# Patient Record
Sex: MALE | Race: WHITE | NOT HISPANIC OR LATINO | URBAN - METROPOLITAN AREA
[De-identification: names, ages, dates, MRNs, and addresses within clinical notes are randomized per-mention and may not be internally consistent; named-entity substitution may affect disease eponyms.]

---

## 2018-12-13 ENCOUNTER — EMERGENCY (EMERGENCY)
Facility: HOSPITAL | Age: 63
LOS: 1 days | Discharge: LEFT BEFORE DISCHARGE | End: 2018-12-13
Attending: EMERGENCY MEDICINE

## 2018-12-13 VITALS
TEMPERATURE: 98 F | RESPIRATION RATE: 18 BRPM | WEIGHT: 210.1 LBS | SYSTOLIC BLOOD PRESSURE: 226 MMHG | OXYGEN SATURATION: 98 % | DIASTOLIC BLOOD PRESSURE: 116 MMHG | HEIGHT: 72 IN | HEART RATE: 100 BPM
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
## 2018-12-13 NOTE — ED ADULT NURSE NOTE - CHPI ED NUR SYMPTOMS NEG
no difficulty bearing weight/no laceration/no back pain/no bruising/no dizziness/no loss of consciousness/no disorientation
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
## 2018-12-21 ENCOUNTER — APPOINTMENT (OUTPATIENT)
Dept: OTOLARYNGOLOGY | Facility: CLINIC | Age: 63
End: 2018-12-21

## 2019-01-18 ENCOUNTER — APPOINTMENT (OUTPATIENT)
Dept: OTOLARYNGOLOGY | Facility: CLINIC | Age: 64
End: 2019-01-18
Payer: SELF-PAY

## 2019-01-18 VITALS
WEIGHT: 210 LBS | BODY MASS INDEX: 28.44 KG/M2 | SYSTOLIC BLOOD PRESSURE: 173 MMHG | DIASTOLIC BLOOD PRESSURE: 91 MMHG | HEART RATE: 75 BPM | HEIGHT: 72 IN

## 2019-01-18 DIAGNOSIS — Z82.2 FAMILY HISTORY OF DEAFNESS AND HEARING LOSS: ICD-10-CM

## 2019-01-18 DIAGNOSIS — Z87.828 PERSONAL HISTORY OF OTHER (HEALED) PHYSICAL INJURY AND TRAUMA: ICD-10-CM

## 2019-01-18 DIAGNOSIS — Z78.9 OTHER SPECIFIED HEALTH STATUS: ICD-10-CM

## 2019-01-18 DIAGNOSIS — H90.3 SENSORINEURAL HEARING LOSS, BILATERAL: ICD-10-CM

## 2019-01-18 PROCEDURE — 99203 OFFICE O/P NEW LOW 30 MIN: CPT | Mod: 25

## 2019-01-18 PROCEDURE — 92567 TYMPANOMETRY: CPT

## 2019-01-18 PROCEDURE — 92563 TONE DECAY HEARING TEST: CPT

## 2019-01-18 PROCEDURE — 92557 COMPREHENSIVE HEARING TEST: CPT

## 2019-01-18 NOTE — REASON FOR VISIT
[Initial Consultation] : an initial consultation for [Hearing Loss] : hearing loss [FreeTextEntry2] : ear

## 2019-01-18 NOTE — REVIEW OF SYSTEMS
[Seasonal Allergies] : seasonal allergies [Hearing Loss] : hearing loss [Dizziness] : dizziness [Vertigo] : vertigo [Negative] : Heme/Lymph [FreeTextEntry1] : headache  weight loss
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
## 2019-02-15 ENCOUNTER — APPOINTMENT (OUTPATIENT)
Age: 64
End: 2019-02-15
Payer: SELF-PAY

## 2019-02-15 VITALS
SYSTOLIC BLOOD PRESSURE: 181 MMHG | HEIGHT: 72 IN | HEART RATE: 87 BPM | WEIGHT: 210 LBS | BODY MASS INDEX: 28.44 KG/M2 | DIASTOLIC BLOOD PRESSURE: 92 MMHG

## 2019-02-15 PROCEDURE — 92567 TYMPANOMETRY: CPT

## 2019-02-15 PROCEDURE — 99213 OFFICE O/P EST LOW 20 MIN: CPT | Mod: 25

## 2019-02-15 PROCEDURE — 92557 COMPREHENSIVE HEARING TEST: CPT
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
## 2019-02-15 NOTE — REASON FOR VISIT
[Subsequent Evaluation] : a subsequent evaluation for [Hearing Loss] : hearing loss [FreeTextEntry2] :  as hearing loss mild tinnitus
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
## 2019-03-11 ENCOUNTER — APPOINTMENT (OUTPATIENT)
Dept: OTOLARYNGOLOGY | Facility: CLINIC | Age: 64
End: 2019-03-11
Payer: SELF-PAY

## 2019-03-11 VITALS — BODY MASS INDEX: 28.44 KG/M2 | HEIGHT: 72 IN | WEIGHT: 210 LBS

## 2019-03-11 DIAGNOSIS — H90.12 CONDUCTIVE HEARING LOSS, UNILATERAL, LEFT EAR, WITH UNRESTRICTED HEARING ON THE CONTRALATERAL SIDE: ICD-10-CM

## 2019-03-11 PROCEDURE — 99213 OFFICE O/P EST LOW 20 MIN: CPT

## 2019-03-11 NOTE — PHYSICAL EXAM
[Normal] : tympanic membranes are normal in both ears [de-identified] : maria elena 512 lat to rt ac>bc as rinne

## 2019-03-11 NOTE — ASSESSMENT
[FreeTextEntry1] : cnow w as tinnitus due to hearing loss\par last audio conductive component\par rec fu audio 2 weeks

## 2019-03-11 NOTE — HISTORY OF PRESENT ILLNESS
[de-identified] : co as ringing ie high pitch whine\par  co discomfort and loss hearing as\par co as clogging

## 2019-03-18 ENCOUNTER — APPOINTMENT (OUTPATIENT)
Dept: OTOLARYNGOLOGY | Facility: CLINIC | Age: 64
End: 2019-03-18
Payer: SELF-PAY

## 2019-03-18 DIAGNOSIS — H68.022 CHRONIC EUSTACHIAN SALPINGITIS, LEFT EAR: ICD-10-CM

## 2019-03-18 DIAGNOSIS — H91.22 SUDDEN IDIOPATHIC HEARING LOSS, LEFT EAR: ICD-10-CM

## 2019-03-18 PROCEDURE — 92557 COMPREHENSIVE HEARING TEST: CPT

## 2019-03-18 PROCEDURE — 92567 TYMPANOMETRY: CPT

## 2019-03-18 PROCEDURE — 99213 OFFICE O/P EST LOW 20 MIN: CPT | Mod: 25
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
## 2019-03-18 NOTE — ASSESSMENT
[FreeTextEntry1] : audio as high frequency loss 6000 8000 hz \par source of tinnitus as \par most likely permanent\par fu audio 1 y

## 2020-02-05 ENCOUNTER — EMERGENCY (EMERGENCY)
Facility: HOSPITAL | Age: 65
LOS: 0 days | Discharge: HOME | End: 2020-02-06
Attending: EMERGENCY MEDICINE
Payer: MEDICARE

## 2020-02-05 VITALS
HEIGHT: 70 IN | HEART RATE: 79 BPM | OXYGEN SATURATION: 97 % | RESPIRATION RATE: 18 BRPM | TEMPERATURE: 99 F | SYSTOLIC BLOOD PRESSURE: 210 MMHG | DIASTOLIC BLOOD PRESSURE: 110 MMHG

## 2020-02-05 DIAGNOSIS — R41.82 ALTERED MENTAL STATUS, UNSPECIFIED: ICD-10-CM

## 2020-02-05 DIAGNOSIS — R53.1 WEAKNESS: ICD-10-CM

## 2020-02-05 DIAGNOSIS — R29.711 NIHSS SCORE 11: ICD-10-CM

## 2020-02-05 DIAGNOSIS — R47.9 UNSPECIFIED SPEECH DISTURBANCES: ICD-10-CM

## 2020-02-05 DIAGNOSIS — I63.9 CEREBRAL INFARCTION, UNSPECIFIED: ICD-10-CM

## 2020-02-05 LAB
ALBUMIN SERPL ELPH-MCNC: 3.3 G/DL — SIGNIFICANT CHANGE UP (ref 3.3–5)
ALP SERPL-CCNC: 81 U/L — SIGNIFICANT CHANGE UP (ref 40–120)
ALT FLD-CCNC: 28 U/L — SIGNIFICANT CHANGE UP (ref 12–78)
ANION GAP SERPL CALC-SCNC: 5 MMOL/L — SIGNIFICANT CHANGE UP (ref 5–17)
APTT BLD: 38.4 SEC — HIGH (ref 27.5–36.3)
AST SERPL-CCNC: 23 U/L — SIGNIFICANT CHANGE UP (ref 15–37)
BASOPHILS # BLD AUTO: 0.05 K/UL — SIGNIFICANT CHANGE UP (ref 0–0.2)
BASOPHILS NFR BLD AUTO: 0.5 % — SIGNIFICANT CHANGE UP (ref 0–2)
BILIRUB SERPL-MCNC: 0.2 MG/DL — SIGNIFICANT CHANGE UP (ref 0.2–1.2)
BUN SERPL-MCNC: 14 MG/DL — SIGNIFICANT CHANGE UP (ref 7–23)
CALCIUM SERPL-MCNC: 8.6 MG/DL — SIGNIFICANT CHANGE UP (ref 8.5–10.1)
CHLORIDE SERPL-SCNC: 102 MMOL/L — SIGNIFICANT CHANGE UP (ref 96–108)
CO2 SERPL-SCNC: 30 MMOL/L — SIGNIFICANT CHANGE UP (ref 22–31)
CREAT SERPL-MCNC: 1 MG/DL — SIGNIFICANT CHANGE UP (ref 0.5–1.3)
EOSINOPHIL # BLD AUTO: 0.02 K/UL — SIGNIFICANT CHANGE UP (ref 0–0.5)
EOSINOPHIL NFR BLD AUTO: 0.2 % — SIGNIFICANT CHANGE UP (ref 0–6)
GLUCOSE SERPL-MCNC: 128 MG/DL — HIGH (ref 70–99)
HCT VFR BLD CALC: 39.4 % — SIGNIFICANT CHANGE UP (ref 39–50)
HGB BLD-MCNC: 12.6 G/DL — LOW (ref 13–17)
IMM GRANULOCYTES NFR BLD AUTO: 0.4 % — SIGNIFICANT CHANGE UP (ref 0–1.5)
INR BLD: 0.98 RATIO — SIGNIFICANT CHANGE UP (ref 0.88–1.16)
LYMPHOCYTES # BLD AUTO: 0.54 K/UL — LOW (ref 1–3.3)
LYMPHOCYTES # BLD AUTO: 5.7 % — LOW (ref 13–44)
MCHC RBC-ENTMCNC: 29.3 PG — SIGNIFICANT CHANGE UP (ref 27–34)
MCHC RBC-ENTMCNC: 32 GM/DL — SIGNIFICANT CHANGE UP (ref 32–36)
MCV RBC AUTO: 91.6 FL — SIGNIFICANT CHANGE UP (ref 80–100)
MONOCYTES # BLD AUTO: 0.31 K/UL — SIGNIFICANT CHANGE UP (ref 0–0.9)
MONOCYTES NFR BLD AUTO: 3.3 % — SIGNIFICANT CHANGE UP (ref 2–14)
NEUTROPHILS # BLD AUTO: 8.51 K/UL — HIGH (ref 1.8–7.4)
NEUTROPHILS NFR BLD AUTO: 89.9 % — HIGH (ref 43–77)
PLATELET # BLD AUTO: 230 K/UL — SIGNIFICANT CHANGE UP (ref 150–400)
POTASSIUM SERPL-MCNC: 4.2 MMOL/L — SIGNIFICANT CHANGE UP (ref 3.5–5.3)
POTASSIUM SERPL-SCNC: 4.2 MMOL/L — SIGNIFICANT CHANGE UP (ref 3.5–5.3)
PROT SERPL-MCNC: 6.7 GM/DL — SIGNIFICANT CHANGE UP (ref 6–8.3)
PROTHROM AB SERPL-ACNC: 10.9 SEC — SIGNIFICANT CHANGE UP (ref 10–12.9)
RBC # BLD: 4.3 M/UL — SIGNIFICANT CHANGE UP (ref 4.2–5.8)
RBC # FLD: 12.9 % — SIGNIFICANT CHANGE UP (ref 10.3–14.5)
SODIUM SERPL-SCNC: 137 MMOL/L — SIGNIFICANT CHANGE UP (ref 135–145)
WBC # BLD: 9.47 K/UL — SIGNIFICANT CHANGE UP (ref 3.8–10.5)
WBC # FLD AUTO: 9.47 K/UL — SIGNIFICANT CHANGE UP (ref 3.8–10.5)

## 2020-02-05 PROCEDURE — G0425: CPT

## 2020-02-05 PROCEDURE — 99291 CRITICAL CARE FIRST HOUR: CPT | Mod: 25

## 2020-02-05 PROCEDURE — 70450 CT HEAD/BRAIN W/O DYE: CPT

## 2020-02-05 PROCEDURE — 70496 CT ANGIOGRAPHY HEAD: CPT | Mod: 26

## 2020-02-05 PROCEDURE — 96374 THER/PROPH/DIAG INJ IV PUSH: CPT | Mod: XU

## 2020-02-05 PROCEDURE — 85025 COMPLETE CBC W/AUTO DIFF WBC: CPT

## 2020-02-05 PROCEDURE — 99291 CRITICAL CARE FIRST HOUR: CPT

## 2020-02-05 PROCEDURE — 93005 ELECTROCARDIOGRAM TRACING: CPT

## 2020-02-05 PROCEDURE — 96375 TX/PRO/DX INJ NEW DRUG ADDON: CPT | Mod: XU

## 2020-02-05 PROCEDURE — 85730 THROMBOPLASTIN TIME PARTIAL: CPT

## 2020-02-05 PROCEDURE — 80053 COMPREHEN METABOLIC PANEL: CPT

## 2020-02-05 PROCEDURE — 85610 PROTHROMBIN TIME: CPT

## 2020-02-05 PROCEDURE — 36415 COLL VENOUS BLD VENIPUNCTURE: CPT

## 2020-02-05 PROCEDURE — 70498 CT ANGIOGRAPHY NECK: CPT | Mod: 26

## 2020-02-05 PROCEDURE — 70496 CT ANGIOGRAPHY HEAD: CPT

## 2020-02-05 PROCEDURE — 70498 CT ANGIOGRAPHY NECK: CPT

## 2020-02-05 PROCEDURE — 82962 GLUCOSE BLOOD TEST: CPT

## 2020-02-05 PROCEDURE — 93010 ELECTROCARDIOGRAM REPORT: CPT

## 2020-02-05 RX ORDER — ALTEPLASE 100 MG
8.9 KIT INTRAVENOUS ONCE
Refills: 0 | Status: COMPLETED | OUTPATIENT
Start: 2020-02-05 | End: 2020-02-05

## 2020-02-05 RX ORDER — ALTEPLASE 100 MG
80.2 KIT INTRAVENOUS ONCE
Refills: 0 | Status: COMPLETED | OUTPATIENT
Start: 2020-02-05 | End: 2020-02-05

## 2020-02-05 RX ORDER — LABETALOL HCL 100 MG
10 TABLET ORAL ONCE
Refills: 0 | Status: COMPLETED | OUTPATIENT
Start: 2020-02-05 | End: 2020-02-05

## 2020-02-05 RX ORDER — NICARDIPINE HYDROCHLORIDE 30 MG/1
5 CAPSULE, EXTENDED RELEASE ORAL
Qty: 40 | Refills: 0 | Status: DISCONTINUED | OUTPATIENT
Start: 2020-02-05 | End: 2020-02-06

## 2020-02-05 RX ADMIN — Medication 10 MILLIGRAM(S): at 22:45

## 2020-02-05 RX ADMIN — NICARDIPINE HYDROCHLORIDE 25 MG/HR: 30 CAPSULE, EXTENDED RELEASE ORAL at 22:47

## 2020-02-05 RX ADMIN — ALTEPLASE 80.2 MILLIGRAM(S): KIT at 22:50

## 2020-02-05 RX ADMIN — ALTEPLASE 534 MILLIGRAM(S): KIT at 22:48

## 2020-02-05 NOTE — ED PROVIDER NOTE - PROGRESS NOTE DETAILS
Alissa BOLANOS for ED attending, Dr. Jarrell: TPA administered. Case d/w telestroke attending (see note).  Pt is not improving after TPA and has a significant lesion on CTA.  Transfer to [facility] coordinated by telestroke.  Repeat CT head order to r/o ICH s/p administration of TPA.  Cricket Jarrell, DO

## 2020-02-05 NOTE — ED ADULT NURSE NOTE - AGENT'S NAME
Phentermine Approved        Filling Pharmacy: [facility] [id number]  
Phentermine Pending    Insurance response  Prescription Drug Insurance: [employer]  Notes: Prior authorization submitted - will update provider when decision has been made by insurance.         
Sheeba wife

## 2020-02-05 NOTE — ED PROVIDER NOTE - CRITICAL CARE PROVIDED
additional history taking/consultation with other physicians/documentation/interpretation of diagnostic studies/consult w/ pt's family directly relating to pts condition/direct patient care (not related to procedure)

## 2020-02-05 NOTE — ED ADULT NURSE NOTE - CHIEF COMPLAINT QUOTE
AS per EMS and family, 2 hours PTA patient noted to become confused, right sided weakness, slurred speech. No pertinent medical history as per family.  at triage. MD Jarrell at triage, Code Stroke called [phone].

## 2020-02-05 NOTE — ED ADULT TRIAGE NOTE - CHIEF COMPLAINT QUOTE
AS per EMS and family, 2 hours PTA patient noted to become confused, right sided weakness, slurred speech. No pertinent medical history as per family.  at triage. MD Jarrell at triage, Code Stroke called [phone].

## 2020-02-05 NOTE — STROKE CODE NOTE - ASSESSMENT/PLAN
Ischemic stroke NIHSS 11, ASPECT SCORE 9, cta SHOWED LEFT ICA occlusion s/p tPA at 22:48  CT perfusion at [place]   Neurointerventional surgeon will  assess the patient at [facility] for endovascular, already informed Ischemic stroke NIHSS 11, ASPECT SCORE 9, CTA SHOWED LEFT ICA occlusion s/p tPA at 22:48  CT perfusion at [place]   Neurointerventional surgeon will  assess the patient at [place] for endovascular, already informed

## 2020-02-05 NOTE — ED ADULT NURSE REASSESSMENT NOTE - NS ED NURSE REASSESS COMMENT FT1
As per , maintain TPA infusion below 185 SBP, instruction verified by  with TARUN Baron at bedside, will continue to monitor.

## 2020-02-05 NOTE — ED ADULT NURSE NOTE - NSIMPLEMENTINTERV_GEN_ALL_ED
Implemented All Fall Risk Interventions:  [place] to call system. Call bell, personal items and telephone within reach. Instruct patient to call for assistance. Room bathroom lighting operational. Non-slip footwear when patient is off stretcher. Physically safe environment: no spills, clutter or unnecessary equipment. Stretcher in lowest position, wheels locked, appropriate side rails in place. Provide visual cue, wrist band, yellow gown, etc. Monitor gait and stability. Monitor for mental status changes and reorient to person, place, and time. Review medications for side effects contributing to fall risk. Reinforce activity limits and safety measures with patient and family.

## 2020-02-05 NOTE — STROKE CODE NOTE - SUBJECTIVE
HPI Objective Statement: 63 y/o male with no pertinent PMHx presents to the ED BIBA for weakness. Last known normal was at 7: 30 pm. Per family at bedside, pt started to become confused, was not conversing per his baseline, and was having right sided weakness. Pt was unable to speak/was speaking less. He was given tPA at 22:48 pm by ED physician HPI Objective Statement: 65 y/o male with no pertinent PMHx presents to the ED BIBA for weakness. Last known normal was at 7: 30 pm. Per family at bedside, pt started to become confused, was not conversing per his baseline, and was having right sided weakness. Pt was unable to speak/was speaking less. He was given tPA at 22:48 pm by ED physician before activation of the telestroke.

## 2020-02-05 NOTE — ED PROVIDER NOTE - OBJECTIVE STATEMENT
63 y/o male with no pertinent PMHx presents to the ED BIBA for stroke-like sx 2 hours PTA. Per family at bedside, pt started to become confused, was not conversing per his baseline, and was having right sided weakness. Pt was unable to speak/was speaking less. Per family, pt had recent surgery in December 2019 but cannot recall what it was. Bp 210/110 at ED. No blood thinners.

## 2020-02-05 NOTE — ED ADULT NURSE REASSESSMENT NOTE - NS ED NURSE REASSESS COMMENT FT1
Instructed by  to administer TPA with SBP of 184, states no need for Telestroke at this time, tpa will be pushed, ct read +cva, -bleed.

## 2020-02-05 NOTE — ED PROVIDER NOTE - CLINICAL SUMMARY MEDICAL DECISION MAKING FREE TEXT BOX
Pt presents with acute stroke presentation within TPA window. Stat CT head to r/o ICH. Pt give Labetalol to reduce systolic BP below 185, admit to ICU. Pt presents with acute stroke presentation within TPA window. Stat CT head to r/o ICH. Pt give Labetalol to reduce systolic BP below 185, admit to ICU.  Pt started on Nicardipine drip to control BP.  Tele-stroke attending contacted for transfer as pt has a significant lesion without improvement after TPA.

## 2020-02-05 NOTE — STROKE CODE NOTE - OBJECTIVE
65 yo right handed with ischemic stroke, NIHSS 11, ASPECT SCORE 9, cta SHOWED LEFT ICA occlusion s/p tPA  Neurointerventional called at [place] ( Dr Dale), will transfer patient to [place]

## 2020-02-06 ENCOUNTER — TRANSCRIPTION ENCOUNTER (OUTPATIENT)
Age: 65
End: 2020-02-06

## 2020-02-06 ENCOUNTER — INPATIENT (INPATIENT)
Facility: HOSPITAL | Age: 65
LOS: 6 days | Discharge: HOME | DRG: 64 | End: 2020-02-13
Attending: HOSPITALIST | Admitting: INTERNAL MEDICINE
Payer: COMMERCIAL

## 2020-02-06 VITALS
DIASTOLIC BLOOD PRESSURE: 83 MMHG | HEIGHT: 70 IN | HEART RATE: 71 BPM | SYSTOLIC BLOOD PRESSURE: 144 MMHG | RESPIRATION RATE: 14 BRPM | TEMPERATURE: 99 F | OXYGEN SATURATION: 98 % | WEIGHT: 217.82 LBS

## 2020-02-06 VITALS
DIASTOLIC BLOOD PRESSURE: 77 MMHG | SYSTOLIC BLOOD PRESSURE: 166 MMHG | OXYGEN SATURATION: 96 % | HEART RATE: 74 BPM | RESPIRATION RATE: 12 BRPM

## 2020-02-06 DIAGNOSIS — I63.9 CEREBRAL INFARCTION, UNSPECIFIED: ICD-10-CM

## 2020-02-06 LAB
ANION GAP SERPL CALC-SCNC: 12 MMOL/L — SIGNIFICANT CHANGE UP (ref 5–17)
BUN SERPL-MCNC: 9 MG/DL — SIGNIFICANT CHANGE UP (ref 8–20)
CALCIUM SERPL-MCNC: 9.3 MG/DL — SIGNIFICANT CHANGE UP (ref 8.6–10.2)
CHLORIDE SERPL-SCNC: 98 MMOL/L — SIGNIFICANT CHANGE UP (ref 98–107)
CHOLEST SERPL-MCNC: 187 MG/DL — SIGNIFICANT CHANGE UP (ref 110–199)
CO2 SERPL-SCNC: 26 MMOL/L — SIGNIFICANT CHANGE UP (ref 22–29)
CREAT SERPL-MCNC: 0.56 MG/DL — SIGNIFICANT CHANGE UP (ref 0.5–1.3)
GLUCOSE SERPL-MCNC: 135 MG/DL — HIGH (ref 70–99)
HBA1C BLD-MCNC: 6 % — HIGH (ref 4–5.6)
HCT VFR BLD CALC: 40.1 % — SIGNIFICANT CHANGE UP (ref 39–50)
HDLC SERPL-MCNC: 75 MG/DL — SIGNIFICANT CHANGE UP
HGB BLD-MCNC: 12.8 G/DL — LOW (ref 13–17)
LIPID PNL WITH DIRECT LDL SERPL: 104 MG/DL — SIGNIFICANT CHANGE UP
MAGNESIUM SERPL-MCNC: 2 MG/DL — SIGNIFICANT CHANGE UP (ref 1.6–2.6)
MCHC RBC-ENTMCNC: 28.8 PG — SIGNIFICANT CHANGE UP (ref 27–34)
MCHC RBC-ENTMCNC: 31.9 GM/DL — LOW (ref 32–36)
MCV RBC AUTO: 90.3 FL — SIGNIFICANT CHANGE UP (ref 80–100)
PHOSPHATE SERPL-MCNC: 3.9 MG/DL — SIGNIFICANT CHANGE UP (ref 2.4–4.7)
PLATELET # BLD AUTO: 236 K/UL — SIGNIFICANT CHANGE UP (ref 150–400)
POTASSIUM SERPL-MCNC: 5 MMOL/L — SIGNIFICANT CHANGE UP (ref 3.5–5.3)
POTASSIUM SERPL-SCNC: 5 MMOL/L — SIGNIFICANT CHANGE UP (ref 3.5–5.3)
RBC # BLD: 4.44 M/UL — SIGNIFICANT CHANGE UP (ref 4.2–5.8)
RBC # FLD: 12.8 % — SIGNIFICANT CHANGE UP (ref 10.3–14.5)
SODIUM SERPL-SCNC: 136 MMOL/L — SIGNIFICANT CHANGE UP (ref 135–145)
TOTAL CHOLESTEROL/HDL RATIO MEASUREMENT: 2 RATIO — LOW (ref 3.4–9.6)
TRIGL SERPL-MCNC: 42 MG/DL — SIGNIFICANT CHANGE UP (ref 10–200)
WBC # BLD: 9.51 K/UL — SIGNIFICANT CHANGE UP (ref 3.8–10.5)
WBC # FLD AUTO: 9.51 K/UL — SIGNIFICANT CHANGE UP (ref 3.8–10.5)

## 2020-02-06 PROCEDURE — 93306 TTE W/DOPPLER COMPLETE: CPT | Mod: 26

## 2020-02-06 PROCEDURE — 99223 1ST HOSP IP/OBS HIGH 75: CPT

## 2020-02-06 PROCEDURE — 0042T: CPT

## 2020-02-06 PROCEDURE — 99223 1ST HOSP IP/OBS HIGH 75: CPT | Mod: AI

## 2020-02-06 PROCEDURE — 70552 MRI BRAIN STEM W/DYE: CPT | Mod: 26

## 2020-02-06 PROCEDURE — 70450 CT HEAD/BRAIN W/O DYE: CPT | Mod: 26,77

## 2020-02-06 PROCEDURE — 99285 EMERGENCY DEPT VISIT HI MDM: CPT

## 2020-02-06 PROCEDURE — 70450 CT HEAD/BRAIN W/O DYE: CPT | Mod: 26

## 2020-02-06 PROCEDURE — 93880 EXTRACRANIAL BILAT STUDY: CPT | Mod: 26

## 2020-02-06 RX ORDER — NICARDIPINE HYDROCHLORIDE 30 MG/1
7.5 CAPSULE, EXTENDED RELEASE ORAL
Qty: 40 | Refills: 0 | Status: DISCONTINUED | OUTPATIENT
Start: 2020-02-06 | End: 2020-02-06

## 2020-02-06 RX ORDER — NICARDIPINE HYDROCHLORIDE 30 MG/1
5 CAPSULE, EXTENDED RELEASE ORAL
Qty: 40 | Refills: 0 | Status: DISCONTINUED | OUTPATIENT
Start: 2020-02-06 | End: 2020-02-07

## 2020-02-06 RX ORDER — ATORVASTATIN CALCIUM 80 MG/1
80 TABLET, FILM COATED ORAL AT BEDTIME
Refills: 0 | Status: DISCONTINUED | OUTPATIENT
Start: 2020-02-06 | End: 2020-02-13

## 2020-02-06 RX ADMIN — NICARDIPINE HYDROCHLORIDE 37.5 MG/HR: 30 CAPSULE, EXTENDED RELEASE ORAL at 02:57

## 2020-02-06 NOTE — H&P ADULT - NSHPSOCIALHISTORY_GEN_ALL_CORE
Lives at home w/ significant other, Sheeba. Hx of oxycodone abuse, now on suboxone. Current nicotine vape smoker x2 yrs, smoked cigarettes for 2-3 years prior to vaping. Denies hx of EtOH abuse.

## 2020-02-06 NOTE — CONSULT NOTE ADULT - ASSESSMENT
This is a 63 y/o M w/ no significant PMHx who was transferred from [facility] ED with L fronto-temporal CVA s/p tPA administration at 22:48. CTA head performed at [facility] revealing of total left ICA occlusion w/ right hemiplegia and slurred speech  Now admitted to MICU  Vacular surgery consulted for Left ICA occlusion  -No acute surgical intevention  -Recommend Carotid duplex  -Further management per MICU  -We will follow    Patient seen and examined, d/w Dr. Lowery

## 2020-02-06 NOTE — PROGRESS NOTE ADULT - SUBJECTIVE AND OBJECTIVE BOX
Patient is a 64y old  Male who presents with a chief complaint of Acute CVA (06 Feb 2020 08:56)      BRIEF HOSPITAL COURSE: ***    Events last 24 hours: ***    PAST MEDICAL & SURGICAL HISTORY:  No pertinent past medical history  No significant past surgical history    Allergies    No Known Allergies    Intolerances      FAMILY HISTORY:      Social History:     Review of Systems:  CONSTITUTIONAL: No fever, chills, or fatigue  EYES: No eye pain, visual disturbances, or discharge  ENMT:  No difficulty hearing, tinnitus, vertigo; No sinus or throat pain  NECK: No pain or stiffness  RESPIRATORY: No cough, wheezing, chills or hemoptysis; No shortness of breath  CARDIOVASCULAR: No chest pain, palpitations, dizziness, or leg swelling  GASTROINTESTINAL: No abdominal or epigastric pain. No nausea, vomiting, or hematemesis; No diarrhea or constipation. No melena or hematochezia.  GENITOURINARY: No dysuria, frequency, hematuria, or incontinence  NEUROLOGICAL: No headaches, memory loss, loss of strength, numbness, or tremors  SKIN: No itching, burning, rashes, or lesions   MUSCULOSKELETAL: No joint pain or swelling; No muscle, back, or extremity pain  PSYCHIATRIC: No depression, anxiety, mood swings, or difficulty sleeping      Vitals During Exam:   HR:   BP:   RR:  sPO2:     Physical Examination:    General: No acute distress.      HEENT: Pupils equal, reactive to light.  Symmetric.    PULM: Clear to auscultation bilaterally, no significant sputum production    CVS: Regular rate and rhythm, no murmurs, rubs, or gallops    ABD: Soft, nondistended, nontender, normoactive bowel sounds, no masses    EXT: No edema, nontender    SKIN: Warm and well perfused, no rashes noted.    NEURO: Alert, oriented, interactive, nonfocal      Medications:    niCARdipine Infusion 5 mG/Hr IV Continuous <Continuous>                atorvastatin 80 milliGRAM(s) Oral at bedtime                  ICU Vital Signs Last 24 Hrs  T(C): 36.8 (06 Feb 2020 07:37), Max: 37.3 (05 Feb 2020 22:22)  T(F): 98.2 (06 Feb 2020 07:37), Max: 99.1 (05 Feb 2020 22:22)  HR: 64 (06 Feb 2020 09:00) (61 - 87)  BP: 160/97 (06 Feb 2020 09:00) (122/58 - 210/110)  BP(mean): 116 (06 Feb 2020 09:00) (83 - 129)  ABP: --  ABP(mean): --  RR: 12 (06 Feb 2020 09:00) (10 - 23)  SpO2: 93% (06 Feb 2020 09:00) (93% - 100%)    Vital Signs Last 24 Hrs  T(C): 36.8 (06 Feb 2020 07:37), Max: 37.3 (05 Feb 2020 22:22)  T(F): 98.2 (06 Feb 2020 07:37), Max: 99.1 (05 Feb 2020 22:22)  HR: 64 (06 Feb 2020 09:00) (61 - 87)  BP: 160/97 (06 Feb 2020 09:00) (122/58 - 210/110)  BP(mean): 116 (06 Feb 2020 09:00) (83 - 129)  RR: 12 (06 Feb 2020 09:00) (10 - 23)  SpO2: 93% (06 Feb 2020 09:00) (93% - 100%)        I&O's Detail    05 Feb 2020 07:01  -  06 Feb 2020 07:00  --------------------------------------------------------  IN:    niCARdipine Infusion: 25 mL  Total IN: 25 mL    OUT:    Voided: 160 mL  Total OUT: 160 mL    Total NET: -135 mL      06 Feb 2020 07:01  -  06 Feb 2020 09:35  --------------------------------------------------------  IN:  Total IN: 0 mL    OUT:    Voided: 200 mL  Total OUT: 200 mL    Total NET: -200 mL            LABS:                        12.8   9.51  )-----------( 236      ( 06 Feb 2020 07:40 )             40.1     02-06    136  |  98  |  9.0  ----------------------------<  135<H>  5.0   |  26.0  |  0.56    Ca    9.3      06 Feb 2020 07:40  Phos  3.9     02-06  Mg     2.0     02-06    TPro  6.7  /  Alb  3.3  /  TBili  0.2  /  DBili  x   /  AST  23  /  ALT  28  /  AlkPhos  81  02-05          CAPILLARY BLOOD GLUCOSE      POCT Blood Glucose.: 140 mg/dL (06 Feb 2020 01:06)    PT/INR - ( 05 Feb 2020 22:43 )   PT: 10.9 sec;   INR: 0.98 ratio         PTT - ( 05 Feb 2020 22:43 )  PTT:38.4 sec      RADIOLOGY:   < from: CT Perfusion w/ Maps w/ IV Cont (02.06.20 @ 01:29) >   EXAM:  CT PERFUSION W MAPS IC                         EXAM:  CT BRAIN                          PROCEDURE DATE:  02/06/2020          INTERPRETATION:  HISTORY: Status post TPA. Altered mental status.    COMPARISON: CT head 2/6/2020    TECHNIQUE: Axial noncontrast CT images from the skull base to the vertex were obtained and submitted for interpretation. Coronal and sagittal reformatted images were performed. Bone and soft tissue windows were evaluated. Perfusion maps were generated and submittedfor evaluation.    FINDINGS:    CT HEAD    Subtle loss of gray-white differentiation in the left superior frontal gyrus due to acute infarction.    There is no acute intracranial mass-effect, hemorrhage, midline shift, or abnormal extra-axial fluid collection.     Ventricles, sulci, and cisterns are normal in size for the patient's age without hydrocephalus. Basal cisterns are patent.     Visualized paranasal sinuses and mastoid air cells are clear. Calvarium is intact.     CT PERFUSION    Moderate to large region of asymmetric decrease in time parameters, including mean transit time and Tmax, in the left anterior circulation involving the left frontoparietal region. There is a small region of matched decrease in cerebral blood volume and blood flow in the left superior frontal gyrus due to acute infarction.     No abnormality noted in the right cerebral hemisphere.    IMPRESSION:     CT HEAD: Acute infarction of the left superior frontal gyrus. No acute intracranial bleeding.    CT PERFUSION: Small to moderate-sized left frontal infarction with moderate to large region of low flow and/or ischemic state in the left frontoparietal region. Findings are in keeping with mid left cervical ICA occlusion.    NORMA LEES M.D., ATTENDING RADIOLOGIST  This document has been electronically signed. Feb 6 2020  1:45AM    < end of copied text >    < from: CT Head No Cont (02.06.20 @ 01:28) >   EXAM:  CT PERFUSION W MAPS IC                         EXAM:  CT BRAIN                          PROCEDURE DATE:  02/06/2020      INTERPRETATION:  HISTORY: Status post TPA. Altered mental status.    COMPARISON: CT head 2/6/2020    TECHNIQUE: Axial noncontrast CT images from the skull base to the vertex were obtained and submitted for interpretation. Coronal and sagittal reformatted images were performed. Bone and soft tissue windows were evaluated. Perfusion maps were generated and submittedfor evaluation.    FINDINGS:    CT HEAD    Subtle loss of gray-white differentiation in the left superior frontal gyrus due to acute infarction.    There is no acute intracranial mass-effect, hemorrhage, midline shift, or abnormal extra-axial fluid collection.     Ventricles, sulci, and cisterns are normal in size for the patient's age without hydrocephalus. Basal cisterns are patent.     Visualized paranasal sinuses and mastoid air cells are clear. Calvarium is intact.     CT PERFUSION    Moderate to large region of asymmetric decrease in time parameters, including mean transit time and Tmax, in the left anterior circulation involving the left frontoparietal region. There is a small region of matched decrease in cerebral blood volume and blood flow in the left superior frontal gyrus due to acute infarction.     No abnormality noted in the right cerebral hemisphere.    IMPRESSION:     CT HEAD: Acute infarction of the left superior frontal gyrus. No acute intracranial bleeding.    CT PERFUSION: Small to moderate-sized left frontal infarction with moderate to large region of low flow and/or ischemic state in the left frontoparietal region. Findings are in keeping with mid left cervical ICA occlusion.    NORMA LEES M.D., ATTENDING RADIOLOGIST  This document has been electronically signed. Feb 6 2020  1:45AM    < end of copied text >      SUPPLEMENTAL O2: NC   LINES: Peripheral IV  IVF:   CULVER:   PPx:   CONTACT: Patient is a 64y old  Male who presents with a chief complaint of Acute CVA (06 Feb 2020 08:56)      BRIEF HOSPITAL COURSE:   65 yo m no significant pmhx transferred from  with acute CVA.  Patient presented to  with right sided weakness and ataxia.  Patient was in his normal state of health and then at ~1930 yesterday patient's wife noted him to be swaying to one side and then with right sided weakness with progression to aphasia prompting 911 call.  In  ED patient with /100, slurred speech and dense right sided hemiplegia, Code Stroke, head ct negative, started on cardene gtt and tpa given at 22:48 2/5/2020.  CTA performed that revealed moderate left frontal infarction with moderate to large region of low flow/ischemic state in left frontoparietal region and significant occlusion of left internal carotid prompting transfer to [facility]. Patient seen by vascular, not a candidate for surgical intervention at that time.      Events last 24 hours:   This am called by radiology,  CTA head/neck with new read: right carotid dissection.  Vascular team called, made aware and considering full ac      PAST MEDICAL & SURGICAL HISTORY:  No pertinent past medical history  No significant past surgical history    Allergies  No Known Allergies      FAMILY HISTORY:  unknown     Social History:   From home, lives with significant other (Sheeba).  Remote oxycodone abuse now on suboxone.  Active vape smoker x2 years, prior smoked cigarettes for # years. No etoh use.       Review of Systems:  All ROS negative except as appreciated.       Vitals During Exam:   HR: 64  BP: 160/97 mmHg   RR: 12  sPO2: 94% on RA     Physical Examination:    General: Elderly male, lying in bed, appears comfortable.     HEENT: Pupils equal, reactive to light.  Symmetric.    PULM: Clear to auscultation bilaterally, no significant sputum production    CVS: Regular rate and rhythm, no murmurs, rubs, or gallops    ABD: Soft, nondistended, nontender, normoactive bowel sounds, no masses    EXT: No edema, nontender    SKIN: Warm and well perfused, no rashes noted.    NEURO: Alert, oriented, interactive, nonfocal      Medications:  niCARdipine Infusion 5 mG/Hr IV Continuous <Continuous>  atorvastatin 80 milliGRAM(s) Oral at bedtime      ICU Vital Signs Last 24 Hrs  T(C): 36.8 (06 Feb 2020 07:37), Max: 37.3 (05 Feb 2020 22:22)  T(F): 98.2 (06 Feb 2020 07:37), Max: 99.1 (05 Feb 2020 22:22)  HR: 64 (06 Feb 2020 09:00) (61 - 87)  BP: 160/97 (06 Feb 2020 09:00) (122/58 - 210/110)  BP(mean): 116 (06 Feb 2020 09:00) (83 - 129)  ABP: --  ABP(mean): --  RR: 12 (06 Feb 2020 09:00) (10 - 23)  SpO2: 93% (06 Feb 2020 09:00) (93% - 100%)    Vital Signs Last 24 Hrs  T(C): 36.8 (06 Feb 2020 07:37), Max: 37.3 (05 Feb 2020 22:22)  T(F): 98.2 (06 Feb 2020 07:37), Max: 99.1 (05 Feb 2020 22:22)  HR: 64 (06 Feb 2020 09:00) (61 - 87)  BP: 160/97 (06 Feb 2020 09:00) (122/58 - 210/110)  BP(mean): 116 (06 Feb 2020 09:00) (83 - 129)  RR: 12 (06 Feb 2020 09:00) (10 - 23)  SpO2: 93% (06 Feb 2020 09:00) (93% - 100%)      I&O's Detail    05 Feb 2020 07:01  -  06 Feb 2020 07:00  --------------------------------------------------------  IN:    niCARdipine Infusion: 25 mL  Total IN: 25 mL    OUT:    Voided: 160 mL  Total OUT: 160 mL  Total NET: -135 mL      06 Feb 2020 07:01  -  06 Feb 2020 09:35  --------------------------------------------------------  IN:  Total IN: 0 mL    OUT:    Voided: 200 mL  Total OUT: 200 mL  Total NET: -200 mL      LABS:                        12.8   9.51  )-----------( 236      ( 06 Feb 2020 07:40 )             40.1     02-06    136  |  98  |  9.0  ----------------------------<  135<H>  5.0   |  26.0  |  0.56    Ca    9.3      06 Feb 2020 07:40  Phos  3.9     02-06  Mg     2.0     02-06    TPro  6.7  /  Alb  3.3  /  TBili  0.2  /  DBili  x   /  AST  23  /  ALT  28  /  AlkPhos  81  02-05      CAPILLARY BLOOD GLUCOSE  POCT Blood Glucose.: 140 mg/dL (06 Feb 2020 01:06)      PT/INR - ( 05 Feb 2020 22:43 )   PT: 10.9 sec;   INR: 0.98 ratio    PTT - ( 05 Feb 2020 22:43 )  PTT:38.4 sec      RADIOLOGY:   < from: CT Perfusion w/ Maps w/ IV Cont (02.06.20 @ 01:29) >   EXAM:  CT PERFUSION W MAPS IC                         EXAM:  CT BRAIN                          PROCEDURE DATE:  02/06/2020      INTERPRETATION:  HISTORY: Status post TPA. Altered mental status.    COMPARISON: CT head 2/6/2020    TECHNIQUE: Axial noncontrast CT images from the skull base to the vertex were obtained and submitted for interpretation. Coronal and sagittal reformatted images were performed. Bone and soft tissue windows were evaluated. Perfusion maps were generated and submittedfor evaluation.    FINDINGS:    CT HEAD    Subtle loss of gray-white differentiation in the left superior frontal gyrus due to acute infarction.    There is no acute intracranial mass-effect, hemorrhage, midline shift, or abnormal extra-axial fluid collection.     Ventricles, sulci, and cisterns are normal in size for the patient's age without hydrocephalus. Basal cisterns are patent.     Visualized paranasal sinuses and mastoid air cells are clear. Calvarium is intact.     CT PERFUSION    Moderate to large region of asymmetric decrease in time parameters, including mean transit time and Tmax, in the left anterior circulation involving the left frontoparietal region. There is a small region of matched decrease in cerebral blood volume and blood flow in the left superior frontal gyrus due to acute infarction.     No abnormality noted in the right cerebral hemisphere.    IMPRESSION:     CT HEAD: Acute infarction of the left superior frontal gyrus. No acute intracranial bleeding.    CT PERFUSION: Small to moderate-sized left frontal infarction with moderate to large region of low flow and/or ischemic state in the left frontoparietal region. Findings are in keeping with mid left cervical ICA occlusion.    NORMA LEES M.D., ATTENDING RADIOLOGIST  This document has been electronically signed. Feb 6 2020  1:45AM    < end of copied text >    < from: CT Head No Cont (02.06.20 @ 01:28) >   EXAM:  CT PERFUSION W MAPS IC                         EXAM:  CT BRAIN                          PROCEDURE DATE:  02/06/2020      INTERPRETATION:  HISTORY: Status post TPA. Altered mental status.    COMPARISON: CT head 2/6/2020    TECHNIQUE: Axial noncontrast CT images from the skull base to the vertex were obtained and submitted for interpretation. Coronal and sagittal reformatted images were performed. Bone and soft tissue windows were evaluated. Perfusion maps were generated and submittedfor evaluation.    FINDINGS:    CT HEAD    Subtle loss of gray-white differentiation in the left superior frontal gyrus due to acute infarction.    There is no acute intracranial mass-effect, hemorrhage, midline shift, or abnormal extra-axial fluid collection.     Ventricles, sulci, and cisterns are normal in size for the patient's age without hydrocephalus. Basal cisterns are patent.     Visualized paranasal sinuses and mastoid air cells are clear. Calvarium is intact.     CT PERFUSION    Moderate to large region of asymmetric decrease in time parameters, including mean transit time and Tmax, in the left anterior circulation involving the left frontoparietal region. There is a small region of matched decrease in cerebral blood volume and blood flow in the left superior frontal gyrus due to acute infarction.     No abnormality noted in the right cerebral hemisphere.    IMPRESSION:     CT HEAD: Acute infarction of the left superior frontal gyrus. No acute intracranial bleeding.    CT PERFUSION: Small to moderate-sized left frontal infarction with moderate to large region of low flow and/or ischemic state in the left frontoparietal region. Findings are in keeping with mid left cervical ICA occlusion.    NORMA LEES M.D., ATTENDING RADIOLOGIST  This document has been electronically signed. Feb 6 2020  1:45AM    < end of copied text >      SUPPLEMENTAL O2: NC   LINES: Peripheral IV  IVF: N  CULVER: N  PPx: SCD  CONTACT: N Patient is a 64y old  Male who presents with a chief complaint of Acute CVA (06 Feb 2020 08:56)      BRIEF HOSPITAL COURSE:   65 yo m no significant pmhx transferred from  with acute CVA.  Patient presented to  with right sided weakness and ataxia.  Patient was in his normal state of health and then at ~1930 yesterday patient's wife noted him to be swaying to one side and then with right sided weakness with progression to aphasia prompting 911 call.  In  ED patient with /100, slurred speech and dense right sided hemiplegia, Code Stroke, head ct negative, started on cardene gtt and tpa given at 22:48 2/5/2020.  CTA performed that revealed moderate left frontal infarction with moderate to large region of low flow/ischemic state in left frontoparietal region and significant occlusion of left internal carotid prompting transfer to [facility]. Patient seen by vascular, not a candidate for surgical intervention at that time.      Events last 24 hours:   This am called by radiology,  CTA head/neck with new read: right carotid dissection.  Vascular team called, made aware and considering full ac.  MRi/a ordered.       PAST MEDICAL & SURGICAL HISTORY:  No pertinent past medical history  No significant past surgical history    Allergies  No Known Allergies      FAMILY HISTORY:  unknown     Social History:   From home, lives with significant other (Sheeba).  Remote oxycodone abuse now on suboxone.  Active vape smoker x2 years, prior smoked cigarettes for # years. No etoh use.       Review of Systems:  All ROS negative except as appreciated.       Vitals During Exam:   HR: 64  BP: 160/97 mmHg   RR: 12  sPO2: 94% on RA     Physical Examination:    General: Elderly male, lying in bed, appears comfortable.     HEENT: Pupils equal, reactive to light.  Symmetric.    PULM: Clear to auscultation bilaterally, no significant sputum production    CVS: Regular rate and rhythm, no murmurs, rubs, or gallops    ABD: Soft, nondistended, nontender, normoactive bowel sounds, no masses    EXT: No edema, nontender    SKIN: Warm and well perfused, no rashes noted.    NEURO: Alert, oriented, interactive, nonfocal      Medications:  niCARdipine Infusion 5 mG/Hr IV Continuous <Continuous>  atorvastatin 80 milliGRAM(s) Oral at bedtime      ICU Vital Signs Last 24 Hrs  T(C): 36.8 (06 Feb 2020 07:37), Max: 37.3 (05 Feb 2020 22:22)  T(F): 98.2 (06 Feb 2020 07:37), Max: 99.1 (05 Feb 2020 22:22)  HR: 64 (06 Feb 2020 09:00) (61 - 87)  BP: 160/97 (06 Feb 2020 09:00) (122/58 - 210/110)  BP(mean): 116 (06 Feb 2020 09:00) (83 - 129)  ABP: --  ABP(mean): --  RR: 12 (06 Feb 2020 09:00) (10 - 23)  SpO2: 93% (06 Feb 2020 09:00) (93% - 100%)    Vital Signs Last 24 Hrs  T(C): 36.8 (06 Feb 2020 07:37), Max: 37.3 (05 Feb 2020 22:22)  T(F): 98.2 (06 Feb 2020 07:37), Max: 99.1 (05 Feb 2020 22:22)  HR: 64 (06 Feb 2020 09:00) (61 - 87)  BP: 160/97 (06 Feb 2020 09:00) (122/58 - 210/110)  BP(mean): 116 (06 Feb 2020 09:00) (83 - 129)  RR: 12 (06 Feb 2020 09:00) (10 - 23)  SpO2: 93% (06 Feb 2020 09:00) (93% - 100%)      I&O's Detail    05 Feb 2020 07:01  -  06 Feb 2020 07:00  --------------------------------------------------------  IN:    niCARdipine Infusion: 25 mL  Total IN: 25 mL    OUT:    Voided: 160 mL  Total OUT: 160 mL  Total NET: -135 mL      06 Feb 2020 07:01  -  06 Feb 2020 09:35  --------------------------------------------------------  IN:  Total IN: 0 mL    OUT:    Voided: 200 mL  Total OUT: 200 mL  Total NET: -200 mL      LABS:                        12.8   9.51  )-----------( 236      ( 06 Feb 2020 07:40 )             40.1     02-06    136  |  98  |  9.0  ----------------------------<  135<H>  5.0   |  26.0  |  0.56    Ca    9.3      06 Feb 2020 07:40  Phos  3.9     02-06  Mg     2.0     02-06    TPro  6.7  /  Alb  3.3  /  TBili  0.2  /  DBili  x   /  AST  23  /  ALT  28  /  AlkPhos  81  02-05      CAPILLARY BLOOD GLUCOSE  POCT Blood Glucose.: 140 mg/dL (06 Feb 2020 01:06)      PT/INR - ( 05 Feb 2020 22:43 )   PT: 10.9 sec;   INR: 0.98 ratio    PTT - ( 05 Feb 2020 22:43 )  PTT:38.4 sec      RADIOLOGY:   < from: CT Perfusion w/ Maps w/ IV Cont (02.06.20 @ 01:29) >   EXAM:  CT PERFUSION W MAPS IC                         EXAM:  CT BRAIN                          PROCEDURE DATE:  02/06/2020      INTERPRETATION:  HISTORY: Status post TPA. Altered mental status.    COMPARISON: CT head 2/6/2020    TECHNIQUE: Axial noncontrast CT images from the skull base to the vertex were obtained and submitted for interpretation. Coronal and sagittal reformatted images were performed. Bone and soft tissue windows were evaluated. Perfusion maps were generated and submittedfor evaluation.    FINDINGS:    CT HEAD    Subtle loss of gray-white differentiation in the left superior frontal gyrus due to acute infarction.    There is no acute intracranial mass-effect, hemorrhage, midline shift, or abnormal extra-axial fluid collection.     Ventricles, sulci, and cisterns are normal in size for the patient's age without hydrocephalus. Basal cisterns are patent.     Visualized paranasal sinuses and mastoid air cells are clear. Calvarium is intact.     CT PERFUSION    Moderate to large region of asymmetric decrease in time parameters, including mean transit time and Tmax, in the left anterior circulation involving the left frontoparietal region. There is a small region of matched decrease in cerebral blood volume and blood flow in the left superior frontal gyrus due to acute infarction.     No abnormality noted in the right cerebral hemisphere.    IMPRESSION:     CT HEAD: Acute infarction of the left superior frontal gyrus. No acute intracranial bleeding.    CT PERFUSION: Small to moderate-sized left frontal infarction with moderate to large region of low flow and/or ischemic state in the left frontoparietal region. Findings are in keeping with mid left cervical ICA occlusion.    NORMA LEES M.D., ATTENDING RADIOLOGIST  This document has been electronically signed. Feb 6 2020  1:45AM    < end of copied text >    < from: CT Head No Cont (02.06.20 @ 01:28) >   EXAM:  CT PERFUSION W MAPS IC                         EXAM:  CT BRAIN                          PROCEDURE DATE:  02/06/2020      INTERPRETATION:  HISTORY: Status post TPA. Altered mental status.    COMPARISON: CT head 2/6/2020    TECHNIQUE: Axial noncontrast CT images from the skull base to the vertex were obtained and submitted for interpretation. Coronal and sagittal reformatted images were performed. Bone and soft tissue windows were evaluated. Perfusion maps were generated and submittedfor evaluation.    FINDINGS:    CT HEAD    Subtle loss of gray-white differentiation in the left superior frontal gyrus due to acute infarction.    There is no acute intracranial mass-effect, hemorrhage, midline shift, or abnormal extra-axial fluid collection.     Ventricles, sulci, and cisterns are normal in size for the patient's age without hydrocephalus. Basal cisterns are patent.     Visualized paranasal sinuses and mastoid air cells are clear. Calvarium is intact.     CT PERFUSION    Moderate to large region of asymmetric decrease in time parameters, including mean transit time and Tmax, in the left anterior circulation involving the left frontoparietal region. There is a small region of matched decrease in cerebral blood volume and blood flow in the left superior frontal gyrus due to acute infarction.     No abnormality noted in the right cerebral hemisphere.    IMPRESSION:     CT HEAD: Acute infarction of the left superior frontal gyrus. No acute intracranial bleeding.    CT PERFUSION: Small to moderate-sized left frontal infarction with moderate to large region of low flow and/or ischemic state in the left frontoparietal region. Findings are in keeping with mid left cervical ICA occlusion.    NORMA LEES M.D., ATTENDING RADIOLOGIST  This document has been electronically signed. Feb 6 2020  1:45AM    < end of copied text >      SUPPLEMENTAL O2: NC   LINES: Peripheral IV  IVF: N  CULVER: N  PPx: SCD  CONTACT: N

## 2020-02-06 NOTE — H&P ADULT - HISTORY OF PRESENT ILLNESS
65 y/o M w/ no significant PMHx who was transferred from [facility] ED s/p tPA administration at 22:48. CTA head performed at [facility] revealing of total left ICA occlusion. Per pt's significant other, Sheeba, pt returned home from work around 6:30 PM in his usual state of health. Pt ate dinner, and at approx 7:30 PM, pt's significant other noticed him to be "swaying to one side" and slumped over on the cough. Also state that pt was not moving his right upper or lower extremities, and was not speaking. At approx 9:30 PM, pt's significant other states that she called 911 because pt's symptoms were not improving. Upon arrival to [facility], pt w/ decreased/slurred speech and dense right-sided hemiplegia. S/p tPA administration, pt w/o improvement in right-sided weakness. Pt was transferred to [facility] for further neurologic evaluation. Upon arrival to ED, /100 so cardene gtt was re-started. CT head perfusion revealing of small to moderate sized left frontal infarction w/ moderate to large region of low flow and/or ischemic state in left frontoparietal region. CTA neck significant for occlusion of left internal carotid. Neurovascular interventionalist not offering any intervention at this time per ED attending. Admit to MICU for post tPA monitoring.

## 2020-02-06 NOTE — PROGRESS NOTE ADULT - SUBJECTIVE AND OBJECTIVE BOX
Case and radiology imaging reviewed with Dr Cherry Kerns  Pt with LICA occlusion on CTA  R ICA with dissection flap noted  Recommend: Full anticoagulation for at least 6 weeks if ok with neuro; if concern for hemorrhagic convergence outweighs benefit than antiplatelet therapy should be considered  No vascular surgical intervention indicated  Reconsult prn

## 2020-02-06 NOTE — PROGRESS NOTE ADULT - SUBJECTIVE AND OBJECTIVE BOX
Neurointerventional Surgery Note       HPI:  This is a 65 y/o male patient with no significant past medical history whom I was consulted during the night after tPA administration at 22:48 at [facility] due to cervical carotid occlussion. CT angiography of the head was reviewed by me and shows a cervical  left ICA occlusion with reconstitution of supraclinoid ICA via probably posterior and anterior communicating arteries and retrograde through opthalmic. CT of the head showed an area of loss of gray and white matter differentiation at the left frontal lobe JESSE/MCA territory. I discuss case with Dr. Zimmer and given cervical occlusion with no intracranial occlusion other than a distal JESSE branch risks of mechanical thrombectomy outweighs benefits.       Interval history as documented in record:  Per pt's significant other, Sheeba, pt returned home from work around 6:30 PM in his usual state of health. Pt ate dinner, and at approx 7:30 PM, pt's significant other noticed him to be "swaying to one side" and slumped over on the cough. Also state that pt was not moving his right upper or lower extremities, and was not speaking. At approx 9:30 PM, pt's significant other states that she called 911 because pt's symptoms were not improving. Upon arrival to [facility], pt w/ decreased/slurred speech and dense right-sided hemiplegia. S/p tPA administration, pt w/o improvement in right-sided weakness. Pt was transferred to [facility] for further neurologic evaluation. Upon arrival to ED, /100 so cardene gtt was re-started. CT head perfusion revealing of small to moderate sized left frontal infarction w/ moderate to large region of low flow and/or ischemic state in left frontoparietal region. CTA neck significant for occlusion of left internal carotid. Neurovascular interventionalist not offering any intervention at this time per ED attending. Admit to MICU for post tPA monitoring. (06 Feb 2020 02:41)    PAST MEDICAL & SURGICAL HISTORY:  No pertinent past medical history  No significant past surgical history    Allergies    No Known Allergies    Intolerances    MEDICATIONS:   CV:   niCARdipine Infusion 5 mG/Hr IV Continuous <Continuous>    Other:   atorvastatin 80 milliGRAM(s) Oral at bedtime      PHYSICAL EXAM:   Vital Signs Last 24 Hrs  T(C): 36.8 (06 Feb 2020 07:37), Max: 37.3 (05 Feb 2020 22:22)  T(F): 98.2 (06 Feb 2020 07:37), Max: 99.1 (05 Feb 2020 22:22)  HR: 64 (06 Feb 2020 09:00) (61 - 87)  BP: 160/97 (06 Feb 2020 09:00) (122/58 - 210/110)  BP(mean): 116 (06 Feb 2020 09:00) (83 - 129)  RR: 12 (06 Feb 2020 09:00) (10 - 23)  SpO2: 93% (06 Feb 2020 09:00) (93% - 100%)  General: No acute distress  HEENT: EOM intact, visual fields difficult to asses but seems responding to direct threat adequately       NEUROLOGICAL EXAM:  Mental status: Awake, alert, follows one step command intermittently like show 2 fingers, nods head  yes or no appropriately to my questions, globally aphasic   Cranial Nerves: Mild right facial droop, V1-V3 difficult to asses, responds to my voice, no evident dysphagia, shoulder shrug intact on the left  Motor exam: Decrease tone both right upper and lower extremities.          [] Upper extremity                Delt       Bicep    Tricep                                                  R         0/5        0/5        0/5       0/5                                               L          5/5        5/5        5/5       5/5         [] Lower extremity               HF          KE          KF        DF         PF                                               R        0/5        0/5        0/5       0/5       0/5                                               L         5/5        5/5       5/5       5/5        5/5    Sensation: Intact to pinprick in all 4 extremities    Coordination/ Gait: No nystagmus, no apparent dysmetria on the left     LABS:                        12.8   9.51  )-----------( 236      ( 06 Feb 2020 07:40 )             40.1    02-06    136  |  98  |  9.0  ----------------------------<  135<H>  5.0   |  26.0  |  0.56    Ca    9.3      06 Feb 2020 07:40  Phos  3.9     02-06  Mg     2.0     02-06    TPro  6.7  /  Alb  3.3  /  TBili  0.2  /  DBili  x   /  AST  23  /  ALT  28  /  AlkPhos  81  02-05  PT/INR - ( 05 Feb 2020 22:43 )   PT: 10.9 sec;   INR: 0.98 ratio         PTT - ( 05 Feb 2020 22:43 )  PTT:38.4 sec  Hemoglobin A1C, Whole Blood: 6.0 % (02-06 @ 07:40)      IMAGING: Reviewed by me.     CT Perfusion w/ Maps w/ IV Cont (02.06.20 @ 01:29)   IMPRESSION:     CT HEAD: Acute infarction of the left superior frontal gyrus. No acute intracranial bleeding.    CT PERFUSION: Small to moderate-sized left frontal infarction with moderate to large region of low flow and/or ischemic state in the left frontoparietal region. Findings are in keeping with mid left cervical ICA occlusion.      NORMA LEES M.D., ATTENDING RADIOLOGIST  This document has been electronically signed. Feb 6 2020  1:45AM

## 2020-02-06 NOTE — ED ADULT TRIAGE NOTE - CHIEF COMPLAINT QUOTE
pt BIBA, transferred from [facility] for stroke. LKW 1930. pt presents with slurred speech and R sided weakness. tpa administered at [facility]. pt presents with cardene paused at this time for goal of systolic -180 as per EMS. as per EMS, pt stopped speaking after tpa was administered and repeat CT was done showing no change. pt BIBA, transferred from [facility] for stroke. LKW 1930. pt presents with slurred speech and R sided weakness. tpa administered at [place]. pt presents with cardene paused at this time for goal of systolic -180 as per EMS. as per EMS, pt stopped speaking after tpa was administered and repeat CT was done showing no change. Code stroke called, pt brought to CT and MD ibarra called to bedside.

## 2020-02-06 NOTE — ED ADULT NURSE REASSESSMENT NOTE - NS ED NURSE REASSESS COMMENT FT1
[facility] transport team at bedside for transfer to [facility], report given to Consuelo DIAZ for transfer of pt care at this time, report given to Joshua lemus.

## 2020-02-06 NOTE — PROGRESS NOTE ADULT - ASSESSMENT
Impression: Cerebral infarction area of left MCA/JESSE borderzone probably related to known left carotid occlusion. Etiology either large vessel disease with artery to artery embolism vs embolic form cardiac source or paradoxical embolism. Given cervical occlusion with very distal JESSE intracranial occlusion but rest of circulation patent mechanical thrombectomy was not recommended in this setting.     Recommendations:  1-Post tPA protocol in terms of BP parameters try to avoid hypotension systolic >120  2-Repeat CT 24  hrs after tPA or MRI of the brain w/o contrast   3-After 24 hrs post tPA if no   4-Aspirin and pharmacological DVT prophylaxis to be considered at 24 hours after the IV tPA and after repeating brain imaging, SCDs for DVT prophylaxis in the interim  5- Atorvastatin 80 mg at bedtime after establishing enteral access or passing swallow evaluation and titrate according to lipid panel  6- TTE with bubble study telemetry to screen for possible cardiac source of embolism  7- Prolonged cardiac monitoring with 30 days events monitor / ICM to screen for occult cardiac arrythmia being the cardiac source of embolism, to be considered based on results of above mentioned tests   8- HbA1C and LDL, continue with aggressive vascular risk factors modifications   9- PT/OT/Speech and swallow evaluation   Rest of recommendations as per neuro and primary team    Above mentioned plan was discussed with patient and available family member in detail. All the questions were answered and concerns were addressed.

## 2020-02-06 NOTE — PROGRESS NOTE ADULT - ASSESSMENT
65 yo m no significant pmhx transferred from  with acute CVA.     NEURO: Acute CVA s/p tPA,   CV: HTN on cardene gtt, titrating to maintain goal sbp 160-180.  Weaning as tolerated.   RESP: No active issues.   RENAL: Monitor lytes, replace as needed  GI: NPO.   ENDO: No active issues  ID: No active infectious process   HEME: Holding ac in setting of tpa last night.  With new results with right ICA dissection, on going discussion with vascular about timing of restarting ac.   DISPO: Full code. 63 yo m no significant pmhx transferred from  with acute CVA.     NEURO: Acute CVA s/p tPA, q1hr neurochecks, cta neck with right ica dissection, mri/a head and neck ordered for today, vascular following, neurointerventionalist following, neurology consulted.  Head CT at 2300.    CV: HTN on cardene gtt, titrating to maintain goal sbp 160-180.  Weaning as tolerated.   RESP: No active issues.   RENAL: Monitor lytes, replace as needed  GI: NPO.   ENDO: No active issues  ID: No active infectious process   HEME: Holding ac in setting of tpa last night.  With new results with right ICA dissection, on going discussion with vascular about timing of restarting ac.   DISPO: Full code.

## 2020-02-06 NOTE — ED PROVIDER NOTE - OBJECTIVE STATEMENT
65y/o M with no significant PMHx presents to the ED, transferred from [facility] ED s/p stroke for further workup. According to wife at bedside, pt came home from work, they ate pizza for dinner and around 730 tonight, she noticed pt to be slumped on one side on couch with decreased speech. Pt presented to the ED 2 hrs after sx onset, around 9pm c/o right sided weakness and decreased speech, after wife noticed that pt was not improving. Pt had CT angio head and neck at [facility] which was positive for left ICA total occlusion. Pt had TPA given at [facility] PTA to transfer and had decreased speech afterwards without improvement in weakness. According to wife, pt has fluctuating BP which increases and decreases. BP currently 202/100 in ED. Additionally, pt is on Suboxone (px by [facility]) for prior Oxycodone abuse. NIH stroke scale at [facility] was 14.   PSHx: Spinal disc (December)

## 2020-02-06 NOTE — PROVIDER CONTACT NOTE (EICU) - BACKGROUND
63 y/o male transferred from [place] for neurological evaluation following dx of acute CVA s/p tPA 10:48 pm. He had presented with slurred speech with right sided hemiplegia at 9:30 pm.

## 2020-02-06 NOTE — H&P ADULT - NSHPPHYSICALEXAM_GEN_ALL_CORE
General: Elderly male lying in bed, appears comfortable.  HEENT: PERRL, sclera white.  Cardiac: Regular rate and rhythm, +s1/s2, no murmurs appreciated.  Pulm: Breath sounds clear B/L. No wheezing, rhonchi, or rales.  GI: Soft, nontender, nondistended, bowel sounds normoactive.  Skin: No rashes, warm and dry.  Ext: No edema, DP pulses +2 B/L.  Neuro: Unable to assess orientation due to difficulty speaking, strength 5/5 in left upper and lower extremities, strength 0/5 in right upper and lower extremities, sensory loss in right upper and lower extremities.

## 2020-02-06 NOTE — H&P ADULT - NSHPLABSRESULTS_GEN_ALL_CORE
12.6   9.47  )-----------( 230      ( 05 Feb 2020 22:43 )             39.4   02-05    137  |  102  |  14  ----------------------------<  128<H>  4.2   |  30  |  1.00    Ca    8.6      05 Feb 2020 22:43    TPro  6.7  /  Alb  3.3  /  TBili  0.2  /  DBili  x   /  AST  23  /  ALT  28  /  AlkPhos  81  02-05    < from: CT Perfusion w/ Maps w/ IV Cont (02.06.20 @ 01:29) >    EXAM:  CT PERFUSION W MAPS IC                        EXAM:  CT BRAIN                          PROCEDURE DATE:  02/06/2020      INTERPRETATION:  HISTORY: Status post TPA. Altered mental status.    COMPARISON: CT head 2/6/2020    TECHNIQUE: Axial noncontrast CT images from the skull base to the vertex were obtained and submitted for interpretation. Coronal and sagittal reformatted images were performed. Bone and soft tissue windows were evaluated. Perfusion maps were generated and submittedfor evaluation.    FINDINGS:    CT HEAD    Subtle loss of gray-white differentiation in the left superior frontal gyrus due to acute infarction.    There is no acute intracranial mass-effect, hemorrhage, midline shift, or abnormal extra-axial fluid collection.     Ventricles, sulci, and cisterns are normal in size for the patient's age without hydrocephalus. Basal cisterns are patent.     Visualized paranasal sinuses and mastoid air cells are clear. Calvarium is intact.     CT PERFUSION    Moderate to large region of asymmetric decrease in time parameters, including mean transit time and Tmax, in the left anterior circulation involving the left frontoparietal region. There is a small region of matched decrease in cerebral blood volume and blood flow in the left superior frontal gyrus due to acute infarction.     No abnormality noted in the right cerebral hemisphere.    IMPRESSION:     CT HEAD: Acute infarction of the left superior frontal gyrus. No acute intracranial bleeding.    CT PERFUSION: Small to moderate-sized left frontal infarction with moderate to large region of low flow and/or ischemic state in the left frontoparietal region. Findings are in keeping with mid left cervical ICA occlusion.    NORMA LEES M.D., ATTENDING RADIOLOGIST  This document has been electronically signed. Feb 6 2020  1:45AM    < end of copied text >

## 2020-02-06 NOTE — H&P ADULT - ATTENDING COMMENTS
I saw this patient independently and agree with above.  Will need to address ICA -- vascular surgery consultation.  re-image  continue stroke workup  updated life partner and brother (MD) at bedside

## 2020-02-06 NOTE — PROVIDER CONTACT NOTE (EICU) - ACTION/TREATMENT ORDERED:
-have entered AM labs for this patient including CBC, BMP, magnesium and phosphorous levels  -will CTM for results and replete as per [facility] standard

## 2020-02-06 NOTE — CONSULT NOTE ADULT - SUBJECTIVE AND OBJECTIVE BOX
HPI:  65 y/o M w/ no significant PMHx who was transferred from [facility] ED s/p tPA administration at 22:48. CTA head performed at [facility] revealing of total left ICA occlusion. Per pt's significant other, Sheeba, pt returned home from work around 6:30 PM in his usual state of health. Pt ate dinner, and at approx 7:30 PM, pt's significant other noticed him to be "swaying to one side" and slumped over on the cough. Also state that pt was not moving his right upper or lower extremities, and was not speaking. At approx 9:30 PM, pt's significant other states that she called 911 because pt's symptoms were not improving. Upon arrival to [facility], pt w/ decreased/slurred speech and dense right-sided hemiplegia. S/p tPA administration, pt w/o improvement in right-sided weakness. Pt was transferred to [facility] for further neurologic evaluation. Upon arrival to ED, /100 so cardene gtt was re-started. CT head perfusion revealing of small to moderate sized left frontal infarction w/ moderate to large region of low flow and/or ischemic state in left frontoparietal region. CTA neck significant for occlusion of left internal carotid. Neurovascular interventionalist not offering any intervention at this time per ED attending. Admit to MICU for post tPA monitoring. (06 Feb 2020 02:41)    Vascular surgery consulted for recommendations regarding L internal carotid occlusion. Upon examination, patient is now off the cardene drip. Still with right-sided hemiplegia and slurred speech. Able to follow commands.     PAST MEDICAL & SURGICAL HISTORY:  No pertinent past medical history  Cervical spine surgery (2019)    Review of systems: all systems reviewed, negative except as stated above    MEDICATIONS  (STANDING):  atorvastatin 80 milliGRAM(s) Oral at bedtime  niCARdipine Infusion 7.5 mG/Hr (37.5 mL/Hr) IV Continuous <Continuous>    Allergies: No Known Allergies    SOCIAL HISTORY: Lives at home w/ significant other, Sheeba. Hx of oxycodone abuse, now on suboxone. Current nicotine vape smoker x2 yrs, smoked cigarettes for 2-3 years prior to vaping. Denies hx of EtOH abuse.    FAMILY HISTORY: unknown to significant other     Vital Signs Last 24 Hrs  T(C): 36.8 (06 Feb 2020 03:20), Max: 37.3 (05 Feb 2020 22:22)  T(F): 98.2 (06 Feb 2020 03:20), Max: 99.1 (05 Feb 2020 22:22)  HR: 68 (06 Feb 2020 04:30) (68 - 87)  BP: 140/64 (06 Feb 2020 04:30) (122/58 - 210/110)  BP(mean): 94 (06 Feb 2020 04:30) (83 - 129)  RR: 13 (06 Feb 2020 04:30) (12 - 20)  SpO2: 98% (06 Feb 2020 04:30) (94% - 99%)    General: Elderly male lying in bed, appears comfortable  HEENT: PERRL, sclera white  Cardiac: Regular rate and rhythm, +s1/s2, no murmurs appreciated  Pulm: Breath sounds clear B/L. No wheezing, rhonchi, or rales  GI: Soft, nontender, nondistended, bowel sounds normoactive  Skin: No rashes, warm and dry  Ext: No edema, DP pulses +2 B/L  Neuro: Unable to assess orientation due to difficulty speaking, strength 5/5 in left upper and lower extremities, strength 0/5 in right upper and lower extremities, sensory loss in right upper and lower extremities    LABS:                        12.6   9.47  )-----------( 230      ( 05 Feb 2020 22:43 )             39.4     02-05    137  |  102  |  14  ----------------------------<  128<H>  4.2   |  30  |  1.00    Ca    8.6      05 Feb 2020 22:43    TPro  6.7  /  Alb  3.3  /  TBili  0.2  /  DBili  x   /  AST  23  /  ALT  28  /  AlkPhos  81  02-05    PT/INR - ( 05 Feb 2020 22:43 )   PT: 10.9 sec;   INR: 0.98 ratio         PTT - ( 05 Feb 2020 22:43 )  PTT:38.4 sec      RADIOLOGY & ADDITIONAL STUDIES:

## 2020-02-06 NOTE — H&P ADULT - ASSESSMENT
65 y/o M w/ no significant PMHx who was transferred from [facility] ED w/ small to moderate sized left frontal infarction s/p tPA administration at 22:48.    PLAN:  Neuro: CT perfusion scan revealing of small to moderate sized left frontal infarction w/ moderate to large region of low flow and/or ischemic state in left frontoparietal region. S/p tPA administration at [facility] at 22:48. Neurovascular interventionalist not offering any intervention at this time per ED attending. Neurology consult. Repeat CT head 24 hrs s/p tPA administration or for any acute change in neurologic status. q1 hr neurochecks. CTA neck revealing of left carotid artery occlusion. Vascular surgery consulted. PT/OT/speech consults in AM. F/u lipid panel. Hx of oxycodone abuse, currently on suboxone. Obtain home dose, restart tmrw.  Cardiac: S/p tPA administration, SBP goal 140-180 per neurovascular interventionalist. Currently on cardene gtt. TTE ordered. High dose statin.  Pulm: SpO2 stable on room air. Maintain SpO2 > 92%.  GI: NPO, pending speech evaluation in AM.  Renal: Monitor electrolytes, replace as needed. No active issues.  ID: Afebrile, WBC within normal limits. No signs of infection.  Endo: F/u HgbA1C in AM.  Heme/Onc: S/p tPA administration. Hold off on chemical DVT prophylaxis until repeat head CT. SCDs for mechanical DVT prophylaxis.  Dispo: Full code. Sheeba, significant other, will be primary contact (cell: [phone], home: [phone]).    Case discussed w/ eICU attending Dr. Garcia.

## 2020-02-06 NOTE — PROVIDER CONTACT NOTE (EICU) - RECOMMENDATIONS
Admit to ICU for post tPA monitoring with neuro checks q1 hr  Monitoring BP on nicardipine gtt with goal <180  No anticoagulation/anti platelet for 24 hrs post tPA, SCDs only  ECHO    Discussed with ANDER Headley.

## 2020-02-06 NOTE — DISCHARGE NOTE NURSING/CASE MANAGEMENT/SOCIAL WORK - PATIENT PORTAL LINK FT
You can access the FollowMyHealth Patient Portal offered by [facility] by registering at the following website: [email or web address]. By joining [employer]’s FollowMyHealth portal, you will also be able to view your health information using other applications (apps) compatible with our system.

## 2020-02-07 LAB
ANION GAP SERPL CALC-SCNC: 11 MMOL/L — SIGNIFICANT CHANGE UP (ref 5–17)
BUN SERPL-MCNC: 10 MG/DL — SIGNIFICANT CHANGE UP (ref 8–20)
CALCIUM SERPL-MCNC: 9.2 MG/DL — SIGNIFICANT CHANGE UP (ref 8.6–10.2)
CHLORIDE SERPL-SCNC: 98 MMOL/L — SIGNIFICANT CHANGE UP (ref 98–107)
CO2 SERPL-SCNC: 28 MMOL/L — SIGNIFICANT CHANGE UP (ref 22–29)
CREAT SERPL-MCNC: 0.72 MG/DL — SIGNIFICANT CHANGE UP (ref 0.5–1.3)
GLUCOSE SERPL-MCNC: 116 MG/DL — HIGH (ref 70–99)
HCT VFR BLD CALC: 40.4 % — SIGNIFICANT CHANGE UP (ref 39–50)
HCV AB S/CO SERPL IA: 0.21 S/CO — SIGNIFICANT CHANGE UP (ref 0–0.99)
HCV AB SERPL-IMP: SIGNIFICANT CHANGE UP
HGB BLD-MCNC: 13.3 G/DL — SIGNIFICANT CHANGE UP (ref 13–17)
MAGNESIUM SERPL-MCNC: 2 MG/DL — SIGNIFICANT CHANGE UP (ref 1.6–2.6)
MCHC RBC-ENTMCNC: 29.5 PG — SIGNIFICANT CHANGE UP (ref 27–34)
MCHC RBC-ENTMCNC: 32.9 GM/DL — SIGNIFICANT CHANGE UP (ref 32–36)
MCV RBC AUTO: 89.6 FL — SIGNIFICANT CHANGE UP (ref 80–100)
PHOSPHATE SERPL-MCNC: 3.1 MG/DL — SIGNIFICANT CHANGE UP (ref 2.4–4.7)
PLATELET # BLD AUTO: 257 K/UL — SIGNIFICANT CHANGE UP (ref 150–400)
POTASSIUM SERPL-MCNC: 3.9 MMOL/L — SIGNIFICANT CHANGE UP (ref 3.5–5.3)
POTASSIUM SERPL-SCNC: 3.9 MMOL/L — SIGNIFICANT CHANGE UP (ref 3.5–5.3)
RBC # BLD: 4.51 M/UL — SIGNIFICANT CHANGE UP (ref 4.2–5.8)
RBC # FLD: 12.9 % — SIGNIFICANT CHANGE UP (ref 10.3–14.5)
SODIUM SERPL-SCNC: 137 MMOL/L — SIGNIFICANT CHANGE UP (ref 135–145)
WBC # BLD: 9.52 K/UL — SIGNIFICANT CHANGE UP (ref 3.8–10.5)
WBC # FLD AUTO: 9.52 K/UL — SIGNIFICANT CHANGE UP (ref 3.8–10.5)

## 2020-02-07 PROCEDURE — 99233 SBSQ HOSP IP/OBS HIGH 50: CPT

## 2020-02-07 PROCEDURE — 99223 1ST HOSP IP/OBS HIGH 75: CPT

## 2020-02-07 RX ORDER — ASPIRIN/CALCIUM CARB/MAGNESIUM 324 MG
325 TABLET ORAL DAILY
Refills: 0 | Status: DISCONTINUED | OUTPATIENT
Start: 2020-02-07 | End: 2020-02-10

## 2020-02-07 RX ORDER — ASPIRIN/CALCIUM CARB/MAGNESIUM 324 MG
300 TABLET ORAL DAILY
Refills: 0 | Status: DISCONTINUED | OUTPATIENT
Start: 2020-02-07 | End: 2020-02-07

## 2020-02-07 RX ORDER — ENOXAPARIN SODIUM 100 MG/ML
40 INJECTION SUBCUTANEOUS DAILY
Refills: 0 | Status: DISCONTINUED | OUTPATIENT
Start: 2020-02-07 | End: 2020-02-12

## 2020-02-07 RX ADMIN — Medication 325 MILLIGRAM(S): at 12:26

## 2020-02-07 RX ADMIN — ENOXAPARIN SODIUM 40 MILLIGRAM(S): 100 INJECTION SUBCUTANEOUS at 11:11

## 2020-02-07 RX ADMIN — ATORVASTATIN CALCIUM 80 MILLIGRAM(S): 80 TABLET, FILM COATED ORAL at 23:00

## 2020-02-07 NOTE — CONSULT NOTE ADULT - SUBJECTIVE AND OBJECTIVE BOX
[facility]                                        Neurology at [place]                                  Zohreh Alexandre, & Skyler                                      [address]                                           [place], [place], [address]                                                [phone]        CC: stroke.     HISTORY:  The patient is a 64y Male who presented 2/5/2020 to [facility] after slumping over at home. He was given t-PA and transferred to [facility] for further evaluation and management.  He was found to have distal occlusion of the left ICA and was admitted to MICU for blood pressure control as he initially required drips. CT-A also suggested dissection of the right ICA.   In ICU he has been globally aphasic with neglect of right and right hemiparesis.     PAST MEDICAL & SURGICAL HISTORY:  No pertinent past medical history  No significant past surgical history    Meds prior to admission:  Suboxone.     MEDICATIONS  (STANDING):  aspirin Suppository 300 milliGRAM(s) Rectal daily  atorvastatin 80 milliGRAM(s) Oral at bedtime  enoxaparin Injectable 40 milliGRAM(s) SubCutaneous daily    Allergies  No Known Allergies    SOCIAL HISTORY:  Vape smoker.   Prior oxycodone abuse.     FAMILY HISTORY:  Patient unable to provide family history.    ROS:  Constitutional: Unobtainable due to patient's condition.   Neuro: Unobtainable due to patient's condition.   Eyes: Unobtainable due to patient's condition.   Ears/nose/throat: Unobtainable due to patient's condition.   Cardiac: Unobtainable due to patient's condition.   Respiratory: Unobtainable due to patient's condition.   GI: Unobtainable due to patient's condition.   : Unobtainable due to patient's condition..  Integumentary: Unobtainable due to patient's condition.  Psych: Unobtainable due to patient's condition.  Heme: Unobtainable due to patient's condition.     Exam:  Vital Signs Last 24 Hrs  T(C): 36.3 (07 Feb 2020 07:33), Max: 36.7 (06 Feb 2020 11:44)  T(F): 97.4 (07 Feb 2020 07:33), Max: 98.1 (06 Feb 2020 11:44)  HR: 55 (07 Feb 2020 10:00) (54 - 78)  BP: 144/80 (07 Feb 2020 10:00) (130/62 - 184/83)  BP(mean): 106 (07 Feb 2020 10:00) (88 - 129)  RR: 21 (07 Feb 2020 10:00) (10 - 21)  SpO2: 93% (07 Feb 2020 10:00) (90% - 97%)  General: NAD.   Carotid bruits absent.     Mental status: The patient sleepy but awakens to voice. He is mute and does not follow instructions.     Cranial nerves: There is no papilledema. Pupils react symmetrically to light. There is no blink to threat from the right. He tracks with gaze although does not go much past midline on the right. There is no ptosis. Facial sensation is intact. Facial musculature is asymmetric with a depression of the left nasolabial fold. Palate and tongue are difficult to assess.     Motor/Sensory: There is normal bulk and tone.  Moves left well to stimuli.   No movement on right to stimuli.     Reflexes: 1+ throughout and plantar responses are flexor.    Cerebellar: Cannot be tested.     LABS:                         13.3   9.52  )-----------( 257      ( 07 Feb 2020 05:13 )             40.4       02-07    137  |  98  |  10.0  ----------------------------<  116<H>  3.9   |  28.0  |  0.72    Ca    9.2      07 Feb 2020 05:13  Phos  3.1     02-07  Mg     2.0     02-07    TPro  6.7  /  Alb  3.3  /  TBili  0.2  /  DBili  x   /  AST  23  /  ALT  28  /  AlkPhos  81  02-05      PT/INR - ( 05 Feb 2020 22:43 )   PT: 10.9 sec;   INR: 0.98 ratio    PTT - ( 05 Feb 2020 22:43 )  PTT:38.4 sec    RADIOLOGY   MRI brain and CT reviewed. There is acute infarct in the left anterior frontal region.     CT-A shows distal left ICA occlusion and also right petrous ICA dissection.

## 2020-02-07 NOTE — PROGRESS NOTE ADULT - SUBJECTIVE AND OBJECTIVE BOX
ACCEPTANCE NOTE:  Pt downgraded from ICU to SDU    CHIEF COMPLAINT/INTERVAL HISTORY:    Patient is a 64y old  Male who presents with a chief complaint of Acute CVA (07 Feb 2020 11:19)      HPI:  63 y/o M w/ no significant PMHx who was transferred from [facility] ED s/p tPA administration at 22:48. CTA head performed at [facility] revealing of total left ICA occlusion. Per pt's significant other, Sheeba, pt returned home from work around 6:30 PM in his usual state of health. Pt ate dinner, and at approx 7:30 PM, pt's significant other noticed him to be "swaying to one side" and slumped over on the cough. Also state that pt was not moving his right upper or lower extremities, and was not speaking. At approx 9:30 PM, pt's significant other states that she called 911 because pt's symptoms were not improving. Upon arrival to [facility], pt w/ decreased/slurred speech and dense right-sided hemiplegia. S/p tPA administration, pt w/o improvement in right-sided weakness. Pt was transferred to [facility] for further neurologic evaluation. Upon arrival to ED, /100 so cardene gtt was re-started. CT head perfusion revealing of small to moderate sized left frontal infarction w/ moderate to large region of low flow and/or ischemic state in left frontoparietal region. CTA neck significant for occlusion of left internal carotid. Neurovascular interventionalist not offering any intervention at this time per ED attending. Admit to MICU for post tPA monitoring. (06 Feb 2020 02:41)      SUBJECTIVE & OBJECTIVE/ ROS: Pt seen and examined at bedside.     ICU Vital Signs Last 24 Hrs  T(C): 36.3 (07 Feb 2020 07:33), Max: 36.7 (06 Feb 2020 15:36)  T(F): 97.4 (07 Feb 2020 07:33), Max: 98.1 (06 Feb 2020 15:36)  HR: 60 (07 Feb 2020 12:00) (54 - 78)  BP: 135/73 (07 Feb 2020 12:00) (130/62 - 184/83)  BP(mean): 97 (07 Feb 2020 12:00) (88 - 127)  ABP: --  ABP(mean): --  RR: 13 (07 Feb 2020 12:00) (10 - 21)  SpO2: 95% (07 Feb 2020 12:00) (90% - 97%)        MEDICATIONS  (STANDING):  aspirin 325 milliGRAM(s) Oral daily  atorvastatin 80 milliGRAM(s) Oral at bedtime  enoxaparin Injectable 40 milliGRAM(s) SubCutaneous daily    MEDICATIONS  (PRN):      LABS:                        13.3   9.52  )-----------( 257      ( 07 Feb 2020 05:13 )             40.4     02-07    137  |  98  |  10.0  ----------------------------<  116<H>  3.9   |  28.0  |  0.72    Ca    9.2      07 Feb 2020 05:13  Phos  3.1     02-07  Mg     2.0     02-07    TPro  6.7  /  Alb  3.3  /  TBili  0.2  /  DBili  x   /  AST  23  /  ALT  28  /  AlkPhos  81  02-05    PT/INR - ( 05 Feb 2020 22:43 )   PT: 10.9 sec;   INR: 0.98 ratio         PTT - ( 05 Feb 2020 22:43 )  PTT:38.4 sec      CAPILLARY BLOOD GLUCOSE          RECENT CULTURES:      RADIOLOGY & ADDITIONAL TESTS:      PHYSICAL EXAM:    GENERAL: NAD  HEAD:  Atraumatic, Normocephalic  EYES: EOMI, PERRLA, conjunctiva and sclera clear  ENMT: Moist mucous membranes  NECK: Supple, No JVD  NERVOUS SYSTEM: Awake, right sided hemiplegia, hemineglect, decreased sensation on right  CHEST/LUNG: Clear to auscultation bilaterally; No rales, rhonchi, wheezing, or rubs  HEART: Regular rate and rhythm; No murmurs, rubs, or gallops  ABDOMEN: Soft, Nontender, Nondistended; Bowel sounds present  EXTREMITIES:  2+ Peripheral Pulses, No clubbing, cyanosis, or edema

## 2020-02-07 NOTE — OCCUPATIONAL THERAPY INITIAL EVALUATION ADULT - ADDITIONAL COMMENTS
Pt inconsistently answer questions via yes/no head nods regarding prior level of function, social history and prior living environment. Pt is questionable historian so information should be verified by family. Pt potentially lives with wife in an apartment with stairs.

## 2020-02-07 NOTE — CONSULT NOTE ADULT - ASSESSMENT
The patient is a 64y Male with stroke.     Stroke  Now status post t-PA.   No hemorrhage on follow up imaging.   Clinically large territory.   Would repeat CT in another 24-48 hrs.   LDL: 104  Continue antiplatelet and statin.   Mobilize with physical therapy.     Dissection.   Seen by Vascular surgery.   Would hold off on anticoagulation given clinically large infarct.   Would wait at least 7 days from stroke onset (ie. 2/12/2020) prior to starting any anticoagulation.    Case discussed with MICU team (Dr Gonzalez attending).

## 2020-02-07 NOTE — OCCUPATIONAL THERAPY INITIAL EVALUATION ADULT - LEVEL OF INDEPENDENCE: BED TO CHAIR, REHAB EVAL
Tamie Delatorre
recommend use of ezio at this time for safety due to pt's decreased functional and cognitive status

## 2020-02-07 NOTE — PROGRESS NOTE ADULT - ASSESSMENT
HPI/INTERVAL EVENTS: no major events    EXAM:   GENERAL: NAD,   HEAD: Atraumatic, normocephalic,   EYES: EOMI, PERRL, sclera clear,   ENMT:  No oropharyngeal erythema or exudate,   NECK: supple, trachea midline, no JVD noted,   NERVOUS SYSTEM: awake, doesn't follow commands well (asked to show 2 fingers and he just holds his hand up), EOMI, dense right sided hemiplegia and hemineglect, decreased sensation on right  CHEST/LUNG: bilat air entry, no chest deformity,  HEART: regular rhythm, regular rate,   ABDOMEN: nontender, soft, nondistended, no rebound or guarding,   EXTREMITIES: no clubbing, no cyanosis,   LYMPH: no lymphadenopathy,    SKIN: good capillary refill,       IMPRESSION/ASSESSMENT & PLAN:  65 yo male transferred from  to [facility] after receiving TPA for right sided hemiplegia and aphasia, found to have large left frontal stroke on MRI, left ICA occlusion with dissection.   MRI, echo, carotid US performed.  Seen by PT, awaiting speech/swallow eval.  - ASA, lipitor, SC lovenox  - Hold off full dose anticoagulation due to large infarct and high likelihood of hemorrhagic conversion  - Can continue neuro checks Q2H in step down unit.  Discussed with neurology.

## 2020-02-07 NOTE — OCCUPATIONAL THERAPY INITIAL EVALUATION ADULT - PERTINENT HX OF CURRENT PROBLEM, REHAB EVAL
Pt presents to the ED as a transfer from [facility] with slurred speech and right sided weakness. Pt received tPA at [facility] prior to transfer to [facility]. Head MRI with left-sided acute infarcts (as described in results); no acute intracranial hemorrhage; occluded distal left internal carotid artery.

## 2020-02-07 NOTE — PHYSICAL THERAPY INITIAL EVALUATION ADULT - GENERAL OBSERVATIONS, REHAB EVAL
Pt. greeted resting in bed, not able to express himself verbally, but nodding yes and no inconsistently, + monitor, pulse ox, agreeable to PT

## 2020-02-07 NOTE — OCCUPATIONAL THERAPY INITIAL EVALUATION ADULT - ORIENTATION, REHAB EVAL
pt nods head "yes" to confirmation of name and to correct option when given choices for place/location, pt unable to identify time/year despite choices/cues

## 2020-02-07 NOTE — OCCUPATIONAL THERAPY INITIAL EVALUATION ADULT - RANGE OF MOTION EXAMINATION, UPPER EXTREMITY
Right UE Passive ROM was WFL  (within functional limits)/Left UE Active ROM was WFL (within functional limits)/right UE with no AROM/trace movement in any planes

## 2020-02-07 NOTE — PROGRESS NOTE ADULT - SUBJECTIVE AND OBJECTIVE BOX
On Admission  02-06-20 (1d)  HPI:  63 y/o M w/ no significant PMHx who was transferred from [facility] ED s/p tPA administration at 22:48. CTA head performed at [facility] revealing of total left ICA occlusion. Per pt's significant other, Sheeba, pt returned home from work around 6:30 PM in his usual state of health. Pt ate dinner, and at approx 7:30 PM, pt's significant other noticed him to be "swaying to one side" and slumped over on the cough. Also state that pt was not moving his right upper or lower extremities, and was not speaking. At approx 9:30 PM, pt's significant other states that she called 911 because pt's symptoms were not improving. Upon arrival to [facility], pt w/ decreased/slurred speech and dense right-sided hemiplegia. S/p tPA administration, pt w/o improvement in right-sided weakness. Pt was transferred to [facility] for further neurologic evaluation. Upon arrival to ED, /100 so cardene gtt was re-started. CT head perfusion revealing of small to moderate sized left frontal infarction w/ moderate to large region of low flow and/or ischemic state in left frontoparietal region. CTA neck significant for occlusion of left internal carotid. Neurovascular interventionalist not offering any intervention at this time per ED attending. Admit to MICU for post tPA monitoring. (06 Feb 2020 02:41)    PAST MEDICAL & SURGICAL HISTORY:  No pertinent past medical history  No significant past surgical history      Antimicrobial:    Cardiovascular:    Pulmonary:    Hematalogic:  enoxaparin Injectable 40 milliGRAM(s) SubCutaneous daily    Other:  aspirin Suppository 300 milliGRAM(s) Rectal daily  atorvastatin 80 milliGRAM(s) Oral at bedtime      Drug Dosing Weight  Height (cm): 188 (06 Feb 2020 03:20)  Weight (kg): 96.7 (06 Feb 2020 03:20)  BMI (kg/m2): 27.4 (06 Feb 2020 03:20)  BSA (m2): 2.23 (06 Feb 2020 03:20)    T(C): 36.3 (02-07-20 @ 07:33), Max: 36.7 (02-06-20 @ 11:44)  HR: 55 (02-07-20 @ 10:00)  BP: 144/80 (02-07-20 @ 10:00)  BP(mean): 106 (02-07-20 @ 10:00)  ABP: --  ABP(mean): --  RR: 21 (02-07-20 @ 10:00)  SpO2: 93% (02-07-20 @ 10:00)          02-06 @ 07:01  -  02-07 @ 07:00  --------------------------------------------------------  IN: 20 mL / OUT: 1145 mL / NET: -1125 mL              LABS:  CBC Full  -  ( 07 Feb 2020 05:13 )  WBC Count : 9.52 K/uL  RBC Count : 4.51 M/uL  Hemoglobin : 13.3 g/dL  Hematocrit : 40.4 %  Platelet Count - Automated : 257 K/uL  Mean Cell Volume : 89.6 fl  Mean Cell Hemoglobin : 29.5 pg  Mean Cell Hemoglobin Concentration : 32.9 gm/dL  Auto Neutrophil # : x  Auto Lymphocyte # : x  Auto Monocyte # : x  Auto Eosinophil # : x  Auto Basophil # : x  Auto Neutrophil % : x  Auto Lymphocyte % : x  Auto Monocyte % : x  Auto Eosinophil % : x  Auto Basophil % : x    02-07    137  |  98  |  10.0  ----------------------------<  116<H>  3.9   |  28.0  |  0.72    Ca    9.2      07 Feb 2020 05:13  Phos  3.1     02-07  Mg     2.0     02-07    TPro  6.7  /  Alb  3.3  /  TBili  0.2  /  DBili  x   /  AST  23  /  ALT  28  /  AlkPhos  81  02-05    PT/INR - ( 05 Feb 2020 22:43 )   PT: 10.9 sec;   INR: 0.98 ratio         PTT - ( 05 Feb 2020 22:43 )  PTT:38.4 sec      ____________________________________________________________________________________________________

## 2020-02-07 NOTE — OCCUPATIONAL THERAPY INITIAL EVALUATION ADULT - MANUAL MUSCLE TESTING RESULTS, REHAB EVAL
left shoulder grossly assessed with AROM against gravity 3/5, left elbow grossly assessed with AROM against gravity 3/5, left gross grasp 4/5; right UE 0/5 in all planes

## 2020-02-07 NOTE — PHYSICAL THERAPY INITIAL EVALUATION ADULT - LEVEL OF INDEPENDENCE: BED TO CHAIR, REHAB EVAL
unable to perform/safety concerns; pt. pushing/leaning to right, decreased balance, poor command following

## 2020-02-07 NOTE — OCCUPATIONAL THERAPY INITIAL EVALUATION ADULT - PLANNED THERAPY INTERVENTIONS, OT EVAL
cognitive, visual perceptual/fine motor coordination training/transfer training/ROM/strengthening/ADL retraining/bed mobility training/motor coordination training/toilet/balance training/neuromuscular re-education/parent/caregiver training...

## 2020-02-07 NOTE — PHYSICAL THERAPY INITIAL EVALUATION ADULT - ADDITIONAL COMMENTS
Pt. unable to express. Pt. inconsistently nodding head yes and no. May be poor historian. Pt. reporting he lives in an apartment with his wife with stairs, but is unable to clarify any further details re: social/functional hx.

## 2020-02-07 NOTE — OCCUPATIONAL THERAPY INITIAL EVALUATION ADULT - GENERAL OBSERVATIONS, REHAB EVAL
Received pt in semi-craig position in bed, +IV lock, +telemetry, +condom cath quezada. Pt agrees to OT.

## 2020-02-07 NOTE — OCCUPATIONAL THERAPY INITIAL EVALUATION ADULT - VISUAL ACUITY
to be assessed further, visual deficits suspected however difficult to assess with cognitive and speech impairments

## 2020-02-07 NOTE — OCCUPATIONAL THERAPY INITIAL EVALUATION ADULT - MODIFIED CLINICAL TEST OF SENSORY INTEGRATION IN BALANCE TEST
static sitting edge of bed with maximum assistance with left UE support; pt with significant lean/push to right lateral and posterior

## 2020-02-07 NOTE — PROGRESS NOTE ADULT - ASSESSMENT
65 yo male transferred from  to [facility] after receiving TPA for right sided hemiplegia and aphasia, found to have large left frontal stroke on MRI, left ICA occlusion with dissection.         >Acute  large left frontal stroke/ left ICA occlusion with dissection.   Transfer to SDU  MRI, echo, carotid US performed.  Seen by PT- recs Acute rehab, PM & R called  awaiting speech/swallow eval.  - ASA, lipitor, SC lovenox  - Hold off full dose anticoagulation due to large infarct and high likelihood of hemorrhagic conversion  - Can continue neuro checks Q2H in step down unit.  Discussed with neurology.   Would repeat CT in another 24-48 hrs.       ICA dissection  ICA with dissection flap  Vascular sx Recommend: Full anticoagulation for at least 6 weeks if ok with neuro; but as per neuro would hold off on anticoagulation given clinically large infarct.   Would wait at least 7 days from stroke onset (ie. 2/12/2020) prior to starting any anticoagulation.  c/w  as per neuro      DVT ppx 63 y/o M w/ no significant PMHx who was transferred from [facility] ED s/p tPA administration at 22:48. CTA head performed at [facility] revealing of total left ICA occlusion. Per pt's significant other, Sheeba, pt returned home from work around 6:30 PM in his usual state of health. Pt ate dinner, and at approx 7:30 PM, pt's significant other noticed him to be "swaying to one side" and slumped over on the cough. Also state that pt was not moving his right upper or lower extremities, and was not speaking. At approx 9:30 PM, pt's significant other states that she called 911 because pt's symptoms were not improving. Upon arrival to [facility], pt w/ decreased/slurred speech and dense right-sided hemiplegia. S/p tPA administration, pt w/o improvement in right-sided weakness. Pt was transferred to [facility] for further neurologic evaluation. Upon arrival to ED, /100 so cardene gtt was re-started. CT head perfusion revealing of small to moderate sized left frontal infarction w/ moderate to large region of low flow and/or ischemic state in left frontoparietal region. CTA neck significant for occlusion of left internal carotid. Neurovascular interventionalist not offering any intervention at this time per ED attending. Admit to MICU for post tPA monitoring. Also found to have left ICA occlusion with dissection. Stable to be transferred to SDU              >Acute  large left frontal stroke/ left ICA occlusion with dissection.   Transfer to SDU  MRI, echo, carotid US performed.  Seen by PT- recs Acute rehab, PM & R called  awaiting speech/swallow eval.  - ASA, lipitor, SC lovenox  - Hold off full dose anticoagulation due to large infarct and high likelihood of hemorrhagic conversion  - Can continue neuro checks Q2H in step down unit.  Discussed with neurology.   Would repeat CT in another 24-48 hrs.       ICA dissection  ICA with dissection flap  Vascular sx Recommend: Full anticoagulation for at least 6 weeks if ok with neuro; but as per neuro would hold off on anticoagulation given clinically large infarct.   Would wait at least 7 days from stroke onset (ie. 2/12/2020) prior to starting any anticoagulation.  c/w  as per neuro      DVT ppx

## 2020-02-07 NOTE — OCCUPATIONAL THERAPY INITIAL EVALUATION ADULT - NS ASR FOLLOW COMMAND OT EVAL
50% of the time/pt requires increased time and repetition to process commands of tasks; pt with decreased attention requiring cues to redirect and attend to tasks; pt requires cues to sequence and problem solve tasks/mobility/able to follow single-step instructions

## 2020-02-07 NOTE — PHYSICAL THERAPY INITIAL EVALUATION ADULT - PERTINENT HX OF CURRENT PROBLEM, REHAB EVAL
65 yo m no significant pmhx transferred from  with acute CVA.  Patient presented to  with right sided weakness and ataxia.  Patient was in his normal state of health and then at ~1930 2/5/20 patient's wife noted him to be swaying to one side and then with right sided weakness with progression to aphasia prompting 911 call.

## 2020-02-07 NOTE — PHYSICAL THERAPY INITIAL EVALUATION ADULT - ACTIVE RANGE OF MOTION EXAMINATION, REHAB EVAL
Left UE Active ROM was WNL (within normal limits)/right side limited by melvin plegia/LLE Active ROM was WNL (within normal limits)

## 2020-02-08 LAB
ANION GAP SERPL CALC-SCNC: 13 MMOL/L — SIGNIFICANT CHANGE UP (ref 5–17)
BASOPHILS # BLD AUTO: 0.05 K/UL — SIGNIFICANT CHANGE UP (ref 0–0.2)
BASOPHILS NFR BLD AUTO: 0.5 % — SIGNIFICANT CHANGE UP (ref 0–2)
BUN SERPL-MCNC: 16 MG/DL — SIGNIFICANT CHANGE UP (ref 8–20)
CALCIUM SERPL-MCNC: 9.4 MG/DL — SIGNIFICANT CHANGE UP (ref 8.6–10.2)
CHLORIDE SERPL-SCNC: 99 MMOL/L — SIGNIFICANT CHANGE UP (ref 98–107)
CO2 SERPL-SCNC: 25 MMOL/L — SIGNIFICANT CHANGE UP (ref 22–29)
CREAT SERPL-MCNC: 0.69 MG/DL — SIGNIFICANT CHANGE UP (ref 0.5–1.3)
EOSINOPHIL # BLD AUTO: 0.03 K/UL — SIGNIFICANT CHANGE UP (ref 0–0.5)
EOSINOPHIL NFR BLD AUTO: 0.3 % — SIGNIFICANT CHANGE UP (ref 0–6)
GLUCOSE SERPL-MCNC: 115 MG/DL — HIGH (ref 70–99)
HCT VFR BLD CALC: 41.3 % — SIGNIFICANT CHANGE UP (ref 39–50)
HGB BLD-MCNC: 13.3 G/DL — SIGNIFICANT CHANGE UP (ref 13–17)
IMM GRANULOCYTES NFR BLD AUTO: 0.2 % — SIGNIFICANT CHANGE UP (ref 0–1.5)
LYMPHOCYTES # BLD AUTO: 0.94 K/UL — LOW (ref 1–3.3)
LYMPHOCYTES # BLD AUTO: 9.7 % — LOW (ref 13–44)
MAGNESIUM SERPL-MCNC: 2.1 MG/DL — SIGNIFICANT CHANGE UP (ref 1.6–2.6)
MCHC RBC-ENTMCNC: 29.2 PG — SIGNIFICANT CHANGE UP (ref 27–34)
MCHC RBC-ENTMCNC: 32.2 GM/DL — SIGNIFICANT CHANGE UP (ref 32–36)
MCV RBC AUTO: 90.6 FL — SIGNIFICANT CHANGE UP (ref 80–100)
MONOCYTES # BLD AUTO: 0.8 K/UL — SIGNIFICANT CHANGE UP (ref 0–0.9)
MONOCYTES NFR BLD AUTO: 8.3 % — SIGNIFICANT CHANGE UP (ref 2–14)
NEUTROPHILS # BLD AUTO: 7.81 K/UL — HIGH (ref 1.8–7.4)
NEUTROPHILS NFR BLD AUTO: 81 % — HIGH (ref 43–77)
PHOSPHATE SERPL-MCNC: 3.6 MG/DL — SIGNIFICANT CHANGE UP (ref 2.4–4.7)
PLATELET # BLD AUTO: 241 K/UL — SIGNIFICANT CHANGE UP (ref 150–400)
POTASSIUM SERPL-MCNC: 4 MMOL/L — SIGNIFICANT CHANGE UP (ref 3.5–5.3)
POTASSIUM SERPL-SCNC: 4 MMOL/L — SIGNIFICANT CHANGE UP (ref 3.5–5.3)
RBC # BLD: 4.56 M/UL — SIGNIFICANT CHANGE UP (ref 4.2–5.8)
RBC # FLD: 12.9 % — SIGNIFICANT CHANGE UP (ref 10.3–14.5)
SODIUM SERPL-SCNC: 137 MMOL/L — SIGNIFICANT CHANGE UP (ref 135–145)
WBC # BLD: 9.65 K/UL — SIGNIFICANT CHANGE UP (ref 3.8–10.5)
WBC # FLD AUTO: 9.65 K/UL — SIGNIFICANT CHANGE UP (ref 3.8–10.5)

## 2020-02-08 PROCEDURE — 99232 SBSQ HOSP IP/OBS MODERATE 35: CPT

## 2020-02-08 PROCEDURE — 93010 ELECTROCARDIOGRAM REPORT: CPT

## 2020-02-08 PROCEDURE — 99233 SBSQ HOSP IP/OBS HIGH 50: CPT

## 2020-02-08 RX ORDER — METOPROLOL TARTRATE 50 MG
25 TABLET ORAL
Refills: 0 | Status: DISCONTINUED | OUTPATIENT
Start: 2020-02-08 | End: 2020-02-08

## 2020-02-08 RX ORDER — AMLODIPINE BESYLATE 2.5 MG/1
5 TABLET ORAL DAILY
Refills: 0 | Status: DISCONTINUED | OUTPATIENT
Start: 2020-02-08 | End: 2020-02-09

## 2020-02-08 RX ADMIN — Medication 325 MILLIGRAM(S): at 11:38

## 2020-02-08 RX ADMIN — AMLODIPINE BESYLATE 5 MILLIGRAM(S): 2.5 TABLET ORAL at 11:38

## 2020-02-08 RX ADMIN — ATORVASTATIN CALCIUM 80 MILLIGRAM(S): 80 TABLET, FILM COATED ORAL at 20:52

## 2020-02-08 RX ADMIN — ENOXAPARIN SODIUM 40 MILLIGRAM(S): 100 INJECTION SUBCUTANEOUS at 11:38

## 2020-02-08 NOTE — PROGRESS NOTE ADULT - SUBJECTIVE AND OBJECTIVE BOX
Internal Medicine Hospitalist Progress Note    follow up for stroke , HTN , aphasia   pt is seen in am , chart reviewed   he is awake , non verbal able to fallow simple commands    overnight events noted         ROS: as above, all remaining ROS are negative.       BACKGROUND:  MEDICATIONS  (STANDING):  aspirin 325 milliGRAM(s) Oral daily  atorvastatin 80 milliGRAM(s) Oral at bedtime  enoxaparin Injectable 40 milliGRAM(s) SubCutaneous daily    MEDICATIONS  (PRN):    Allergies    No Known Allergies    Intolerances            VITALS:  Vital Signs Last 24 Hrs  T(C): 36.9 (2020 04:00), Max: 37 (2020 16:32)  T(F): 98.5 (2020 04:00), Max: 98.6 (2020 16:32)  HR: 67 (2020 07:00) (57 - 71)  BP: 175/96 (2020 07:00) (132/78 - 176/85)  BP(mean): 112 (2020 04:00) (97 - 112)  RR: 12 (2020 07:00) (10 - 18)  SpO2: 96% (2020 07:00) (93% - 99%) Daily     Daily Weight in k.7 (2020 06:00)  CAPILLARY BLOOD GLUCOSE        I&O's Summary    2020 07:01  -  2020 07:00  --------------------------------------------------------  IN: 0 mL / OUT: 245 mL / NET: -245 mL        PHYSICAL EXAM:      Constitutional: awake alert , no verbal not able assess his cognitive status     Neck: supple , no JVD     Respiratory: CTA bilateral , no wheezing     Cardiovascular: regular s1 /s2     Gastrointestinal: soft no tenderness , BS positive     Extremities: no pretibial edema     Neurological awake alert aphasic , right sided weakness, able to follow commands             LABS:                        13.3   9.65  )-----------( 241      ( 2020 05:34 )             41.3     02-08    137  |  99  |  16.0  ----------------------------<  115<H>  4.0   |  25.0  |  0.69    Ca    9.4      2020 05:34  Phos  3.6     02-  Mg     2.1               Radiology :      < from: CT Head No Cont (20 @ 22:49) >  MPRESSION:   Loss of gray-white differentiation and decreased attenuation at the left anterior frontal lobe consistent with acute infarct as seen on previous MRI. No intracranial hemorrhage.     < end of copied text >

## 2020-02-08 NOTE — PROGRESS NOTE ADULT - ASSESSMENT
64y Male with stroke.     Stroke  Now status post t-PA.   No hemorrhage on follow up imaging.   Clinically large territory.   Would repeat CT tomorrow.  LDL: 104  Continue antiplatelet and statin.   Mobilize with physical therapy.     Dissection.   Seen by Vascular surgery.   Would hold off on anticoagulation given clinically large infarct.   Would wait at least 7 days from stroke onset (ie. 2/12/2020) prior to starting any anticoagulation.

## 2020-02-08 NOTE — PROGRESS NOTE ADULT - SUBJECTIVE AND OBJECTIVE BOX
[facility]                                        Neurology at [place]                                 Zohreh Alexandre & Skyler                                  [address]                                        [place], [place], [address]                                             [phone]        CC: Stroke.     HPI:   The patient is a 64y Male who presented 2/5/2020 to [facility] after slumping over at home. He was given t-PA and transferred to [facility] for further evaluation and management.  He was found to have distal occlusion of the left ICA and was admitted to MICU for blood pressure control as he initially required drips. CT-A also suggested dissection of the right ICA.   In ICU he has been globally aphasic with neglect of right and right hemiparesis.     Interim history:  Now on 4 Boyd.   No neurologic change.     ROS:   Unobtainable due to patient's condition.     MEDICATIONS  (STANDING):  aspirin 325 milliGRAM(s) Oral daily  atorvastatin 80 milliGRAM(s) Oral at bedtime  enoxaparin Injectable 40 milliGRAM(s) SubCutaneous daily    Vital Signs Last 24 Hrs  T(C): 36.9 (08 Feb 2020 04:00), Max: 37 (07 Feb 2020 16:32)  T(F): 98.5 (08 Feb 2020 04:00), Max: 98.6 (07 Feb 2020 16:32)  HR: 67 (08 Feb 2020 07:00) (58 - 71)  BP: 175/96 (08 Feb 2020 07:00) (135/73 - 176/85)  BP(mean): 112 (08 Feb 2020 04:00) (97 - 112)  RR: 12 (08 Feb 2020 07:00) (10 - 18)  SpO2: 96% (08 Feb 2020 07:00) (93% - 99%)    Detailed Neurologic Exam:    Mental status: The patient is alert. He is mute and does not follow instructions.     Cranial nerves: There is no papilledema. Pupils react symmetrically to light. There is no blink to threat from the right. He tracks with gaze although does not go much past midline on the right. There is no ptosis. Facial sensation is intact. Facial musculature is asymmetric with a depression of the left nasolabial fold. Palate and tongue are difficult to assess.     Motor/Sensory: There is normal bulk and tone.  Moves left well to stimuli.   No movement on right to stimuli.     Reflexes: 1+ throughout and plantar responses are flexor.    Cerebellar: Cannot be tested.     Labs:     02-08    137  |  99  |  16.0  ----------------------------<  115<H>  4.0   |  25.0  |  0.69    Ca    9.4      08 Feb 2020 05:34  Phos  3.6     02-08  Mg     2.1     02-08                              13.3   9.65  )-----------( 241      ( 08 Feb 2020 05:34 )             41.3

## 2020-02-08 NOTE — PROGRESS NOTE ADULT - ASSESSMENT
65 y/o M w/ no significant PMHx who was transferred from [facility] ED s/p tPA administration at 22:48. CTA head performed at [facility] revealing of total left ICA occlusion. Per pt's significant other, Sheeba, pt returned home from work around 6:30 PM in his usual state of health. Pt ate dinner, and at approx 7:30 PM, pt's significant other noticed him to be "swaying to one side" and slumped over on the cough. Also state that pt was not moving his right upper or lower extremities, and was not speaking. At approx 9:30 PM, pt's significant other states that she called 911 because pt's symptoms were not improving. Upon arrival to [facility], pt w/ decreased/slurred speech and dense right-sided hemiplegia. S/p tPA administration, pt w/o improvement in right-sided weakness. Pt was transferred to [facility] for further neurologic evaluation. Upon arrival to ED, /100 so cardene gtt was re-started. CT head perfusion revealing of small to moderate sized left frontal infarction w/ moderate to large region of low flow and/or ischemic state in left frontoparietal region. CTA neck significant for occlusion of left internal carotid. Neurovascular interventionalist not offering any intervention at this time per ED attending. Admit to MICU for post tPA monitoring.    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

## 2020-02-08 NOTE — CHART NOTE - NSCHARTNOTEFT_GEN_A_CORE
E- Alert called for abnormal rhythm on monitor.   As per RN, unchanged from prior during day however concerning for underlying ST elevation.  Pt unable to communicate s/p CVA, received TPA for ischemic CVA on 2/6/20.  Due to large infarct and high likelihood of hemorrhagic conversion cannot received full AC due to risk vs benefit- currenlty on ASA, sq lovenox    Vital Signs Last 24 Hrs  T(C): 36.6 (08 Feb 2020 00:00), Max: 37 (07 Feb 2020 16:32)  T(F): 97.8 (08 Feb 2020 00:00), Max: 98.6 (07 Feb 2020 16:32)  HR: 66 (08 Feb 2020 00:00) (54 - 76)  BP: 162/96 (08 Feb 2020 00:00) (132/78 - 176/85)  BP(mean): 110 (07 Feb 2020 20:00) (97 - 117)  RR: 10 (08 Feb 2020 00:00) (10 - 21)  SpO2: 95% (08 Feb 2020 00:00) (90% - 99%)    Plan:  EKG performed and compared to previous via EMR.  EKG on arrival: HR 84, NSR, RBBB, no STT elevation noted (2/5/20)  EKG now: HR 67 NSR, RBBB, no STT elevated noted, grossly unchanged from prior with artifact noted in V3.  No further intervention indicated at this time.   Pt is asymptomatic however non verbal  Continue to monitor closely on SDU  Will sign out to day team.

## 2020-02-09 PROCEDURE — 99233 SBSQ HOSP IP/OBS HIGH 50: CPT

## 2020-02-09 PROCEDURE — 70450 CT HEAD/BRAIN W/O DYE: CPT | Mod: 26

## 2020-02-09 RX ORDER — METOPROLOL TARTRATE 50 MG
25 TABLET ORAL
Refills: 0 | Status: DISCONTINUED | OUTPATIENT
Start: 2020-02-09 | End: 2020-02-10

## 2020-02-09 RX ORDER — AMLODIPINE BESYLATE 2.5 MG/1
10 TABLET ORAL EVERY 24 HOURS
Refills: 0 | Status: DISCONTINUED | OUTPATIENT
Start: 2020-02-10 | End: 2020-02-13

## 2020-02-09 RX ORDER — METOPROLOL TARTRATE 50 MG
2.5 TABLET ORAL EVERY 6 HOURS
Refills: 0 | Status: DISCONTINUED | OUTPATIENT
Start: 2020-02-09 | End: 2020-02-09

## 2020-02-09 RX ORDER — HYDRALAZINE HCL 50 MG
10 TABLET ORAL ONCE
Refills: 0 | Status: COMPLETED | OUTPATIENT
Start: 2020-02-09 | End: 2020-02-09

## 2020-02-09 RX ORDER — ONDANSETRON 8 MG/1
4 TABLET, FILM COATED ORAL EVERY 4 HOURS
Refills: 0 | Status: DISCONTINUED | OUTPATIENT
Start: 2020-02-09 | End: 2020-02-13

## 2020-02-09 RX ORDER — AMLODIPINE BESYLATE 2.5 MG/1
5 TABLET ORAL ONCE
Refills: 0 | Status: COMPLETED | OUTPATIENT
Start: 2020-02-09 | End: 2020-02-09

## 2020-02-09 RX ADMIN — Medication 10 MILLIGRAM(S): at 04:28

## 2020-02-09 RX ADMIN — Medication 25 MILLIGRAM(S): at 14:04

## 2020-02-09 RX ADMIN — Medication 2.5 MILLIGRAM(S): at 06:02

## 2020-02-09 RX ADMIN — ONDANSETRON 4 MILLIGRAM(S): 8 TABLET, FILM COATED ORAL at 17:16

## 2020-02-09 RX ADMIN — ATORVASTATIN CALCIUM 80 MILLIGRAM(S): 80 TABLET, FILM COATED ORAL at 21:47

## 2020-02-09 RX ADMIN — AMLODIPINE BESYLATE 5 MILLIGRAM(S): 2.5 TABLET ORAL at 14:04

## 2020-02-09 RX ADMIN — ENOXAPARIN SODIUM 40 MILLIGRAM(S): 100 INJECTION SUBCUTANEOUS at 12:12

## 2020-02-09 RX ADMIN — ONDANSETRON 4 MILLIGRAM(S): 8 TABLET, FILM COATED ORAL at 21:51

## 2020-02-09 RX ADMIN — Medication 325 MILLIGRAM(S): at 12:12

## 2020-02-09 NOTE — PROGRESS NOTE ADULT - ASSESSMENT
64y Male with stroke.     Stroke  Now status post t-PA.   No hemorrhage on follow up imaging.   Clinically large territory.   Would repeat CT in another 24-48 hrs.   LDL: 104  Continue antiplatelet and statin.   Mobilize with physical therapy.     Dissection.   Seen by Vascular surgery.   Would hold off on anticoagulation given clinically large infarct.   Would wait at least 7 days from stroke onset (ie. 2/12/2020) prior to starting any anticoagulation. Would need repeat CT on 2/12/2020 if still in hospital prior to decision.     Case discussed with Dr Hay.

## 2020-02-09 NOTE — PROGRESS NOTE ADULT - ASSESSMENT
65 y/o M w/ no significant PMHx who was transferred from [facility] ED s/p tPA administration at 22:48. CTA head performed at [facility] revealing of total left ICA occlusion. Per pt's significant other, Sheeba, pt returned home from work around 6:30 PM in his usual state of health. Pt ate dinner, and at approx 7:30 PM, pt's significant other noticed him to be "swaying to one side" and slumped over on the cough. Also state that pt was not moving his right upper or lower extremities, and was not speaking. At approx 9:30 PM, pt's significant other states that she called 911 because pt's symptoms were not improving. Upon arrival to [facility], pt w/ decreased/slurred speech and dense right-sided hemiplegia. S/p tPA administration, pt w/o improvement in right-sided weakness. Pt was transferred to [facility] for further neurologic evaluation. Upon arrival to ED, /100 so cardene gtt was re-started. CT head perfusion revealing of small to moderate sized left frontal infarction w/ moderate to large region of low flow and/or ischemic state in left frontoparietal region. CTA neck significant for occlusion of left internal carotid. Neurovascular interventionalist not offering any intervention at this time per ED attending. Admit to MICU for post tPA monitoring.He was treated with cardene drip for HTN , stopped on 2/7, transfered to medical service , pt was seen by vascular surgery in ICU for Left CA occlusion rec :  Full anticoagulation for at least 6 weeks if ok with neuro however neuro  advise to hold starting anticoagulation at least 6 days after stroke       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 with aphasia and right sided weakness   s/p TPA   cont aspirin , statin   repeat CT scan reviewed by neurology worse than before   cont to monitor   he will need another CT scan in 2 days per neuro   discharge to [facility] [place] vs acute rehab   overall prognosis poor     2- Left ICA occlusion   no intervention for c artery stenosis per neurovascular team and vascular team   rec full anticoagulation once stable and cleared by neurology     3- HTN new dx uncontrolled   adjust dose of amlodipine  , add metoprolol   iv hydralazine as needed     discharge planning in am

## 2020-02-09 NOTE — PROGRESS NOTE ADULT - SUBJECTIVE AND OBJECTIVE BOX
Internal Medicine Hospitalist Progress Note    follow up for stroke , HTN , aphasia   pt is seen in am , elevated BP overnight iv meds given  pt is awake , non verbal , right sided neglect   he is shaking his head to every question i ask   d/w neurology , CT scan result reviewed             Vital Signs Last 24 Hrs  T(C): 37.1 (09 Feb 2020 08:37), Max: 37.3 (08 Feb 2020 15:58)  T(F): 98.8 (09 Feb 2020 08:37), Max: 99.1 (08 Feb 2020 15:58)  HR: 75 (09 Feb 2020 12:00) (68 - 84)  BP: 174/93 (09 Feb 2020 12:00) (153/75 - 190/104)  BP(mean): --  RR: 17 (09 Feb 2020 12:00) (12 - 21)  SpO2: 96% (09 Feb 2020 12:00) (94% - 97%)      PHYSICAL EXAM:      Constitutional: awake alert , no verbal     Neck: supple , no JVD     Respiratory: CTA bilateral , no wheezing     Cardiovascular: regular s1 /s2     Gastrointestinal: soft no tenderness , BS positive     Extremities: no pretibial edema     Neurological awake alert aphasic , right sided weakness, able to follow commands , moves left side             LABS:                        13.3   9.65  )-----------( 241      ( 08 Feb 2020 05:34 )             41.3     02-08    137  |  99  |  16.0  ----------------------------<  115<H>  4.0   |  25.0  |  0.69    Ca    9.4      08 Feb 2020 05:34  Phos  3.6     02-08  Mg     2.1     02-08          Radiology :      IMPRESSION:   Loss of gray-white differentiation and decreased attenuation at the left anterior frontal lobe consistent with acute infarct as seen on previous MRI. No intracranial hemorrhage.     < end of copied text >    < from: CT Head No Cont (02.09.20 @ 08:57) >  IMPRESSION:   acute LEFT frontal cortical infarctions within the LEFT JESSE and MCA territories. Minimal high density seen in the region of the LEFT anterior cerebral artery consistent with thrombus. Is sulcal effacement without significant mass effect. Minimal hyperdensity in the LEFT posterior frontal infarction may represent petechial hemorrhage.     < end of copied text >

## 2020-02-09 NOTE — PROGRESS NOTE ADULT - SUBJECTIVE AND OBJECTIVE BOX
[facility]                                        Neurology at [place]                                 Zohreh Alexandre & Skyler                                  [address]                                        [place], [place], [address]                                             [phone]        CC: Stroke.     HPI:   The patient is a 64y Male who presented 2/5/2020 to [facility] after slumping over at home. He was given t-PA and transferred to [facility] for further evaluation and management.  He was found to have distal occlusion of the left ICA and was admitted to MICU for blood pressure control as he initially required drips. CT-A also suggested dissection of the right ICA.   In ICU he has been globally aphasic with neglect of right and right hemiparesis.     Interim history:  Now on 4 Boyd.   No neurologic change.     ROS:   Unobtainable due to patient's condition.     MEDICATIONS  (STANDING):  amLODIPine   Tablet 5 milliGRAM(s) Oral daily  aspirin 325 milliGRAM(s) Oral daily  atorvastatin 80 milliGRAM(s) Oral at bedtime  enoxaparin Injectable 40 milliGRAM(s) SubCutaneous daily      Vital Signs Last 24 Hrs  T(C): 37.1 (09 Feb 2020 08:37), Max: 37.5 (08 Feb 2020 12:19)  T(F): 98.8 (09 Feb 2020 08:37), Max: 99.5 (08 Feb 2020 12:19)  HR: 68 (09 Feb 2020 08:00) (68 - 84)  BP: 153/75 (09 Feb 2020 08:00) (153/75 - 190/104)  RR: 21 (09 Feb 2020 08:00) (12 - 21)  SpO2: 94% (09 Feb 2020 08:00) (93% - 97%)    Detailed Neurologic Exam:    Mental status: The patient is alert. He is mute and does not follow instructions. Able to mimic gestures with left hand after multiple attempts.	  Cranial nerves: There is no papilledema. Pupils react symmetrically to light. There is no blink to threat from the right. He tracks with gaze although does not go much past midline on the right. There is no ptosis. Facial sensation is intact. Facial musculature is asymmetric with a depression of the left nasolabial fold. Palate and tongue are difficult to assess.     Motor/Sensory: There is normal bulk and tone.  Moves left well to stimuli.   No movement on right to stimuli.     Reflexes: 1+ throughout and plantar responses are flexor.    Cerebellar: Cannot be tested.     Labs:     02-08    137  |  99  |  16.0  ----------------------------<  115<H>  4.0   |  25.0  |  0.69    Ca    9.4      08 Feb 2020 05:34  Phos  3.6     02-08  Mg     2.1     02-08                              13.3   9.65  )-----------( 241      ( 08 Feb 2020 05:34 )             41.3       Rad:   Repeat CT reviewed. There is left frontal infarct and new left parietal infarct. There is minimal petechial hemorrhage.

## 2020-02-10 PROCEDURE — 99233 SBSQ HOSP IP/OBS HIGH 50: CPT

## 2020-02-10 PROCEDURE — 99223 1ST HOSP IP/OBS HIGH 75: CPT | Mod: GC

## 2020-02-10 RX ORDER — ASPIRIN/CALCIUM CARB/MAGNESIUM 324 MG
325 TABLET ORAL DAILY
Refills: 0 | Status: DISCONTINUED | OUTPATIENT
Start: 2020-02-10 | End: 2020-02-12

## 2020-02-10 RX ORDER — METOPROLOL TARTRATE 50 MG
50 TABLET ORAL
Refills: 0 | Status: DISCONTINUED | OUTPATIENT
Start: 2020-02-10 | End: 2020-02-10

## 2020-02-10 RX ORDER — LOSARTAN POTASSIUM 100 MG/1
25 TABLET, FILM COATED ORAL DAILY
Refills: 0 | Status: DISCONTINUED | OUTPATIENT
Start: 2020-02-11 | End: 2020-02-13

## 2020-02-10 RX ORDER — METOPROLOL TARTRATE 50 MG
50 TABLET ORAL DAILY
Refills: 0 | Status: DISCONTINUED | OUTPATIENT
Start: 2020-02-10 | End: 2020-02-12

## 2020-02-10 RX ADMIN — Medication 50 MILLIGRAM(S): at 20:07

## 2020-02-10 RX ADMIN — ONDANSETRON 4 MILLIGRAM(S): 8 TABLET, FILM COATED ORAL at 05:09

## 2020-02-10 RX ADMIN — ENOXAPARIN SODIUM 40 MILLIGRAM(S): 100 INJECTION SUBCUTANEOUS at 11:26

## 2020-02-10 RX ADMIN — AMLODIPINE BESYLATE 10 MILLIGRAM(S): 2.5 TABLET ORAL at 04:08

## 2020-02-10 RX ADMIN — Medication 325 MILLIGRAM(S): at 11:26

## 2020-02-10 RX ADMIN — Medication 25 MILLIGRAM(S): at 05:02

## 2020-02-10 RX ADMIN — ONDANSETRON 4 MILLIGRAM(S): 8 TABLET, FILM COATED ORAL at 20:08

## 2020-02-10 RX ADMIN — ATORVASTATIN CALCIUM 80 MILLIGRAM(S): 80 TABLET, FILM COATED ORAL at 21:12

## 2020-02-10 NOTE — PROGRESS NOTE ADULT - SUBJECTIVE AND OBJECTIVE BOX
[facility]                                        Neurology at [place]                                 Zohreh Alexandre, & Skyler                                  [address]                                        [place], [place], [address]                                             [phone]        CC: Stroke.     HPI:   The patient is a 64y Male who presented 2/5/2020 to [facility] after slumping over at home. He was given t-PA and transferred to [facility] for further evaluation and management.  He was found to have distal occlusion of the left ICA and was admitted to MICU for blood pressure control as he initially required drips. CT-A also suggested dissection of the right ICA.   In ICU he has been globally aphasic with neglect of right and right hemiparesis.     Interim history:  Now on 4 Boyd.   No neurologic change.     ROS:   Unobtainable due to patient's condition.     MEDICATIONS  (STANDING):  amLODIPine   Tablet 5 milliGRAM(s) Oral daily  aspirin 325 milliGRAM(s) Oral daily  atorvastatin 80 milliGRAM(s) Oral at bedtime  enoxaparin Injectable 40 milliGRAM(s) SubCutaneous daily      Vital Signs Last 24 Hrs  T(C): 37.1 (09 Feb 2020 08:37), Max: 37.5 (08 Feb 2020 12:19)  T(F): 98.8 (09 Feb 2020 08:37), Max: 99.5 (08 Feb 2020 12:19)  HR: 68 (09 Feb 2020 08:00) (68 - 84)  BP: 153/75 (09 Feb 2020 08:00) (153/75 - 190/104)  RR: 21 (09 Feb 2020 08:00) (12 - 21)  SpO2: 94% (09 Feb 2020 08:00) (93% - 97%)    Detailed Neurologic Exam:    Mental status: The patient is alert. He is mute . Follows  some simple instructions  	  Cranial nerves:  Pupils react symmetrically to light. There is no blink to threat from the right. He tracks with gaze although does not go much past midline on the right. There is no ptosis. Facial sensation is intact. Facial musculature is asymmetric with a depression of the right nasolabial fold. ss.     Motor/Sensory: There is normal bulk and tone.  Moves left well to stimuli.   No movement on right to stimuli.     Reflexes: 1+ throughout and plantar responses are flexor.    Cerebellar: Cannot be tested.     Labs:     02-08    137  |  99  |  16.0  ----------------------------<  115<H>  4.0   |  25.0  |  0.69    Ca    9.4      08 Feb 2020 05:34  Phos  3.6     02-08  Mg     2.1     02-08                              13.3   9.65  )-----------( 241      ( 08 Feb 2020 05:34 )             41.3       Rad:   Repeat CT reviewed. There is left frontal infarct and new left parietal infarct. There is minimal petechial hemorrhage.           Assessment and Plan:   · Assessment		  64y Male with stroke.     Stroke  Now status post t-PA.   No hemorrhage on follow up imaging.   Clinically large territory.   Would repeat CT in another 24-48 hrs.   LDL: 104  Continue antiplatelet and statin.   Mobilize with physical therapy.     Dissection.   Seen by Vascular surgery.   Would hold off on anticoagulation given clinically large infarct.   Would wait at least 7 days from stroke onset (ie. 2/12/2020) prior to starting any anticoagulation. Would need repeat CT on 2/12/2020 if still in hospital prior to decision.

## 2020-02-10 NOTE — SPEECH LANGUAGE PATHOLOGY EVALUATION - SLP DIAGNOSIS
Severe receptive & expressive language deficits, ongoing assessment for apraxia. Assessment limited by ? frustration vs behavioral overlay

## 2020-02-10 NOTE — CONSULT NOTE ADULT - ASSESSMENT
ASSESSMENT/PLAN  64y Male with functional deficits after acute LEFT frontal cortical infarctions within the LEFT JESSE and MCA territories.       CVA- Continue ASA, statin   Left ICA occlusion - No intervention as per vascular team, follow up neuro in regards to full AC   HTN-Amlodipine, metoprolol,   Pain - well managed without meds at the moment   DVT PPX - Lovenox, SCDs   Diet- Dysphagia 3     Rehab -    Recommend ACUTE inpatient rehabilitation for the functional deficits consisting of 3 hours of therapy/day & 24 hour RN/daily PMR physician for comorbid medical management. Will continue to follow for ongoing rehab needs and recommendations. ASSESSMENT/PLAN  64y Male with functional deficits after acute LEFT frontal cortical infarctions within the LEFT JESSE and MCA territories.       CVA- Continue ASA, statin   Left ICA occlusion - No intervention as per vascular team, follow up neuro in regards to full AC   HTN-Amlodipine, metoprolol,   Pain - well managed without meds at the moment   DVT PPX - Lovenox, SCDs   Diet- Dysphagia 3       Rehab -   Will continue to follow. Functional progress will determine ongoing rehab dispo recommendations. ASSESSMENT/PLAN  64y Male with functional deficits after acute LEFT frontal cortical infarctions within the LEFT JESSE and MCA territories.   s/p tPA  Right neglect, right hemiplegia, aphasia    CVA- Continue ASA, statin   Left ICA occlusion - No intervention as per vascular team, follow up neuro in regards to full AC   HTN-Amlodipine, metoprolol,   Pain - well managed without meds at the moment   DVT PPX - Lovenox, SCDs   Diet- Dysphagia 3

## 2020-02-10 NOTE — CONSULT NOTE ADULT - ATTENDING COMMENTS
Patient seen and examined and cased discussed with resident. Agree with recommendations.   Rehab -   Patient was independent in the community prior to admission, would benefit from intensive PT/OT/SLP to restore function while being closely monitored and managed for ongoing medical issues which can destabilize     Recommend ACUTE inpatient rehabilitation for the functional deficits consisting of 3 hours of therapy/day & 24 hour RN/daily PMR physician for comorbid medical management. Will continue to follow for ongoing rehab needs and recommendations. Patient will be able to tolerate 3 hours a day.    Continue bedside therapy as well as OOB throughout the day with mobilization throughout the day with staff to maintain cardiopulmonary function and prevention of secondary complications related to debility.   SLP, PT, OT for speech, bed mobility, transfers, ambulation, ADLs

## 2020-02-10 NOTE — SPEECH LANGUAGE PATHOLOGY EVALUATION - COMMENTS
As per MD note:   "64y Male with functional deficits after acute LEFT frontal cortical infarctions within the LEFT JESSE and MCA territories." Pt with no attempt to initiate verbal output t/o assessment: ? impacted by frustration versus ? behavioral overlay to be assessed Unable to assess, given severity of communication deficits To be assessed To be assessed: pt non-verbal at this time

## 2020-02-10 NOTE — SPEECH LANGUAGE PATHOLOGY EVALUATION - SLP GESTURES
head nod/Cue sto initiate use of head nod with yes/no questions, in which pt did not maintain t/o eval, in spite of provided cues

## 2020-02-10 NOTE — CONSULT NOTE ADULT - SUBJECTIVE AND OBJECTIVE BOX
64yM was admitted on 02-06    In ED, GCS=    Patient is a 64y old  Male who presents with a chief complaint of Acute CVA (09 Feb 2020 12:49)    HPI:  63 y/o M w/ no significant PMHx who was transferred from  ED s/p tPA administration at 22:48. CTA head performed at  revealing of total left ICA occlusion. Per pt's significant other, Sheeba, pt returned home from work around 6:30 PM in his usual state of health. Pt ate dinner, and at approx 7:30 PM, pt's significant other noticed him to be "swaying to one side" and slumped over on the cough. Also state that pt was not moving his right upper or lower extremities, and was not speaking. At approx 9:30 PM, pt's significant other states that she called 911 because pt's symptoms were not improving. Upon arrival to , pt w/ decreased/slurred speech and dense right-sided hemiplegia. S/p tPA administration, pt w/o improvement in right-sided weakness. Pt was transferred to [facility] for further neurologic evaluation. Upon arrival to ED, /100 so cardene gtt was re-started. CT head perfusion revealing of small to moderate sized left frontal infarction w/ moderate to large region of low flow and/or ischemic state in left frontoparietal region. CTA neck significant for occlusion of left internal carotid. Neurovascular interventionalist not offering any intervention at this time per ED attending. Admitted to MICU for post tPA monitoring. CT-A also noted with right ICA dissection.     While admitted, patient was treated with cardene drip for HTN which was stopped on 2/7 and was then transfered to medical services. Patient was seen by vascular surgery in ICU for Left CA occlusion and recommended for patient to be started on full anticoagulation for at least 6 weeks. Neuro was consulted and recommended for repeat CTH which showed no hemorrhage on follow up. As per neuro, patient to be held off of anticoagulation from atleast 7 days from stroke onset (2/6/20) and would need repeat CTH on 2/12 prior to making AC decision       REVIEW OF SYSTEMS  Difficult to assess due to aphasia       VITALS  T(C): 36.9 (02-10-20 @ 08:12), Max: 37.7 (02-09-20 @ 23:41)  HR: 61 (02-10-20 @ 08:00) (61 - 76)  BP: 151/85 (02-10-20 @ 08:00) (151/85 - 174/93)  RR: 11 (02-10-20 @ 08:00) (11 - 20)  SpO2: 96% (02-10-20 @ 08:00) (95% - 96%)  Wt(kg): --    PAST MEDICAL & SURGICAL HISTORY  No pertinent past medical history  No significant past surgical history      SOCIAL HISTORY  Smoking - Denied  EtOH - Denied   Drugs - Denied    FUNCTIONAL HISTORY  Patient is a poor historian and will nod yes or no to questions but not much else. As per Therapy notes: Pt. reporting he lives in an apartment with his wife with stairs, but is unable to clarify any further details.  Prior level of function unknown due to his aphasia       CURRENT FUNCTIONAL STATUS    As per PT notes on 2/10/20:     Bed Mobility  Bed Mobility Training Sit-to-Supine: maximum assist (25% patient effort)  Bed Mobility Training Supine-to-Sit: maximum assist (25% patient effort)    Bed-Chair Transfer Training  Transfer Training Bed-to-Chair Transfer: maximum assist (25% patient effort);  2 person assist;  weight-bearing as tolerated  Transfer Training Chair-to-Bed Transfer: maximum assist (25% patient effort);  2 person assist;  weight-bearing as tolerated  Bed-to-Chair Transfer Training Transfer Safety Analysis: impaired coordination;  impaired balance;  decreased strength;  impaired motor control;  impaired postural control;  cognitive, decreased safety awareness    Sit-Stand Transfer Training  Transfer Training Sit-to-Stand Transfer: maximum assist (25% patient effort);  2 person assist;  weight-bearing as tolerated   rolling walker  Transfer Training Stand-to-Sit Transfer: maximum assist (25% patient effort);  2 person assist;  weight-bearing as tolerated   rolling walker  Sit-to-Stand Transfer Training Transfer Safety Analysis: decreased balance;  decreased strength;  impaired balance;  impaired coordination;  impaired motor control;  cognitive, decreased safety awareness    Gait: Unable to be tested       As per OT notes on 2/7/20    Fine Motor Coordination:   · Left Hand, Finger to Nose	pt unable to follow commands of assessment despite visual/verbal/tactile cues  · Right Hand, Finger to Nose	not tested  pt with no AROM in right UE  · Left Hand Thumb/Finger Opposition Skills	pt unable to follow commands of assessment despite visual/verbal/tactile cues  · Right Hand Thumb/Finger Opposition Skills	not tested  pt with no AROM in right UE  · Left Hand, Manipulation of Objects	pt unable to follow commands of assessment despite visual/verbal/tactile cues  · Right Hand, Manipulation of Objects	not tested  pt with no AROM in right UE    Bathing Training:     · Level of [place]	dependent (less than 25% patients effort); sponge, as per nursing  · Physical Assist/Nonphysical Assist	2 person assist; verbal cues; nonverbal cues (demo/gestures); set-up required    Upper Body Dressing Training:     · Level of [place]	dependent (less than 25% patients effort); gowns  · Physical Assist/Nonphysical Assist	1 person assist; nonverbal cues (demo/gestures); verbal cues    Lower Body Dressing Training:     · Level of [place]	dependent (less than 25% patients effort); socks  · Physical Assist/Nonphysical Assist	1 person assist; nonverbal cues (demo/gestures); verbal cues    Toilet Hygiene Training:     · Level of [place]	dependent (less than 25% patients effort); +condom cath quezada  · Physical Assist/Nonphysical Assist	1 person assist    Grooming Training:     · Level of [place]	dependent (less than 25% patients effort)  · Physical Assist/Nonphysical Assist	1 person assist; nonverbal cues (demo/gestures); verbal cues    Eating/Self-Feeding Training:     · Level of [place]	not observed      FAMILY HISTORY     RECENT LABS/IMAGING    < from: CT Angio Head w/ IV Cont (02.05.20 @ 22:37) >    IMPRESSION:          1.   Right carotid system:No hemodynamically significant stenosis.        2.   Left carotid system:  occlusion of the LEFT internal carotid artery 3.2 cm beyond the bifurcation.        3.   Intracranial circulation:  occlusion of the intracranial LEFT internal carotid artery with reconstitution of the LEFT supraclinoid internal carotid artery via a patent anterior communicating artery. There is occlusion of the A3 segment of the LEFT anterior cerebral artery.        4.   Brain:  loss of gray-white differentiation in the anterior superior LEFT frontal lobe which may reflect an acute infarction.      < end of copied text >      < from: CT Head No Cont (02.06.20 @ 22:49) >  IMPRESSION:   Loss of gray-white differentiation and decreased attenuation at the left anterior frontal lobe consistent with acute infarct as seen on previous MRI. No intracranial hemorrhage.     < end of copied text >      < from: CT Head No Cont (02.09.20 @ 08:57) >    IMPRESSION:   acute LEFT frontal cortical infarctions within the LEFT JESSE and MCA territories. Minimal high density seen in the region of the LEFT anterior cerebral artery consistent with thrombus. Is sulcal effacement without significant mass effect. Minimal hyperdensity in the LEFT posterior frontal infarction may represent petechial hemorrhage.     < end of copied text >        ALLERGIES  No Known Allergies      MEDICATIONS   amLODIPine   Tablet 10 milliGRAM(s) Oral every 24 hours  aspirin 325 milliGRAM(s) Oral daily  atorvastatin 80 milliGRAM(s) Oral at bedtime  enoxaparin Injectable 40 milliGRAM(s) SubCutaneous daily  metoprolol tartrate 25 milliGRAM(s) Oral two times a day  ondansetron Injectable 4 milliGRAM(s) IV Push every 4 hours PRN      ----------------------------------------------------------------------------------------  PHYSICAL EXAM  Constitutional - NAD, Comfortable  HEENT - NCAT, EOMI  Neck - Supple, No limited ROM  Chest - Breathing comfortably, No wheezing  Cardiovascular - S1S2   Abdomen - Soft   Extremities - No C/C/E, No calf tenderness   Neurologic Exam -                    Cognitive -Awake and lethargic, unable to fully assess due to aphasia      Communication - Non verbal, will nod his head to some questions but unable to speak. Expressive >receptive aphasia. Not able to fully follow commands      Cranial Nerves - Right sided droop      Motor - No focal deficits                    LEFT    UE - ShAB 5/5, EF 5/5, EE 5/5, WE 5/5,  5/5                    RIGHT UE - ShAB 0/5, EF 0/5, EE 0/5, WE 0/5,  0/5                    LEFT    LE - HF 5/5, KE 5/5, DF 5/5, PF 5/5                    RIGHT LE - HF 0/5, KE 0/5, DF 0/5, PF 0/5        Sensory - Intact to LT     Reflexes - 1+ reflexes      Coordination - Unable to be tested on the affected side R      Psychiatric - Flat affect 64yM was admitted on 02-06    Patient is a 64y old  Male who presents with a chief complaint of Acute CVA (09 Feb 2020 12:49)    HPI:  63 y/o M w/ no significant PMHx who was transferred from [facility] ED s/p tPA administration at 22:48. CTA head performed at [facility] revealing of total left ICA occlusion. Per pt's significant other, Sheeba, pt returned home from work around 6:30 PM in his usual state of health. Pt ate dinner, and at approx 7:30 PM, pt's significant other noticed him to be "swaying to one side" and slumped over on the cough. Also state that pt was not moving his right upper or lower extremities, and was not speaking. At approx 9:30 PM, pt's significant other states that she called 911 because pt's symptoms were not improving. Upon arrival to [facility], pt w/ decreased/slurred speech and dense right-sided hemiplegia. S/p tPA administration, pt w/o improvement in right-sided weakness. Pt was transferred to [facility] for further neurologic evaluation. Upon arrival to ED, /100 so cardene gtt was re-started. CT head perfusion revealing of small to moderate sized left frontal infarction w/ moderate to large region of low flow and/or ischemic state in left frontoparietal region. CTA neck significant for occlusion of left internal carotid. Neurovascular interventionalist not offering any intervention at this time per ED attending. Admitted to MICU for post tPA monitoring. CT-A also noted with right ICA dissection.     While admitted, patient was treated with cardene drip for HTN which was stopped on 2/7 and was then transfered to medical services. Patient was seen by vascular surgery in ICU for Left CA occlusion and recommended for patient to be started on full anticoagulation for at least 6 weeks. Neuro was consulted and recommended for repeat CTH which showed no hemorrhage on follow up. As per neuro, patient to be held off of anticoagulation from atleast 7 days from stroke onset (2/6/20) and would need repeat CTH on 2/12 prior to making AC decision       REVIEW OF SYSTEMS  Difficult to assess due to aphasia       VITALS  T(C): 36.9 (02-10-20 @ 08:12), Max: 37.7 (02-09-20 @ 23:41)  HR: 61 (02-10-20 @ 08:00) (61 - 76)  BP: 151/85 (02-10-20 @ 08:00) (151/85 - 174/93)  RR: 11 (02-10-20 @ 08:00) (11 - 20)  SpO2: 96% (02-10-20 @ 08:00) (95% - 96%)  Wt(kg): --    PAST MEDICAL & SURGICAL HISTORY  No pertinent past medical history  No significant past surgical history      SOCIAL HISTORY  Smoking - Denied  EtOH - Denied   Drugs - Denied    FUNCTIONAL HISTORY  Patient is a poor historian and will nod yes or no to questions but not much else. As per Therapy notes: Pt. reporting he lives in an apartment with his wife with stairs, but is unable to clarify any further details.  Prior level of function unknown due to his aphasia       CURRENT FUNCTIONAL STATUS    As per PT notes on 2/10/20:     Bed Mobility  Bed Mobility Training Sit-to-Supine: maximum assist (25% patient effort)  Bed Mobility Training Supine-to-Sit: maximum assist (25% patient effort)    Bed-Chair Transfer Training  Transfer Training Bed-to-Chair Transfer: maximum assist (25% patient effort);  2 person assist;  weight-bearing as tolerated  Transfer Training Chair-to-Bed Transfer: maximum assist (25% patient effort);  2 person assist;  weight-bearing as tolerated  Bed-to-Chair Transfer Training Transfer Safety Analysis: impaired coordination;  impaired balance;  decreased strength;  impaired motor control;  impaired postural control;  cognitive, decreased safety awareness    Sit-Stand Transfer Training  Transfer Training Sit-to-Stand Transfer: maximum assist (25% patient effort);  2 person assist;  weight-bearing as tolerated   rolling walker  Transfer Training Stand-to-Sit Transfer: maximum assist (25% patient effort);  2 person assist;  weight-bearing as tolerated   rolling walker  Sit-to-Stand Transfer Training Transfer Safety Analysis: decreased balance;  decreased strength;  impaired balance;  impaired coordination;  impaired motor control;  cognitive, decreased safety awareness    Gait: Unable to be tested       As per OT notes on 2/7/20    Fine Motor Coordination:   · Left Hand, Finger to Nose	pt unable to follow commands of assessment despite visual/verbal/tactile cues  · Right Hand, Finger to Nose	not tested  pt with no AROM in right UE  · Left Hand Thumb/Finger Opposition Skills	pt unable to follow commands of assessment despite visual/verbal/tactile cues  · Right Hand Thumb/Finger Opposition Skills	not tested  pt with no AROM in right UE  · Left Hand, Manipulation of Objects	pt unable to follow commands of assessment despite visual/verbal/tactile cues  · Right Hand, Manipulation of Objects	not tested  pt with no AROM in right UE    Bathing Training:     · Level of [place]	dependent (less than 25% patients effort); sponge, as per nursing  · Physical Assist/Nonphysical Assist	2 person assist; verbal cues; nonverbal cues (demo/gestures); set-up required    Upper Body Dressing Training:     · Level of [place]	dependent (less than 25% patients effort); gowns  · Physical Assist/Nonphysical Assist	1 person assist; nonverbal cues (demo/gestures); verbal cues    Lower Body Dressing Training:     · Level of [place]	dependent (less than 25% patients effort); socks  · Physical Assist/Nonphysical Assist	1 person assist; nonverbal cues (demo/gestures); verbal cues    Toilet Hygiene Training:     · Level of [place]	dependent (less than 25% patients effort); +condom cath quezada  · Physical Assist/Nonphysical Assist	1 person assist    Grooming Training:     · Level of [place]	dependent (less than 25% patients effort)  · Physical Assist/Nonphysical Assist	1 person assist; nonverbal cues (demo/gestures); verbal cues    Eating/Self-Feeding Training:     · Level of [place]	not observed      FAMILY HISTORY     RECENT LABS/IMAGING    < from: CT Angio Head w/ IV Cont (02.05.20 @ 22:37) >    IMPRESSION:          1.   Right carotid system:No hemodynamically significant stenosis.        2.   Left carotid system:  occlusion of the LEFT internal carotid artery 3.2 cm beyond the bifurcation.        3.   Intracranial circulation:  occlusion of the intracranial LEFT internal carotid artery with reconstitution of the LEFT supraclinoid internal carotid artery via a patent anterior communicating artery. There is occlusion of the A3 segment of the LEFT anterior cerebral artery.        4.   Brain:  loss of gray-white differentiation in the anterior superior LEFT frontal lobe which may reflect an acute infarction.      < end of copied text >      < from: CT Head No Cont (02.06.20 @ 22:49) >  IMPRESSION:   Loss of gray-white differentiation and decreased attenuation at the left anterior frontal lobe consistent with acute infarct as seen on previous MRI. No intracranial hemorrhage.     < end of copied text >      < from: CT Head No Cont (02.09.20 @ 08:57) >    IMPRESSION:   acute LEFT frontal cortical infarctions within the LEFT JESSE and MCA territories. Minimal high density seen in the region of the LEFT anterior cerebral artery consistent with thrombus. Is sulcal effacement without significant mass effect. Minimal hyperdensity in the LEFT posterior frontal infarction may represent petechial hemorrhage.     < end of copied text >        ALLERGIES  No Known Allergies      MEDICATIONS   amLODIPine   Tablet 10 milliGRAM(s) Oral every 24 hours  aspirin 325 milliGRAM(s) Oral daily  atorvastatin 80 milliGRAM(s) Oral at bedtime  enoxaparin Injectable 40 milliGRAM(s) SubCutaneous daily  metoprolol tartrate 25 milliGRAM(s) Oral two times a day  ondansetron Injectable 4 milliGRAM(s) IV Push every 4 hours PRN      ----------------------------------------------------------------------------------------  PHYSICAL EXAM  Constitutional - NAD, Comfortable  HEENT - NCAT, EOMI  Neck - Supple, No limited ROM  Chest - Breathing comfortably, No wheezing  Cardiovascular - S1S2   Abdomen - Soft   Extremities - No C/C/E, No calf tenderness   Neurologic Exam -                    Cognitive -Awake and lethargic, unable to fully assess due to aphasia      Communication - Non verbal, will nod his head to some questions but unable to speak. Expressive >receptive aphasia. Not able to fully follow commands      Cranial Nerves - Right sided droop      Motor - No focal deficits                    LEFT    UE - ShAB 5/5, EF 5/5, EE 5/5, WE 5/5,  5/5                    RIGHT UE - ShAB 0/5, EF 0/5, EE 0/5, WE 0/5,  0/5                    LEFT    LE - HF 5/5, KE 5/5, DF 5/5, PF 5/5                    RIGHT LE - HF 0/5, KE 0/5, DF 0/5, PF 0/5        Sensory - Intact to LT     Reflexes - 1+ reflexes      Coordination - Unable to be tested on the affected side R      Psychiatric - Flat affect 64yM was admitted on 02-06    Patient is a 64y old  Male who presents with a chief complaint of Acute CVA (09 Feb 2020 12:49)    HPI:  65 y/o M w/ no significant PMHx who was transferred from [facility] ED s/p tPA administration at 22:48. CTA head performed at [facility] revealing of total left ICA occlusion. Per pt's significant other, Sheeba, pt returned home from work around 6:30 PM in his usual state of health. Pt ate dinner, and at approx 7:30 PM, pt's significant other noticed him to be "swaying to one side" and slumped over on the cough. Also state that pt was not moving his right upper or lower extremities, and was not speaking. At approx 9:30 PM, pt's significant other states that she called 911 because pt's symptoms were not improving. Upon arrival to [facility], pt w/ decreased/slurred speech and dense right-sided hemiplegia. S/p tPA administration, pt w/o improvement in right-sided weakness. Pt was transferred to [facility] for further neurologic evaluation. Upon arrival to ED, /100 so cardene gtt was re-started. CT head perfusion revealing of small to moderate sized left frontal infarction w/ moderate to large region of low flow and/or ischemic state in left frontoparietal region. CTA neck significant for occlusion of left internal carotid. Neurovascular interventionalist not offering any intervention at this time per ED attending. Admitted to MICU for post tPA monitoring. CT-A also noted with right ICA dissection.     While admitted, patient was treated with cardene drip for HTN which was stopped on 2/7 and was then transfered to medical services. Patient was seen by vascular surgery in ICU for Left CA occlusion and recommended for patient to be started on full anticoagulation for at least 6 weeks. Neuro was consulted and recommended for repeat CTH which showed no hemorrhage on follow up. As per neuro, patient to be held off of anticoagulation from atleast 7 days from stroke onset (2/6/20) and would need repeat CTH on 2/12 prior to making AC decision       REVIEW OF SYSTEMS  Difficult to assess due to aphasia       VITALS  T(C): 36.9 (02-10-20 @ 08:12), Max: 37.7 (02-09-20 @ 23:41)  HR: 61 (02-10-20 @ 08:00) (61 - 76)  BP: 151/85 (02-10-20 @ 08:00) (151/85 - 174/93)  RR: 11 (02-10-20 @ 08:00) (11 - 20)  SpO2: 96% (02-10-20 @ 08:00) (95% - 96%)  Wt(kg): --    PAST MEDICAL & SURGICAL HISTORY  No pertinent past medical history  No significant past surgical history      SOCIAL HISTORY  Smoking - Denied  EtOH - Denied   Drugs - Denied    FUNCTIONAL HISTORY  Patient is a poor historian and will nod yes or no to questions but not much else. As per Therapy notes: Pt. reporting he lives in an apartment with his wife with stairs, but is unable to clarify any further details.  Prior level of function unknown due to his aphasia       CURRENT FUNCTIONAL STATUS    2/10 SLP  Severe receptive & expressive language deficits, ongoing assessment for apraxia. Assessment limited by ? frustration vs behavioral overlay    As per PT notes on 2/10/20:     Bed Mobility  Bed Mobility Training Sit-to-Supine: maximum assist (25% patient effort)  Bed Mobility Training Supine-to-Sit: maximum assist (25% patient effort)    Bed-Chair Transfer Training  Transfer Training Bed-to-Chair Transfer: maximum assist (25% patient effort);  2 person assist;  weight-bearing as tolerated  Transfer Training Chair-to-Bed Transfer: maximum assist (25% patient effort);  2 person assist;  weight-bearing as tolerated  Bed-to-Chair Transfer Training Transfer Safety Analysis: impaired coordination;  impaired balance;  decreased strength;  impaired motor control;  impaired postural control;  cognitive, decreased safety awareness    Sit-Stand Transfer Training  Transfer Training Sit-to-Stand Transfer: maximum assist (25% patient effort);  2 person assist;  weight-bearing as tolerated   rolling walker  Transfer Training Stand-to-Sit Transfer: maximum assist (25% patient effort);  2 person assist;  weight-bearing as tolerated   rolling walker  Sit-to-Stand Transfer Training Transfer Safety Analysis: decreased balance;  decreased strength;  impaired balance;  impaired coordination;  impaired motor control;  cognitive, decreased safety awareness    Gait: Unable to be tested       As per OT notes on 2/7/20    Fine Motor Coordination:   · Left Hand, Finger to Nose	pt unable to follow commands of assessment despite visual/verbal/tactile cues  · Right Hand, Finger to Nose	not tested  pt with no AROM in right UE  · Left Hand Thumb/Finger Opposition Skills	pt unable to follow commands of assessment despite visual/verbal/tactile cues  · Right Hand Thumb/Finger Opposition Skills	not tested  pt with no AROM in right UE  · Left Hand, Manipulation of Objects	pt unable to follow commands of assessment despite visual/verbal/tactile cues  · Right Hand, Manipulation of Objects	not tested  pt with no AROM in right UE    Bathing Training:     · Level of [place]	dependent (less than 25% patients effort); sponge, as per nursing  · Physical Assist/Nonphysical Assist	2 person assist; verbal cues; nonverbal cues (demo/gestures); set-up required    Upper Body Dressing Training:     · Level of [place]	dependent (less than 25% patients effort); gowns  · Physical Assist/Nonphysical Assist	1 person assist; nonverbal cues (demo/gestures); verbal cues    Lower Body Dressing Training:     · Level of [place]	dependent (less than 25% patients effort); socks  · Physical Assist/Nonphysical Assist	1 person assist; nonverbal cues (demo/gestures); verbal cues    Toilet Hygiene Training:     · Level of [place]	dependent (less than 25% patients effort); +condom cath quezada  · Physical Assist/Nonphysical Assist	1 person assist    Grooming Training:     · Level of [place]	dependent (less than 25% patients effort)  · Physical Assist/Nonphysical Assist	1 person assist; nonverbal cues (demo/gestures); verbal cues    Eating/Self-Feeding Training:     · Level of [place]	not observed      FAMILY HISTORY   no pertinent history in primary relatives    RECENT LABS/IMAGING    < from: CT Angio Head w/ IV Cont (02.05.20 @ 22:37) >    IMPRESSION:          1.   Right carotid system:No hemodynamically significant stenosis.        2.   Left carotid system:  occlusion of the LEFT internal carotid artery 3.2 cm beyond the bifurcation.        3.   Intracranial circulation:  occlusion of the intracranial LEFT internal carotid artery with reconstitution of the LEFT supraclinoid internal carotid artery via a patent anterior communicating artery. There is occlusion of the A3 segment of the LEFT anterior cerebral artery.        4.   Brain:  loss of gray-white differentiation in the anterior superior LEFT frontal lobe which may reflect an acute infarction.      < end of copied text >      < from: CT Head No Cont (02.06.20 @ 22:49) >  IMPRESSION:   Loss of gray-white differentiation and decreased attenuation at the left anterior frontal lobe consistent with acute infarct as seen on previous MRI. No intracranial hemorrhage.     < end of copied text >      < from: CT Head No Cont (02.09.20 @ 08:57) >    IMPRESSION:   acute LEFT frontal cortical infarctions within the LEFT JESSE and MCA territories. Minimal high density seen in the region of the LEFT anterior cerebral artery consistent with thrombus. Is sulcal effacement without significant mass effect. Minimal hyperdensity in the LEFT posterior frontal infarction may represent petechial hemorrhage.     < end of copied text >        ALLERGIES  No Known Allergies      MEDICATIONS   amLODIPine   Tablet 10 milliGRAM(s) Oral every 24 hours  aspirin 325 milliGRAM(s) Oral daily  atorvastatin 80 milliGRAM(s) Oral at bedtime  enoxaparin Injectable 40 milliGRAM(s) SubCutaneous daily  metoprolol tartrate 25 milliGRAM(s) Oral two times a day  ondansetron Injectable 4 milliGRAM(s) IV Push every 4 hours PRN      ----------------------------------------------------------------------------------------  PHYSICAL EXAM  Constitutional - NAD, Comfortable, in chair  HEENT - NCAT, EOMI  Neck - Supple, No limited ROM  Chest - Breathing comfortably, No wheezing  Cardiovascular - S1S2   Abdomen - Soft   Extremities - No C/C/E, No calf tenderness   Neurologic Exam -   right side neglect                 Cognitive -Awake and lethargic, unable to fully assess due to aphasia      Communication - Non verbal, will nod his head to some questions but unable to speak. Expressive >receptive aphasia. Not able to fully follow commands      Cranial Nerves - Right sided droop      Motor -                     LEFT    UE - ShAB 5/5, EF 5/5, EE 5/5, WE 5/5,  5/5                    RIGHT UE - ShAB 0/5, EF 0/5, EE 0/5, WE 0/5,  0/5                    LEFT    LE - HF 5/5, KE 5/5, DF 5/5, PF 5/5                    RIGHT LE - HF 0/5, KE 0/5, DF 0/5, PF 0/5        Sensory - Intact to LT     Reflexes - 1+ reflexes      Coordination - Unable to be tested on the affected side R      Psychiatric - Flat affect

## 2020-02-10 NOTE — PROGRESS NOTE ADULT - SUBJECTIVE AND OBJECTIVE BOX
CLAYTON COX Male 64y [id number]    Patient is a 64y old  Male who presents with a chief complaint of Acute CVA (10 Feb 2020 10:27)      Subjective/objective:  Pt seen and examined, sitting comfortably in chair, no over night event reported by night staff. Patient is aphasic, communication through nodding/shaking head, denies any new complaints.    PHYSICAL EXAM:    Vital Signs Last 24 Hrs  T(C): 36.9 (10 Feb 2020 08:12), Max: 37.7 (09 Feb 2020 23:41)  T(F): 98.4 (10 Feb 2020 08:12), Max: 99.8 (09 Feb 2020 23:41)  HR: 61 (10 Feb 2020 08:00) (61 - 76)  BP: 151/85 (10 Feb 2020 08:00) (151/85 - 174/93)  BP(mean): 113 (10 Feb 2020 05:02) (106 - 113)  RR: 11 (10 Feb 2020 08:00) (11 - 20)  SpO2: 96% (10 Feb 2020 08:00) (95% - 96%)    GENERAL: Pt sitting comfortably in chair, NAD.  ENMT: PERRL, +EOMI.  NECK: soft, Supple, No JVD,   CHEST/LUNG: Clear to auscultatation bilaterally; No wheezing.  HEART: S1S2+, Regular rate and rhythm; No murmurs.  ABDOMEN: Soft, Nontender, Nondistended;  MUSCULOSKELETAL: No AROM right upper/lower extremity  SKIN: No rashes or lesions.  NEURO: Patient is alert, aphasic, significant right sided facial droop, right sided hemiplegia, left side normal strength  PSYCH: appears sad, possible depression         MEDICATIONS  (STANDING):  amLODIPine   Tablet 10 milliGRAM(s) Oral every 24 hours  aspirin 325 milliGRAM(s) Oral daily  atorvastatin 80 milliGRAM(s) Oral at bedtime  enoxaparin Injectable 40 milliGRAM(s) SubCutaneous daily  metoprolol tartrate 25 milliGRAM(s) Oral two times a day    MEDICATIONS  (PRN):  ondansetron Injectable 4 milliGRAM(s) IV Push every 4 hours PRN Nausea and/or Vomiting      < from: MR Head w/ IV Cont (02.06.20 @ 10:09) >    FINDINGS:    There is mild diffuse parenchymal volume loss. There are T2 prolongation signal abnormalities in the periventricular white matter likely related to mild chronic microvascular ischemic changes.    Acute infarct left frontal lobe noted measuring approximately 7.4 x 4.1 cm. Punctate acute infarct also noted left centrum semiovale. Small acute infarct also noted measuring approximately 1.4 x 1.2 cm in the left posterior temporal lobe. No abnormal intraparenchymal or leptomeningeal enhancement.    There is no acute parenchymal hemorrhage, parenchymal mass, or midline shift. There is no extra-axial fluid collection.  There is no hydrocephalus. Partial empty sella    Loss of normal high T2 signal distal left internal carotid artery compatible with occlusion    IMPRESSION:  Left-sided acute infarcts as described above.    No acute intracranial hemorrhage.    Occluded distal left internal carotid artery    < end of copied text >      < from: CT Head No Cont (02.09.20 @ 08:57) >    IMPRESSION:   acute LEFT frontal cortical infarctions within the LEFT JESSE and MCA territories. Minimal high density seen in the region of the LEFT anterior cerebral artery consistent with thrombus. Is sulcal effacement without significant mass effect. Minimal hyperdensity in the LEFT posterior frontal infarction may represent petechial hemorrhage.       < end of copied text >

## 2020-02-10 NOTE — PROGRESS NOTE ADULT - ASSESSMENT
65 y/o M no PMH transfer from [facility] ED s/p tPA After presenting from home with right sided weakness and aphasia. CTA at  with occlusion of LICA. Pt was transferred to [facility] for further neurologic evaluation. Upon arrival to ED, /100 so cardene gtt was re-started. Imaging revealed multiple left sided infarcts, large JESSE/MCA infarct, punctate left centrum semiovale infarct, and small left posterior temporal infarct with Minimal high density seen in the region of the LEFT anterior cerebral artery consistent with thrombus. Neurovascular interventionalist consulted, not offering any intervention at this time. Admitted to MICU for post tPA monitoring. He was treated with cardene drip for HTN , stopped on 2/7, transferred to medical service. Pt was seen by vascular surgery in ICU for Left CA occlusion rec :  Full anticoagulation for at least 6 weeks if ok with neuro however neuro advises to hold starting anticoagulation at least 7 days after stroke  (02/12/20). On exam no improvement of motor function in right upper and lower extremities. Patient is aphasic, cooperative to nodding/shaking head upon questioning. Previous NIH scale score of 20. Patient is a candidate for acute rehab facility. Denies any new complaints at this time by shaking head. Repeat CT 2/9 with possible petechial hemorrhage. Plan to repeat CT on 02/12/20.       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 with aphasia and right sided weakness   - s/p TPA   - c/w aspirin, statin   -   - Hgb A1C 6.0- blood glucose here 115-130's, continue to monitor     - repeat CT scan possible petechial hemorrhage 02/09, plan to repeat on 02/12   - cont to monitor q 2 hr neuro checks   - PMNR consult appreciated, eventual discharge to acute rehab   - overall prognosis poor   - tolerating soft with thin liquid diet, S/S following  - PT/OT and Speech therapy following      2- Left ICA occlusion   - no intervention per neuro interventional or vascular team  - rec full anticoagulation once stable and cleared by neurology   - as per neurology wait at least 7 days to consider AC (02/12)    3- HTN new dx uncontrolled   - BP trend reviewed  - Increased metoprolol to 50 mg BID with parameters  - continue amlodipine 10 mg qd  - IV hydralazine as needed   - continue to monitor     - RBBB noted on EKG  - Telemetry reviewed: no events  -Continue to monitor tele

## 2020-02-10 NOTE — SPEECH LANGUAGE PATHOLOGY EVALUATION - SLP GENERAL OBSERVATIONS
Pt received & seen seated upright OOB in chair, +awake, +lethargic at times, +non-verbal t/o assessment, 0/10 pain non-verbal pain scale

## 2020-02-10 NOTE — PROGRESS NOTE ADULT - ATTENDING COMMENTS
pt is seen in am , pt is non verbal and looks sad , refuses to eat per nurse   exam no change as above   BP is still not well controlled   neurology fallow up appreciated     cont to observe closely neurocheck BP monitoring   soon discharge to acute rehab

## 2020-02-11 LAB
ANION GAP SERPL CALC-SCNC: 13 MMOL/L — SIGNIFICANT CHANGE UP (ref 5–17)
BUN SERPL-MCNC: 32 MG/DL — HIGH (ref 8–20)
CALCIUM SERPL-MCNC: 9.6 MG/DL — SIGNIFICANT CHANGE UP (ref 8.6–10.2)
CHLORIDE SERPL-SCNC: 99 MMOL/L — SIGNIFICANT CHANGE UP (ref 98–107)
CO2 SERPL-SCNC: 26 MMOL/L — SIGNIFICANT CHANGE UP (ref 22–29)
CREAT SERPL-MCNC: 0.71 MG/DL — SIGNIFICANT CHANGE UP (ref 0.5–1.3)
GLUCOSE SERPL-MCNC: 136 MG/DL — HIGH (ref 70–99)
HCT VFR BLD CALC: 44.6 % — SIGNIFICANT CHANGE UP (ref 39–50)
HGB BLD-MCNC: 14.4 G/DL — SIGNIFICANT CHANGE UP (ref 13–17)
MCHC RBC-ENTMCNC: 29 PG — SIGNIFICANT CHANGE UP (ref 27–34)
MCHC RBC-ENTMCNC: 32.3 GM/DL — SIGNIFICANT CHANGE UP (ref 32–36)
MCV RBC AUTO: 89.7 FL — SIGNIFICANT CHANGE UP (ref 80–100)
PLATELET # BLD AUTO: 303 K/UL — SIGNIFICANT CHANGE UP (ref 150–400)
POTASSIUM SERPL-MCNC: 4.1 MMOL/L — SIGNIFICANT CHANGE UP (ref 3.5–5.3)
POTASSIUM SERPL-SCNC: 4.1 MMOL/L — SIGNIFICANT CHANGE UP (ref 3.5–5.3)
RBC # BLD: 4.97 M/UL — SIGNIFICANT CHANGE UP (ref 4.2–5.8)
RBC # FLD: 12.6 % — SIGNIFICANT CHANGE UP (ref 10.3–14.5)
SODIUM SERPL-SCNC: 138 MMOL/L — SIGNIFICANT CHANGE UP (ref 135–145)
WBC # BLD: 10.61 K/UL — HIGH (ref 3.8–10.5)
WBC # FLD AUTO: 10.61 K/UL — HIGH (ref 3.8–10.5)

## 2020-02-11 PROCEDURE — 99232 SBSQ HOSP IP/OBS MODERATE 35: CPT

## 2020-02-11 PROCEDURE — 99233 SBSQ HOSP IP/OBS HIGH 50: CPT

## 2020-02-11 RX ADMIN — LOSARTAN POTASSIUM 25 MILLIGRAM(S): 100 TABLET, FILM COATED ORAL at 06:05

## 2020-02-11 RX ADMIN — ENOXAPARIN SODIUM 40 MILLIGRAM(S): 100 INJECTION SUBCUTANEOUS at 11:44

## 2020-02-11 RX ADMIN — AMLODIPINE BESYLATE 10 MILLIGRAM(S): 2.5 TABLET ORAL at 04:37

## 2020-02-11 RX ADMIN — ATORVASTATIN CALCIUM 80 MILLIGRAM(S): 80 TABLET, FILM COATED ORAL at 22:33

## 2020-02-11 RX ADMIN — Medication 325 MILLIGRAM(S): at 11:44

## 2020-02-11 NOTE — PROGRESS NOTE ADULT - ASSESSMENT
65 y/o M w/ no significant PMHx who was transferred from [facility] ED s/p tPA administration at 22:48. CTA head performed at [facility] revealing of total left ICA occlusion. Per pt's significant other, Sheeba, pt returned home from work around 6:30 PM in his usual state of health. Pt ate dinner, and at approx 7:30 PM, pt's significant other noticed him to be "swaying to one side" and slumped over on the cough. Also state that pt was not moving his right upper or lower extremities, and was not speaking. At approx 9:30 PM, pt's significant other states that she called 911 because pt's symptoms were not improving. Upon arrival to [facility], pt w/ decreased/slurred speech and dense right-sided hemiplegia. S/p tPA administration, pt w/o improvement in right-sided weakness. Pt was transferred to [facility] for further neurologic evaluation. Upon arrival to ED, /100 so cardene gtt was re-started. CT head perfusion revealing of small to moderate sized left frontal infarction w/ moderate to large region of low flow and/or ischemic state in left frontoparietal region. CTA neck significant for occlusion of left internal carotid. Neurovascular interventionalist not offering any intervention at this time per ED attending. Admit to MICU for post tPA monitoring.He was treated with cardene drip for HTN , stopped on 2/7, transfered to medical service , pt was seen by vascular surgery in ICU for Left CA occlusion and  R ICA with dissection flap noted so rec to start Full anticoagulation for at least 6 weeks if ok with neuro however neuro  advise to hold starting anticoagulation at least 6 days after stroke , cont aspirin  , repeat Ct scan large stroke with micro hemorrhages ? , pt is with high BP antihyopertensive medications initiated post tx from ICU , meds adjusted for better control         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 and R ICa dissection on Ct scan residual   aphasia and right sided weakness   s/p TPA te ed to [facility]   cont aspirin , statin   repeat Ct scan tomorrow per neuro   discharge acute rehab in 24 -48 hours     2- Left ICA occlusion / R ICA dissection flap   no intervention for c artery stenosis per neurovascular team and vascular team   rec full anticoagulation once stable and cleared by neurology to start on 2/12     3- HTN new dx uncontrolled - better   cont metoprolol , losartan and amlodipine   will avoid hypotension meds     discharge planning to acute rehab once cleared by neurology Speaking Coherently

## 2020-02-11 NOTE — PROGRESS NOTE ADULT - SUBJECTIVE AND OBJECTIVE BOX
patient emotional, frustrated by deficits. Follows simple commands    REVIEW OF SYSTEMS  Constitutional - No fever,  No fatigue  HEENT - No vertigo, No neck pain  Neurological - No headaches, No memory loss, + loss of strength, No numbness, No tremors  Skin - No rashes, No lesions   Musculoskeletal - No joint pain, No joint swelling, No muscle pain  Psychiatric - +depression, + anxiety    FUNCTIONAL PROGRESS    2/11 SLP  Therapeutic Interventions    SLP Treatment: Auditory Comprehension  SLP Treatment: Auditory Comprehension Treatment Detail: 1. To assess reading comprehension: Letters: 0% independently, 0% despite max cues2. To improve body part ID to >30% accuracy without cues, to facilitate improve auditory  comprehension: 20% independently, did not increase despite cues, due to +perseveration on previous stimulus3. To follow simple 1 step comnmands to >30% accuracy without cues, to facilitate improve auditory  comprehension: 60% independently increased to 80% with max cues4. To improve yes/no reliability for simple ? to > 70%, to facilitate improve auditory  comprehension: 40% independently, did not increase despite max cues5. To improve object ID in array x2 to >75%, to facilitate improve auditory  comprehension: 80% independently     SLP Treatment: Verbal Expression  SLP Treatment: Verbal Expression Treatment Detail: 1. To imitate vowels to >30% accuracy, to facilitate verbal expression: 0% independently, pt able to mouth /a/ after max tactile cues, however, no vocalization noted.     2/10 PT  Bed Mobility  Bed Mobility Training Sit-to-Supine: maximum assist (25% patient effort)  Bed Mobility Training Supine-to-Sit: maximum assist (25% patient effort)    Bed-Chair Transfer Training  Transfer Training Bed-to-Chair Transfer: maximum assist (25% patient effort);  2 person assist;  weight-bearing as tolerated  Transfer Training Chair-to-Bed Transfer: maximum assist (25% patient effort);  2 person assist;  weight-bearing as tolerated  Bed-to-Chair Transfer Training Transfer Safety Analysis: impaired coordination;  impaired balance;  decreased strength;  impaired motor control;  impaired postural control;  cognitive, decreased safety awareness    Sit-Stand Transfer Training  Transfer Training Sit-to-Stand Transfer: maximum assist (25% patient effort);  2 person assist;  weight-bearing as tolerated   rolling walker  Transfer Training Stand-to-Sit Transfer: maximum assist (25% patient effort);  2 person assist;  weight-bearing as tolerated   rolling walker  Sit-to-Stand Transfer Training Transfer Safety Analysis: decreased balance;  decreased strength;  impaired balance;  impaired coordination;  impaired motor control;  cognitive, decreased safety awareness      2/10 OT  Therapeutic Exercise  Therapeutic Exercise Detail: Passive ROM right UE throughout by therapist, encouraging pt to assist as able.  No muscle contraction palpated 0/5.  Pt instructed on use of left UE to self assist with Right UE ROM.  Pt continues to need max A but grasps right UE in attempt to assist.  Pt mirrored PROM Right UE with active ROM left UE, with min verbal cues to promote bilateral coordination, neuromuscular re-education, and crossing midline.     Functional Endurance  Functional Endurance Detail: Pt demonstrates increased alertness, increased attention during OT today.  Pt still inconsistent with following but follows simple commands  >50% of time.  Pt demonstrates increased ability to attend to right side with min verbal cues when asked to search for and maintain focus on different objects in room.     VITALS  T(C): 36.7 (02-11-20 @ 09:04), Max: 37.1 (02-10-20 @ 16:00)  HR: 62 (02-11-20 @ 08:00) (56 - 69)  BP: 141/72 (02-11-20 @ 08:00) (119/84 - 163/101)  RR: 10 (02-11-20 @ 08:00) (10 - 19)  SpO2: 94% (02-11-20 @ 08:00) (94% - 97%)  Wt(kg): --    MEDICATIONS   amLODIPine   Tablet 10 milliGRAM(s) every 24 hours  aspirin enteric coated 325 milliGRAM(s) daily  atorvastatin 80 milliGRAM(s) at bedtime  enoxaparin Injectable 40 milliGRAM(s) daily  losartan 25 milliGRAM(s) daily  metoprolol succinate ER 50 milliGRAM(s) daily  ondansetron Injectable 4 milliGRAM(s) every 4 hours PRN      RECENT LABS - Reviewed                        14.4   10.61 )-----------( 303      ( 11 Feb 2020 04:50 )             44.6     02-11    138  |  99  |  32.0<H>  ----------------------------<  136<H>  4.1   |  26.0  |  0.71    Ca    9.6      11 Feb 2020 04:50        -------------------------------------------------------------------------------  PHYSICAL EXAM  Constitutional - NAD, Comfortable, in bed  HEENT - NCAT, EOMI  Neck - Supple, No limited ROM  Chest - Breathing comfortably, No wheezing  Cardiovascular - S1S2   Abdomen - Soft   Extremities - No C/C/E, No calf tenderness   Neurologic Exam -   decreased neglect on right              Cognitive -Awake and lethargic, unable to fully assess due to aphasia      Communication - Non verbal, will nod his head to some questions but unable to speak. Expressive >receptive aphasia. Not able to fully follow commands      Cranial Nerves - Right sided droop      Motor -                     LEFT    UE - ShAB 5/5, EF 5/5, EE 5/5, WE 5/5,  5/5                    RIGHT UE - ShAB 0/5, EF 0/5, EE 0/5, WE 0/5,  0/5                    LEFT    LE - HF 5/5, KE 5/5, DF 5/5, PF 5/5                    RIGHT LE - HF 0/5, KE 0/5, DF 0/5, PF 0/5        Sensory - Intact to LT     Reflexes - 1+ reflexes      Coordination - Unable to be tested on the affected side R      Psychiatric - tearful, frustrated         Assessment and Recommendation:   · Assessment	  ASSESSMENT/PLAN  64y Male with functional deficits after acute LEFT frontal cortical infarctions within the LEFT JESSE and MCA territories.   s/p tPA  Right neglect, right hemiplegia, aphasia    CVA- Continue ASA, statin, follow up CT tomorrow  Left ICA occlusion - No intervention as per vascular team, follow up neuro in regards to full AC   HTN-Amlodipine, metoprolol,   Pain - well managed without meds at the moment   DVT PPX - Lovenox, SCDs   Diet- Dysphagia 3, swallow eval, patient seen coughing, spitting up food  Consider adding Prozac 10 mg daily for mood and motor recovery    Rehab -   Patient was independent in the community prior to admission, would benefit from intensive PT/OT/SLP to restore function while being closely monitored and managed for ongoing medical issues which can destabilize     Recommend ACUTE inpatient rehabilitation for the functional deficits consisting of 3 hours of therapy/day & 24 hour RN/daily PMR physician for comorbid medical management. Will continue to follow for ongoing rehab needs and recommendations. Patient will be able to tolerate 3 hours a day.    Continue bedside therapy as well as OOB throughout the day with mobilization throughout the day with staff to maintain cardiopulmonary function and prevention of secondary complications related to debility.   SLP, PT, OT for speech, bed mobility, transfers, ambulation, ADLs .

## 2020-02-11 NOTE — SWALLOW BEDSIDE ASSESSMENT ADULT - ASR SWALLOW ASPIRATION MONITOR
throat clearing/gurgly voice/oral hygiene/cough/fever/upper respiratory infection/change of breathing pattern

## 2020-02-11 NOTE — SWALLOW BEDSIDE ASSESSMENT ADULT - SLP PERTINENT HISTORY OF CURRENT PROBLEM
Pt family reports difficulty swallowing s/p recent neck surgery with "effort" needed to get solid food down

## 2020-02-11 NOTE — SWALLOW BEDSIDE ASSESSMENT ADULT - SLP GENERAL OBSERVATIONS
Pt received & seen seated upright in bed, +awake/alert, +aphasic, +non-verbal, +family present (ex-wife, son & brother), 0/10 pain non-verbal pain scale

## 2020-02-11 NOTE — PROGRESS NOTE ADULT - SUBJECTIVE AND OBJECTIVE BOX
[facility]                                        Neurology at [place]                                 [facility]                                  [address]                                        [place], [place], [address]                                             [phone]        CC: Stroke.     HPI:   The patient is a 64y Male who presented 2/5/2020 to [facility] after slumping over at home. He was given t-PA and transferred to [facility] for further evaluation and management.  He was found to have distal occlusion of the left ICA and was admitted to MICU for blood pressure control as he initially required drips. CT-A also suggested dissection of the right ICA.   In ICU he has been globally aphasic with neglect of right and right hemiparesis.     Interim history:  Now on 3 [place] Stepdown.  No neurologic change.     ROS:   Unobtainable due to patient's condition.     MEDICATIONS  (STANDING):  amLODIPine   Tablet 10 milliGRAM(s) Oral every 24 hours  aspirin enteric coated 325 milliGRAM(s) Oral daily  atorvastatin 80 milliGRAM(s) Oral at bedtime  enoxaparin Injectable 40 milliGRAM(s) SubCutaneous daily  losartan 25 milliGRAM(s) Oral daily  metoprolol succinate ER 50 milliGRAM(s) Oral daily      Vital Signs Last 24 Hrs  T(C): 36.7 (11 Feb 2020 09:04), Max: 37.1 (10 Feb 2020 16:00)  T(F): 98 (11 Feb 2020 09:04), Max: 98.8 (10 Feb 2020 16:00)  HR: 62 (11 Feb 2020 08:00) (56 - 69)  BP: 141/72 (11 Feb 2020 08:00) (119/84 - 163/101)  BP(mean): 92 (11 Feb 2020 08:00) (91 - 103)  RR: 10 (11 Feb 2020 08:00) (10 - 19)  SpO2: 94% (11 Feb 2020 08:00) (94% - 97%)    Detailed Neurologic Exam:    Mental status: The patient is alert. He is mute and does not follow instructions. Able to mimic gestures with left hand after multiple attempts.    Cranial nerves: There is no papilledema. Pupils react symmetrically to light. There is no blink to threat from the right. He tracks with gaze although does not go much past midline on the right. There is no ptosis. Facial sensation is intact. Facial musculature is asymmetric with a depression of the left nasolabial fold. Palate and tongue are difficult to assess.     Motor/Sensory: There is normal bulk and tone.  Moves left well to stimuli.   No movement on right to stimuli.     Reflexes: 1+ throughout and plantar responses are flexor.    Cerebellar: Cannot be tested.     Labs:     02-11    138  |  99  |  32.0<H>  ----------------------------<  136<H>  4.1   |  26.0  |  0.71    Ca    9.6      11 Feb 2020 04:50                              14.4   10.61 )-----------( 303      ( 11 Feb 2020 04:50 )             44.6

## 2020-02-11 NOTE — SWALLOW BEDSIDE ASSESSMENT ADULT - ORAL PHASE
Within functional limits Decreased anterior-posterior movement of the bolus/prolonged mastication time observed/Delayed oral transit time

## 2020-02-11 NOTE — PROGRESS NOTE ADULT - SUBJECTIVE AND OBJECTIVE BOX
Internal Medicine Hospitalist Progress Note    follow up for acute stroke with aphasia right sided weakness   pt 's seen examined , sitting in the bed , no distress  nonverbal , able to fallow commands , moves left side upper and lower extremity       Vital Signs Last 24 Hrs  T(C): 36.7 (11 Feb 2020 09:04), Max: 37.1 (10 Feb 2020 16:00)  T(F): 98 (11 Feb 2020 09:04), Max: 98.8 (10 Feb 2020 16:00)  HR: 62 (11 Feb 2020 08:00) (56 - 69)  BP: 141/72 (11 Feb 2020 08:00) (119/84 - 163/101)  BP(mean): 92 (11 Feb 2020 08:00) (91 - 103)  RR: 10 (11 Feb 2020 08:00) (10 - 19)  SpO2: 94% (11 Feb 2020 08:00) (94% - 97%)        Constitutional: awake alert , non verbal     Respiratory: CTA bilateral , no wheezing     Cardiovascular: regular s1 /s2     Gastrointestinal: soft no tenderness , BS positive     Extremities: no pretibial edema , no calf tenderness     Neurological awake alert aphasic , right sided weakness, able to follow commands , moves left side                                 14.4   10.61 )-----------( 303      ( 11 Feb 2020 04:50 )             44.6   02-11    138  |  99  |  32.0<H>  ----------------------------<  136<H>  4.1   |  26.0  |  0.71    Ca    9.6      11 Feb 2020 04:50        Radiology :      IMPRESSION:   Loss of gray-white differentiation and decreased attenuation at the left anterior frontal lobe consistent with acute infarct as seen on previous MRI. No intracranial hemorrhage.     < end of copied text >    < from: CT Head No Cont (02.09.20 @ 08:57) >  IMPRESSION:   acute LEFT frontal cortical infarctions within the LEFT JESSE and MCA territories. Minimal high density seen in the region of the LEFT anterior cerebral artery consistent with thrombus. Is sulcal effacement without significant mass effect. Minimal hyperdensity in the LEFT posterior frontal infarction may represent petechial hemorrhage.     < end of copied text >

## 2020-02-11 NOTE — PROGRESS NOTE ADULT - ASSESSMENT
64y Male with stroke.     Stroke  Now status post t-PA.   No hemorrhage on follow up imaging.   Clinically large territory.   Would repeat CT in another 24-48 hrs.   LDL: 104  Continue antiplatelet and statin.   Mobilize with physical therapy.     Dissection.   Seen by Vascular surgery.   Would hold off on anticoagulation given clinically large infarct.   Would wait at least 7 days from stroke onset (ie. 2/12/2020) prior to starting any anticoagulation.   Will repeat CT in am.     Case discussed with PA covering stepdown unit.

## 2020-02-11 NOTE — SWALLOW BEDSIDE ASSESSMENT ADULT - SWALLOW EVAL: DIAGNOSIS
Mild to moderate oral dysphagia observed for soft solids, otherwise grossly WFL for puree, mech soft & thin fluids.  Pharyngeal stage of swallow clinically unremarkable with no overt s/s aspiration

## 2020-02-11 NOTE — SWALLOW BEDSIDE ASSESSMENT ADULT - COMMENTS
As per MD note:  "As per MD note:   "64y Male with functional deficits after acute LEFT frontal cortical infarctions within the LEFT JESSE and MCA territories."

## 2020-02-12 ENCOUNTER — TRANSCRIPTION ENCOUNTER (OUTPATIENT)
Age: 65
End: 2020-02-12

## 2020-02-12 PROCEDURE — 93010 ELECTROCARDIOGRAM REPORT: CPT

## 2020-02-12 PROCEDURE — 99232 SBSQ HOSP IP/OBS MODERATE 35: CPT

## 2020-02-12 PROCEDURE — 99221 1ST HOSP IP/OBS SF/LOW 40: CPT

## 2020-02-12 PROCEDURE — 70450 CT HEAD/BRAIN W/O DYE: CPT | Mod: 26

## 2020-02-12 RX ORDER — FLUOXETINE HCL 10 MG
10 CAPSULE ORAL DAILY
Refills: 0 | Status: DISCONTINUED | OUTPATIENT
Start: 2020-02-12 | End: 2020-02-13

## 2020-02-12 RX ORDER — FLUOXETINE HCL 10 MG
10 CAPSULE ORAL DAILY
Refills: 0 | Status: DISCONTINUED | OUTPATIENT
Start: 2020-02-13 | End: 2020-02-15

## 2020-02-12 RX ORDER — ASPIRIN/CALCIUM CARB/MAGNESIUM 324 MG
81 TABLET ORAL DAILY
Refills: 0 | Status: DISCONTINUED | OUTPATIENT
Start: 2020-02-12 | End: 2020-02-13

## 2020-02-12 RX ORDER — BUPRENORPHINE AND NALOXONE 2; .5 MG/1; MG/1
1 TABLET SUBLINGUAL DAILY
Refills: 0 | Status: DISCONTINUED | OUTPATIENT
Start: 2020-02-12 | End: 2020-02-13

## 2020-02-12 RX ORDER — ACETAMINOPHEN 500 MG
650 TABLET ORAL EVERY 6 HOURS
Refills: 0 | Status: DISCONTINUED | OUTPATIENT
Start: 2020-02-13 | End: 2020-03-06

## 2020-02-12 RX ORDER — SODIUM CHLORIDE 9 MG/ML
1000 INJECTION INTRAMUSCULAR; INTRAVENOUS; SUBCUTANEOUS
Refills: 0 | Status: COMPLETED | OUTPATIENT
Start: 2020-02-12 | End: 2020-02-12

## 2020-02-12 RX ORDER — LOSARTAN POTASSIUM 100 MG/1
1 TABLET, FILM COATED ORAL
Qty: 0 | Refills: 0 | DISCHARGE
Start: 2020-02-12

## 2020-02-12 RX ORDER — ASPIRIN/CALCIUM CARB/MAGNESIUM 324 MG
81 TABLET ORAL DAILY
Refills: 0 | Status: DISCONTINUED | OUTPATIENT
Start: 2020-02-13 | End: 2020-02-15

## 2020-02-12 RX ORDER — LOSARTAN POTASSIUM 100 MG/1
25 TABLET, FILM COATED ORAL DAILY
Refills: 0 | Status: DISCONTINUED | OUTPATIENT
Start: 2020-02-13 | End: 2020-02-22

## 2020-02-12 RX ORDER — ATORVASTATIN CALCIUM 80 MG/1
1 TABLET, FILM COATED ORAL
Qty: 0 | Refills: 0 | DISCHARGE
Start: 2020-02-12

## 2020-02-12 RX ORDER — AMLODIPINE BESYLATE 2.5 MG/1
1 TABLET ORAL
Qty: 0 | Refills: 0 | DISCHARGE
Start: 2020-02-12

## 2020-02-12 RX ORDER — ATORVASTATIN CALCIUM 80 MG/1
80 TABLET, FILM COATED ORAL AT BEDTIME
Refills: 0 | Status: DISCONTINUED | OUTPATIENT
Start: 2020-02-13 | End: 2020-03-06

## 2020-02-12 RX ORDER — ASPIRIN/CALCIUM CARB/MAGNESIUM 324 MG
1 TABLET ORAL
Qty: 0 | Refills: 0 | DISCHARGE
Start: 2020-02-12

## 2020-02-12 RX ORDER — BUPRENORPHINE AND NALOXONE 2; .5 MG/1; MG/1
1.5 TABLET SUBLINGUAL DAILY
Refills: 0 | Status: DISCONTINUED | OUTPATIENT
Start: 2020-02-12 | End: 2020-02-12

## 2020-02-12 RX ORDER — BUPRENORPHINE 10 UG/H
1.5 PATCH, EXTENDED RELEASE TRANSDERMAL
Qty: 0 | Refills: 0 | DISCHARGE

## 2020-02-12 RX ORDER — FLUOXETINE HCL 10 MG
1 CAPSULE ORAL
Qty: 0 | Refills: 0 | DISCHARGE
Start: 2020-02-12

## 2020-02-12 RX ORDER — AMLODIPINE BESYLATE 2.5 MG/1
10 TABLET ORAL EVERY 24 HOURS
Refills: 0 | Status: DISCONTINUED | OUTPATIENT
Start: 2020-02-13 | End: 2020-03-01

## 2020-02-12 RX ORDER — BUPRENORPHINE 10 UG/H
1.5 PATCH, EXTENDED RELEASE TRANSDERMAL
Qty: 0 | Refills: 0 | DISCHARGE
Start: 2020-02-12

## 2020-02-12 RX ADMIN — LOSARTAN POTASSIUM 25 MILLIGRAM(S): 100 TABLET, FILM COATED ORAL at 05:49

## 2020-02-12 RX ADMIN — BUPRENORPHINE AND NALOXONE 1 TABLET(S): 2; .5 TABLET SUBLINGUAL at 19:54

## 2020-02-12 RX ADMIN — AMLODIPINE BESYLATE 10 MILLIGRAM(S): 2.5 TABLET ORAL at 04:47

## 2020-02-12 RX ADMIN — Medication 10 MILLIGRAM(S): at 17:54

## 2020-02-12 RX ADMIN — SODIUM CHLORIDE 75 MILLILITER(S): 9 INJECTION INTRAMUSCULAR; INTRAVENOUS; SUBCUTANEOUS at 19:55

## 2020-02-12 RX ADMIN — Medication 81 MILLIGRAM(S): at 17:42

## 2020-02-12 RX ADMIN — Medication 50 MILLIGRAM(S): at 05:49

## 2020-02-12 RX ADMIN — ATORVASTATIN CALCIUM 80 MILLIGRAM(S): 80 TABLET, FILM COATED ORAL at 22:01

## 2020-02-12 NOTE — DISCHARGE NOTE PROVIDER - HOSPITAL COURSE
63 y/o M w/ no significant PMHx who was transferred from [facility] ED s/p tPA administration at 22:48. CTA head performed at [facility] revealing of total left ICA occlusion. Per pt's significant other, Sheeba, pt returned home from work around 6:30 PM in his usual state of health. Pt ate dinner, and at approx 7:30 PM, pt's significant other noticed him to be "swaying to one side" and slumped over on the cough. Also state that pt was not moving his right upper or lower extremities, and was not speaking. At approx 9:30 PM, pt's significant other states that she called 911 because pt's symptoms were not improving. Upon arrival to [facility], pt w/ decreased/slurred speech and dense right-sided hemiplegia. S/p tPA administration, pt w/o improvement in right-sided weakness. Pt was transferred to [facility] for further neurologic evaluation. Upon arrival to ED, /100 so cardene gtt was re-started. CT head perfusion revealing of small to moderate sized left frontal infarction w/ moderate to large region of low flow and/or ischemic state in left frontoparietal region. CTA neck significant for occlusion of left internal carotid. Neurovascular interventionalist not offering any intervention at this time per ED attending. Admit to MICU for post tPA monitoring. He was treated with cardene drip for HTN , stopped on 2/7,and  transferred to medical service He was seen by vascular surgery in  ICU for Left CA occlusion and R ICA dissection flap seen on CTA rec :  Full anticoagulation for at least 6 weeks if ok with neuro however neuro  advise to hold starting anticoagulation at least 6 days initialy repeat CT scan 2/9 showed large infarct with left frontal petechial hemorrhage , repaet CT scan today showed more prominent hemmorhage , neurology called to comment ; due to petecchial hemorrhage and risk of bleed advised to hold full anticoagulation for few weeks .continue aspirin 81 mg daily     I had spoek to [patient's daughter who is orthopedics physician re plan and findings     he is cleared to go to acute rehab today         total time spend coordinating care 40 min 65 y/o M w/ no significant PMHx who was transferred from [facility] ED s/p tPA administration at 22:48. CTA head performed at [facility] revealing of total left ICA occlusion. Per pt's significant other, Sheeba, pt returned home from work around 6:30 PM in his usual state of health. Pt ate dinner, and at approx 7:30 PM, pt's significant other noticed him to be "swaying to one side" and slumped over on the cough. Also state that pt was not moving his right upper or lower extremities, and was not speaking. At approx 9:30 PM, pt's significant other states that she called 911 because pt's symptoms were not improving. Upon arrival to [facility], pt w/ decreased/slurred speech and dense right-sided hemiplegia. S/p tPA administration, pt w/o improvement in right-sided weakness. Pt was transferred to [facility] for further neurologic evaluation. Upon arrival to ED, /100 so cardene gtt was re-started. CT head perfusion revealing of small to moderate sized left frontal infarction w/ moderate to large region of low flow and/or ischemic state in left frontoparietal region. CTA neck significant for occlusion of left internal carotid. Neurovascular interventionalist not offering any intervention at this time per ED attending. Admit to MICU for post tPA monitoring. He was treated with cardene drip for HTN , stopped on 2/7,and  transferred to medical service He was seen by vascular surgery in  ICU for Left CA occlusion and R ICA dissection flap seen on CTA rec :  Full anticoagulation for at least 6 weeks if ok with neuro however neuro  advise to hold starting anticoagulation at least 6 days initialy repeat CT scan 2/9 showed large infarct with left frontal petechial hemorrhage , repaet CT scan today showed more prominent hemmorhage , neurology called to comment ; due to petecchial hemorrhage and risk of bleed advised to hold full anticoagulation for few weeks .continue aspirin 81 mg daily     cardio also saw patient who recommended to hold beta blockers        I had spoek to [patient's daughter who is orthopedics physician re plan and findings     he is cleared to go to acute rehab today         total time spend coordinating care 40 min 65 y/o M w/ no significant PMHx who was transferred from [facility] ED s/p tPA administration at 22:48. CTA head performed at [facility] revealing of total left ICA occlusion. Per pt's significant other, Sheeba, pt returned home from work around 6:30 PM in his usual state of health. Pt ate dinner, and at approx 7:30 PM, pt's significant other noticed him to be "swaying to one side" and slumped over on the cough. Also state that pt was not moving his right upper or lower extremities, and was not speaking. At approx 9:30 PM, pt's significant other states that she called 911 because pt's symptoms were not improving. Upon arrival to [facility], pt w/ decreased/slurred speech and dense right-sided hemiplegia. S/p tPA administration, pt w/o improvement in right-sided weakness. Pt was transferred to [facility] for further neurologic evaluation. Upon arrival to ED, /100 so cardene gtt was re-started. CT head perfusion revealing of small to moderate sized left frontal infarction w/ moderate to large region of low flow and/or ischemic state in left frontoparietal region. CTA neck significant for occlusion of left internal carotid. Neurovascular interventionalist not offering any intervention at this time per ED attending. Admit to MICU for post tPA monitoring. He was treated with cardene drip for HTN , stopped on 2/7,and  transferred to medical service He was seen by vascular surgery in  ICU for Left CA occlusion and R ICA dissection flap seen on CTA rec :  Full anticoagulation for at least 6 weeks if ok with neuro however neuro  advise to hold starting anticoagulation at least 6 days initialy. Repeat CT scan 2/9 showed large infarct with left frontal petechial hemorrhage, another repeat CT head showed more prominent hemorrhage.  Neurology called to comment, due to petecchial hemorrhage and risk of bleed advised to hold full anticoagulation for 6-8 weeks. Ok to continue with aspirin 81 mg daily. Cardio also saw patient who recommended to hold beta blockers.         Vital Signs Last 24 Hrs    T(C): 36.8 (13 Feb 2020 07:39), Max: 37.1 (12 Feb 2020 12:04)    T(F): 98.2 (13 Feb 2020 07:39), Max: 98.7 (12 Feb 2020 12:04)    HR: 70 (13 Feb 2020 04:00) (61 - 70)    BP: 148/88 (13 Feb 2020 04:00) (122/77 - 148/90)    BP(mean): 102 (13 Feb 2020 04:00) (85 - 103)    RR: 14 (13 Feb 2020 04:00) (12 - 15)    SpO2: 93% (13 Feb 2020 04:00) (93% - 97%)        PHYSICAL EXAM:     Constitutional: awake alert, non verbal     Respiratory: CTA bilateral , no wheezing     Cardiovascular: regular s1 /s2     Gastrointestinal: soft no tenderness , BS positive     Extremities: no pretibial edema , no calf tenderness     Neurological awake alert aphasic but able to answer yes or no questions with head shakes, complete right sided weakness, able to follow commands, moves left side

## 2020-02-12 NOTE — H&P ADULT - HISTORY OF PRESENT ILLNESS
65 y/o M w/ no significant PMHx who was transferred from [facility] ED on 2/5/20 after presenting with aphasia, dysarthria and right hemiplegia, s/p tPA administration at 22:48 2/5/20. CTA head performed at [facility] revealing of total left ICA occlusion. LNK 6:30PM 2/5/20. S/p tPA administration, pt w/o improvement in right-sided weakness. Pt was transferred to [facility] for further neurologic evaluation. Upon arrival to ED, /100 so cardene gtt was re-started. CT head perfusion revealing of small to moderate sized left frontal infarction w/ moderate to large region of low flow and/or ischemic state in left frontoparietal region. CTA neck significant for occlusion of left internal carotid. Neurovascular interventionalist not offering any intervention at this time per ED attending. Admitted to MICU for post tPA monitoring. CT-A also noted with right ICA dissection. While admitted, patient was treated with cardene drip for HTN which was stopped on 2/7 and was then transferred to medical services. Patient was seen by vascular surgery in ICU for Left CA occlusion and recommended for patient to be started on full anticoagulation for at least 6 weeks. Neuro was consulted, repeat CT showing petechial hemorrhages.

## 2020-02-12 NOTE — DISCHARGE NOTE PROVIDER - CARE PROVIDERS DIRECT ADDRESSES
[email or web address]

## 2020-02-12 NOTE — CONSULT NOTE ADULT - SUBJECTIVE AND OBJECTIVE BOX
[place] [facility]                                                               Office:  [address] [address]                                                              Telephone: [phone]. Fax:[phone]                                                                        CARDIOLOGY CONSULTATION NOTE                                                                                             Consult requested by:  Dr. Burgess  Reason for Consultation: Pause   History obtained by: Patient and medical record   obtained: No    Chief complaint:    Patient is a 64y old  Male who presents with a chief complaint of Acute CVA (12 Feb 2020 15:13)        HPI: Pt is a 65 y/o male with history of active smoking/vaping, and oxycodone abuse-on suboxone, who presents to [facility] as a transfer from [facility] s/p CVA. Pt unable to verbally relay HPI d/t aphasia. As per chart pt "transferred from [facility] ED s/p tPA administration at 22:48. CTA head performed at [facility] revealing of total left ICA occlusion. Per pt's significant other, Sheeba, pt returned home from work around 6:30 PM in his usual state of health. Pt ate dinner, and at approx 7:30 PM, pt's significant other noticed him to be "swaying to one side" and slumped over on the cough. Also state that pt was not moving his right upper or lower extremities, and was not speaking. At approx 9:30 PM, pt's significant other states that she called 911 because pt's symptoms were not improving. Upon arrival to [facility], pt w/ decreased/slurred speech and dense right-sided hemiplegia. S/p tPA administration, pt w/o improvement in right-sided weakness. Pt was transferred to [facility] for further neurologic evaluation. Upon arrival to ED, /100 so cardene gtt was re-started. CT head perfusion revealing of small to moderate sized left frontal infarction w/ moderate to large region of low flow and/or ischemic state in left frontoparietal region. CTA neck significant for occlusion of left internal carotid. Neurovascular interventionalist not offering any intervention at this time." As per neurology team, plan is for patient to be discharged to subacute facility. Pt presented three episodes of  pauses 3-3.5 seconds in length. Only one witnessed pause episode as per nurse, with no noted symptoms to report. Pt unable to verbalize associated symptoms d/t aphasia.    REVIEW OF SYMPTOMS:     CONSTITUTIONAL: No fever, weight loss, or fatigue  ENMT:  No difficulty hearing, tinnitus, vertigo; No sinus or throat pain  NECK: No pain or stiffness  CARDIOVASCULAR: See HPI  RESPIRATORY: No Dyspnea on exertion, Shortness of breath, cough, wheezing  : No dysuria, no hematuria   GI: No dark color stool, no melena, no diarrhea, no constipation, no abdominal pain   NEURO: See HPI  MUSCULOSKELETAL: No joint pain or swelling; No muscle, back, or extremity pain  PSYCH: No agitation, no anxiety.    ALL OTHER REVIEW OF SYSTEMS ARE NEGATIVE.      PREVIOUS DIAGNOSTIC TESTING  ECHO FINDINGS:< from: TTE Echo Complete w/Doppler (02.06.20 @ 10:20) >  Summary:   1. The left atrium is normal in size.   2. Normal wall motion. Left ventricular ejection fraction, by visual estimation, is 65 to 70%. Grade I diastolic dysfunction.   3. The right atrium is normal in size.   4. Normal right ventricular size and function.   5. No significant valvular abnormality.   6. There is no evidence of pericardial effusion.   7. No cardiac mass, vegetations, thrombus or shunts visualized. However, DANNY is a more sensitive test to evaluate for these findings.    < end of copied text >      ALLERGIES: Allergies    No Known Allergies    Intolerances      PAST MEDICAL HISTORY  No pertinent past medical history      PAST SURGICAL HISTORY  No significant past surgical history      FAMILY HISTORY:      SOCIAL HISTORY:    CIGARETTES:   smoked cigg 2-3 years prior to vaping-vaping for past 2 years  ALCOHOL: Denies  DRUGS: History of oxycodone abuse, on suboxone      CURRENT MEDICATIONS:  amLODIPine   Tablet 10 milliGRAM(s) Oral every 24 hours  losartan 25 milliGRAM(s) Oral daily  buprenorphine 8 mG/naloxone 2 mG SL  Tablet  FLUoxetine  aspirin enteric coated  atorvastatin        HOME MEDICATIONS:    amLODIPine 10 mg oral tablet: 1 tab(s) orally every 24 hours (12 Feb 2020 15:15)  aspirin 81 mg oral delayed release tablet: 1 tab(s) orally once a day (12 Feb 2020 15:15)  atorvastatin 80 mg oral tablet: 1 tab(s) orally once a day (at bedtime) (12 Feb 2020 15:15)  buprenorphine 8 mg sublingual tablet: 1.5 tab(s) sublingual once a day (12 Feb 2020 15:32)  FLUoxetine 10 mg oral capsule: 1 cap(s) orally once a day (12 Feb 2020 15:15)  losartan 25 mg oral tablet: 1 tab(s) orally once a day (12 Feb 2020 15:15)      Vital Signs Last 24 Hrs  T(C): 37.1 (12 Feb 2020 17:32), Max: 37.2 (12 Feb 2020 00:16)  T(F): 98.7 (12 Feb 2020 17:32), Max: 98.9 (12 Feb 2020 00:16)  HR: 61 (12 Feb 2020 20:00) (60 - 66)  BP: 148/90 (12 Feb 2020 20:00) (119/82 - 148/90)  BP(mean): 103 (12 Feb 2020 20:00) (93 - 103)  RR: 15 (12 Feb 2020 20:00) (14 - 19)  SpO2: 97% (12 Feb 2020 20:00) (84% - 98%)      PHYSICAL EXAM:  Constitutional: Comfortable . No acute distress.   HEENT: Atraumatic and normocephalic , neck is supple . no JVD. No carotid bruit. PEERL   CNS: unable to assess d/t aphasia, hemipalgeia in right side   Lymph Nodes: Cervical : Not palpable.  Respiratory: CTAB  Cardiovascular: S1S2 RRR. No murmur/rubs or gallop.  Gastrointestinal: Soft non-tender and non distended . +Bowel sounds. negative Hoffman's sign.  Extremities: No edema.   Psychiatric: Calm . no agitation.  Skin: No skin rash/ulcers visualized to face, hands or feet.    Intake and output:   02-11 @ 07:01  -  02-12 @ 07:00  --------------------------------------------------------  IN: 480 mL / OUT: 650 mL / NET: -170 mL        LABS:                        14.4   10.61 )-----------( 303      ( 11 Feb 2020 04:50 )             44.6     02-11    138  |  99  |  32.0<H>  ----------------------------<  136<H>  4.1   |  26.0  |  0.71    Ca    9.6      11 Feb 2020 04:50        ;p-BNP=        INTERPRETATION OF TELEMETRY: NSR with bi-fasicular block HR @ 61  ECG:  NSR HR @ 79 with bi-fasicular block    RADIOLOGY & ADDITIONAL STUDIES:      CT scan: < from: CT Head No Cont (02.12.20 @ 09:23) >  IMPRESSION:    Evolving acute infarcts in the left JESSE and left MCA vascular territory distribution.     Associated petechial hemorrhages, which appears more prominent than on prior imaging.    < end of copied text >    MRI: < from: MR Head w/ IV Cont (02.06.20 @ 10:09) >    IMPRESSION:  Left-sided acute infarcts as described above.    No acute intracranial hemorrhage.    Occluded distal left internal carotid artery        < end of copied text > [place] [facility]                                                               Office:  [address] [address]                                                              Telephone: [phone]. Fax:[phone]                                                                        CARDIOLOGY CONSULTATION NOTE                                                                                             Consult requested by:  Dr. Burgess  Reason for Consultation: Pause   History obtained by: Patient and medical record   obtained: No    Chief complaint:    Patient is a 64y old  Male who presents with a chief complaint of Acute CVA (12 Feb 2020 15:13)    HPI: Pt is a 63 y/o male with history of active smoking/vaping, and oxycodone abuse-on suboxone, who presents to [facility] as a transfer from [facility] s/p CVA. Pt unable to verbally relay HPI d/t aphasia. As per chart pt "transferred from [facility] ED s/p tPA administration at 22:48. CTA head performed at [facility] revealing of total left ICA occlusion. Per pt's significant other, Sheeba, pt returned home from work around 6:30 PM in his usual state of health. Pt ate dinner, and at approx 7:30 PM, pt's significant other noticed him to be "swaying to one side" and slumped over on the cough. Also state that pt was not moving his right upper or lower extremities, and was not speaking. At approx 9:30 PM, pt's significant other states that she called 911 because pt's symptoms were not improving. Upon arrival to [facility], pt w/ decreased/slurred speech and dense right-sided hemiplegia. S/p tPA administration, pt w/o improvement in right-sided weakness. Pt was transferred to [facility] for further neurologic evaluation. Upon arrival to ED, /100 so cardene gtt was re-started. CT head perfusion revealing of small to moderate sized left frontal infarction w/ moderate to large region of low flow and/or ischemic state in left frontoparietal region. CTA neck significant for occlusion of left internal carotid. Neurovascular interventionalist not offering any intervention at this time." As per neurology team, plan is for patient to be discharged to subacute facility. Pt presented three episodes of  pauses 3-3.5 seconds in length. Only one witnessed pause episode as per nurse, with no noted symptoms to report. Pt unable to verbalize associated symptoms d/t aphasia.    REVIEW OF SYMPTOMS:     CONSTITUTIONAL: No fever, weight loss, or fatigue  ENMT:  No difficulty hearing, tinnitus, vertigo; No sinus or throat pain  NECK: No pain or stiffness  CARDIOVASCULAR: See HPI  RESPIRATORY: No Dyspnea on exertion, Shortness of breath, cough, wheezing  : No dysuria, no hematuria   GI: No dark color stool, no melena, no diarrhea, no constipation, no abdominal pain   NEURO: See HPI  MUSCULOSKELETAL: No joint pain or swelling; No muscle, back, or extremity pain  PSYCH: No agitation, no anxiety.    ALL OTHER REVIEW OF SYSTEMS ARE NEGATIVE.      PREVIOUS DIAGNOSTIC TESTING  ECHO FINDINGS:< from: TTE Echo Complete w/Doppler (02.06.20 @ 10:20) >  Summary:   1. The left atrium is normal in size.   2. Normal wall motion. Left ventricular ejection fraction, by visual estimation, is 65 to 70%. Grade I diastolic dysfunction.   3. The right atrium is normal in size.   4. Normal right ventricular size and function.   5. No significant valvular abnormality.   6. There is no evidence of pericardial effusion.   7. No cardiac mass, vegetations, thrombus or shunts visualized. However, DANNY is a more sensitive test to evaluate for these findings.    < end of copied text >      ALLERGIES: Allergies    No Known Allergies    Intolerances      PAST MEDICAL HISTORY  No pertinent past medical history      PAST SURGICAL HISTORY  No significant past surgical history      FAMILY HISTORY:      SOCIAL HISTORY:    CIGARETTES:   smoked cigg 2-3 years prior to vaping-vaping for past 2 years  ALCOHOL: Denies  DRUGS: History of oxycodone abuse, on suboxone      CURRENT MEDICATIONS:  amLODIPine   Tablet 10 milliGRAM(s) Oral every 24 hours  losartan 25 milliGRAM(s) Oral daily  buprenorphine 8 mG/naloxone 2 mG SL  Tablet  FLUoxetine  aspirin enteric coated  atorvastatin        HOME MEDICATIONS:    amLODIPine 10 mg oral tablet: 1 tab(s) orally every 24 hours (12 Feb 2020 15:15)  aspirin 81 mg oral delayed release tablet: 1 tab(s) orally once a day (12 Feb 2020 15:15)  atorvastatin 80 mg oral tablet: 1 tab(s) orally once a day (at bedtime) (12 Feb 2020 15:15)  buprenorphine 8 mg sublingual tablet: 1.5 tab(s) sublingual once a day (12 Feb 2020 15:32)  FLUoxetine 10 mg oral capsule: 1 cap(s) orally once a day (12 Feb 2020 15:15)  losartan 25 mg oral tablet: 1 tab(s) orally once a day (12 Feb 2020 15:15)      Vital Signs Last 24 Hrs  T(C): 37.1 (12 Feb 2020 17:32), Max: 37.2 (12 Feb 2020 00:16)  T(F): 98.7 (12 Feb 2020 17:32), Max: 98.9 (12 Feb 2020 00:16)  HR: 61 (12 Feb 2020 20:00) (60 - 66)  BP: 148/90 (12 Feb 2020 20:00) (119/82 - 148/90)  BP(mean): 103 (12 Feb 2020 20:00) (93 - 103)  RR: 15 (12 Feb 2020 20:00) (14 - 19)  SpO2: 97% (12 Feb 2020 20:00) (84% - 98%)      PHYSICAL EXAM:  Constitutional: Comfortable . No acute distress.   HEENT: Atraumatic and normocephalic , neck is supple . no JVD. No carotid bruit. PEERL   CNS: unable to assess d/t aphasia, hemipalgeia in right side   Lymph Nodes: Cervical : Not palpable.  Respiratory: CTAB  Cardiovascular: S1S2 RRR. No murmur/rubs or gallop.  Gastrointestinal: Soft non-tender and non distended . +Bowel sounds. negative Hoffman's sign.  Extremities: No edema.   Psychiatric: Calm . no agitation.  Skin: No skin rash/ulcers visualized to face, hands or feet.    Intake and output:   02-11 @ 07:01  -  02-12 @ 07:00  --------------------------------------------------------  IN: 480 mL / OUT: 650 mL / NET: -170 mL        LABS:                        14.4   10.61 )-----------( 303      ( 11 Feb 2020 04:50 )             44.6     02-11    138  |  99  |  32.0<H>  ----------------------------<  136<H>  4.1   |  26.0  |  0.71    Ca    9.6      11 Feb 2020 04:50        ;p-BNP=        INTERPRETATION OF TELEMETRY: NSR with bi-fasicular block HR @ 61  ECG:  NSR HR @ 79 with bi-fasicular block    RADIOLOGY & ADDITIONAL STUDIES:      CT scan: < from: CT Head No Cont (02.12.20 @ 09:23) >  IMPRESSION:    Evolving acute infarcts in the left JESSE and left MCA vascular territory distribution.     Associated petechial hemorrhages, which appears more prominent than on prior imaging.    < end of copied text >    MRI: < from: MR Head w/ IV Cont (02.06.20 @ 10:09) >    IMPRESSION:  Left-sided acute infarcts as described above.    No acute intracranial hemorrhage.    Occluded distal left internal carotid artery        < end of copied text >

## 2020-02-12 NOTE — CONSULT NOTE ADULT - ASSESSMENT
Pt is a 65 y/o male with history of active smoking/vaping, and oxycodone abuse-on suboxone, who presents to [facility] as a transfer from [facility] s/p CVA. Pt unable to verbally relay HPI d/t aphasia. As per chart pt "transferred from [facility] ED s/p tPA administration at 22:48. CTA head performed at [facility] revealing of total left ICA occlusion. Per pt's significant other, Sheeba, pt returned home from work around 6:30 PM in his usual state of health. Pt ate dinner, and at approx 7:30 PM, pt's significant other noticed him to be "swaying to one side" and slumped over on the cough. Also state that pt was not moving his right upper or lower extremities, and was not speaking. At approx 9:30 PM, pt's significant other states that she called 911 because pt's symptoms were not improving. Upon arrival to [facility], pt w/ decreased/slurred speech and dense right-sided hemiplegia. S/p tPA administration, pt w/o improvement in right-sided weakness. Pt was transferred to [facility] for further neurologic evaluation. Upon arrival to ED, /100 so cardene gtt was re-started. CT head perfusion revealing of small to moderate sized left frontal infarction w/ moderate to large region of low flow and/or ischemic state in left frontoparietal region. CTA neck significant for occlusion of left internal carotid. Neurovascular interventionalist not offering any intervention at this time." As per neurology team, plan is for patient to be discharged to subacute facility. Pt presented three episodes of  pauses 3-3.5 seconds in length. Only one witnessed pause episode as per nurse, with no noted symptoms to report. Pt unable to verbalize associated symptoms d/t aphasia.    Sinus rhythm with episodes of 3-3.5 second Pauses  -ECG-NSR HR @ 79 with bi-fasicular block  -Tele- NSR with bi-fasicular block HR @ 61  -Echo-EF 65-70%, grade I diastolic dysfx, normal wall motion, normal RV size and fx, no pericardial effusion,   -Pauses likely d/t vagal response, may also be contributed by resp. apnea  -Metoprolol currently DC'd   -No further orders at this time Pt is a 63 y/o male with history of active smoking/vaping, and oxycodone abuse-on suboxone, who presents to [facility] as a transfer from [facility] s/p CVA. Pt unable to verbally relay HPI d/t aphasia. As per chart pt "transferred from [facility] ED s/p tPA administration at 22:48. CTA head performed at [facility] revealing of total left ICA occlusion. Per pt's significant other, Sheeba, pt returned home from work around 6:30 PM in his usual state of health. Pt ate dinner, and at approx 7:30 PM, pt's significant other noticed him to be "swaying to one side" and slumped over on the cough. Also state that pt was not moving his right upper or lower extremities, and was not speaking. At approx 9:30 PM, pt's significant other states that she called 911 because pt's symptoms were not improving. Upon arrival to [facility], pt w/ decreased/slurred speech and dense right-sided hemiplegia. S/p tPA administration, pt w/o improvement in right-sided weakness. Pt was transferred to [facility] for further neurologic evaluation. Upon arrival to ED, /100 so cardene gtt was re-started. CT head perfusion revealing of small to moderate sized left frontal infarction w/ moderate to large region of low flow and/or ischemic state in left frontoparietal region. CTA neck significant for occlusion of left internal carotid. Neurovascular interventionalist not offering any intervention at this time." As per neurology team, plan is for patient to be discharged to subacute facility. Pt presented three episodes of  pauses 3-3.5 seconds in length. Only one witnessed pause episode as per nurse, with no noted symptoms to report. Pt unable to verbalize associated symptoms d/t aphasia.    Sinus rhythm with episodes of 3-3.5 second Pauses  -ECG-NSR HR @ 79 with bi-fasicular block  -Tele- NSR with bi-fasicular block HR @ 61  -Echo-EF 65-70%, grade I diastolic dysfx, normal wall motion, normal RV size and fx, no pericardial effusion,   -Pauses likely d/t vagal response, may also be contributed by resp. apnea  -Metoprolol currently DC'd   -No further orders at this time  -Cont. plan as per neuro team for dispo Pt is a 65 y/o male with history of active smoking/vaping, and oxycodone abuse-on suboxone, who presents to [facility] as a transfer from [facility] s/p CVA. Pt unable to verbally relay HPI d/t aphasia. As per chart pt "transferred from [facility] ED s/p tPA administration at 22:48. CTA head performed at [facility] revealing of total left ICA occlusion. Per pt's significant other, Sheeba, pt returned home from work around 6:30 PM in his usual state of health. Pt ate dinner, and at approx 7:30 PM, pt's significant other noticed him to be "swaying to one side" and slumped over on the cough. Also state that pt was not moving his right upper or lower extremities, and was not speaking. At approx 9:30 PM, pt's significant other states that she called 911 because pt's symptoms were not improving. Upon arrival to [facility], pt w/ decreased/slurred speech and dense right-sided hemiplegia. S/p tPA administration, pt w/o improvement in right-sided weakness. Pt was transferred to [facility] for further neurologic evaluation. Upon arrival to ED, /100 so cardene gtt was re-started. CT head perfusion revealing of small to moderate sized left frontal infarction w/ moderate to large region of low flow and/or ischemic state in left frontoparietal region. CTA neck significant for occlusion of left internal carotid. Neurovascular interventionalist not offering any intervention at this time." As per neurology team, plan is for patient to be discharged to subacute facility. Pt presented three episodes of  pauses 3-3.5 seconds in length. Only one witnessed pause episode as per nurse, with no noted symptoms to report. Pt unable to verbalize associated symptoms d/t aphasia.    Sinus rhythm with episodes of 3-3.5 second Pauses  -ECG-NSR HR @ 79 with bi-fasicular block  -Tele- NSR with bi-fasicular block HR @ 61  -Echo-EF 65-70%, grade I diastolic dysfx, normal wall motion, normal RV size and fx, no pericardial effusion,   -Telemetry review shows that pauses always preceded by gradual sinus slowing, and then resolves with gradual sinus rate acceleration. This is associated with [place] prolongation consistent with elevated vagal tone. Patient was reportedly awake during these times. It is possible elevated vagal tone from cerebral disease is causing these pauses. Pacemaker implantation would not help this and fortunately he is asymptomatic. He should be evaluated for sleep apnea at some point.  -Hold Metoprolol and other AVN blockers  -No further orders at this time  -Cont. plan as per neuro team for dispo     Thank you for this consultation. Cardiology will sign off. Please contact with any questions.    Cristian Ricketts MD  Clinical Cardiac Electrophysiology

## 2020-02-12 NOTE — H&P ADULT - ASSESSMENT
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    Rehab: Gait Instability, ADL impairments and Functional impairments: start Comprehensive Rehab Program of PT/OT/ST    Left JESSE/MCA CVA with petechial hemorrhage:  - Continue ASA 81mg daily  - Continue Lipitor 80mg QHS  - Continue Prozac 10mg daily for mood motor recovery     Dissection in Right ICA:  - Per neuro at [facility] given size of stroke with petechial hemorrhage avoid anticoagulation for 6-8 weeks, continue ASA but at 81 mg daily.  "This should provide protection for stroke from Right ICA dissection while minimizing chance for worsening petechial ICH."    Pain: Tylenol PRN  - Prior opioid use: well managed without meds at the moment, patient has h/o opioid dependence, was on Suboxone treatment as outpatient.      Hypertension:  - Continue Losartan 25mg daily    Resp: desat last night at [facility] care hospital, placed on O2 PRN and nocturnal    GI/Bowel: Colace, Senna, Miralax PRN    Diet: Mechanical soft with thin liquids    Renal/Bladder:     Precautions / PROPHYLAXIS:   - Falls  - Ortho: Weight bearing status: WBAT   - Lungs: Aspiration, Incentive Spirometer   - Pressure injury/Skin: Turn Q2hrs while in bed, OOB to Chair, PT/OT    - DVT: Lovenox, SCDs, TEDs     MEDICAL PROGNOSIS: GOOD            REHAB POTENTIAL: FAIR             ESTIMATED DISPOSITION: HOME WITH HOME CARE            ELOS: 10-14 Days   EXPECTED THERAPY:     P.T. 1hr/day       O.T. 1hr/day      S.L.P. 1hr/day     P&O Unnecessary     EXP FREQUENCY: 5 days per 7 day period     PRESCREEN COMPARISON:   I have reviewed the prescreen information and I have found no relevant changes between the preadmission screening and my post admission evaluation     RATIONALE FOR INPATIENT ADMISSION - Patient demonstrates the following: (check all that apply)  [X] Medically appropriate for rehabilitation admission  [X] Has attainable rehab goals with an appropriate initial discharge plan  [X] Has rehabilitation potential (expected to make a significant improvement within a reasonable period of time)   [X] Requires close medical management by a rehab physician, rehab nursing care, Hospitalist and comprehensive interdisciplinary team (including PT, OT, & or SLP, Prosthetics and Orthotics) ASSESSMENT/PLAN  CLAYTON COX is a 64M with no previous PMH who 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    Rehab: Gait Instability, ADL impairments and Functional impairments: start Comprehensive Rehab Program of PT/OT/ST    Left JESSE/MCA CVA with petechial hemorrhage:  - Continue ASA 81mg daily  - Continue Lipitor 80mg QHS  - Continue Prozac 10mg daily for mood motor recovery     Dissection in Right ICA:  - Per neuro at [facility] given size of stroke with petechial hemorrhage avoid anticoagulation for 6-8 weeks, continue ASA but at 81 mg daily.  "This should provide protection for stroke from Right ICA dissection while minimizing chance for worsening petechial ICH."    Pain: Tylenol PRN  - Prior opioid use: well managed without meds at the moment, patient has h/o opioid dependence, was on Suboxone treatment as outpatient.      Hypertension:  - Continue Losartan 25mg daily    Resp: desat last night at acute care hospital, placed on O2 PRN and nocturnal  - may need r/o sleep apnea with overnight pulse ox.     GI/Bowel: Colace, Senna, Miralax PRN    Diet: Mechanical soft with thin liquids    Renal/Bladder:     Precautions / PROPHYLAXIS:   - Falls  - Ortho: Weight bearing status: WBAT   - Lungs: Aspiration, Incentive Spirometer   - Pressure injury/Skin: Turn Q2hrs while in bed, OOB to Chair, PT/OT    - DVT: Lovenox, SCDs, TEDs     MEDICAL PROGNOSIS: GOOD            REHAB POTENTIAL: FAIR             ESTIMATED DISPOSITION: HOME WITH HOME CARE            ELOS: 10-14 Days   EXPECTED THERAPY:     P.T. 1hr/day       O.T. 1hr/day      S.L.P. 1hr/day     P&O Unnecessary     EXP FREQUENCY: 5 days per 7 day period     PRESCREEN COMPARISON:   I have reviewed the prescreen information and I have found no relevant changes between the preadmission screening and my post admission evaluation     RATIONALE FOR INPATIENT ADMISSION - Patient demonstrates the following: (check all that apply)  [X] Medically appropriate for rehabilitation admission  [X] Has attainable rehab goals with an appropriate initial discharge plan  [X] Has rehabilitation potential (expected to make a significant improvement within a reasonable period of time)   [X] Requires close medical management by a rehab physician, rehab nursing care, Hospitalist and comprehensive interdisciplinary team (including PT, OT, & or SLP, Prosthetics and Orthotics)

## 2020-02-12 NOTE — H&P ADULT - NSHPREVIEWOFSYSTEMS_GEN_ALL_CORE
REVIEW OF SYSTEMS  Constitutional: No fever, No Chills, No fatigue  HEENT: No eye pain, No visual disturbances, No difficulty hearing, No Dysphagia   Pulm: No cough,  No shortness of breath  Cardio: No chest pain, No palpitations  GI:  No abdominal pain, No nausea, No vomiting, No diarrhea, No constipation, No incontinence, Last Bowel Movement on   : No dysuria, No frequency, No hematuria, No incontinence  Neuro: No headaches, No memory loss, No loss of strength, No numbness, No tremors  Skin: No itching, No rashes, No lesions   Endo: No temperature intolerance  MSK: No joint pain, No joint swelling, No muscle pain, No Neck or back pain  Psych:  No depression, No anxiety    Any Major Surgery within past 100 days? No / Yes     Two or more Falls within past one year?  No / Yes                   One Fall with injury past one year?           No / Yes

## 2020-02-12 NOTE — DISCHARGE NOTE PROVIDER - CARE PROVIDER_API CALL
London Storey)  Neurology  [address], [address]  [place] [address]  Phone: [phone]  Fax: [phone]  Follow Up Time: Routine    Christiano Barrera)  Surgery; Vascular Surgery  [address], [address] [place] [address]  Phone: [phone]  Fax: [phone]  Follow Up Time: 1 month

## 2020-02-12 NOTE — DISCHARGE NOTE PROVIDER - PROVIDER TOKENS
PROVIDER:[TOKEN:[[id number]],FOLLOWUP:[Routine]],PROVIDER:[TOKEN:[[id number]],FOLLOWUP:[1 month]]

## 2020-02-12 NOTE — PROGRESS NOTE ADULT - ASSESSMENT
Imp: 64y Male with stroke.     Stroke   status post t-PA.   Petechial hemorrhage on follow up head CT.   Clinically large territory.   LDL: 104  Continue antiplatelet and statin.   Mobilize with physical therapy.     Dissection in Right ICA   Seen by Vascular surgery.   Follow up CT head shows petechial hemorrhage and evolving stroke  given size of stroke with petechial hemorrhage avoid anticoagulation for 6-8 weeks  Would continue ASA but at 81 mg daily.  This should provide protection for stroke from Right ICA dissection while minimizing chance for worsening petechial ICH    Neurologically stable for discharge today to acute rehab at [facility].    Case discussed with Dr Hay and ANDER Ramos

## 2020-02-12 NOTE — H&P ADULT - NSHPSOCIALHISTORY_GEN_ALL_CORE
SOCIAL HISTORY  Smoking - Denied  EtOH - Denied   Drugs - Denied    FUNCTIONAL HISTORY  Lives in a ____ with ___ and has __ Stairs to Enter + Hand Rails and ___ Stairs inside + Hand Rails   Prior Level of Function: Independent prior with Ambulation and ADLs.   Occupation:    CURRENT FUNCTIONAL STATUS  - Bed Mobility: Max A  - Transfers: Mod-Max A  - Gait: Only standing attempted Max A  - ADLs: Dependent SOCIAL HISTORY  Smoking - Denied  EtOH - Denied   Drugs - Denied    FUNCTIONAL HISTORY  Patient is a poor historian and will nod yes or no to questions but not much else. As per Therapy notes: Pt. reporting he lives in an apartment with his wife with stairs, but is unable to clarify any further details.  Prior level of function unknown due to his aphasia     CURRENT FUNCTIONAL STATUS  - Bed Mobility: Max A  - Transfers: Mod-Max A  - Gait: Only standing attempted Max A  - ADLs: Dependent

## 2020-02-12 NOTE — DISCHARGE NOTE PROVIDER - NSDCCPCAREPLAN_GEN_ALL_CORE_FT
PRINCIPAL DISCHARGE DIAGNOSIS  Diagnosis: Acute CVA (cerebrovascular accident)  Assessment and Plan of Treatment: continue baby aspirin , statin rehab   pt will need to follwo up with neurology and vascualr surgery after discharge      SECONDARY DISCHARGE DIAGNOSES  Diagnosis: Internal carotid artery occlusion  Assessment and Plan of Treatment: Right internal carotid artery dissection   can not be anticoagulated due to petechial hemorrhage per neurologist    Diagnosis: HTN (hypertension)  Assessment and Plan of Treatment: adjut medications as needed for better control PRINCIPAL DISCHARGE DIAGNOSIS  Diagnosis: Acute CVA (cerebrovascular accident)  Assessment and Plan of Treatment: continue baby aspirin, statin, rehab   pt will need to follow up with neurology and vascular surgery after discharge for further management / treatment. Follow up 1-2 weeks post discharge. Contact info provided.      SECONDARY DISCHARGE DIAGNOSES  Diagnosis: Urinary retention  Assessment and Plan of Treatment: Retained > 500cc of urine today, now s/p straight cath. To continue bladder scans Q6 and straight cath as needed at acute rehab. Further work up as per MD at acute rehab. Flomax to be continued.    Diagnosis: HTN (hypertension)  Assessment and Plan of Treatment: Continue with BP regimen as prescribed. Furtehr mgt as per PMD at acute rehab    Diagnosis: Internal carotid artery occlusion  Assessment and Plan of Treatment: Right internal carotid artery dissection   Cannot be anticoagulated due to petechial hemorrhage per neurologist. To be reevaulated in 6-8 weeks.

## 2020-02-12 NOTE — PROGRESS NOTE ADULT - SUBJECTIVE AND OBJECTIVE BOX
patient with aphasia    REVIEW OF SYSTEMS  unable to obtain    FUNCTIONAL PROGRESS  2/11 SLP  Speech Language Pathology Recommendations: 1. Modify diet to: mechanical soft, thin fluids 2. Oral care 3. Aspiration precautions 4. Small bites/sips 5. 1:1 supervision with PO 6. Upright with all PO 7. Will follow, to chcek PO tolerance       SLP Treatment: Auditory Comprehension  SLP Treatment: Auditory Comprehension Treatment Detail: 1. To assess reading comprehension: Letters: 0% independently, 0% despite max cues2. To improve body part ID to >30% accuracy without cues, to facilitate improve auditory  comprehension: 20% independently, did not increase despite cues, due to +perseveration on previous stimulus3. To follow simple 1 step comnmands to >30% accuracy without cues, to facilitate improve auditory  comprehension: 60% independently increased to 80% with max cues4. To improve yes/no reliability for simple ? to > 70%, to facilitate improve auditory  comprehension: 40% independently, did not increase despite max cues5. To improve object ID in array x2 to >75%, to facilitate improve auditory  comprehension: 80% independently     SLP Treatment: Verbal Expression  SLP Treatment: Verbal Expression Treatment Detail: 1. To imitate vowels to >30% accuracy, to facilitate verbal expression: 0% independently, pt able to mouth /a/ after max tactile cues, however, no vocalization noted.       2/12 PT  Bed Mobility  Bed Mobility Training Sit-to-Supine: maximum assist (25% patient effort);  2 person assist  Bed Mobility Training Supine-to-Sit: maximum assist (25% patient effort);  2 person assist  Bed Mobility Training Limitations: decreased ability to use legs for bridging/pushing    Bed-Chair Transfer Training  Transfer Training Bed-to-Chair Transfer: maximum assist (25% patient effort);  2 person assist;  full weight-bearing  Transfer Training Chair-to-Bed Transfer: maximum assist (25% patient effort);  2 person assist;  full weight-bearing  Bed-to-Chair Transfer Training Transfer Safety Analysis: decreased balance;  decreased strength;  decreased ROM;  impaired coordination;  impaired motor control;  impaired sensory feedback;  impaired postural control;  cognitive, decreased safety awareness    Sit-Stand Transfer Training  Transfer Training Sit-to-Stand Transfer: maximum assist (25% patient effort);  2 person assist;  full weight-bearing  Transfer Training Stand-to-Sit Transfer: maximum assist (25% patient effort);  2 person assist;  verbal cues;  full weight-bearing  Sit-to-Stand Transfer Training Transfer Safety Analysis: decreased strength;  impaired balance;  impaired coordination;  impaired motor control;  impaired postural control    Gait Training  Gait Training: unable to perform    2/11 OT  Bed Mobility  Bed Mobility Training Sit-to-Supine: maximum assist (25% patient effort);  2 person assist  Bed Mobility Training Supine-to-Sit: maximum assist (25% patient effort);  2 person assist  Bed Mobility Training Limitations: impaired motor control;  impaired coordination;  impaired balance;  impaired postural control;  cognitive, decreased safety awareness    Sit-Stand Transfer Training  Transfer Training Sit-to-Stand Transfer: maximum assist (25% patient effort);  2 person assist  Transfer Training Stand-to-Sit Transfer: maximum assist (25% patient effort);  2 person assist  Sit-to-Stand Transfer Training Transfer Safety Analysis: cognitive, decreased safety awareness;  impaired motor control;  impaired coordination;  impaired balance    Therapeutic Exercise  Therapeutic Exercise Detail: Pt provided with passive stretch t/o right UE shoulder, elbow, wrist, and digits.     Functional Endurance  Functional Endurance Detail: Pt stood for 2 trials with maximal assist x2. Pt tends to lean to the right at times and demonstrates decreased difficulty with weight shifting to right side to advance left LE. Pt also sat at the edge of the bed with minimal assist while self supporting and moderate assist unsupported.                 VITALS  T(C): 36.9 (02-12-20 @ 08:28), Max: 37.2 (02-12-20 @ 00:16)  HR: 60 (02-12-20 @ 08:00) (60 - 73)  BP: 119/82 (02-12-20 @ 08:00) (119/82 - 150/83)  RR: 16 (02-12-20 @ 08:00) (15 - 20)  SpO2: 95% (02-12-20 @ 08:00) (84% - 98%)  Wt(kg): --    MEDICATIONS   amLODIPine   Tablet 10 milliGRAM(s) every 24 hours  atorvastatin 80 milliGRAM(s) at bedtime  FLUoxetine 10 milliGRAM(s) daily  losartan 25 milliGRAM(s) daily  ondansetron Injectable 4 milliGRAM(s) every 4 hours PRN      RECENT LABS - Reviewed                        14.4   10.61 )-----------( 303      ( 11 Feb 2020 04:50 )             44.6     02-11    138  |  99  |  32.0<H>  ----------------------------<  136<H>  4.1   |  26.0  |  0.71    Ca    9.6      11 Feb 2020 04:50      < from: CT Head No Cont (02.12.20 @ 09:23) >    IMPRESSION:    Evolving acute infarcts in the left JESSE and left MCA vascular territory distribution.     Associated petechial hemorrhages, which appears more prominent than on prior imaging.              < end of copied text >    ----------------------------------------------------------------------  PHYSICAL EXAM  Constitutional - NAD, Comfortable, in bed  HEENT - NCAT, EOMI  Neck - Supple, No limited ROM  Chest - Breathing comfortably, No wheezing  Cardiovascular - S1S2   Abdomen - Soft   Extremities - No C/C/E, No calf tenderness   Neurologic Exam -   decreased neglect on right              Cognitive -Awake and lethargic, unable to fully assess due to aphasia      Communication - Non verbal, will nod his head to some questions but unable to speak. Expressive >receptive aphasia. Not able to fully follow commands      Cranial Nerves - Right sided droop      Motor -                     LEFT    UE - ShAB 5/5, EF 5/5, EE 5/5, WE 5/5,  5/5                    RIGHT UE - ShAB 0/5, EF 0/5, EE 0/5, WE 0/5,  0/5                    LEFT    LE - HF 5/5, KE 5/5, DF 5/5, PF 5/5                    RIGHT LE - HF 0/5, KE 0/5, DF 0/5, PF 0/5        Sensory - Intact to LT     Reflexes - 1+ reflexes      Coordination - Unable to be tested on the affected side R      Psychiatric - tearful, frustrated         Assessment and Recommendation:   · Assessment	  ASSESSMENT/PLAN  64y Male with functional deficits after acute LEFT frontal cortical infarctions within the LEFT JESSE and MCA territories.   s/p tPA  Right neglect, right hemiplegia, aphasia    CVA- CT showing evolving acute infarct, neuro following   Left ICA occlusion - No intervention as per vascular team, follow up neuro in regards to full AC   HTN-Amlodipine, metoprolol,   Pain - well managed without meds at the moment, patient has h/o opioid dependence, was on Suboxone treatment as outpatient.    DVT PPX - Lovenox, SCDs   Diet- Dysphagia 3, swallow eval, patient seen coughing, spitting up food  Started on Prozac 10 mg daily for mood and motor recovery    Rehab -   Patient was independent in the community prior to admission, would benefit from intensive PT/OT/SLP to restore function while being closely monitored and managed for ongoing medical issues which can destabilize     Recommend ACUTE inpatient rehabilitation for the functional deficits consisting of 3 hours of therapy/day & 24 hour RN/daily PMR physician for comorbid medical management. Will continue to follow for ongoing rehab needs and recommendations. Patient will be able to tolerate 3 hours a day.    Continue bedside therapy as well as OOB throughout the day with mobilization throughout the day with staff to maintain cardiopulmonary function and prevention of secondary complications related to debility.   SLP, PT, OT for speech, bed mobility, transfers, ambulation, ADLs .

## 2020-02-12 NOTE — DISCHARGE NOTE PROVIDER - NSDCMRMEDTOKEN_GEN_ALL_CORE_FT
amLODIPine 10 mg oral tablet: 1 tab(s) orally every 24 hours  aspirin 81 mg oral delayed release tablet: 1 tab(s) orally once a day  atorvastatin 80 mg oral tablet: 1 tab(s) orally once a day (at bedtime)  FLUoxetine 10 mg oral capsule: 1 cap(s) orally once a day  losartan 25 mg oral tablet: 1 tab(s) orally once a day amLODIPine 10 mg oral tablet: 1 tab(s) orally every 24 hours  aspirin 81 mg oral delayed release tablet: 1 tab(s) orally once a day  atorvastatin 80 mg oral tablet: 1 tab(s) orally once a day (at bedtime)  buprenorphine 8 mg sublingual tablet: 1.5 tab(s) sublingual once a day  FLUoxetine 10 mg oral capsule: 1 cap(s) orally once a day  losartan 25 mg oral tablet: 1 tab(s) orally once a day amLODIPine 10 mg oral tablet: 1 tab(s) orally every 24 hours  aspirin 81 mg oral delayed release tablet: 1 tab(s) orally once a day  atorvastatin 80 mg oral tablet: 1 tab(s) orally once a day (at bedtime)  buprenorphine 8 mg sublingual tablet: 1.5 tab(s) sublingual once a day  FLUoxetine 10 mg oral capsule: 1 cap(s) orally once a day  losartan 25 mg oral tablet: 1 tab(s) orally once a day  tamsulosin 0.4 mg oral capsule: 1 cap(s) orally once

## 2020-02-12 NOTE — PROGRESS NOTE ADULT - SUBJECTIVE AND OBJECTIVE BOX
[facility]                                        [facility]                                 [facility]                                  [address]                                        [place], [place], [address]                                             [phone]        CC: Stroke.     HPI:   The patient is a 64y Male who presented 2/5/2020 to [facility] after slumping over at home. He was given t-PA and transferred to [facility] for further evaluation and management.  He was found to have distal occlusion of the left ICA and was admitted to MICU for blood pressure control as he initially required drips. CT-A also suggested dissection of the right ICA.   In ICU he has been globally aphasic with neglect of right and right hemiparesis. (JW)    Interval history: remains aphasic, right weakness    ROS neurology: Global (expressive>receptive) aphasia (+) right weakness.     MEDICATIONS  (STANDING):  amLODIPine   Tablet 10 milliGRAM(s) Oral every 24 hours  aspirin enteric coated 81 milliGRAM(s) Oral daily  atorvastatin 80 milliGRAM(s) Oral at bedtime  FLUoxetine 10 milliGRAM(s) Oral daily  losartan 25 milliGRAM(s) Oral daily    MEDICATIONS  (PRN):  ondansetron Injectable 4 milliGRAM(s) IV Push every 4 hours PRN Nausea and/or Vomiting      Vital Signs Last 24 Hrs  T(C): 36.9 (12 Feb 2020 08:28), Max: 37.2 (12 Feb 2020 00:16)  T(F): 98.4 (12 Feb 2020 08:28), Max: 98.9 (12 Feb 2020 00:16)  HR: 60 (12 Feb 2020 08:00) (60 - 73)  BP: 119/82 (12 Feb 2020 08:00) (119/82 - 150/83)  BP(mean): 100 (12 Feb 2020 05:41) (89 - 103)  RR: 16 (12 Feb 2020 08:00) (15 - 20)  SpO2: 95% (12 Feb 2020 08:00) (84% - 98%)    Detailed Neurologic Exam:    Mental status: The patient is alert. He is mute and does not follow instructions.     Cranial nerves: Pupils react symmetrically to light. There is no blink to threat from the right. He tracks with gaze although does not go much past midline on the right. There is no ptosis. Facial sensation is intact. Facial musculature is asymmetric with a central right sided weakness Palate and tongue are difficult to assess.     Motor/Sensory: There is normal bulk and tone.  Moves left well to stimuli.   No movement on right to stimuli.     Reflexes: 1+ throughout and plantar responses are flexor.    Cerebellar: Cannot be tested.     Labs:                           14.4   10.61 )-----------( 303      ( 11 Feb 2020 04:50 )             44.6     02-11    138  |  99  |  32.0<H>  ----------------------------<  136<H>  4.1   |  26.0  |  0.71    Ca    9.6      11 Feb 2020 04:50        Rads:  repeat head CT large CVA in left MCA territory with petechial hemorrhage, no mass

## 2020-02-12 NOTE — CHART NOTE - NSCHARTNOTEFT_GEN_A_CORE
Pt had 3.5 sec pause on cardiac monitor   Dc canceled , cardiology consult called to evaluate     cont cardiac monitor , avoid B blocker   discharge once cleared by cardiology and HR is stable   a Pt had 3.5 sec pause on cardiac monitor   Dc canceled , cardiology consult called to evaluate     cont cardiac monitor , avoid B blocker   discharge once cleared by cardiology and HR is stable   his doctor who is prescribing suboxone contacted this afternoon Dr Amico in [place], apparently pt has been on it for 1 years and he has been on tapering dose , he advised us to restart to avoid withdrawal and can be tapered as tolerate   will resume and monitor closely

## 2020-02-12 NOTE — PROGRESS NOTE ADULT - ASSESSMENT
65 y/o M w/ no significant PMHx who was transferred from [facility] ED s/p tPA administration at 22:48. CTA head performed at [facility] revealing of total left ICA occlusion. Per pt's significant other, Sheeba, pt returned home from work around 6:30 PM in his usual state of health. Pt ate dinner, and at approx 7:30 PM, pt's significant other noticed him to be "swaying to one side" and slumped over on the cough. Also state that pt was not moving his right upper or lower extremities, and was not speaking. At approx 9:30 PM, pt's significant other states that she called 911 because pt's symptoms were not improving. Upon arrival to [facility], pt w/ decreased/slurred speech and dense right-sided hemiplegia. S/p tPA administration, pt w/o improvement in right-sided weakness. Pt was transferred to [facility] for further neurologic evaluation. Upon arrival to ED, /100 so cardene gtt was re-started. CT head perfusion revealing of small to moderate sized left frontal infarction w/ moderate to large region of low flow and/or ischemic state in left frontoparietal region. CTA neck significant for occlusion of left internal carotid. Neurovascular interventionalist not offering any intervention at this time per ED attending. Admit to MICU for post tPA monitoring.He was treated with cardene drip for HTN , stopped on 2/7, transfered to medical service , pt was seen by vascular surgery in ICU for Left CA occlusion and  R ICA with dissection flap noted so rec to start Full anticoagulation for at least 6 weeks if ok with neuro however neuro  advise to hold starting anticoagulation at least 6 days after stroke , cont aspirin  , repeat Ct scan large stroke with micro hemorrhages ? , pt is with high BP antihyopertensive medications initiated post tx from ICU , meds adjusted BP is stable , repat CT of head today 2/12         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 and R ICa dissection on Ct scan residual   aphasia and right sided weakness   s/p TPA te ed to [facility]   cont aspirin , statin   repeat Ct scan today   if stable likely discharge to acute rehab today or tomorrow   may need insurance authorization     2- Left ICA occlusion / R ICA dissection flap   no intervention for c artery occlusion per neurovascular team and vascular team   rec full anticoagulation once stable and cleared by neurology to start on 2/12   need it for at least  6 weeks per vascular then antiplatelet therapy recommended     3- HTN new dx uncontrolled - better now   cont losartan and amlodipine   metoprolol dose decreased due to low HR     discharge planning to acute rehab once cleared by neurology

## 2020-02-12 NOTE — H&P ADULT - NSHPPHYSICALEXAM_GEN_ALL_CORE
Vital Signs  T(C): 37.1 (02-12-20 @ 12:04), Max: 37.2 (02-12-20 @ 00:16)  HR: 60 (02-12-20 @ 08:00) (60 - 73)  BP: 119/82 (02-12-20 @ 08:00) (119/82 - 150/83)  RR: 16 (02-12-20 @ 08:00) (15 - 20)  SpO2: 95% (02-12-20 @ 08:00) (84% - 98%)    Gen - NAD, Comfortable  HEENT - NCAT, EOMI, MMM  Neck - Supple, No limited ROM  Pulm - CTAB, No wheeze, No rhonchi, No crackles  Cardiovascular - RRR, S1S2, No m/r/g  Abdomen - Soft, NT/ND, +BS  Extremities - No C/C/E, No calf tenderness  Neuro-     Cognitive - AAOx3     Communication - Fluent, No dysarthria     Attention: Intact      Judgement: Good evidence of judgement     Memory: Recall 3 objects immediate and 3 min later         Cranial Nerves - CN 2-12 intact     Motor - No focal deficits                    LEFT    UE - ShAB 5/5, EF 5/5, EE 5/5, WE 5/5,  5/5                    RIGHT UE - ShAB 5/5, EF 5/5, EE 5/5, WE 5/5,  5/5                    LEFT    LE - HF 5/5, KE 5/5, DF 5/5, PF 5/5                    RIGHT LE - HF 5/5, KE 5/5, DF 5/5, PF 5/5        Sensory - Intact to LT     Reflexes - DTR Intact, No primitive reflexive     Coordination - FTN intact     Tone - normal  Psychiatric - Mood stable, Affect WNL  Skin:  all skin intact    Wounds: None Present

## 2020-02-12 NOTE — PROGRESS NOTE ADULT - SUBJECTIVE AND OBJECTIVE BOX
Internal Medicine Hospitalist Progress Note    follow up for acute stroke with aphasia right sided weakness   overnight events reported , he was desaturating at sleep   now awake alert , not sure if he understand verbal cues or question , non verbal difficult to asses due to aphasia   able to move left side when asked     Vital Signs Last 24 Hrs  T(C): 36.9 (12 Feb 2020 05:23), Max: 37.2 (12 Feb 2020 00:16)  T(F): 98.5 (12 Feb 2020 05:23), Max: 98.9 (12 Feb 2020 00:16)  HR: 66 (12 Feb 2020 05:41) (64 - 73)  BP: 143/89 (12 Feb 2020 05:41) (130/77 - 150/83)  BP(mean): 100 (12 Feb 2020 05:41) (89 - 103)  RR: 15 (12 Feb 2020 04:00) (15 - 20)  SpO2: 98% (12 Feb 2020 04:00) (84% - 98%)      Constitutional: awake alert , non verbal     Respiratory: CTA bilateral , no wheezing     Cardiovascular: regular s1 /s2     Gastrointestinal: soft no tenderness , BS positive     Extremities: no pretibial edema , no calf tenderness     Neurological awake alert aphasic , right sided weakness, able to follow commands , moves left side                                  14.4   10.61 )-----------( 303      ( 11 Feb 2020 04:50 )             44.6   02-11    138  |  99  |  32.0<H>  ----------------------------<  136<H>  4.1   |  26.0  |  0.71    Ca    9.6      11 Feb 2020 04:50        Radiology :      IMPRESSION:   Loss of gray-white differentiation and decreased attenuation at the left anterior frontal lobe consistent with acute infarct as seen on previous MRI. No intracranial hemorrhage.     < end of copied text >    < from: CT Head No Cont (02.09.20 @ 08:57) >  IMPRESSION:   acute LEFT frontal cortical infarctions within the LEFT JESSE and MCA territories. Minimal high density seen in the region of the LEFT anterior cerebral artery consistent with thrombus. Is sulcal effacement without significant mass effect. Minimal hyperdensity in the LEFT posterior frontal infarction may represent petechial hemorrhage.     < end of copied text >

## 2020-02-13 ENCOUNTER — INPATIENT (INPATIENT)
Facility: HOSPITAL | Age: 65
LOS: 21 days | Discharge: NURSING, REHAB OR LONG-TERM CARE | DRG: 949 | End: 2020-03-06
Attending: STUDENT IN AN ORGANIZED HEALTH CARE EDUCATION/TRAINING PROGRAM | Admitting: SPECIALIST
Payer: MEDICARE

## 2020-02-13 VITALS
DIASTOLIC BLOOD PRESSURE: 83 MMHG | HEIGHT: 68 IN | TEMPERATURE: 98 F | HEART RATE: 77 BPM | RESPIRATION RATE: 15 BRPM | SYSTOLIC BLOOD PRESSURE: 125 MMHG | WEIGHT: 196.65 LBS | OXYGEN SATURATION: 96 %

## 2020-02-13 VITALS — TEMPERATURE: 98 F

## 2020-02-13 DIAGNOSIS — I63.9 CEREBRAL INFARCTION, UNSPECIFIED: ICD-10-CM

## 2020-02-13 LAB
ANION GAP SERPL CALC-SCNC: 12 MMOL/L — SIGNIFICANT CHANGE UP (ref 5–17)
BUN SERPL-MCNC: 27 MG/DL — HIGH (ref 8–20)
CALCIUM SERPL-MCNC: 8.9 MG/DL — SIGNIFICANT CHANGE UP (ref 8.6–10.2)
CHLORIDE SERPL-SCNC: 98 MMOL/L — SIGNIFICANT CHANGE UP (ref 98–107)
CO2 SERPL-SCNC: 25 MMOL/L — SIGNIFICANT CHANGE UP (ref 22–29)
CREAT SERPL-MCNC: 0.64 MG/DL — SIGNIFICANT CHANGE UP (ref 0.5–1.3)
GLUCOSE SERPL-MCNC: 122 MG/DL — HIGH (ref 70–99)
HCT VFR BLD CALC: 42.6 % — SIGNIFICANT CHANGE UP (ref 39–50)
HGB BLD-MCNC: 14.5 G/DL — SIGNIFICANT CHANGE UP (ref 13–17)
MAGNESIUM SERPL-MCNC: 2.1 MG/DL — SIGNIFICANT CHANGE UP (ref 1.8–2.6)
MCHC RBC-ENTMCNC: 30.1 PG — SIGNIFICANT CHANGE UP (ref 27–34)
MCHC RBC-ENTMCNC: 34 GM/DL — SIGNIFICANT CHANGE UP (ref 32–36)
MCV RBC AUTO: 88.6 FL — SIGNIFICANT CHANGE UP (ref 80–100)
PHOSPHATE SERPL-MCNC: 3.7 MG/DL — SIGNIFICANT CHANGE UP (ref 2.4–4.7)
PLATELET # BLD AUTO: 329 K/UL — SIGNIFICANT CHANGE UP (ref 150–400)
POTASSIUM SERPL-MCNC: 4 MMOL/L — SIGNIFICANT CHANGE UP (ref 3.5–5.3)
POTASSIUM SERPL-SCNC: 4 MMOL/L — SIGNIFICANT CHANGE UP (ref 3.5–5.3)
RBC # BLD: 4.81 M/UL — SIGNIFICANT CHANGE UP (ref 4.2–5.8)
RBC # FLD: 12.2 % — SIGNIFICANT CHANGE UP (ref 10.3–14.5)
SODIUM SERPL-SCNC: 135 MMOL/L — SIGNIFICANT CHANGE UP (ref 135–145)
WBC # BLD: 13.35 K/UL — HIGH (ref 3.8–10.5)
WBC # FLD AUTO: 13.35 K/UL — HIGH (ref 3.8–10.5)

## 2020-02-13 PROCEDURE — 70450 CT HEAD/BRAIN W/O DYE: CPT

## 2020-02-13 PROCEDURE — 97530 THERAPEUTIC ACTIVITIES: CPT

## 2020-02-13 PROCEDURE — 80048 BASIC METABOLIC PNL TOTAL CA: CPT

## 2020-02-13 PROCEDURE — 82962 GLUCOSE BLOOD TEST: CPT

## 2020-02-13 PROCEDURE — 0042T: CPT

## 2020-02-13 PROCEDURE — 70552 MRI BRAIN STEM W/DYE: CPT

## 2020-02-13 PROCEDURE — 99239 HOSP IP/OBS DSCHRG MGMT >30: CPT

## 2020-02-13 PROCEDURE — 92610 EVALUATE SWALLOWING FUNCTION: CPT

## 2020-02-13 PROCEDURE — 83735 ASSAY OF MAGNESIUM: CPT

## 2020-02-13 PROCEDURE — 86803 HEPATITIS C AB TEST: CPT

## 2020-02-13 PROCEDURE — 36415 COLL VENOUS BLD VENIPUNCTURE: CPT

## 2020-02-13 PROCEDURE — 93005 ELECTROCARDIOGRAM TRACING: CPT

## 2020-02-13 PROCEDURE — 85027 COMPLETE CBC AUTOMATED: CPT

## 2020-02-13 PROCEDURE — 93880 EXTRACRANIAL BILAT STUDY: CPT

## 2020-02-13 PROCEDURE — 99232 SBSQ HOSP IP/OBS MODERATE 35: CPT | Mod: GC

## 2020-02-13 PROCEDURE — 92523 SPEECH SOUND LANG COMPREHEN: CPT

## 2020-02-13 PROCEDURE — 99223 1ST HOSP IP/OBS HIGH 75: CPT | Mod: GC,AI

## 2020-02-13 PROCEDURE — 93306 TTE W/DOPPLER COMPLETE: CPT

## 2020-02-13 PROCEDURE — 97535 SELF CARE MNGMENT TRAINING: CPT

## 2020-02-13 PROCEDURE — 80061 LIPID PANEL: CPT

## 2020-02-13 PROCEDURE — 84100 ASSAY OF PHOSPHORUS: CPT

## 2020-02-13 PROCEDURE — 83036 HEMOGLOBIN GLYCOSYLATED A1C: CPT

## 2020-02-13 PROCEDURE — 99285 EMERGENCY DEPT VISIT HI MDM: CPT | Mod: 25

## 2020-02-13 PROCEDURE — 97110 THERAPEUTIC EXERCISES: CPT

## 2020-02-13 PROCEDURE — 97163 PT EVAL HIGH COMPLEX 45 MIN: CPT

## 2020-02-13 PROCEDURE — 97112 NEUROMUSCULAR REEDUCATION: CPT

## 2020-02-13 PROCEDURE — 97167 OT EVAL HIGH COMPLEX 60 MIN: CPT

## 2020-02-13 RX ORDER — ACETAMINOPHEN 500 MG
650 TABLET ORAL EVERY 6 HOURS
Refills: 0 | Status: DISCONTINUED | OUTPATIENT
Start: 2020-02-13 | End: 2020-02-13

## 2020-02-13 RX ORDER — TAMSULOSIN HYDROCHLORIDE 0.4 MG/1
0.4 CAPSULE ORAL ONCE
Refills: 0 | Status: COMPLETED | OUTPATIENT
Start: 2020-02-13 | End: 2020-02-13

## 2020-02-13 RX ORDER — TAMSULOSIN HYDROCHLORIDE 0.4 MG/1
1 CAPSULE ORAL
Qty: 0 | Refills: 0 | DISCHARGE
Start: 2020-02-13

## 2020-02-13 RX ADMIN — BUPRENORPHINE AND NALOXONE 1 TABLET(S): 2; .5 TABLET SUBLINGUAL at 11:04

## 2020-02-13 RX ADMIN — LOSARTAN POTASSIUM 25 MILLIGRAM(S): 100 TABLET, FILM COATED ORAL at 05:49

## 2020-02-13 RX ADMIN — ATORVASTATIN CALCIUM 80 MILLIGRAM(S): 80 TABLET, FILM COATED ORAL at 21:31

## 2020-02-13 RX ADMIN — AMLODIPINE BESYLATE 10 MILLIGRAM(S): 2.5 TABLET ORAL at 05:49

## 2020-02-13 NOTE — H&P ADULT - ATTENDING COMMENTS
Chart reviewed.  History from chart (as patient with global aphasia) and from wife over phone  64 year old male, previously independent, works as a , admitted for rehab after recent admission for stroke s/p TPA. Found to have left ICA occlusion with involvement of left frontal and temporoparietal area. Repeat imaging with petechial hemorrhages. Also found to have right ICA dissection.   On ASA 81 mag daily and high dose statins. Recommendations for full dose AC in 6 weeks.  On exam patient with global aphasia, ( grimaces and shrugs ), moves left side spontaneously, right dense hemiplegia with normal tone right knee and non sustained clonus.   Begin full rehab program

## 2020-02-13 NOTE — CHART NOTE - NSCHARTNOTEFT_GEN_A_CORE
Pt resting, appears comfortable  VSS B/P 122/77 T 98.7 R 12 PO 96%  Heart RR  Lungs clear  Abd soft and nontender  Pt stable at this time  Continue to monitor

## 2020-02-13 NOTE — H&P ADULT - NSHPREVIEWOFSYSTEMS_GEN_ALL_CORE
REVIEW OF SYSTEMS - patient nods or shrugs.   Constitutional: No fever, No fatigue  Pulm: No shortness of breath  Cardio: No chest pain, No palpitations  GI:  No abdominal pain, No nausea, No vomiting, No diarrhea, No constipation  Neuro: +loss of strength  MSK:  No Neck or back pain  Psych:  No depression, No anxiety REVIEW OF SYSTEMS - patient nods or shrugs.   Constitutional: No fever, No fatigue  Limited due to global aphasia

## 2020-02-13 NOTE — DIETITIAN INITIAL EVALUATION ADULT. - SIGNS/SYMPTOMS
as evidenced by <50% PO intake and moderate muscle loss as evidenced by <50% PO intake and mild muscle loss

## 2020-02-13 NOTE — H&P ADULT - NSICDXPASTMEDICALHX_GEN_ALL_CORE_FT
PAST MEDICAL HISTORY:  No pertinent past medical history
PAST MEDICAL HISTORY:  No pertinent past medical history

## 2020-02-13 NOTE — DIETITIAN INITIAL EVALUATION ADULT. - OTHER INFO
63 y/o M w/ no significant PMHx who was transferred from [facility] ED w/ small to moderate sized left frontal infarction s/p tPA.    Pt is aphasic. Pt observed during breakfast. As per nurse, pt has poor appetite. Pt observed to drink juice and have fruit. As per speech, pt on dysphagia 2 mechanical soft, thin liquid diet. Unable to obtain usual body weight. 65 y/o M w/ no significant PMHx who was transferred from [facility] ED w/ small to moderate sized left frontal infarction s/p tPA.    Pt is aphasic. Pt observed during breakfast. As per nurse, pt has poor appetite. Pt observed drinking juice and liked fruit. As per speech, pt on dysphagia 2 mechanical soft, thin liquid diet. Unable to obtain usual body weight. 65 y/o M w/ no significant PMHx who was transferred from [facility] ED w/ small to moderate sized left frontal infarction s/p tPA.    Pt is aphasic. Unable to obtain history. Pt observed during breakfast. As per nurse assistant, pt has poor appetite. Pt observed drinking juice. As per speech, pt on dysphagia 2 mechanical soft, thin liquid diet.

## 2020-02-13 NOTE — CHART NOTE - NSCHARTNOTEFT_GEN_A_CORE
Upon Nutritional Assessment by the Registered Dietitian your patient was determined to meet criteria / has evidence of the following diagnosis/diagnoses:          [ ]  Mild Protein Calorie Malnutrition        [x ]  Moderate Protein Calorie Malnutrition        [ ] Severe Protein Calorie Malnutrition        [ ] Unspecified Protein Calorie Malnutrition        [ ] Underweight / BMI <19        [ ] Morbid Obesity / BMI > 40      Findings as based on:  •  Comprehensive nutrition assessment and consultation  •  Calorie counts (nutrient intake analysis)  •  Food acceptance and intake status from observations by staff  •  Follow up  •  Patient education  •  Intervention secondary to interdisciplinary rounds  •   concerns      Treatment:    The following diet has been recommended:  Dysphagia 2 Mechanical Soft-Thin Liquids   Recommend Ensure TID    PROVIDER Section:     By signing this assessment you are acknowledging and agree with the diagnosis/diagnoses assigned by the Registered Dietitian    Comments:

## 2020-02-13 NOTE — H&P ADULT - ASSESSMENT
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  "This should provide protection for stroke from Right ICA dissection while minimizing chance for worsening petechial ICH."    #Pain: Tylenol PRN  - Prior opioid use: well managed without meds at the moment, patient has h/o opioid dependence, was on Suboxone treatment as outpatient.      #Hypertension:  - Continue Losartan 25mg daily    #[place] prolongation  - Per cardio at [facility] "This is associated with [place] prolongation consistent with elevated vagal tone. Patient was reportedly awake during these times. It is possible elevated vagal tone from cerebral disease is causing these pauses. Pacemaker implantation would not help this and fortunately he is asymptomatic. He should be evaluated for sleep apnea at some point."  - Avoid metoprolol and/or other AVN blockers  - Outpatient cardiology followup     #Resp: desat last night at [facility] hospital, placed on O2 PRN and nocturnal  - may need r/o sleep apnea with overnight pulse ox.     #GI/Bowel: Colace, Senna, Miralax PRN    #Diet: Mechanical soft with thin liquids    #Renal/Bladder:     #Precautions / PROPHYLAXIS:   - Falls  - Ortho: Weight bearing status: WBAT   - Lungs: Aspiration, Incentive Spirometer   - Pressure injury/Skin: Turn Q2hrs while in bed, OOB to Chair, PT/OT    - DVT: Lovenox, SCDs, TEDs     MEDICAL PROGNOSIS: GOOD            REHAB POTENTIAL: FAIR             ESTIMATED DISPOSITION: HOME WITH HOME CARE            ELOS: 10-14 Days   EXPECTED THERAPY:     P.T. 1hr/day       O.T. 1hr/day      S.L.P. 1hr/day     P&O Unnecessary     EXP FREQUENCY: 5 days per 7 day period     PRESCREEN COMPARISON:   I have reviewed the prescreen information and I have found no relevant changes between the preadmission screening and my post admission evaluation     RATIONALE FOR INPATIENT ADMISSION - Patient demonstrates the following: (check all that apply)  [X] Medically appropriate for rehabilitation admission  [X] Has attainable rehab goals with an appropriate initial discharge plan  [X] Has rehabilitation potential (expected to make a significant improvement within a reasonable period of time)   [X] Requires close medical management by a rehab physician, rehab nursing care, Hospitalist and comprehensive interdisciplinary team (including PT, OT, & or SLP, Prosthetics and Orthotics) ASSESSMENT/PLAN  CLAYTON COX is a 64M with no previous PMH who 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  "This should provide protection for stroke from Right ICA dissection while minimizing chance for worsening petechial ICH."    #Pain: Tylenol PRN  - Prior opioid use: well managed without meds at the moment, patient has h/o opioid dependence, was on Suboxone treatment as outpatient.      #Hypertension:  - Continue Losartan 25mg daily  - Continue amlodipine 10mg daily     #[place] prolongation  - Per cardio at [facility] "This is associated with [place] prolongation consistent with elevated vagal tone. Patient was reportedly awake during these times. It is possible elevated vagal tone from cerebral disease is causing these pauses. Pacemaker implantation would not help this and fortunately he is asymptomatic. He should be evaluated for sleep apnea at some point."  - Avoid metoprolol and/or other AVN blockers  - Outpatient cardiology followup     #Resp: desat last night at [facility] hospital, placed on O2 PRN and nocturnal  - may need r/o sleep apnea with overnight pulse ox.     #GI/Bowel: Colace, Senna, Miralax PRN    #Diet: Mechanical soft with thin liquids    #Renal/Bladder:     #Precautions / PROPHYLAXIS:   - Falls  - Ortho: Weight bearing status: WBAT   - Lungs: Aspiration, Incentive Spirometer   - Pressure injury/Skin: Turn Q2hrs while in bed, OOB to Chair, PT/OT    - DVT: Lovenox, SCDs, TEDs     MEDICAL PROGNOSIS: GOOD            REHAB POTENTIAL: FAIR             ESTIMATED DISPOSITION: HOME WITH HOME CARE            ELOS: 10-14 Days   EXPECTED THERAPY:     P.T. 1hr/day       O.T. 1hr/day      S.L.P. 1hr/day     P&O Unnecessary     EXP FREQUENCY: 5 days per 7 day period     PRESCREEN COMPARISON:   I have reviewed the prescreen information and I have found no relevant changes between the preadmission screening and my post admission evaluation     RATIONALE FOR INPATIENT ADMISSION - Patient demonstrates the following: (check all that apply)  [X] Medically appropriate for rehabilitation admission  [X] Has attainable rehab goals with an appropriate initial discharge plan  [X] Has rehabilitation potential (expected to make a significant improvement within a reasonable period of time)   [X] Requires close medical management by a rehab physician, rehab nursing care, Hospitalist and comprehensive interdisciplinary team (including PT, OT, & or SLP, Prosthetics and Orthotics) ASSESSMENT/PLAN  CLAYTON OCX is a 64M with no previous PMH who 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    Rehab: Gait Instability, ADL impairments and Functional impairments: start Comprehensive Rehab Program of PT/OT/ST    #Left JESSE/MCA CVA with petechial hemorrhage  - Continue ASA 81mg daily  - Continue Lipitor 80mg QHS  - Continue Prozac 10mg daily for mood motor recovery     #Dissection in Right ICA  - Per neuro at [facility] given size of stroke with petechial hemorrhage avoid anticoagulation for 6-8 weeks, continue ASA but at 81 mg daily.  "This should provide protection for stroke from Right ICA dissection while minimizing chance for worsening petechial ICH."    #Pain: Tylenol PRN  - Prior opioid use: well managed without meds at the moment, patient has h/o opioid dependence, was on Suboxone treatment as outpatient.      #Hypertension  - Continue Losartan 25mg daily  - Continue amlodipine 10mg daily     #[place] prolongation  - Per cardio at [facility] "This is associated with [place] prolongation consistent with elevated vagal tone. Patient was reportedly awake during these times. It is possible elevated vagal tone from cerebral disease is causing these pauses. Pacemaker implantation would not help this and fortunately he is asymptomatic. He should be evaluated for sleep apnea at some point."  - Avoid metoprolol and/or other AVN blockers  - Outpatient cardiology followup     #Resp  - desat last night at [facility], placed on O2 PRN and nocturnal  - may need r/o sleep apnea with overnight pulse ox.     #GI/Bowel: Colace, Senna, Miralax PRN    #Diet: Mechanical soft with thin liquids    #Renal/Bladder: Bladder scans q8    #Precautions / PROPHYLAXIS:   - Falls  - Ortho: Weight bearing status: WBAT   - Lungs: Aspiration, Incentive Spirometer   - Pressure injury/Skin: Turn Q2hrs while in bed, OOB to Chair, PT/OT    - DVT: Lovenox, SCDs, TEDs     MEDICAL PROGNOSIS: GOOD            REHAB POTENTIAL: FAIR             ESTIMATED DISPOSITION: HOME WITH HOME CARE            ELOS: 10-14 Days   EXPECTED THERAPY:     P.T. 1hr/day       O.T. 1hr/day      S.L.P. 1hr/day     P&O Unnecessary     EXP FREQUENCY: 5 days per 7 day period     PRESCREEN COMPARISON:   I have reviewed the prescreen information and I have found no relevant changes between the preadmission screening and my post admission evaluation     RATIONALE FOR INPATIENT ADMISSION - Patient demonstrates the following: (check all that apply)  [X] Medically appropriate for rehabilitation admission  [X] Has attainable rehab goals with an appropriate initial discharge plan  [X] Has rehabilitation potential (expected to make a significant improvement within a reasonable period of time)   [X] Requires close medical management by a rehab physician, rehab nursing care, Hospitalist and comprehensive interdisciplinary team (including PT, OT, & or SLP, Prosthetics and Orthotics)

## 2020-02-13 NOTE — H&P ADULT - HISTORY OF PRESENT ILLNESS
65 y/o M w/ no significant PMHx who was transferred from [facility] ED on 2/5/20 after presenting with aphasia, dysarthria and right hemiplegia, s/p tPA administration at 22:48 2/5/20. CTA head performed at [facility] revealing of total left ICA occlusion. LNK 6:30PM 2/5/20. S/p tPA administration, pt w/o improvement in right-sided weakness. Pt was transferred to [facility] for further neurologic evaluation. Upon arrival to ED, /100 so cardene gtt was re-started. CT head perfusion revealing of small to moderate sized left frontal infarction w/ moderate to large region of low flow and/or ischemic state in left frontoparietal region. CTA neck significant for occlusion of left internal carotid. Neurovascular interventionalist not offering any intervention at this time per ED attending. Admitted to MICU for post tPA monitoring. CT-A also noted with right ICA dissection. While admitted, patient was treated with cardene drip for HTN which was stopped on 2/7 and was then transferred to medical services. Patient was seen by vascular surgery in ICU for Left CA occlusion and recommended for patient to be started on full anticoagulation for at least 6 weeks. Neuro was consulted, repeat CT showing petechial hemorrhages.    On 2/12 Was planned for dc to acute rehab but admission was held due to 3-3.5 second pauses on monitor. Cardiology was consulted and stated that it is associated with [place] prolongation consistent with increased vagal tone possibly due to his cerebral disease. Pacemaker would not be beneficial. Patient also noted to be asymptomatic during the pauses. Metoprolol and AVN blockers held as per cardio. Patient planned for dc to [facility] today. 63 y/o M w/ no significant PMHx who was transferred from [facility] ED on 2/5/20 after presenting with aphasia, dysarthria and right hemiplegia, s/p tPA administration at 22:48 2/5/20. CTA head performed at [facility] revealing of total left ICA occlusion. LNK 6:30PM 2/5/20. S/p tPA administration, pt w/o improvement in right-sided weakness. Pt was transferred to [facility] for further neurologic evaluation. Upon arrival to ED, /100 so cardene gtt was re-started. CT head perfusion revealing of small to moderate sized left frontal infarction w/ moderate to large region of low flow and/or ischemic state in left frontoparietal region. CTA neck significant for occlusion of left internal carotid. Neurovascular interventionalist not offering any intervention at this time per ED attending. Admitted to MICU for post tPA monitoring. CT-A also noted with right ICA dissection. While admitted, patient was treated with cardene drip for HTN which was stopped on 2/7 and was then transferred to medical services. Patient was seen by vascular surgery in ICU for Left CA occlusion and recommended for patient to be started on full anticoagulation for at least 6 weeks. Neuro was consulted, repeat CT showing petechial hemorrhages.    On 2/12 Was planned for dc to acute rehab but admission was held due to 3-3.5 second pauses on monitor. Cardiology was consulted and stated that it is associated with [place] prolongation consistent with increased vagal tone possibly due to his cerebral disease. Pacemaker would not be beneficial. Patient also noted to be asymptomatic during the pauses. Metoprolol and AVN blockers held as per cardio. Patient planned for dc to [facility] today.     Wife: Ca Camposn H) [phone], (C) [phone] 65 y/o M w/ no significant PMHx who was transferred from [facility] ED on 2/5/20 after presenting with aphasia, dysarthria and right hemiplegia, s/p tPA administration at 22:48 2/5/20. CTA head performed at [facility] revealing of total left ICA occlusion. LNK 6:30PM 2/5/20. S/p tPA administration, pt w/o improvement in right-sided weakness. Pt was transferred to [facility] for further neurologic evaluation. Upon arrival to ED, /100 so cardene gtt was re-started. CT head perfusion revealing of small to moderate sized left frontal infarction w/ moderate to large region of low flow and/or ischemic state in left frontoparietal region. CTA neck significant for occlusion of left internal carotid. No Neurovascular intervention at this time. Admitted to MICU for post tPA monitoring. CT-A also noted with right ICA dissection. While admitted, patient was treated with cardene drip for HTN which was stopped on 2/7 and was then transferred to medical services. Patient was seen by vascular surgery in ICU for Left Carotid occlusion and recommended for patient to be started on full anticoagulation in at least 6 weeks. Neuro was consulted, repeat CT showing petechial hemorrhages.    On 2/12 Was planned for dc to acute rehab but admission was held due to 3-3.5 second pauses on monitor. Cardiology was consulted and stated that it is associated with CT prolongation consistent with increased vagal tone possibly due to his cerebral disease. Pacemaker would not be beneficial. Patient also noted to be asymptomatic during the pauses. Metoprolol and AVN blockers held as per cardio. Patient planned for dc to [facility] today.     Wife: Ca Camposn (H) [phone], (C) [phone]

## 2020-02-13 NOTE — PROGRESS NOTE ADULT - SUBJECTIVE AND OBJECTIVE BOX
Patient seen and evaluated this morning. Aphasic but able to follow simple commands. Was planned for dc to acute rehab yesterday but admission was held due to 3-3.5 second pauses on monitor. Cardiology was consulted and stated that it is associated with [place] prolongation consistent with increased vagal tone possibly due to his cerebral disease. Pacemaker would not be beneficial. Patient also noted to be asymptomatic during the pauses. Metoprolol and AVN blockers held as per cardio. Patient planned for dc to [facility] today.     REVIEW OF SYSTEMS  unable to obtain    FUNCTIONAL PROGRESS  2/11 SLP  Speech Language Pathology Recommendations: 1. Modify diet to: mechanical soft, thin fluids 2. Oral care 3. Aspiration precautions 4. Small bites/sips 5. 1:1 supervision with PO 6. Upright with all PO 7. Will follow, to chcek PO tolerance       SLP Treatment: Auditory Comprehension  SLP Treatment: Auditory Comprehension Treatment Detail: 1. To assess reading comprehension: Letters: 0% independently, 0% despite max cues2. To improve body part ID to >30% accuracy without cues, to facilitate improve auditory  comprehension: 20% independently, did not increase despite cues, due to +perseveration on previous stimulus3. To follow simple 1 step comnmands to >30% accuracy without cues, to facilitate improve auditory  comprehension: 60% independently increased to 80% with max cues4. To improve yes/no reliability for simple ? to > 70%, to facilitate improve auditory  comprehension: 40% independently, did not increase despite max cues5. To improve object ID in array x2 to >75%, to facilitate improve auditory  comprehension: 80% independently     SLP Treatment: Verbal Expression  SLP Treatment: Verbal Expression Treatment Detail: 1. To imitate vowels to >30% accuracy, to facilitate verbal expression: 0% independently, pt able to mouth /a/ after max tactile cues, however, no vocalization noted.       2/12 PT  Bed Mobility  Bed Mobility Training Sit-to-Supine: maximum assist (25% patient effort);  2 person assist  Bed Mobility Training Supine-to-Sit: maximum assist (25% patient effort);  2 person assist  Bed Mobility Training Limitations: decreased ability to use legs for bridging/pushing    Bed-Chair Transfer Training  Transfer Training Bed-to-Chair Transfer: maximum assist (25% patient effort);  2 person assist;  full weight-bearing  Transfer Training Chair-to-Bed Transfer: maximum assist (25% patient effort);  2 person assist;  full weight-bearing  Bed-to-Chair Transfer Training Transfer Safety Analysis: decreased balance;  decreased strength;  decreased ROM;  impaired coordination;  impaired motor control;  impaired sensory feedback;  impaired postural control;  cognitive, decreased safety awareness    Sit-Stand Transfer Training  Transfer Training Sit-to-Stand Transfer: maximum assist (25% patient effort);  2 person assist;  full weight-bearing  Transfer Training Stand-to-Sit Transfer: maximum assist (25% patient effort);  2 person assist;  verbal cues;  full weight-bearing  Sit-to-Stand Transfer Training Transfer Safety Analysis: decreased strength;  impaired balance;  impaired coordination;  impaired motor control;  impaired postural control    Gait Training  Gait Training: unable to perform    2/11 OT  Bed Mobility  Bed Mobility Training Sit-to-Supine: maximum assist (25% patient effort);  2 person assist  Bed Mobility Training Supine-to-Sit: maximum assist (25% patient effort);  2 person assist  Bed Mobility Training Limitations: impaired motor control;  impaired coordination;  impaired balance;  impaired postural control;  cognitive, decreased safety awareness    Sit-Stand Transfer Training  Transfer Training Sit-to-Stand Transfer: maximum assist (25% patient effort);  2 person assist  Transfer Training Stand-to-Sit Transfer: maximum assist (25% patient effort);  2 person assist  Sit-to-Stand Transfer Training Transfer Safety Analysis: cognitive, decreased safety awareness;  impaired motor control;  impaired coordination;  impaired balance    Therapeutic Exercise  Therapeutic Exercise Detail: Pt provided with passive stretch t/o right UE shoulder, elbow, wrist, and digits.     Functional Endurance  Functional Endurance Detail: Pt stood for 2 trials with maximal assist x2. Pt tends to lean to the right at times and demonstrates decreased difficulty with weight shifting to right side to advance left LE. Pt also sat at the edge of the bed with minimal assist while self supporting and moderate assist unsupported.       Vital Signs Last 24 Hrs  T(C): 36.8 (13 Feb 2020 07:39), Max: 37.1 (12 Feb 2020 12:04)  T(F): 98.2 (13 Feb 2020 07:39), Max: 98.7 (12 Feb 2020 12:04)  HR: 70 (13 Feb 2020 04:00) (61 - 70)  BP: 148/88 (13 Feb 2020 04:00) (122/77 - 148/90)  BP(mean): 102 (13 Feb 2020 04:00) (85 - 103)  RR: 14 (13 Feb 2020 04:00) (12 - 15)  SpO2: 93% (13 Feb 2020 04:00) (93% - 97%)    MEDICATIONS  (STANDING):  amLODIPine   Tablet 10 milliGRAM(s) Oral every 24 hours  aspirin enteric coated 81 milliGRAM(s) Oral daily  atorvastatin 80 milliGRAM(s) Oral at bedtime  buprenorphine 8 mG/naloxone 2 mG SL  Tablet 1 Tablet(s) SubLingual daily  FLUoxetine 10 milliGRAM(s) Oral daily  losartan 25 milliGRAM(s) Oral daily  tamsulosin 0.4 milliGRAM(s) Oral once    MEDICATIONS  (PRN):  acetaminophen   Tablet .. 650 milliGRAM(s) Oral every 6 hours PRN Temp greater or equal to 38C (100.4F), Mild Pain (1 - 3)  ondansetron Injectable 4 milliGRAM(s) IV Push every 4 hours PRN Nausea and/or Vomiting    RECENT LABS/IMAGING                        14.5   13.35 )-----------( 329      ( 13 Feb 2020 07:48 )             42.6     02-13    135  |  98  |  27.0<H>  ----------------------------<  122<H>  4.0   |  25.0  |  0.64    Ca    8.9      13 Feb 2020 07:48  Phos  3.7     02-13  Mg     2.1     02-13      < from: CT Head No Cont (02.12.20 @ 09:23) >    IMPRESSION:    Evolving acute infarcts in the left JESSE and left MCA vascular territory distribution.     Associated petechial hemorrhages, which appears more prominent than on prior imaging.      < end of copied text >    ----------------------------------------------------------------------  PHYSICAL EXAM  Constitutional - NAD, Comfortable, in bed  HEENT - NCAT, EOMI  Neck - Supple, No limited ROM  Chest - Breathing comfortably, No wheezing  Cardiovascular - S1S2   Abdomen - Soft   Extremities - No C/C/E, No calf tenderness   Neurologic Exam -   decreased neglect on right              Cognitive -Awake and lethargic, unable to fully assess due to aphasia      Communication - Non verbal, will nod his head to some questions but unable to speak. Expressive >receptive aphasia. Able to follow simple commands such as raising hie left arm.      Cranial Nerves - Right sided droop      Motor -                     LEFT    UE - ShAB 5/5, EF 5/5, EE 5/5, WE 5/5,  5/5                    RIGHT UE - ShAB 0/5, EF 0/5, EE 0/5, WE 0/5,  0/5                    LEFT    LE - HF 5/5, KE 5/5, DF 5/5, PF 5/5                    RIGHT LE - HF 0/5, KE 0/5, DF 0/5, PF 0/5        Sensory - Intact to LT     Reflexes - 1+ reflexes      Coordination - Unable to be tested on the affected side R      Psychiatric - Seems frustrated but mood slightly improved compared to prior days.       Assessment and Recommendation:   · Assessment	  ASSESSMENT/PLAN  64y Male with functional deficits after acute LEFT frontal cortical infarctions within the LEFT JESSE and MCA territories.   s/p tPA  Right neglect, right hemiplegia, aphasia    CVA- ASA, Statin, Prozac, CT showing evolving acute infarct, neuro following   Left ICA occlusion - No intervention as per vascular team, Was recommended full AC but will avoid anticoagulation for 6-8 weeks due to more prominent petechial hemorrhage on CTH.   HTN-Amlodipine, losartan. As per cario metoprolol and AVN blockers to be held due to pasues noted on telemetry.   Pain - well managed without meds at the moment, patient has h/o opioid dependence, was on Suboxone treatment as outpatient.    DVT PPX - SCDs, Lovenox dc'd due to more prominent petechial hemorrhage on recent CT   Diet- Dysphagia 2  Mood- Continue on Prozac 10 mg daily for mood and motor recovery    Rehab -   Patient was independent in the community prior to admission, would benefit from intensive PT/OT/SLP to restore function while being closely monitored and managed for ongoing medical issues which can destabilize     Recommend ACUTE inpatient rehabilitation for the functional deficits consisting of 3 hours of therapy/day & 24 hour RN/daily PMR physician for comorbid medical management. Will continue to follow for ongoing rehab needs and recommendations. Patient will be able to tolerate 3 hours a day.    Continue bedside therapy as well as OOB throughout the day with mobilization throughout the day with staff to maintain cardiopulmonary function and prevention of secondary complications related to debility.   SLP, PT, OT for speech, bed mobility, transfers, ambulation, ADLs . Patient seen and evaluated this morning. Aphasic but able to follow simple commands. Was planned for dc to acute rehab yesterday but admission was held due to 3-3.5 second pauses on monitor. Cardiology was consulted and stated that it is associated with [place] prolongation consistent with increased vagal tone possibly due to his cerebral disease. Pacemaker would not be beneficial. Patient also noted to be asymptomatic during the pauses. Metoprolol and AVN blockers held as per cardio. Patient planned for dc to [facility] today.     REVIEW OF SYSTEMS  unable to obtain    FUNCTIONAL PROGRESS  2/11 SLP  Speech Language Pathology Recommendations: 1. Modify diet to: mechanical soft, thin fluids 2. Oral care 3. Aspiration precautions 4. Small bites/sips 5. 1:1 supervision with PO 6. Upright with all PO 7. Will follow, to chcek PO tolerance       SLP Treatment: Auditory Comprehension  SLP Treatment: Auditory Comprehension Treatment Detail: 1. To assess reading comprehension: Letters: 0% independently, 0% despite max cues2. To improve body part ID to >30% accuracy without cues, to facilitate improve auditory  comprehension: 20% independently, did not increase despite cues, due to +perseveration on previous stimulus3. To follow simple 1 step comnmands to >30% accuracy without cues, to facilitate improve auditory  comprehension: 60% independently increased to 80% with max cues4. To improve yes/no reliability for simple ? to > 70%, to facilitate improve auditory  comprehension: 40% independently, did not increase despite max cues5. To improve object ID in array x2 to >75%, to facilitate improve auditory  comprehension: 80% independently     SLP Treatment: Verbal Expression  SLP Treatment: Verbal Expression Treatment Detail: 1. To imitate vowels to >30% accuracy, to facilitate verbal expression: 0% independently, pt able to mouth /a/ after max tactile cues, however, no vocalization noted.       2/12 PT  Bed Mobility  Bed Mobility Training Sit-to-Supine: maximum assist (25% patient effort);  2 person assist  Bed Mobility Training Supine-to-Sit: maximum assist (25% patient effort);  2 person assist  Bed Mobility Training Limitations: decreased ability to use legs for bridging/pushing    Bed-Chair Transfer Training  Transfer Training Bed-to-Chair Transfer: maximum assist (25% patient effort);  2 person assist;  full weight-bearing  Transfer Training Chair-to-Bed Transfer: maximum assist (25% patient effort);  2 person assist;  full weight-bearing  Bed-to-Chair Transfer Training Transfer Safety Analysis: decreased balance;  decreased strength;  decreased ROM;  impaired coordination;  impaired motor control;  impaired sensory feedback;  impaired postural control;  cognitive, decreased safety awareness    Sit-Stand Transfer Training  Transfer Training Sit-to-Stand Transfer: maximum assist (25% patient effort);  2 person assist;  full weight-bearing  Transfer Training Stand-to-Sit Transfer: maximum assist (25% patient effort);  2 person assist;  verbal cues;  full weight-bearing  Sit-to-Stand Transfer Training Transfer Safety Analysis: decreased strength;  impaired balance;  impaired coordination;  impaired motor control;  impaired postural control    Gait Training  Gait Training: unable to perform    2/11 OT  Bed Mobility  Bed Mobility Training Sit-to-Supine: maximum assist (25% patient effort);  2 person assist  Bed Mobility Training Supine-to-Sit: maximum assist (25% patient effort);  2 person assist  Bed Mobility Training Limitations: impaired motor control;  impaired coordination;  impaired balance;  impaired postural control;  cognitive, decreased safety awareness    Sit-Stand Transfer Training  Transfer Training Sit-to-Stand Transfer: maximum assist (25% patient effort);  2 person assist  Transfer Training Stand-to-Sit Transfer: maximum assist (25% patient effort);  2 person assist  Sit-to-Stand Transfer Training Transfer Safety Analysis: cognitive, decreased safety awareness;  impaired motor control;  impaired coordination;  impaired balance    Therapeutic Exercise  Therapeutic Exercise Detail: Pt provided with passive stretch t/o right UE shoulder, elbow, wrist, and digits.     Functional Endurance  Functional Endurance Detail: Pt stood for 2 trials with maximal assist x2. Pt tends to lean to the right at times and demonstrates decreased difficulty with weight shifting to right side to advance left LE. Pt also sat at the edge of the bed with minimal assist while self supporting and moderate assist unsupported.       Vital Signs Last 24 Hrs  T(C): 36.8 (13 Feb 2020 07:39), Max: 37.1 (12 Feb 2020 12:04)  T(F): 98.2 (13 Feb 2020 07:39), Max: 98.7 (12 Feb 2020 12:04)  HR: 70 (13 Feb 2020 04:00) (61 - 70)  BP: 148/88 (13 Feb 2020 04:00) (122/77 - 148/90)  BP(mean): 102 (13 Feb 2020 04:00) (85 - 103)  RR: 14 (13 Feb 2020 04:00) (12 - 15)  SpO2: 93% (13 Feb 2020 04:00) (93% - 97%)    MEDICATIONS  (STANDING):  amLODIPine   Tablet 10 milliGRAM(s) Oral every 24 hours  aspirin enteric coated 81 milliGRAM(s) Oral daily  atorvastatin 80 milliGRAM(s) Oral at bedtime  buprenorphine 8 mG/naloxone 2 mG SL  Tablet 1 Tablet(s) SubLingual daily  FLUoxetine 10 milliGRAM(s) Oral daily  losartan 25 milliGRAM(s) Oral daily  tamsulosin 0.4 milliGRAM(s) Oral once    MEDICATIONS  (PRN):  acetaminophen   Tablet .. 650 milliGRAM(s) Oral every 6 hours PRN Temp greater or equal to 38C (100.4F), Mild Pain (1 - 3)  ondansetron Injectable 4 milliGRAM(s) IV Push every 4 hours PRN Nausea and/or Vomiting    RECENT LABS/IMAGING                        14.5   13.35 )-----------( 329      ( 13 Feb 2020 07:48 )             42.6     02-13    135  |  98  |  27.0<H>  ----------------------------<  122<H>  4.0   |  25.0  |  0.64    Ca    8.9      13 Feb 2020 07:48  Phos  3.7     02-13  Mg     2.1     02-13      < from: CT Head No Cont (02.12.20 @ 09:23) >    IMPRESSION:    Evolving acute infarcts in the left JESSE and left MCA vascular territory distribution.     Associated petechial hemorrhages, which appears more prominent than on prior imaging.      < end of copied text >    ----------------------------------------------------------------------  PHYSICAL EXAM  Constitutional - NAD, Comfortable, in bed  HEENT - NCAT, EOMI  Neck - Supple, No limited ROM  Chest - Breathing comfortably, No wheezing  Cardiovascular - S1S2   Abdomen - Soft   Extremities - No C/C/E, No calf tenderness   Neurologic Exam -   decreased neglect on right              Cognitive -Awake and lethargic, unable to fully assess due to aphasia      Communication - Non verbal, will nod his head to some questions but unable to speak. Expressive >receptive aphasia. Able to follow simple commands such as raising hie left arm.      Cranial Nerves - Right sided droop      Motor -                     LEFT    UE - ShAB 5/5, EF 5/5, EE 5/5, WE 5/5,  5/5                    RIGHT UE - ShAB 0/5, EF 0/5, EE 0/5, WE 0/5,  0/5                    LEFT    LE - HF 5/5, KE 5/5, DF 5/5, PF 5/5                    RIGHT LE - HF 0/5, KE 0/5, DF 0/5, PF 0/5        Sensory - Intact to LT     Reflexes - 1+ reflexes      Coordination - Unable to be tested on the affected side R      Psychiatric - Seems frustrated but mood slightly improved compared to prior days.       Assessment and Recommendation:   · Assessment	  ASSESSMENT/PLAN  64y Male with functional deficits after acute LEFT frontal cortical infarctions within the LEFT JESSE and MCA territories.   s/p tPA  Right neglect, right hemiplegia, aphasia    CVA- ASA, Statin, Prozac, CT showing evolving acute infarct, neuro following   Left ICA occlusion - No intervention as per vascular team, Was recommended full AC but will avoid anticoagulation for 6-8 weeks due to more prominent petechial hemorrhage on CTH.   HTN-Amlodipine, losartan. As per cario metoprolol and AVN blockers to be held due to pasues noted on telemetry.   Pain - Tylenol PRN, patient has h/o opioid dependence, was on Suboxone treatment as outpatient.    DVT PPX - SCDs, Lovenox dc'd due to more prominent petechial hemorrhage on recent CT   Diet- Dysphagia 2  Mood- Continue on Prozac 10 mg daily for mood and motor recovery    Rehab -   Patient was independent in the community prior to admission, would benefit from intensive PT/OT/SLP to restore function while being closely monitored and managed for ongoing medical issues which can destabilize     Recommend ACUTE inpatient rehabilitation for the functional deficits consisting of 3 hours of therapy/day & 24 hour RN/daily PMR physician for comorbid medical management. Will continue to follow for ongoing rehab needs and recommendations. Patient will be able to tolerate 3 hours a day.    Continue bedside therapy as well as OOB throughout the day with mobilization throughout the day with staff to maintain cardiopulmonary function and prevention of secondary complications related to debility.   SLP, PT, OT for speech, bed mobility, transfers, ambulation, ADLs .

## 2020-02-13 NOTE — H&P ADULT - NSHPSOCIALHISTORY_GEN_ALL_CORE
SOCIAL HISTORY  CIGARETTES:   smoked cigg 2-3 years prior to vaping-vaping for past 2 years  ALCOHOL: Denies  DRUGS: History of oxycodone abuse, on suboxone taper    FUNCTIONAL HISTORY  Patient is a poor historian and will nod yes or no to questions but not much else. As per Therapy notes: Pt. reporting he lives in an apartment with his wife with stairs, but is unable to clarify any further details. Worked as a .   Prior level of function independent.       CURRENT FUNCTIONAL STATUS  - Bed Mobility: Max A  - Transfers: Mod-Max A  - Gait: Only standing attempted Max A  - ADLs: Dependent    SLP Treatment: Verbal Expression  SLP Treatment: Verbal Expression Treatment Detail: 1. To imitate vowels to >30% accuracy, to facilitate verbal expression: 0% independently, pt able to mouth /a/ after max tactile cues, however, no vocalization noted.       2/12 PT  Bed Mobility  Bed Mobility Training Sit-to-Supine: maximum assist (25% patient effort);  2 person assist  Bed Mobility Training Supine-to-Sit: maximum assist (25% patient effort);  2 person assist  Bed Mobility Training Limitations: decreased ability to use legs for bridging/pushing    Bed-Chair Transfer Training  Transfer Training Bed-to-Chair Transfer: maximum assist (25% patient effort);  2 person assist;  full weight-bearing  Transfer Training Chair-to-Bed Transfer: maximum assist (25% patient effort);  2 person assist;  full weight-bearing  Bed-to-Chair Transfer Training Transfer Safety Analysis: decreased balance;  decreased strength;  decreased ROM;  impaired coordination;  impaired motor control;  impaired sensory feedback;  impaired postural control;  cognitive, decreased safety awareness    Sit-Stand Transfer Training  Transfer Training Sit-to-Stand Transfer: maximum assist (25% patient effort);  2 person assist;  full weight-bearing  Transfer Training Stand-to-Sit Transfer: maximum assist (25% patient effort);  2 person assist;  verbal cues;  full weight-bearing  Sit-to-Stand Transfer Training Transfer Safety Analysis: decreased strength;  impaired balance;  impaired coordination;  impaired motor control;  impaired postural control    Gait Training  Gait Training: unable to perform SOCIAL HISTORY  CIGARETTES: smoked cigg 2-3 years prior to vaping- vaping for past 2 years  ALCOHOL: Denies  DRUGS: History of oxycodone abuse, on suboxone taper (Per wife, family is not to know)    FUNCTIONAL HISTORY  Patient is a poor historian and will nod yes or no to questions but not much else.   History obtained from wife: patient lives in [address] [facility] with 2STE, no HR. Bedroom and bathroom on the 1st floor.   Patient works as a , +drives. Independent in ADLs and ambulation. Very active, plays golf, tennis. Wife available at home.       CURRENT FUNCTIONAL STATUS  - Bed Mobility: Max A  - Transfers: Mod-Max A  - Gait: Only standing attempted Max A  - ADLs: Dependent    SLP Treatment: Verbal Expression  SLP Treatment: Verbal Expression Treatment Detail: 1. To imitate vowels to >30% accuracy, to facilitate verbal expression: 0% independently, pt able to mouth /a/ after max tactile cues, however, no vocalization noted.       2/12 PT  Bed Mobility  Bed Mobility Training Sit-to-Supine: maximum assist (25% patient effort);  2 person assist  Bed Mobility Training Supine-to-Sit: maximum assist (25% patient effort);  2 person assist  Bed Mobility Training Limitations: decreased ability to use legs for bridging/pushing    Bed-Chair Transfer Training  Transfer Training Bed-to-Chair Transfer: maximum assist (25% patient effort);  2 person assist;  full weight-bearing  Transfer Training Chair-to-Bed Transfer: maximum assist (25% patient effort);  2 person assist;  full weight-bearing  Bed-to-Chair Transfer Training Transfer Safety Analysis: decreased balance;  decreased strength;  decreased ROM;  impaired coordination;  impaired motor control;  impaired sensory feedback;  impaired postural control;  cognitive, decreased safety awareness    Sit-Stand Transfer Training  Transfer Training Sit-to-Stand Transfer: maximum assist (25% patient effort);  2 person assist;  full weight-bearing  Transfer Training Stand-to-Sit Transfer: maximum assist (25% patient effort);  2 person assist;  verbal cues;  full weight-bearing  Sit-to-Stand Transfer Training Transfer Safety Analysis: decreased strength;  impaired balance;  impaired coordination;  impaired motor control;  impaired postural control    Gait Training  Gait Training: unable to perform

## 2020-02-13 NOTE — DIETITIAN INITIAL EVALUATION ADULT. - PERTINENT MEDS FT
MEDICATIONS  (STANDING):  amLODIPine   Tablet 10 milliGRAM(s) Oral every 24 hours  aspirin enteric coated 81 milliGRAM(s) Oral daily  atorvastatin 80 milliGRAM(s) Oral at bedtime  buprenorphine 8 mG/naloxone 2 mG SL  Tablet 1 Tablet(s) SubLingual daily  FLUoxetine 10 milliGRAM(s) Oral daily  losartan 25 milliGRAM(s) Oral daily    MEDICATIONS  (PRN):  acetaminophen   Tablet .. 650 milliGRAM(s) Oral every 6 hours PRN Temp greater or equal to 38C (100.4F), Mild Pain (1 - 3)  ondansetron Injectable 4 milliGRAM(s) IV Push every 4 hours PRN Nausea and/or Vomiting

## 2020-02-13 NOTE — PROGRESS NOTE ADULT - REASON FOR ADMISSION
Acute CVA

## 2020-02-13 NOTE — H&P ADULT - NSHPPHYSICALEXAM_GEN_ALL_CORE
PHYSICAL EXAM  Constitutional - NAD, Comfortable, in bed  HEENT - NCAT, EOMI  Neck - Supple, No limited ROM  Chest - Breathing comfortably, No wheezing  Cardiovascular - S1S2   Abdomen - Soft   Extremities - No C/C/E, No calf tenderness   Neurologic Exam -   decreased neglect on right              Cognitive -Awake and lethargic, unable to fully assess due to aphasia      Communication - Non verbal, will nod his head to some questions but unable to speak. Expressive >receptive aphasia. Able to follow simple commands such as raising hie left arm.      Cranial Nerves - Right sided droop      Motor -                     LEFT    UE - ShAB 5/5, EF 5/5, EE 5/5, WE 5/5,  5/5                    RIGHT UE - ShAB 0/5, EF 0/5, EE 0/5, WE 0/5,  0/5                    LEFT    LE - HF 5/5, KE 5/5, DF 5/5, PF 5/5                    RIGHT LE - HF 0/5, KE 0/5, DF 0/5, PF 0/5        Sensory - Intact to LT     Reflexes - 1+ reflexes      Coordination - Unable to be tested on the affected side R      Psychiatric - Seems frustrated PHYSICAL EXAM  Constitutional - NAD, Comfortable, in bed  HEENT - NCAT, EOMI  Neck - Supple, No limited ROM  Chest - Breathing comfortably, No wheezing  Cardiovascular - S1S2   Abdomen - Soft, NTND, +BS  Extremities - No C/C/E, No calf tenderness   Neurologic Exam -   +right sided neglect.           Cognitive - AAO to person, nods when his name is given. Unable to verbalize responses. Intermittently follows commands correctly 25% of the time.      Communication - Non verbal, will nod his head to some questions but unable to speak. Expressive >receptive aphasia.      Cranial Nerves - Right sided facial droop      Motor -                     LEFT    UE - ShAB 5/5, EF 5/5, EE 5/5, WE 5/5,  5/5                    RIGHT UE - ShAB 0/5, EF 0/5, EE 0/5, WE 0/5,  0/5                    LEFT    LE - HF 5/5, KE 5/5, DF 5/5, PF 5/5                    RIGHT LE - HF 0/5, KE 0/5, DF 0/5, PF 0/5        Sensory - winces to deep pressure on RUE.      Reflexes - 1+ reflexes, +R ankle clonus 3-4 beats     Coordination - Unable to be tested on the affected side R       Tone - RUE and RLE flaccid    Psychiatric - Seems frustrated

## 2020-02-13 NOTE — H&P ADULT - NSHPLABSRESULTS_GEN_ALL_CORE
14.4   10.61 )-----------( 303      ( 11 Feb 2020 04:50 )             44.6   02-11    138  |  99  |  32.0<H>  ----------------------------<  136<H>  4.1   |  26.0  |  0.71    Ca    9.6      11 Feb 2020 04:50    CT Head No Cont (02.12.20 @ 09:23)  IMPRESSION:  Evolving acute infarcts in the left JESSE and left MCA vascular territory distribution.   Associated petechial hemorrhages, which appears more prominent than on prior imaging.    US Duplex Carotid Arteries Complete, Bilateral (02.06.20 @ 12:48)   IMPRESSION:  No duplex evidence of hemodynamically significant right internal carotid artery stenosis.  Diminished and reversed flow within the left internal carotid artery, suspicious for distal occlusion.    CT Angio:  IMPRESSION:        1.   Right carotid system: No hemodynamically significant stenosis.      2.   Left carotid system:  occlusion of the LEFT internal carotid artery 3.2 cm beyond the bifurcation.      3.   Intracranial circulation:  occlusion of the intracranial LEFT internal carotid artery with reconstitution of the LEFT supraclinoid internal carotid artery via a patent anterior communicating artery. There is occlusion of the A3 segment of the LEFT anterior cerebral artery.      4.   Brain:  loss of gray-white differentiation in the anterior superior LEFT frontal lobe which may reflect an acute infarction.    Critical value:  I discussed the finding of this report with Dr. Jarrell at 10:35 PM on 02/05/2020.  Critical value policy of the hospital was followed.  Read back and confirmation of receipt of this communication was performed.  This verbal communication supplements the text report of this document.  Upon review, there is a small dissection flap within the distal cervical and proximal petrous RIGHT internal carotid artery.< from: CT Angio Neck w/ IV Cont (02.05.20 @ 22:37)
Labs, Radiology, Cardiology, and Other Results:                           14.5   13.35 )-----------( 329      ( 13 Feb 2020 07:48 )             42.6     02-13    135  |  98  |  27.0<H>  ----------------------------<  122<H>  4.0   |  25.0  |  0.64    Ca    8.9      13 Feb 2020 07:48  Phos  3.7     02-13  Mg     2.1     02-13          CT Head No Cont (02.12.20 @ 09:23)  IMPRESSION:  Evolving acute infarcts in the left JESSE and left MCA vascular territory distribution.   Associated petechial hemorrhages, which appears more prominent than on prior imaging.    US Duplex Carotid Arteries Complete, Bilateral (02.06.20 @ 12:48)   IMPRESSION:  No duplex evidence of hemodynamically significant right internal carotid artery stenosis.  Diminished and reversed flow within the left internal carotid artery, suspicious for distal occlusion.    CT Angio:  IMPRESSION:        1.   Right carotid system: No hemodynamically significant stenosis.      2.   Left carotid system:  occlusion of the LEFT internal carotid artery 3.2 cm beyond the bifurcation.      3.   Intracranial circulation:  occlusion of the intracranial LEFT internal carotid artery with reconstitution of the LEFT supraclinoid internal carotid artery via a patent anterior communicating artery. There is occlusion of the A3 segment of the LEFT anterior cerebral artery.      4.   Brain:  loss of gray-white differentiation in the anterior superior LEFT frontal lobe which may reflect an acute infarction.    Critical value:  I discussed the finding of this report with Dr. Jarrell at 10:35 PM on 02/05/2020.  Critical value policy of the hospital was followed.  Read back and confirmation of receipt of this communication was performed.  This verbal communication supplements the text report of this document.  Upon review, there is a small dissection flap within the distal cervical and proximal petrous RIGHT internal carotid artery.< from: CT Angio Neck w/ IV Cont (02.05.20 @ 22:37)    ECHO FINDINGS:< from: TTE Echo Complete w/Doppler (02.06.20 @ 10:20) >  Summary:   1. The left atrium is normal in size.   2. Normal wall motion. Left ventricular ejection fraction, by visual estimation, is 65 to 70%. Grade I diastolic dysfunction.   3. The right atrium is normal in size.   4. Normal right ventricular size and function.   5. No significant valvular abnormality.   6. There is no evidence of pericardial effusion.   7. No cardiac mass, vegetations, thrombus or shunts visualized. However, DANNY is a more sensitive test to evaluate for these findings.    < end of copied text >

## 2020-02-13 NOTE — DIETITIAN INITIAL EVALUATION ADULT. - PERTINENT LABORATORY DATA
02-13 Na135 mmol/L Glu 122 mg/dL<H> K+ 4.0 mmol/L Cr  0.64 mg/dL BUN 27.0 mg/dL<H> Phos 3.7 mg/dL Alb n/a   PAB n/a

## 2020-02-13 NOTE — H&P ADULT - NSICDXPASTSURGICALHX_GEN_ALL_CORE_FT
PAST SURGICAL HISTORY:  No significant past surgical history
PAST SURGICAL HISTORY:  No significant past surgical history

## 2020-02-14 PROCEDURE — 99232 SBSQ HOSP IP/OBS MODERATE 35: CPT | Mod: GC

## 2020-02-14 PROCEDURE — 99223 1ST HOSP IP/OBS HIGH 75: CPT

## 2020-02-14 RX ADMIN — AMLODIPINE BESYLATE 10 MILLIGRAM(S): 2.5 TABLET ORAL at 05:51

## 2020-02-14 RX ADMIN — LOSARTAN POTASSIUM 25 MILLIGRAM(S): 100 TABLET, FILM COATED ORAL at 05:51

## 2020-02-14 RX ADMIN — ATORVASTATIN CALCIUM 80 MILLIGRAM(S): 80 TABLET, FILM COATED ORAL at 21:54

## 2020-02-14 NOTE — PROGRESS NOTE ADULT - ATTENDING COMMENTS
Chart reviewed. Patient seen at bedside  Patient with global aphasia and with right hemiplegia  Shrugs and grimaces face  Appears comfortable  Begin full rehab program Chart reviewed. Patient seen at bedside  Patient with global aphasia and with right hemiplegia  Shrugs and grimaces face  Appears comfortable  Begin full rehab program  Patient not on lovenox for DVT prophylaxis due to petechial bleed and increases on repeat head CT. TEDS and VCB for prophylaxis

## 2020-02-14 NOTE — CONSULT NOTE ADULT - ASSESSMENT
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        #Left JESSE/MCA CVA with petechial hemorrhage  - Continue ASA 81mg daily  - Continue Lipitor 80mg QHS  - Continue Prozac 10mg daily for mood motor recovery     # Gait abnormality   -comprehensive Rehab Program of PT/OT/ST    #Dissection in Right ICA  - Per neuro at [facility] given size of stroke with petechial hemorrhage avoid anticoagulation for 6-8 weeks, continue ASA but at 81 mg daily.  "This should provide protection for stroke from Right ICA dissection while minimizing chance for worsening petechial ICH."    #Pain: Tylenol PRN  - Prior opioid use: well managed without meds at the moment, patient has h/o opioid dependence, was on Suboxone treatment as outpatient.      #Hypertension  - Continue Losartan 25mg daily  - Continue amlodipine 10mg daily     #AR prolongation  - Per cardio at [facility] "This is associated with AR prolongation consistent with elevated vagal tone. Patient was reportedly awake during these times. It is possible elevated vagal tone from cerebral disease is causing these pauses. Pacemaker implantation would not help this and fortunately he is asymptomatic. He should be evaluated for sleep apnea at some point."  - Avoid metoprolol and/or other AVN blockers  - Outpatient cardiology followup     #Resp  - desat last night at [facility] hospital, placed on O2 PRN and nocturnal  - may need r/o sleep apnea with overnight pulse ox.     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  "This should provide protection for stroke from Right ICA dissection while minimizing chance for worsening petechial ICH."    #Pain-  - Tylenol PRN  - Prior opioid use: well managed without meds at the moment, patient has h/o opioid dependence, was on Suboxone treatment as outpatient.      #Hypertension - BP control  - Continue Losartan 25 mg daily, amlodipine 10mg daily     #MT prolongation  - Per cardiologist at [facility] "This is associated with MT prolongation consistent with elevated vagal tone. Patient was reportedly awake during these times. It is possible elevated vagal tone from cerebral disease is causing these pauses. Pacemaker implantation would not help this and fortunately he is asymptomatic. 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 given size of stroke with petechial hemorrhage avoid anticoagulation for 6-8 weeks, continue ASA but at 81 mg daily.  "This should provide protection for stroke from Right ICA dissection while minimizing chance for worsening petechial ICH."    #Pain-  - Tylenol PRN  - Prior opioid use: well managed without meds at the moment, patient has h/o opioid dependence, was on Suboxone treatment as outpatient.      #Hypertension - BP control  - Continue Losartan 25 mg daily, amlodipine 10mg daily     #AZ prolongation  - Per cardiologist at [facility] "This is associated with AZ prolongation consistent with elevated vagal tone. Patient was reportedly awake during these times. It is possible elevated vagal tone from cerebral disease is causing these pauses. Pacemaker implantation would not help this and fortunately he is asymptomatic. He should be evaluated for sleep apnea at some point."  - Avoid metoprolol and/or other AVN blockers  - Outpatient cardiology followup     #Hypoxia  - desat night before at acute care hospital, placed on O2 PRN and nocturnal  - may need r/o sleep apnea  - f/u pulmonary    #Constipation  - continue bowel regimen, Colace, Senna, Miralax PRN    #Dysphagia  - continue Mechanical soft with thin liquids    #DVT ppx  - SCDs, TEDs

## 2020-02-14 NOTE — PROGRESS NOTE ADULT - ASSESSMENT
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  "This should provide protection for stroke from Right ICA dissection while minimizing chance for worsening petechial ICH."    #Pain: Tylenol PRN  - Prior opioid use: well managed without meds at the moment, patient has h/o opioid dependence, was on Suboxone treatment as outpatient.      #Hypertension  - Continue Losartan 25mg daily  - Continue amlodipine 10mg daily     #IN prolongation  - Per cardio at [facility] "This is associated with IN prolongation consistent with elevated vagal tone. Patient was reportedly awake during these times. It is possible elevated vagal tone from cerebral disease is causing these pauses. Pacemaker implantation would not help this and fortunately he is asymptomatic. He should be evaluated for sleep apnea at some point."  - Avoid metoprolol and/or other AVN blockers  - Outpatient cardiology followup     #Resp  - desat during last night at [facility], placed on O2 PRN and nocturnal. No issues overnight.   - may need r/o sleep apnea with overnight pulse ox.     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    Rehab: Gait Instability, ADL impairments and Functional impairments: Continue Comprehensive Rehab Program of PT/OT/ST    #Left JESSE/MCA CVA with petechial hemorrhage  - Continue ASA 81mg daily  - Continue Lipitor 80mg QHS  - Continue Prozac 10mg daily for mood motor recovery     #Dissection in Right ICA  - Per neuro at [facility] given size of stroke with petechial hemorrhage avoid anticoagulation for 6-8 weeks, continue ASA but at 81 mg daily.  "This should provide protection for stroke from Right ICA dissection while minimizing chance for worsening petechial ICH."    #Leukocytosis  - 2/14 WBC 13.3, patient afebrile, vital signs stable  - 2/15 CBC    #Pain: Tylenol PRN  - Prior opioid use: well managed without meds at the moment, patient has h/o opioid dependence, was on Suboxone treatment as outpatient.      #Hypertension  - Continue Losartan 25mg daily  - Continue amlodipine 10mg daily     #GA prolongation  - Per cardio at [facility] "This is associated with GA prolongation consistent with elevated vagal tone. Patient was reportedly awake during these times. It is possible elevated vagal tone from cerebral disease is causing these pauses. Pacemaker implantation would not help this and fortunately he is asymptomatic. He should be evaluated for sleep apnea at some point."  - Avoid metoprolol and/or other AVN blockers  - Outpatient cardiology followup     #Resp  - desat during last night at [facility] hospital, placed on O2 PRN and nocturnal. No issues overnight.   - may need r/o sleep apnea with overnight pulse ox.     #GI/Bowel: Colace, Senna, Miralax PRN    #Diet: Mechanical soft with thin liquids    #Renal/Bladder: Bladder scans q8    #Precautions / PROPHYLAXIS:   - Falls  - Ortho: Weight bearing status: WBAT   - Lungs: Aspiration, Incentive Spirometer   - Pressure injury/Skin: Turn Q2hrs while in bed, OOB to Chair, PT/OT    - DVT: Lovenox, SCDs, TEDs

## 2020-02-14 NOTE — CHART NOTE - NSCHARTNOTEFT_GEN_A_CORE
Brief nutrition note: spoke with pt's daughter regarding food preferences as she reports pt has had poor appetite on mechanical soft diet. Will provide Ensure Enlive TID + in-house smoothie (1 banana, 1 tbsp peanut butter, 9oz whole milk, 1 scoop protein powder). Discussed appropriate texture of foods should family want to bring in any foods from home. Will follow up for full assessment. Shaneka Webb RDN Pager #[phone].

## 2020-02-14 NOTE — CONSULT NOTE ADULT - SUBJECTIVE AND OBJECTIVE BOX
Patient is a 64 year old  Male who presents with a chief complaint of Left MCA and JESSE CVA (13 Feb 2020 11:29)    HPI: 64 year old male w/ no significant PMHx who was transferred from [facility] ED on 2/5/20 after presenting with aphasia, dysarthria and right hemiplegia, s/p tPA administration at 22:48 2/5/20. CTA head performed at [facility] revealing of total left ICA occlusion. LNK 6:30PM 2/5/20. S/p tPA administration, pt w/o improvement in right-sided weakness. Pt was transferred to [facility] for further neurologic evaluation. Upon arrival to ED, /100 so cardene gtt was re-started. CT head perfusion revealing of small to moderate sized left frontal infarction w/ moderate to large region of low flow and/or ischemic state in left frontoparietal region. CTA neck significant for occlusion of left internal carotid. No Neurovascular intervention at this time. Admitted to MICU for post tPA monitoring. CT-A also noted with right ICA dissection. While admitted, patient was treated with cardene drip for HTN which was stopped on 2/7 and was then transferred to medical services. Patient was seen by vascular surgery in ICU for Left Carotid occlusion and recommended for patient to be started on full anticoagulation in at least 6 weeks. Neuro was consulted, repeat CT showing petechial hemorrhages.    On 2/12 Was planned for dc to acute rehab but admission was held due to 3-3.5 second pauses on monitor. Cardiology was consulted and stated that it is associated with AR prolongation consistent with increased vagal tone possibly due to his cerebral disease. Pacemaker would not be beneficial. Patient also noted to be asymptomatic during the pauses. Metoprolol and AVN blockers held as per cardio. Patient planned for dc to [facility] today.       MEDICATIONS  (STANDING):  amLODIPine   Tablet 10 milliGRAM(s) Oral every 24 hours  aspirin enteric coated 81 milliGRAM(s) Oral daily  atorvastatin 80 milliGRAM(s) Oral at bedtime  FLUoxetine 10 milliGRAM(s) Oral daily  losartan 25 milliGRAM(s) Oral daily    MEDICATIONS  (PRN):  acetaminophen   Tablet .. 650 milliGRAM(s) Oral every 6 hours PRN Temp greater or equal to 38C (100.4F), Mild Pain (1 - 3)      REVIEW OF SYSTEMS:  CONSTITUTIONAL: No fever, weight loss, or fatigue  EYES: No eye pain, visual disturbances, or discharge  ENMT:  No difficulty hearing, tinnitus, vertigo; No sinus or throat pain  NECK: No pain or stiffness  RESPIRATORY: No cough, wheezing, chills or hemoptysis; No shortness of breath  CARDIOVASCULAR: No chest pain, palpitations, dizziness, or leg swelling  GASTROINTESTINAL: No abdominal or epigastric pain. No nausea, vomiting, or hematemesis; No diarrhea or constipation. No melena or hematochezia.  GENITOURINARY: No dysuria, frequency, hematuria, or incontinence  NEUROLOGICAL: No headaches, memory loss, loss of strength, numbness, or tremors  SKIN: No itching, burning, rashes, or lesions   ENDOCRINE: No heat or cold intolerance; No hair loss  MUSCULOSKELETAL: No joint pain or swelling; No muscle, back, or extremity pain  PSYCHIATRIC: No depression, anxiety, mood swings, or difficulty sleeping  HEME/LYMPH: No easy bruising, or bleeding gums  ALLERY AND IMMUNOLOGIC: No hives or eczema    PHYSICAL EXAM:    T(C): 36.8 (02-13-20 @ 21:26), Max: 36.8 (02-13-20 @ 07:39)  HR: 85 (02-14-20 @ 05:48) (75 - 85)  BP: 143/93 (02-14-20 @ 05:48) (108/71 - 143/93)  RR: 16 (02-13-20 @ 21:26) (14 - 16)  SpO2: 95% (02-13-20 @ 21:26) (93% - 96%)        GENERAL: NAD, well-groomed, well-developed  HEAD:  Atraumatic, Normocephalic  EYES: EOMI, PERRL, conjunctiva and sclera clear  ENMT: No tonsillar erythema, exudates, or enlargement; Moist mucous membranes, Good dentition, No lesions  NECK: Supple, No JVD, Normal thyroid  NERVOUS SYSTEM:  Alert & Oriented X3, Good concentration; Motor Strength 5/5 B/L upper and lower extremities; DTRs 2+ intact and symmetric  CHEST/LUNG: Clear to ascultation bilaterally; No rales, rhonchi, wheezing, or rubs  HEART: Regular rate and rhythm; No murmurs, rubs, or gallops  ABDOMEN: Soft, Nontender, Nondistended; Bowel sounds present  EXTREMITIES:  2+ Peripheral Pulses, No clubbing, cyanosis, or edema  SKIN: No rash    LABS:                        14.5   13.35 )-----------( 329      ( 13 Feb 2020 07:48 )             42.6     02-13    135  |  98  |  27.0<H>  ----------------------------<  122<H>  4.0   |  25.0  |  0.64    Ca    8.9      13 Feb 2020 07:48  Phos  3.7     02-13  Mg     2.1     02-13      RADIOLOGY & ADDITIONAL TESTS:    Imaging Reviewed:  [x] YES  [ ] NO    Consultant(s) Notes Reviewed:  [x] YES  [ ] NO    Care Discussed with Consultants/Other Providers [x] YES  [ ] NO Patient is a 64 year old male who presents with a chief complaint of Left MCA and JESSE CVA (13 Feb 2020 11:29)    HPI: 64 year old male w/ no significant PMHx who was transferred from [facility] ED on 2/5/2020 after presenting with aphasia, dysarthria, and right hemiplegia, s/p tPA administration at 22:48 2/5/2020. CTA head performed at [facility] revealing of total left ICA occlusion. LNK 6:30PM 2/5/2020. S/p tPA administration, pt w/o improvement in right-sided weakness. Pt was transferred to [facility] for further neurologic evaluation. Upon arrival to ED, /100 so cardene gtt was re-started. CT head perfusion revealing of small to moderate sized left frontal infarction w/ moderate to large region of low flow and/or ischemic state in left frontoparietal region. CTA neck significant for occlusion of left internal carotid. No Neurovascular intervention at this time. Admitted to MICU for post tPA monitoring. CT-A also noted with right ICA dissection. While admitted, patient was treated with cardene drip for HTN which was stopped on 2/7/2020 and was then transferred to medical services. Patient was seen by vascular surgery in ICU for Left Carotid occlusion and recommended for patient to be started on full anticoagulation in at least 6 weeks. Neuro was consulted, repeat CT showing petechial hemorrhages.  On 2/12/2020 was planned for discharge to acute rehab but admission was held due to 3-3.5 second pauses on monitor. Cardiology was consulted and stated that it is associated with [place] prolongation consistent with increased vagal tone possibly due to his cerebral disease. Pacemaker would not be beneficial. Patient also noted to be asymptomatic during the pauses. Metoprolol and AVN blockers held as per cardio. Patient admitted to acute rehab at [facility] on 2/13/2020. History obtained from chart review.       MEDICATIONS  (STANDING):  amLODIPine   Tablet 10 milliGRAM(s) Oral every 24 hours  aspirin enteric coated 81 milliGRAM(s) Oral daily  atorvastatin 80 milliGRAM(s) Oral at bedtime  FLUoxetine 10 milliGRAM(s) Oral daily  losartan 25 milliGRAM(s) Oral daily    MEDICATIONS  (PRN):  acetaminophen   Tablet .. 650 milliGRAM(s) Oral every 6 hours PRN Temp greater or equal to 38C (100.4F), Mild Pain (1 - 3)      REVIEW OF SYSTEMS:  Patient unable to provide     PHYSICAL EXAM:    T(C): 36.8 (02-13-20 @ 21:26), Max: 36.8 (02-13-20 @ 07:39)  HR: 85 (02-14-20 @ 05:48) (75 - 85)  BP: 143/93 (02-14-20 @ 05:48) (108/71 - 143/93)  RR: 16 (02-13-20 @ 21:26) (14 - 16)  SpO2: 95% (02-13-20 @ 21:26) (93% - 96%)        GENERAL: NAD, well-groomed, well-developed  HEAD:  Atraumatic, Normocephalic  EYES: EOMI, PERRL, conjunctiva and sclera clear  ENMT: No tonsillar erythema, exudates, or enlargement; Moist mucous membranes, Good dentition, No lesions  NECK: Supple, No JVD, Normal thyroid  NERVOUS SYSTEM:  Alert & Oriented X3, Good concentration; Motor Strength 5/5 B/L upper and lower extremities; DTRs 2+ intact and symmetric  CHEST/LUNG: Clear to ascultation bilaterally; No rales, rhonchi, wheezing, or rubs  HEART: Regular rate and rhythm; No murmurs, rubs, or gallops  ABDOMEN: Soft, Nontender, Nondistended; Bowel sounds present  EXTREMITIES:  2+ Peripheral Pulses, No clubbing, cyanosis, or edema  SKIN: No rash    LABS:                        14.5   13.35 )-----------( 329      ( 13 Feb 2020 07:48 )             42.6     02-13    135  |  98  |  27.0<H>  ----------------------------<  122<H>  4.0   |  25.0  |  0.64    Ca    8.9      13 Feb 2020 07:48  Phos  3.7     02-13  Mg     2.1     02-13      RADIOLOGY & ADDITIONAL TESTS:    Imaging Reviewed:  [x] YES  [ ] NO    Consultant(s) Notes Reviewed:  [x] YES  [ ] NO    Care Discussed with Consultants/Other Providers [x] YES  [ ] NO Patient is a 64 year old male who presents with a chief complaint of Left MCA and JESSE CVA (13 Feb 2020 11:29)    HPI: 64 year old male w/ no significant PMHx who was transferred from [facility] ED on 2/5/2020 after presenting with aphasia, dysarthria, and right hemiplegia, s/p tPA administration at 22:48 2/5/2020. CTA head performed at [facility] revealing of total left ICA occlusion. LNK 6:30PM 2/5/2020. S/p tPA administration, pt w/o improvement in right-sided weakness. Pt was transferred to [facility] for further neurologic evaluation. Upon arrival to ED, /100 so cardene gtt was re-started. CT head perfusion revealing of small to moderate sized left frontal infarction w/ moderate to large region of low flow and/or ischemic state in left frontoparietal region. CTA neck significant for occlusion of left internal carotid. No Neurovascular intervention at this time. Admitted to MICU for post tPA monitoring. CT-A also noted with right ICA dissection. While admitted, patient was treated with cardene drip for HTN which was stopped on 2/7/2020 and was then transferred to medical services. Patient was seen by vascular surgery in ICU for Left Carotid occlusion and recommended for patient to be started on full anticoagulation in at least 6 weeks. Neuro was consulted, repeat CT showing petechial hemorrhages.  On 2/12/2020 was planned for discharge to acute rehab but admission was held due to 3-3.5 second pauses on monitor. Cardiology was consulted and stated that it is associated with [place] prolongation consistent with increased vagal tone possibly due to his cerebral disease. Pacemaker would not be beneficial. Patient also noted to be asymptomatic during the pauses. Metoprolol and AVN blockers held as per cardio. Patient admitted to acute rehab at [facility] on 2/13/2020. History obtained from chart review. Patient unable to provide history due to asphasia.       PAST MEDICAL HISTORY:  No pertinent past medical history reported.     PAST SURGICAL HISTORY:  No significant past surgical history reported.     FAMILY HISTORY:  No pertinent family history in first degree relatives reported.     SOCIAL HISTORY:  	CIGARETTES: smoked for 2-3 years prior to vaping;  vaping for past 2 years  	ALCOHOL: Denies  	DRUGS: History of oxycodone abuse, on suboxone taper (Per wife)    	  ALLERGIES: No Known Allergies reported    MEDICATIONS  (STANDING):  amLODIPine   Tablet 10 milliGRAM(s) Oral every 24 hours  aspirin enteric coated 81 milliGRAM(s) Oral daily  atorvastatin 80 milliGRAM(s) Oral at bedtime  FLUoxetine 10 milliGRAM(s) Oral daily  losartan 25 milliGRAM(s) Oral daily    MEDICATIONS  (PRN):  acetaminophen   Tablet .. 650 milliGRAM(s) Oral every 6 hours PRN Temp greater or equal to 38C (100.4F), Mild Pain (1 - 3)        REVIEW OF SYSTEMS:  Patient unable to provide due to aphasia    PHYSICAL EXAM:    T(C): 36.8 (02-13-20 @ 21:26), Max: 36.8 (02-13-20 @ 07:39)  HR: 85 (02-14-20 @ 05:48) (75 - 85)  BP: 143/93 (02-14-20 @ 05:48) (108/71 - 143/93)  RR: 16 (02-13-20 @ 21:26) (14 - 16)  SpO2: 95% (02-13-20 @ 21:26) (93% - 96%)        GENERAL: NAD, well-developed  HEAD:  Atraumatic, Normocephalic  EYES: EOMI, PERRL, conjunctiva and sclera clear  ENMT: No tonsillar erythema, exudates, or enlargement; Moist mucous membranes, Good dentition, No lesions  NECK: Supple, No JVD, Normal thyroid  NERVOUS SYSTEM:  Alert, awake, has aphasia, has right side neglect, right sided facial droop, right sided weakness, limited exam as intermittently follows commands   CHEST/LUNG: Clear to ascultation bilaterally; No rales, rhonchi, wheezing, or rubs  HEART: Regular rate and rhythm; No murmurs, rubs, or gallops  ABDOMEN: Soft, Nontender, Nondistended; Bowel sounds present  EXTREMITIES:  2+ Peripheral Pulses, No clubbing, cyanosis, or edema  SKIN: No rash        LABS:                        14.5   13.35 )-----------( 329      ( 13 Feb 2020 07:48 )             42.6     02-13    135  |  98  |  27.0<H>  ----------------------------<  122<H>  4.0   |  25.0  |  0.64    Ca    8.9      13 Feb 2020 07:48  Phos  3.7     02-13  Mg     2.1     02-13      RADIOLOGY & ADDITIONAL TESTS:    Imaging Reviewed:  [x] YES  [ ] NO    Consultant(s) Notes Reviewed:  [x] YES  [ ] NO    Care Discussed with Consultants/Other Providers [x] YES  [ ] NO

## 2020-02-14 NOTE — PROGRESS NOTE ADULT - SUBJECTIVE AND OBJECTIVE BOX
Patient is a 64y old  Male who presents with a chief complaint of Left MCA and JESSE CVA (14 Feb 2020 07:12)      HPI:  63 y/o M w/ no significant PMHx who was transferred from [facility] ED on 2/5/20 after presenting with aphasia, dysarthria and right hemiplegia, s/p tPA administration at 22:48 2/5/20. CTA head performed at [facility] revealing of total left ICA occlusion. LNK 6:30PM 2/5/20. S/p tPA administration, pt w/o improvement in right-sided weakness. Pt was transferred to [facility] for further neurologic evaluation. Upon arrival to ED, /100 so cardene gtt was re-started. CT head perfusion revealing of small to moderate sized left frontal infarction w/ moderate to large region of low flow and/or ischemic state in left frontoparietal region. CTA neck significant for occlusion of left internal carotid. No Neurovascular intervention at this time. Admitted to MICU for post tPA monitoring. CT-A also noted with right ICA dissection. While admitted, patient was treated with cardene drip for HTN which was stopped on 2/7 and was then transferred to medical services. Patient was seen by vascular surgery in ICU for Left Carotid occlusion and recommended for patient to be started on full anticoagulation in at least 6 weeks. Neuro was consulted, repeat CT showing petechial hemorrhages.    On 2/12 Was planned for dc to acute rehab but admission was held due to 3-3.5 second pauses on monitor. Cardiology was consulted and stated that it is associated with [place] prolongation consistent with increased vagal tone possibly due to his cerebral disease. Pacemaker would not be beneficial. Patient also noted to be asymptomatic during the pauses. Metoprolol and AVN blockers held as per cardio. Patient planned for dc to [facility] today.     Wife: Ca Bey (H) [phone], (C) [phone] (13 Feb 2020 11:29)        SUBJECTIVE: Patient seen and examined. Nods for yes and shrugs for no. Is unable to verbalized. No acute overnight events, slept well. Nods yes to right shoulder pain this morning. No other complaints.       REVIEW OF SYSTEMS - Nods for yes and shrugs for no.  Constitutional: No fever  HEENT: No visual disturbances  Pulm: No cough,  No shortness of breath  Cardio: No chest pain, No palpitations  GI:  No abdominal pain, No constipation  Neuro: No headaches, +loss of strength  MSK: No Neck or back pain  Psych:  No depression, No anxiety      MEDICATIONS  (STANDING):  amLODIPine   Tablet 10 milliGRAM(s) Oral every 24 hours  aspirin enteric coated 81 milliGRAM(s) Oral daily  atorvastatin 80 milliGRAM(s) Oral at bedtime  FLUoxetine 10 milliGRAM(s) Oral daily  losartan 25 milliGRAM(s) Oral daily    MEDICATIONS  (PRN):  acetaminophen   Tablet .. 650 milliGRAM(s) Oral every 6 hours PRN Temp greater or equal to 38C (100.4F), Mild Pain (1 - 3)          VITALS  64y  Vital Signs Last 24 Hrs  T(C): 37.1 (14 Feb 2020 08:27), Max: 37.1 (14 Feb 2020 08:27)  T(F): 98.8 (14 Feb 2020 08:27), Max: 98.8 (14 Feb 2020 08:27)  HR: 96 (14 Feb 2020 08:27) (75 - 96)  BP: 146/90 (14 Feb 2020 08:27) (125/83 - 146/90)  BP(mean): --  RR: 16 (14 Feb 2020 08:27) (15 - 16)  SpO2: 97% (14 Feb 2020 08:27) (95% - 97%)  Daily Height in cm: 172.72 (13 Feb 2020 14:53)    Daily       RECENT LABS:                          14.5   13.35 )-----------( 329      ( 13 Feb 2020 07:48 )             42.6     02-13    135  |  98  |  27.0<H>  ----------------------------<  122<H>  4.0   |  25.0  |  0.64    Ca    8.9      13 Feb 2020 07:48  Phos  3.7     02-13  Mg     2.1     02-13                CAPILLARY BLOOD GLUCOSE            PHYSICAL EXAM:   Gen - NAD, Comfortable  HEENT - NCAT, EOMI, MMM  Pulm - CTAB, No wheeze  Cardiovascular - RRR, S1S2  Abdomen - Soft, NT/ND, +BS  Extremities - No C/C/E, No calf tenderness  Neuro-     Cognitive - AAO to person, nods when name called. Unable to verbalize responses. Intermittently follows command.      Communication - nonverbal, will nod and shrug to answer questions. Can be cued to form word shapes with his mouth. Expression > Receptive aphasia.      Attention: Intact      Cranial Nerves - +right facial droop, +Right sided neglect     Motor - No focal deficits                    LEFT    UE - ShAB 5/5, EF 5/5, EE 5/5, WE 5/5,  5/5                    RIGHT UE - ShAB 0/5, EF 0/5, EE 0/5, WE 0/5,  0/5                    LEFT    LE - HF 5/5, KE 5/5, DF 5/5, PF 5/5                    RIGHT LE - HF 0/5, KE 0/5, DF 0/5, PF 0/5        Sensory - Intact to LT     Reflexes - 1+ reflexes throughout. +R ankle clonus 3-4 beats     Tone - RUE and RLE flaccid  Psychiatric - Seems frustrated      FUNCTIONAL STATUS:  transfers: maxA x2

## 2020-02-15 LAB
ALBUMIN SERPL ELPH-MCNC: 2.5 G/DL — LOW (ref 3.3–5)
ALP SERPL-CCNC: 80 U/L — SIGNIFICANT CHANGE UP (ref 40–120)
ALT FLD-CCNC: 29 U/L — SIGNIFICANT CHANGE UP (ref 10–45)
ANION GAP SERPL CALC-SCNC: 7 MMOL/L — SIGNIFICANT CHANGE UP (ref 5–17)
APPEARANCE UR: CLEAR — SIGNIFICANT CHANGE UP
AST SERPL-CCNC: 52 U/L — HIGH (ref 10–40)
BASOPHILS # BLD AUTO: 0.04 K/UL — SIGNIFICANT CHANGE UP (ref 0–0.2)
BASOPHILS NFR BLD AUTO: 0.2 % — SIGNIFICANT CHANGE UP (ref 0–2)
BILIRUB SERPL-MCNC: 0.4 MG/DL — SIGNIFICANT CHANGE UP (ref 0.2–1.2)
BILIRUB UR-MCNC: NEGATIVE — SIGNIFICANT CHANGE UP
BUN SERPL-MCNC: 23 MG/DL — SIGNIFICANT CHANGE UP (ref 7–23)
CALCIUM SERPL-MCNC: 8.5 MG/DL — SIGNIFICANT CHANGE UP (ref 8.4–10.5)
CHLORIDE SERPL-SCNC: 100 MMOL/L — SIGNIFICANT CHANGE UP (ref 96–108)
CO2 SERPL-SCNC: 29 MMOL/L — SIGNIFICANT CHANGE UP (ref 22–31)
COLOR SPEC: YELLOW — SIGNIFICANT CHANGE UP
CREAT SERPL-MCNC: 0.71 MG/DL — SIGNIFICANT CHANGE UP (ref 0.5–1.3)
DIFF PNL FLD: NEGATIVE — SIGNIFICANT CHANGE UP
EOSINOPHIL # BLD AUTO: 0.03 K/UL — SIGNIFICANT CHANGE UP (ref 0–0.5)
EOSINOPHIL NFR BLD AUTO: 0.2 % — SIGNIFICANT CHANGE UP (ref 0–6)
GLUCOSE SERPL-MCNC: 128 MG/DL — HIGH (ref 70–99)
GLUCOSE UR QL: NEGATIVE — SIGNIFICANT CHANGE UP
HCT VFR BLD CALC: 38.9 % — LOW (ref 39–50)
HGB BLD-MCNC: 12.9 G/DL — LOW (ref 13–17)
IMM GRANULOCYTES NFR BLD AUTO: 0.4 % — SIGNIFICANT CHANGE UP (ref 0–1.5)
KETONES UR-MCNC: NEGATIVE — SIGNIFICANT CHANGE UP
LEUKOCYTE ESTERASE UR-ACNC: NEGATIVE — SIGNIFICANT CHANGE UP
LYMPHOCYTES # BLD AUTO: 0.92 K/UL — LOW (ref 1–3.3)
LYMPHOCYTES # BLD AUTO: 5.6 % — LOW (ref 13–44)
MCHC RBC-ENTMCNC: 29.3 PG — SIGNIFICANT CHANGE UP (ref 27–34)
MCHC RBC-ENTMCNC: 33.2 GM/DL — SIGNIFICANT CHANGE UP (ref 32–36)
MCV RBC AUTO: 88.4 FL — SIGNIFICANT CHANGE UP (ref 80–100)
MONOCYTES # BLD AUTO: 0.97 K/UL — HIGH (ref 0–0.9)
MONOCYTES NFR BLD AUTO: 5.9 % — SIGNIFICANT CHANGE UP (ref 2–14)
NEUTROPHILS # BLD AUTO: 14.43 K/UL — HIGH (ref 1.8–7.4)
NEUTROPHILS NFR BLD AUTO: 87.7 % — HIGH (ref 43–77)
NITRITE UR-MCNC: NEGATIVE — SIGNIFICANT CHANGE UP
NRBC # BLD: 0 /100 WBCS — SIGNIFICANT CHANGE UP (ref 0–0)
PH UR: 6 — SIGNIFICANT CHANGE UP (ref 5–8)
PLATELET # BLD AUTO: 296 K/UL — SIGNIFICANT CHANGE UP (ref 150–400)
POTASSIUM SERPL-MCNC: 4.5 MMOL/L — SIGNIFICANT CHANGE UP (ref 3.5–5.3)
POTASSIUM SERPL-SCNC: 4.5 MMOL/L — SIGNIFICANT CHANGE UP (ref 3.5–5.3)
PROT SERPL-MCNC: 6.5 G/DL — SIGNIFICANT CHANGE UP (ref 6–8.3)
PROT UR-MCNC: 15
RBC # BLD: 4.4 M/UL — SIGNIFICANT CHANGE UP (ref 4.2–5.8)
RBC # FLD: 12.2 % — SIGNIFICANT CHANGE UP (ref 10.3–14.5)
SODIUM SERPL-SCNC: 136 MMOL/L — SIGNIFICANT CHANGE UP (ref 135–145)
SP GR SPEC: 1.02 — SIGNIFICANT CHANGE UP (ref 1.01–1.02)
UROBILINOGEN FLD QL: 1
WBC # BLD: 16.46 K/UL — HIGH (ref 3.8–10.5)
WBC # FLD AUTO: 16.46 K/UL — HIGH (ref 3.8–10.5)

## 2020-02-15 PROCEDURE — 99232 SBSQ HOSP IP/OBS MODERATE 35: CPT

## 2020-02-15 PROCEDURE — 71046 X-RAY EXAM CHEST 2 VIEWS: CPT | Mod: 26

## 2020-02-15 RX ORDER — FLUOXETINE HCL 10 MG
10 CAPSULE ORAL
Refills: 0 | Status: DISCONTINUED | OUTPATIENT
Start: 2020-02-15 | End: 2020-02-20

## 2020-02-15 RX ORDER — ASPIRIN/CALCIUM CARB/MAGNESIUM 324 MG
81 TABLET ORAL
Refills: 0 | Status: DISCONTINUED | OUTPATIENT
Start: 2020-02-16 | End: 2020-02-16

## 2020-02-15 RX ADMIN — LOSARTAN POTASSIUM 25 MILLIGRAM(S): 100 TABLET, FILM COATED ORAL at 06:05

## 2020-02-15 RX ADMIN — ATORVASTATIN CALCIUM 80 MILLIGRAM(S): 80 TABLET, FILM COATED ORAL at 22:06

## 2020-02-15 RX ADMIN — Medication 81 MILLIGRAM(S): at 07:04

## 2020-02-15 RX ADMIN — AMLODIPINE BESYLATE 10 MILLIGRAM(S): 2.5 TABLET ORAL at 06:04

## 2020-02-15 NOTE — PROVIDER CONTACT NOTE (OTHER) - SITUATION
Bladder scan 407. Order to straight cath for result greater than 400. Pt refusing to be straight cath. pushing RN hand away.

## 2020-02-15 NOTE — PROGRESS NOTE ADULT - ASSESSMENT
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    Rehab: Gait Instability, ADL impairments and Functional impairments: Continue Comprehensive Rehab Program of PT/OT/ST    #Left JESSE/MCA CVA with petechial hemorrhage  - Continue ASA 81mg daily  - Continue Lipitor 80mg QHS  - Continue Prozac 10mg daily for mood motor recovery     #Dissection in Right ICA  - Per neuro at [facility] given size of stroke with petechial hemorrhage avoid anticoagulation for 6-8 weeks, continue ASA but at 81 mg daily.  "This should provide protection for stroke from Right ICA dissection while minimizing chance for worsening petechial ICH."    #Leukocytosis WBC=16.46, asymptomatic  - 2/15 WBC 16.46, patient afebrile, vital signs stable  - 2/16 CBC with diff  -urine analysis  -CXR  -Hospitalist f/u    #Pain: Tylenol PRN  - Prior opioid use: well managed without meds at the moment, patient has h/o opioid dependence, was on Suboxone treatment as outpatient.      #Hypertension  - Continue Losartan 25mg daily  - Continue amlodipine 10mg daily     #VA prolongation  - Per cardio at [facility] "This is associated with VA prolongation consistent with elevated vagal tone. Patient was reportedly awake during these times. It is possible elevated vagal tone from cerebral disease is causing these pauses. Pacemaker implantation would not help this and fortunately he is asymptomatic. He should be evaluated for sleep apnea at some point."  - Avoid metoprolol and/or other AVN blockers  - Outpatient cardiology followup     #Resp  - desat during last night at [facility] hospital, placed on O2 PRN and nocturnal. No issues overnight.   - may need r/o sleep apnea with overnight pulse ox.     #GI/Bowel: Colace, Senna, Miralax PRN    #Diet: Mechanical soft with thin liquids    #Renal/Bladder: Bladder scans q8    - DVT: Lovenox, SCDs, TEDs

## 2020-02-15 NOTE — PROGRESS NOTE ADULT - SUBJECTIVE AND OBJECTIVE BOX
Patient is a 64y old  Male who presents with a chief complaint of Left MCA and JESSE CVA (14 Feb 2020 07:12)      HPI:  65 y/o M w/ no significant PMHx who was transferred from [facility] ED on 2/5/20 after presenting with aphasia, dysarthria and right hemiplegia, s/p tPA administration at 22:48 2/5/20. CTA head performed at [facility] revealing of total left ICA occlusion. LNK 6:30PM 2/5/20. S/p tPA administration, pt w/o improvement in right-sided weakness. Pt was transferred to [facility] for further neurologic evaluation. Upon arrival to ED, /100 so cardene gtt was re-started. CT head perfusion revealing of small to moderate sized left frontal infarction w/ moderate to large region of low flow and/or ischemic state in left frontoparietal region. CTA neck significant for occlusion of left internal carotid. No Neurovascular intervention at this time. Admitted to MICU for post tPA monitoring. CT-A also noted with right ICA dissection. While admitted, patient was treated with cardene drip for HTN which was stopped on 2/7 and was then transferred to medical services. Patient was seen by vascular surgery in ICU for Left Carotid occlusion and recommended for patient to be started on full anticoagulation in at least 6 weeks. Neuro was consulted, repeat CT showing petechial hemorrhages.    On 2/12 Was planned for dc to acute rehab but admission was held due to 3-3.5 second pauses on monitor. Cardiology was consulted and stated that it is associated with RI prolongation consistent with increased vagal tone possibly due to his cerebral disease. Pacemaker would not be beneficial. Patient also noted to be asymptomatic during the pauses. Metoprolol and AVN blockers held as per cardio. Patient planned for dc to [facility] today.     Wife: Ca Bey (H) [phone], (C) [phone] (13 Feb 2020 11:29)        SUBJECTIVE: Patient seen and examined. Nods for yes and shrugs for no. Is unable to verbalized. No acute overnight events, slept well. .       REVIEW OF SYSTEMS -   UTO      MEDICATIONS  (STANDING):  amLODIPine   Tablet 10 milliGRAM(s) Oral every 24 hours  aspirin enteric coated 81 milliGRAM(s) Oral daily  atorvastatin 80 milliGRAM(s) Oral at bedtime  FLUoxetine 10 milliGRAM(s) Oral daily  losartan 25 milliGRAM(s) Oral daily    MEDICATIONS  (PRN):  acetaminophen   Tablet .. 650 milliGRAM(s) Oral every 6 hours PRN Temp greater or equal to 38C (100.4F), Mild Pain (1 - 3)          VITALS  Vital Signs Last 24 Hrs  T(C): 36.3 (15 Feb 2020 08:16), Max: 36.9 (14 Feb 2020 21:50)  T(F): 97.3 (15 Feb 2020 08:16), Max: 98.5 (14 Feb 2020 21:50)  HR: 69 (15 Feb 2020 08:16) (69 - 93)  BP: 120/78 (15 Feb 2020 08:16) (120/78 - 153/94)  BP(mean): --  RR: 15 (15 Feb 2020 08:16) (14 - 15)  SpO2: 96% (15 Feb 2020 08:16) (95% - 96%)        PHYSICAL EXAM:   Gen - NAD, Comfortable  HEENT - NCAT, MMM  Pulm - CTAB, No wheeze  Cardiovascular - RRR, S1S2  Abdomen - Soft, NT/ND, +BS  Extremities - No C/C/E, No calf tenderness  Neuro-          Motor - No focal deficits                    LEFT    UE - ShAB 5/5, EF 5/5, EE 5/5, WE 5/5,  5/5                    RIGHT UE - ShAB 0/5, EF 0/5, EE 0/5, WE 0/5,  0/5                    LEFT    LE - HF 5/5, KE 5/5, DF 5/5, PF 5/5                    RIGHT LE - HF 0/5, KE 0/5, DF 0/5, PF 0/5        Sensory - Intact to LT     Tone - RUE and RLE flaccid      RECENT LABS:   LABS:                        12.9   16.46 )-----------( 296      ( 15 Feb 2020 05:45 )             38.9     15 Feb 2020 05:45    136    |  100    |  23     ----------------------------<  128    4.5     |  29     |  0.71     Ca    8.5        15 Feb 2020 05:45    TPro  6.5    /  Alb  2.5    /  TBili  0.4    /  DBili  x      /  AST  52     /  ALT  29     /  AlkPhos  80     15 Feb 2020 05:45                            14.5   13.35 )-----------( 329      ( 13 Feb 2020 07:48 )             42.6     02-13    135  |  98  |  27.0<H>  ----------------------------<  122<H>  4.0   |  25.0  |  0.64    Ca    8.9      13 Feb 2020 07:48  Phos  3.7     02-13  Mg     2.1     02-13

## 2020-02-16 LAB
BASOPHILS # BLD AUTO: 0.05 K/UL — SIGNIFICANT CHANGE UP (ref 0–0.2)
BASOPHILS NFR BLD AUTO: 0.5 % — SIGNIFICANT CHANGE UP (ref 0–2)
EOSINOPHIL # BLD AUTO: 0.35 K/UL — SIGNIFICANT CHANGE UP (ref 0–0.5)
EOSINOPHIL NFR BLD AUTO: 3.2 % — SIGNIFICANT CHANGE UP (ref 0–6)
HCT VFR BLD CALC: 37.3 % — LOW (ref 39–50)
HGB BLD-MCNC: 12.5 G/DL — LOW (ref 13–17)
IMM GRANULOCYTES NFR BLD AUTO: 0.7 % — SIGNIFICANT CHANGE UP (ref 0–1.5)
LYMPHOCYTES # BLD AUTO: 1.43 K/UL — SIGNIFICANT CHANGE UP (ref 1–3.3)
LYMPHOCYTES # BLD AUTO: 13.1 % — SIGNIFICANT CHANGE UP (ref 13–44)
MCHC RBC-ENTMCNC: 29.8 PG — SIGNIFICANT CHANGE UP (ref 27–34)
MCHC RBC-ENTMCNC: 33.5 GM/DL — SIGNIFICANT CHANGE UP (ref 32–36)
MCV RBC AUTO: 88.8 FL — SIGNIFICANT CHANGE UP (ref 80–100)
MONOCYTES # BLD AUTO: 0.98 K/UL — HIGH (ref 0–0.9)
MONOCYTES NFR BLD AUTO: 8.9 % — SIGNIFICANT CHANGE UP (ref 2–14)
NEUTROPHILS # BLD AUTO: 8.06 K/UL — HIGH (ref 1.8–7.4)
NEUTROPHILS NFR BLD AUTO: 73.6 % — SIGNIFICANT CHANGE UP (ref 43–77)
NRBC # BLD: 0 /100 WBCS — SIGNIFICANT CHANGE UP (ref 0–0)
PLATELET # BLD AUTO: 323 K/UL — SIGNIFICANT CHANGE UP (ref 150–400)
RBC # BLD: 4.2 M/UL — SIGNIFICANT CHANGE UP (ref 4.2–5.8)
RBC # FLD: 12.4 % — SIGNIFICANT CHANGE UP (ref 10.3–14.5)
WBC # BLD: 10.95 K/UL — HIGH (ref 3.8–10.5)
WBC # FLD AUTO: 10.95 K/UL — HIGH (ref 3.8–10.5)

## 2020-02-16 PROCEDURE — 99232 SBSQ HOSP IP/OBS MODERATE 35: CPT

## 2020-02-16 RX ORDER — ASPIRIN/CALCIUM CARB/MAGNESIUM 324 MG
81 TABLET ORAL
Refills: 0 | Status: DISCONTINUED | OUTPATIENT
Start: 2020-02-17 | End: 2020-03-06

## 2020-02-16 RX ADMIN — Medication 81 MILLIGRAM(S): at 05:23

## 2020-02-16 RX ADMIN — Medication 10 MILLIGRAM(S): at 05:23

## 2020-02-16 RX ADMIN — ATORVASTATIN CALCIUM 80 MILLIGRAM(S): 80 TABLET, FILM COATED ORAL at 21:20

## 2020-02-16 RX ADMIN — AMLODIPINE BESYLATE 10 MILLIGRAM(S): 2.5 TABLET ORAL at 05:23

## 2020-02-16 RX ADMIN — LOSARTAN POTASSIUM 25 MILLIGRAM(S): 100 TABLET, FILM COATED ORAL at 05:23

## 2020-02-16 NOTE — PROGRESS NOTE ADULT - SUBJECTIVE AND OBJECTIVE BOX
Patient is a 64y old  Male who presents with a chief complaint of Left MCA and JESSE CVA (14 Feb 2020 07:12)      HPI:  63 y/o M w/ no significant PMHx who was transferred from [facility] ED on 2/5/20 after presenting with aphasia, dysarthria and right hemiplegia, s/p tPA administration at 22:48 2/5/20. CTA head performed at [facility] revealing of total left ICA occlusion. LNK 6:30PM 2/5/20. S/p tPA administration, pt w/o improvement in right-sided weakness. Pt was transferred to [facility] for further neurologic evaluation. Upon arrival to ED, /100 so cardene gtt was re-started. CT head perfusion revealing of small to moderate sized left frontal infarction w/ moderate to large region of low flow and/or ischemic state in left frontoparietal region. CTA neck significant for occlusion of left internal carotid. No Neurovascular intervention at this time. Admitted to MICU for post tPA monitoring. CT-A also noted with right ICA dissection. While admitted, patient was treated with cardene drip for HTN which was stopped on 2/7 and was then transferred to medical services. Patient was seen by vascular surgery in ICU for Left Carotid occlusion and recommended for patient to be started on full anticoagulation in at least 6 weeks. Neuro was consulted, repeat CT showing petechial hemorrhages.    On 2/12 Was planned for dc to acute rehab but admission was held due to 3-3.5 second pauses on monitor. Cardiology was consulted and stated that it is associated with [place] prolongation consistent with increased vagal tone possibly due to his cerebral disease. Pacemaker would not be beneficial. Patient also noted to be asymptomatic during the pauses. Metoprolol and AVN blockers held as per cardio. Patient planned for dc to [facility] today.     Wife: Ca Bey (H) [phone], (C) [phone] (13 Feb 2020 11:29)        SUBJECTIVE: Patient seen and examined. Nods for yes and shrugs for no. Is unable to verbalized. No acute overnight events, slept well. .       REVIEW OF SYSTEMS -   UTO      MEDICATIONS  (STANDING):  amLODIPine   Tablet 10 milliGRAM(s) Oral every 24 hours  aspirin enteric coated 81 milliGRAM(s) Oral daily  atorvastatin 80 milliGRAM(s) Oral at bedtime  FLUoxetine 10 milliGRAM(s) Oral daily  losartan 25 milliGRAM(s) Oral daily    MEDICATIONS  (PRN):  acetaminophen   Tablet .. 650 milliGRAM(s) Oral every 6 hours PRN Temp greater or equal to 38C (100.4F), Mild Pain (1 - 3)          VITALS  Vital Signs Last 24 Hrs  T(C): 36.8 (15 Feb 2020 22:01), Max: 36.8 (15 Feb 2020 22:01)  T(F): 98.3 (15 Feb 2020 22:01), Max: 98.3 (15 Feb 2020 22:01)  HR: 66 (16 Feb 2020 05:18) (66 - 66)  BP: 131/83 (16 Feb 2020 05:18) (130/73 - 131/83)  BP(mean): --  RR: 14 (15 Feb 2020 22:01) (14 - 14)  SpO2: 96% (15 Feb 2020 22:01) (96% - 96%)        PHYSICAL EXAM:   Gen - NAD, Comfortable  HEENT - NCAT, MMM  Pulm - CTAB, No wheeze  Cardiovascular - RRR, S1S2  Abdomen - Soft, NT/ND, +BS  Extremities - No C/C/E, No calf tenderness  Neuro-          Motor - right hemiparesis stable           RECENT LABS:   LABS:    LABS:                        12.5   10.95 )-----------( 323      ( 16 Feb 2020 05:59 )             37.3       Ca    8.5        15 Feb 2020 05:45                          12.9   16.46 )-----------( 296      ( 15 Feb 2020 05:45 )             38.9     15 Feb 2020 05:45    136    |  100    |  23     ----------------------------<  128    4.5     |  29     |  0.71     Ca    8.5        15 Feb 2020 05:45    TPro  6.5    /  Alb  2.5    /  TBili  0.4    /  DBili  x      /  AST  52     /  ALT  29     /  AlkPhos  80     15 Feb 2020 05:45                            14.5   13.35 )-----------( 329      ( 13 Feb 2020 07:48 )             42.6     02-13    135  |  98  |  27.0<H>  ----------------------------<  122<H>  4.0   |  25.0  |  0.64    Ca    8.9      13 Feb 2020 07:48  Phos  3.7     02-13  Mg     2.1     02-13

## 2020-02-16 NOTE — PROGRESS NOTE ADULT - ASSESSMENT
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    Rehab: Gait Instability, ADL impairments and Functional impairments: Continue Comprehensive Rehab Program of PT/OT/ST    #Left JESSE/MCA CVA with petechial hemorrhage  - Continue ASA 81mg daily  - Continue Lipitor 80mg QHS  - Continue Prozac 10mg daily for mood motor recovery     #Dissection in Right ICA  - Per neuro at [facility] given size of stroke with petechial hemorrhage avoid anticoagulation for 6-8 weeks, continue ASA but at 81 mg daily.  "This should provide protection for stroke from Right ICA dissection while minimizing chance for worsening petechial ICH."    #Leukocytosis WBC=10.95, asymptomatic  -urine analysis negative  -CXR negative  -monitor cbc    #Pain: Tylenol PRN  - Prior opioid use: well managed without meds at the moment, patient has h/o opioid dependence, was on Suboxone treatment as outpatient.      #Hypertension  - Continue Losartan 25mg daily  - Continue amlodipine 10mg daily     #ID prolongation  - Per cardio at [facility] "This is associated with ID prolongation consistent with elevated vagal tone. Patient was reportedly awake during these times. It is possible elevated vagal tone from cerebral disease is causing these pauses. Pacemaker implantation would not help this and fortunately he is asymptomatic. He should be evaluated for sleep apnea at some point."  - Avoid metoprolol and/or other AVN blockers  - Outpatient cardiology followup     #Resp  - desat during last night at [facility] hospital, placed on O2 PRN and nocturnal. No issues overnight.   - may need r/o sleep apnea with overnight pulse ox.     #GI/Bowel: Colace, Senna, Miralax PRN    #Diet: Mechanical soft with thin liquids    #Renal/Bladder: Bladder scans q8    - DVT: Lovenox, SCDs, TEDs

## 2020-02-17 LAB
ALBUMIN SERPL ELPH-MCNC: 2.4 G/DL — LOW (ref 3.3–5)
ALP SERPL-CCNC: 76 U/L — SIGNIFICANT CHANGE UP (ref 40–120)
ALT FLD-CCNC: 37 U/L — SIGNIFICANT CHANGE UP (ref 10–45)
ANION GAP SERPL CALC-SCNC: 7 MMOL/L — SIGNIFICANT CHANGE UP (ref 5–17)
AST SERPL-CCNC: 52 U/L — HIGH (ref 10–40)
BASOPHILS # BLD AUTO: 0.08 K/UL — SIGNIFICANT CHANGE UP (ref 0–0.2)
BASOPHILS NFR BLD AUTO: 0.9 % — SIGNIFICANT CHANGE UP (ref 0–2)
BILIRUB SERPL-MCNC: 0.3 MG/DL — SIGNIFICANT CHANGE UP (ref 0.2–1.2)
BUN SERPL-MCNC: 17 MG/DL — SIGNIFICANT CHANGE UP (ref 7–23)
CALCIUM SERPL-MCNC: 8.8 MG/DL — SIGNIFICANT CHANGE UP (ref 8.4–10.5)
CHLORIDE SERPL-SCNC: 100 MMOL/L — SIGNIFICANT CHANGE UP (ref 96–108)
CO2 SERPL-SCNC: 31 MMOL/L — SIGNIFICANT CHANGE UP (ref 22–31)
CREAT SERPL-MCNC: 0.73 MG/DL — SIGNIFICANT CHANGE UP (ref 0.5–1.3)
EOSINOPHIL # BLD AUTO: 0.25 K/UL — SIGNIFICANT CHANGE UP (ref 0–0.5)
EOSINOPHIL NFR BLD AUTO: 2.7 % — SIGNIFICANT CHANGE UP (ref 0–6)
GLUCOSE SERPL-MCNC: 105 MG/DL — HIGH (ref 70–99)
HCT VFR BLD CALC: 37.5 % — LOW (ref 39–50)
HGB BLD-MCNC: 12.4 G/DL — LOW (ref 13–17)
IMM GRANULOCYTES NFR BLD AUTO: 0.5 % — SIGNIFICANT CHANGE UP (ref 0–1.5)
LYMPHOCYTES # BLD AUTO: 1.48 K/UL — SIGNIFICANT CHANGE UP (ref 1–3.3)
LYMPHOCYTES # BLD AUTO: 16.2 % — SIGNIFICANT CHANGE UP (ref 13–44)
MCHC RBC-ENTMCNC: 29.2 PG — SIGNIFICANT CHANGE UP (ref 27–34)
MCHC RBC-ENTMCNC: 33.1 GM/DL — SIGNIFICANT CHANGE UP (ref 32–36)
MCV RBC AUTO: 88.2 FL — SIGNIFICANT CHANGE UP (ref 80–100)
MONOCYTES # BLD AUTO: 0.72 K/UL — SIGNIFICANT CHANGE UP (ref 0–0.9)
MONOCYTES NFR BLD AUTO: 7.9 % — SIGNIFICANT CHANGE UP (ref 2–14)
NEUTROPHILS # BLD AUTO: 6.54 K/UL — SIGNIFICANT CHANGE UP (ref 1.8–7.4)
NEUTROPHILS NFR BLD AUTO: 71.8 % — SIGNIFICANT CHANGE UP (ref 43–77)
NRBC # BLD: 0 /100 WBCS — SIGNIFICANT CHANGE UP (ref 0–0)
PLATELET # BLD AUTO: 350 K/UL — SIGNIFICANT CHANGE UP (ref 150–400)
POTASSIUM SERPL-MCNC: 3.9 MMOL/L — SIGNIFICANT CHANGE UP (ref 3.5–5.3)
POTASSIUM SERPL-SCNC: 3.9 MMOL/L — SIGNIFICANT CHANGE UP (ref 3.5–5.3)
PROT SERPL-MCNC: 6.8 G/DL — SIGNIFICANT CHANGE UP (ref 6–8.3)
RBC # BLD: 4.25 M/UL — SIGNIFICANT CHANGE UP (ref 4.2–5.8)
RBC # FLD: 12.4 % — SIGNIFICANT CHANGE UP (ref 10.3–14.5)
SODIUM SERPL-SCNC: 138 MMOL/L — SIGNIFICANT CHANGE UP (ref 135–145)
WBC # BLD: 9.12 K/UL — SIGNIFICANT CHANGE UP (ref 3.8–10.5)
WBC # FLD AUTO: 9.12 K/UL — SIGNIFICANT CHANGE UP (ref 3.8–10.5)

## 2020-02-17 PROCEDURE — 99233 SBSQ HOSP IP/OBS HIGH 50: CPT

## 2020-02-17 PROCEDURE — 99232 SBSQ HOSP IP/OBS MODERATE 35: CPT

## 2020-02-17 RX ADMIN — AMLODIPINE BESYLATE 10 MILLIGRAM(S): 2.5 TABLET ORAL at 05:36

## 2020-02-17 RX ADMIN — Medication 81 MILLIGRAM(S): at 05:36

## 2020-02-17 RX ADMIN — LOSARTAN POTASSIUM 25 MILLIGRAM(S): 100 TABLET, FILM COATED ORAL at 05:36

## 2020-02-17 RX ADMIN — Medication 10 MILLIGRAM(S): at 05:36

## 2020-02-17 RX ADMIN — ATORVASTATIN CALCIUM 80 MILLIGRAM(S): 80 TABLET, FILM COATED ORAL at 21:25

## 2020-02-17 NOTE — PROGRESS NOTE ADULT - ASSESSMENT
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    Rehab: Gait Instability, ADL impairments and Functional impairments: Continue Comprehensive Rehab Program of PT/OT/ST    #Left JESSE/MCA CVA with petechial hemorrhage  - Continue ASA 81mg daily  - Continue Lipitor 80mg QHS  - Continue Prozac 10mg daily for mood motor recovery     #Dissection in Right ICA  - Per neuro at [facility] given size of stroke with petechial hemorrhage avoid anticoagulation for 6-8 weeks, continue ASA but at 81 mg daily.  "This should provide protection for stroke from Right ICA dissection while minimizing chance for worsening petechial ICH."    #Leukocytosis WBC=9.12 trending down, afebrile  -urine analysis negative  -CXR negative  -monitor CBC    #Pain: Tylenol PRN  - Prior opioid use: well managed without meds at the moment, patient has h/o opioid dependence, was on Suboxone treatment as outpatient.      #Hypertension  - Continue Losartan 25mg daily  - Continue amlodipine 10mg daily     #[place] prolongation  - Per cardio at [facility] "This is associated with [place] prolongation consistent with elevated vagal tone. Patient was reportedly awake during these times. It is possible elevated vagal tone from cerebral disease is causing these pauses. Pacemaker implantation would not help this and fortunately he is asymptomatic. He should be evaluated for sleep apnea at some point."  - Avoid metoprolol and/or other AVN blockers  - Outpatient cardiology followup     #Resp  - desat during last night at acute care hospital, placed on O2 PRN and nocturnal. No issues overnight.   - may need r/o sleep apnea with overnight pulse ox.     #GI/Bowel: Colace, Senna, Miralax PRN    #Diet: Mechanical soft with thin liquids    #Renal/Bladder: Bladder scans q8    - DVT: Lovenox, SCDs, TEDs

## 2020-02-17 NOTE — PROGRESS NOTE ADULT - SUBJECTIVE AND OBJECTIVE BOX
Patient is a 64y old  Male who presents with a chief complaint of Left MCA and JESSE CVA (14 Feb 2020 07:12)      HPI:  65 y/o M w/ no significant PMHx who was transferred from [facility] ED on 2/5/20 after presenting with aphasia, dysarthria and right hemiplegia, s/p tPA administration at 22:48 2/5/20. CTA head performed at [facility] revealing of total left ICA occlusion. LNK 6:30PM 2/5/20. S/p tPA administration, pt w/o improvement in right-sided weakness. Pt was transferred to [facility] for further neurologic evaluation. Upon arrival to ED, /100 so cardene gtt was re-started. CT head perfusion revealing of small to moderate sized left frontal infarction w/ moderate to large region of low flow and/or ischemic state in left frontoparietal region. CTA neck significant for occlusion of left internal carotid. No Neurovascular intervention at this time. Admitted to MICU for post tPA monitoring. CT-A also noted with right ICA dissection. While admitted, patient was treated with cardene drip for HTN which was stopped on 2/7 and was then transferred to medical services. Patient was seen by vascular surgery in ICU for Left Carotid occlusion and recommended for patient to be started on full anticoagulation in at least 6 weeks. Neuro was consulted, repeat CT showing petechial hemorrhages.    On 2/12 Was planned for dc to acute rehab but admission was held due to 3-3.5 second pauses on monitor. Cardiology was consulted and stated that it is associated with [place] prolongation consistent with increased vagal tone possibly due to his cerebral disease. Pacemaker would not be beneficial. Patient also noted to be asymptomatic during the pauses. Metoprolol and AVN blockers held as per cardio. Patient planned for dc [facility] today.     Wife: Ca Bey (H) [phone], (C) [phone] (13 Feb 2020 11:29)        SUBJECTIVE: Patient seen and examined. Nods for yes and shrugs for no. Is unable to verbalized. No acute overnight events, slept well. .       REVIEW OF SYSTEMS -   UTO      MEDICATIONS  (STANDING):  amLODIPine   Tablet 10 milliGRAM(s) Oral every 24 hours  aspirin enteric coated 81 milliGRAM(s) Oral daily  atorvastatin 80 milliGRAM(s) Oral at bedtime  FLUoxetine 10 milliGRAM(s) Oral daily  losartan 25 milliGRAM(s) Oral daily    MEDICATIONS  (PRN):  acetaminophen   Tablet .. 650 milliGRAM(s) Oral every 6 hours PRN Temp greater or equal to 38C (100.4F), Mild Pain (1 - 3)          VITALS  Vital Signs Last 24 Hrs  T(C): 36.4 (16 Feb 2020 20:40), Max: 36.4 (16 Feb 2020 08:40)  T(F): 97.6 (16 Feb 2020 20:40), Max: 97.6 (16 Feb 2020 20:40)  HR: 65 (17 Feb 2020 05:31) (65 - 68)  BP: 134/83 (17 Feb 2020 05:31) (122/78 - 134/83)  BP(mean): --  RR: 14 (16 Feb 2020 20:40) (14 - 18)  SpO2: 95% (16 Feb 2020 20:40) (95% - 95%)        PHYSICAL EXAM:   Gen - NAD, Comfortable  HEENT - NCAT, MMM  Pulm - CTAB, No wheeze  Cardiovascular - RRR, S1S2  Abdomen - Soft, NT/ND, +BS  Extremities - No C/C/E, No calf tenderness  Neuro-          Motor - right hemiparesis stable           RECENT LABS:   LABS:    LABS:                        12.4   9.12  )-----------( 350      ( 17 Feb 2020 05:30 )             37.5                                 12.5   10.95 )-----------( 323      ( 16 Feb 2020 05:59 )             37.3       Ca    8.5        15 Feb 2020 05:45                          12.9   16.46 )-----------( 296      ( 15 Feb 2020 05:45 )             38.9     15 Feb 2020 05:45    136    |  100    |  23     ----------------------------<  128    4.5     |  29     |  0.71     Ca    8.5        15 Feb 2020 05:45    TPro  6.5    /  Alb  2.5    /  TBili  0.4    /  DBili  x      /  AST  52     /  ALT  29     /  AlkPhos  80     15 Feb 2020 05:45                            14.5   13.35 )-----------( 329      ( 13 Feb 2020 07:48 )             42.6     02-13    135  |  98  |  27.0<H>  ----------------------------<  122<H>  4.0   |  25.0  |  0.64    Ca    8.9      13 Feb 2020 07:48  Phos  3.7     02-13  Mg     2.1     02-13

## 2020-02-17 NOTE — PROGRESS NOTE ADULT - ASSESSMENT
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      #Left JESSE/MCA CVA with petechial hemorrhage  - Continue ASA 81 mg daily, Lipitor 80 mg at bedtime  - Continue Prozac 10 mg daily for mood motor recovery   - comprehensive PT/OT/Rehab/ST    # Gait abnormality   - comprehensive PT/OT/Rehab    #Dissection in Right ICA  - Per neuro at [facility] given size of stroke with petechial hemorrhage avoid anticoagulation for 6-8 weeks, continue ASA but at 81 mg daily.  "This should provide protection for stroke from Right ICA dissection while minimizing chance for worsening petechial ICH."    #Pain  - Tylenol PRN  - Prior opioid use: well managed without meds at the moment, patient has h/o opioid dependence, was on Suboxone treatment as outpatient.      #Hypertension - BP control  - Continue Losartan 25 mg daily, amlodipine 10mg daily     #LA prolongation  - Per cardiologist at [facility] "This is associated with [place] prolongation consistent with elevated vagal tone. Patient was reportedly awake during these times. It is possible elevated vagal tone from cerebral disease is causing these pauses. Pacemaker implantation would not help this and fortunately he is asymptomatic. He should be evaluated for sleep apnea at some point."  - Avoid metoprolol and/or other AVN blockers  - Outpatient cardiology followup     #Hypoxia  - desat night before at acute care hospital, placed on O2 PRN and nocturnal  - may need r/o sleep apnea  - f/u pulmonary    #Constipation  - continue bowel regimen, Colace, Senna, Miralax PRN    #Dysphagia  - continue Mechanical soft diet with thin liquids    #DVT ppx  - SCDs, TEDs

## 2020-02-17 NOTE — PROGRESS NOTE ADULT - SUBJECTIVE AND OBJECTIVE BOX
Patient is a 64 year old male who presents with a chief complaint of Left MCA and JESSE CVA (2020 07:25)      Patient seen and examined. Patient unable to provide history due to aphasia.    MEDICATIONS  (STANDING):  amLODIPine   Tablet 10 milliGRAM(s) Oral every 24 hours  aspirin  chewable 81 milliGRAM(s) Oral <User Schedule>  atorvastatin 80 milliGRAM(s) Oral at bedtime  FLUoxetine 10 milliGRAM(s) Oral <User Schedule>  losartan 25 milliGRAM(s) Oral daily    MEDICATIONS  (PRN):  acetaminophen   Tablet .. 650 milliGRAM(s) Oral every 6 hours PRN Temp greater or equal to 38C (100.4F), Mild Pain (1 - 3)        REVIEW OF SYSTEMS:  Patient unable to provide due to aphasia      PHYSICAL EXAM:    T(C): 36.8 (20 @ 07:47), Max: 36.8 (20 @ 07:47)  HR: 65 (20 @ 07:47) (65 - 68)  BP: 135/82 (20 @ 07:47) (122/78 - 135/82)  RR: 16 (20 @ 07:47) (14 - 18)  SpO2: 95% (20 @ 07:47) (95% - 95%)    I&O's Summary    2020 07:01  -  2020 07:00  --------------------------------------------------------  IN: 0 mL / OUT: 255 mL / NET: -255 mL      GENERAL: NAD, well-developed  HEAD:  Atraumatic, Normocephalic  EYES: EOMI, PERRL, conjunctiva and sclera clear  ENMT:  Moist mucous membranes  NECK: Supple, No JVD, Normal thyroid  NERVOUS SYSTEM:  Alert, awake, has aphasia, has right side neglect, right sided facial droop, right sided weakness, limited exam as intermittently follows commands   CHEST/LUNG: Clear to ascultation bilaterally; No rales, rhonchi, wheezing, or rubs  HEART: Regular rate and rhythm; No murmurs, rubs, or gallops  ABDOMEN: Soft, Nontender, Nondistended; Bowel sounds present  EXTREMITIES:  2+ Peripheral Pulses, No clubbing, cyanosis, or edema  SKIN: No rash      LABS:                        12.4   9.12  )-----------( 350      ( 2020 05:30 )             37.5         138  |  100  |  17  ----------------------------<  105<H>  3.9   |  31  |  0.73    Ca    8.8      2020 05:30    TPro  6.8  /  Alb  2.4<L>  /  TBili  0.3  /  DBili  x   /  AST  52<H>  /  ALT  37  /  AlkPhos  76        Urinalysis Basic - ( 15 Feb 2020 14:40 )    Color: Yellow / Appearance: Clear / S.020 / pH: x  Gluc: x / Ketone: Negative  / Bili: Negative / Urobili: 1   Blood: x / Protein: 15 / Nitrite: Negative   Leuk Esterase: Negative / RBC: 0-4 /HPF / WBC Negative /HPF   Sq Epi: x / Non Sq Epi: Neg.-Few / Bacteria: Negative /HPF        RADIOLOGY & ADDITIONAL TESTS:    Imaging Reviewed:  [x] YES  [ ] NO    Consultant(s) Notes Reviewed:  [x] YES  [ ] NO    Care Discussed with Consultants/Other Providers [x] YES  [ ] NO

## 2020-02-18 PROCEDURE — 99232 SBSQ HOSP IP/OBS MODERATE 35: CPT | Mod: GC

## 2020-02-18 PROCEDURE — 99232 SBSQ HOSP IP/OBS MODERATE 35: CPT

## 2020-02-18 RX ADMIN — Medication 10 MILLIGRAM(S): at 05:16

## 2020-02-18 RX ADMIN — AMLODIPINE BESYLATE 10 MILLIGRAM(S): 2.5 TABLET ORAL at 05:16

## 2020-02-18 RX ADMIN — Medication 81 MILLIGRAM(S): at 05:16

## 2020-02-18 RX ADMIN — ATORVASTATIN CALCIUM 80 MILLIGRAM(S): 80 TABLET, FILM COATED ORAL at 21:12

## 2020-02-18 RX ADMIN — LOSARTAN POTASSIUM 25 MILLIGRAM(S): 100 TABLET, FILM COATED ORAL at 05:16

## 2020-02-18 NOTE — PROGRESS NOTE ADULT - ASSESSMENT
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      #Left JESSE/MCA CVA with petechial hemorrhage  - Continue ASA 81 mg daily, Lipitor 80 mg at bedtime  - Continue Prozac 10 mg daily for mood motor recovery   - comprehensive PT/OT/Rehab/ST    # Gait abnormality   - comprehensive PT/OT/Rehab    #Dissection in Right ICA  - Per neuro at [facility] given size of stroke with petechial hemorrhage avoid anticoagulation for 6-8 weeks, continue ASA but at 81 mg daily.  "This should provide protection for stroke from Right ICA dissection while minimizing chance for worsening petechial ICH."    #Pain  - Tylenol PRN  - Prior opioid use: well managed without meds at the moment, patient has h/o opioid dependence, was on Suboxone treatment as outpatient.      #Hypertension - BP control  - Continue Losartan 25 mg daily, amlodipine 10mg daily     #ID prolongation  - Per cardiologist at [facility] "This is associated with ID prolongation consistent with elevated vagal tone. Patient was reportedly awake during these times. It is possible elevated vagal tone from cerebral disease is causing these pauses. Pacemaker implantation would not help this and fortunately he is asymptomatic. He should be evaluated for sleep apnea at some point."  - Avoid metoprolol and/or other AVN blockers  - Outpatient cardiology followup     #Hypoxia  - desat night before at acute care hospital, placed on O2 PRN and nocturnal  - may need r/o sleep apnea  - f/u pulmonary    #Constipation  - continue bowel regimen, Colace, Senna, Miralax PRN    #Dysphagia  - continue Mechanical soft diet with thin liquids    #DVT ppx  - SCDs, TEDs 64 year old male with no previous PMH who 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      #Left JESSE/MCA CVA with petechial hemorrhage  - Continue ASA 81 mg daily, Lipitor 80 mg at bedtime  - Continue Prozac 10 mg daily for mood motor recovery   - comprehensive PT/OT/Rehab/ST    # Gait abnormality   - comprehensive PT/OT/Rehab    #Dissection in Right ICA  - Per neuro at [facility] given size of stroke with petechial hemorrhage avoid anticoagulation for 6-8 weeks, continue ASA but at 81 mg daily.  "This should provide protection for stroke from Right ICA dissection while minimizing chance for worsening petechial ICH."    #Pain  - Tylenol PRN  - Prior opioid use: well managed without meds at the moment, patient has h/o opioid dependence, was on Suboxone treatment as outpatient.      #Hypertension - BP control  - Continue Losartan 25 mg daily, amlodipine 10mg daily     #NM prolongation  - Per cardiologist at [facility] "This is associated with NM prolongation consistent with elevated vagal tone. Patient was reportedly awake during these times. It is possible elevated vagal tone from cerebral disease is causing these pauses. Pacemaker implantation would not help this and fortunately he is asymptomatic. He should be evaluated for sleep apnea at some point."  - Avoid metoprolol and/or other AVN blockers  - Outpatient cardiology followup     #Hypoxia  - desat night before at acute care hospital, placed on O2 PRN and nocturnal  - may need r/o sleep apnea  - f/u pulmonary    #Constipation  - had BM, continue bowel regimen, Colace, Senna, Miralax PRN    #Dysphagia  - continue Mechanical soft diet with thin liquids    #DVT ppx  - SCDs, TEDs

## 2020-02-18 NOTE — PROGRESS NOTE ADULT - SUBJECTIVE AND OBJECTIVE BOX
Patient is a 64y old  Male who presents with a chief complaint of Left MCA and JESSE CVA (2020 09:41)      HPI:  63 y/o M w/ no significant PMHx who was transferred from [facility] ED on 20 after presenting with aphasia, dysarthria and right hemiplegia, s/p tPA administration at 22:48 20. CTA head performed at [facility] revealing of total left ICA occlusion. LNK 6:30PM 20. S/p tPA administration, pt w/o improvement in right-sided weakness. Pt was transferred to [facility] for further neurologic evaluation. Upon arrival to ED, /100 so cardene gtt was re-started. CT head perfusion revealing of small to moderate sized left frontal infarction w/ moderate to large region of low flow and/or ischemic state in left frontoparietal region. CTA neck significant for occlusion of left internal carotid. No Neurovascular intervention at this time. Admitted to MICU for post tPA monitoring. CT-A also noted with right ICA dissection. While admitted, patient was treated with cardene drip for HTN which was stopped on  and was then transferred to medical services. Patient was seen by vascular surgery in ICU for Left Carotid occlusion and recommended for patient to be started on full anticoagulation in at least 6 weeks. Neuro was consulted, repeat CT showing petechial hemorrhages.    On  Was planned for dc to acute rehab but admission was held due to 3-3.5 second pauses on monitor. Cardiology was consulted and stated that it is associated with VT prolongation consistent with increased vagal tone possibly due to his cerebral disease. Pacemaker would not be beneficial. Patient also noted to be asymptomatic during the pauses. Metoprolol and AVN blockers held as per cardio. Patient planned for dc to [facility] today.     Wife: Ca Bey (H) [phone], (C) [phone] (2020 11:29)        SUBJECTIVE: Patient seen and examined. Nods for yes and shrugs for no. Is unable to verbalize. No acute overnight events, slept well. Continues to have right upper and lower extremity weakness. Requires much encouragement and verbal cueing to participate in physical exam. No other complaints.       REVIEW OF SYSTEMS - Nods for yes and shrugs for no.  Constitutional: No fever  HEENT: No visual disturbances  Pulm: No cough,  No shortness of breath  Cardio: No chest pain, No palpitations  GI:  No abdominal pain, No constipation  Neuro: No headaches, +loss of strength  MSK: No Neck or back pain  Psych:  No depression, No anxiety      MEDICATIONS  (STANDING):  amLODIPine   Tablet 10 milliGRAM(s) Oral every 24 hours  aspirin  chewable 81 milliGRAM(s) Oral <User Schedule>  atorvastatin 80 milliGRAM(s) Oral at bedtime  FLUoxetine 10 milliGRAM(s) Oral <User Schedule>  losartan 25 milliGRAM(s) Oral daily    MEDICATIONS  (PRN):  acetaminophen   Tablet .. 650 milliGRAM(s) Oral every 6 hours PRN Temp greater or equal to 38C (100.4F), Mild Pain (1 - 3)          VITALS  64y  Vital Signs Last 24 Hrs  T(C): 36.6 (2020 07:34), Max: 36.6 (2020 07:34)  T(F): 97.9 (2020 07:34), Max: 97.9 (2020 07:34)  HR: 71 (2020 07:34) (64 - 71)  BP: 124/80 (2020 07:34) (124/80 - 134/84)  BP(mean): --  RR: 14 (2020 07:34) (14 - 15)  SpO2: 96% (2020 07:34) (94% - 96%)  Daily     Daily Weight in k.1 (2020 10:42)      RECENT LABS:                          12.4   9.12  )-----------( 350      ( 2020 05:30 )             37.5     -    138  |  100  |  17  ----------------------------<  105<H>  3.9   |  31  |  0.73    Ca    8.8      2020 05:30    TPro  6.8  /  Alb  2.4<L>  /  TBili  0.3  /  DBili  x   /  AST  52<H>  /  ALT  37  /  AlkPhos  76  17    LIVER FUNCTIONS - ( 2020 05:30 )  Alb: 2.4 g/dL / Pro: 6.8 g/dL / ALK PHOS: 76 U/L / ALT: 37 U/L / AST: 52 U/L / GGT: x                   CAPILLARY BLOOD GLUCOSE            PHYSICAL EXAM:   Gen - NAD, Comfortable  HEENT - NCAT, EOMI, MMM  Pulm - CTAB, No wheeze  Cardiovascular - RRR, S1S2  Abdomen - Soft, NT/ND, +BS  Extremities - No C/C/E, No calf tenderness  Neuro-     Cognitive - AAO to person, nods when name called. Unable to verbalize responses. Intermittently follows command.      Communication - nonverbal, will nod and shrug to answer questions. Can be cued to form word shapes with his mouth. Expression > Receptive aphasia.      Attention: Intact      Cranial Nerves - +right facial droop, +Right sided neglect     Motor -                     LEFT    UE - ShAB 5/5, EF 5/5, EE 5/5, WE 5/5,  5/5                    RIGHT UE - ShAB 0/5, EF 0/5, EE 0/5, WE 0/5,  0/5                    LEFT    LE - HF 5/5, KE 5/5, DF 5/5, PF 5/5                    RIGHT LE - HF 0/5, KE 0/5, DF 0/5, PF 0/5        Sensory - Intact to LT     Reflexes - 1+ reflexes throughout. +R ankle clonus 3-4 beats     Tone - RUE and RLE flaccid  Psychiatric - Seems frustrated      FUNCTIONAL STATUS:  OT: Sajan with eating. maxA with grooming, UBD. total with LBD, transfers  PT: mod-max A with popover transfers. nonfunctional ambulation 15ft with RW, requires blocking and leg advancement by therapist  SLP: poor attention and carry over, motor apraxia. aphasia. yes/no unreliable.

## 2020-02-18 NOTE — PROGRESS NOTE ADULT - SUBJECTIVE AND OBJECTIVE BOX
Patient is a 64 year old male who presents with a chief complaint of Left MCA and JESSE CVA (17 Feb 2020 08:25)    Patient seen and examined. Patient unable to provide history due to aphasia.      MEDICATIONS  (STANDING):  amLODIPine   Tablet 10 milliGRAM(s) Oral every 24 hours  aspirin  chewable 81 milliGRAM(s) Oral <User Schedule>  atorvastatin 80 milliGRAM(s) Oral at bedtime  FLUoxetine 10 milliGRAM(s) Oral <User Schedule>  losartan 25 milliGRAM(s) Oral daily    MEDICATIONS  (PRN):  acetaminophen   Tablet .. 650 milliGRAM(s) Oral every 6 hours PRN Temp greater or equal to 38C (100.4F), Mild Pain (1 - 3)      REVIEW OF SYSTEMS:  Patient unable to provide due to aphasia        PHYSICAL EXAM:  T(C): 36.6 (02-18-20 @ 07:34), Max: 36.6 (02-18-20 @ 07:34)  HR: 71 (02-18-20 @ 07:34) (64 - 71)  BP: 124/80 (02-18-20 @ 07:34) (124/80 - 134/84)  RR: 14 (02-18-20 @ 07:34) (14 - 15)  SpO2: 96% (02-18-20 @ 07:34) (94% - 96%)    I&O's Summary    17 Feb 2020 07:01  -  18 Feb 2020 07:00  --------------------------------------------------------  IN: 600 mL / OUT: 184 mL / NET: 416 mL        GENERAL: NAD, well-developed  HEAD:  Atraumatic, Normocephalic  EYES: EOMI, PERRL, conjunctiva and sclera clear  ENMT:  Moist mucous membranes  NECK: Supple, No JVD, Normal thyroid  NERVOUS SYSTEM:  Alert, awake, has aphasia, has right side neglect, right sided facial droop, right sided weakness, limited exam as intermittently follows commands   CHEST/LUNG: Clear to ascultation bilaterally; No rales, rhonchi, wheezing, or rubs  HEART: Regular rate and rhythm; No murmurs, rubs, or gallops  ABDOMEN: Soft, Nontender, Nondistended; Bowel sounds present  EXTREMITIES:  2+ Peripheral Pulses, No clubbing, cyanosis, or edema  SKIN: No rash      LABS:                        12.4   9.12  )-----------( 350      ( 17 Feb 2020 05:30 )             37.5     02-17    138  |  100  |  17  ----------------------------<  105<H>  3.9   |  31  |  0.73    Ca    8.8      17 Feb 2020 05:30    TPro  6.8  /  Alb  2.4<L>  /  TBili  0.3  /  DBili  x   /  AST  52<H>  /  ALT  37  /  AlkPhos  76  02-17      RADIOLOGY & ADDITIONAL TESTS:    Imaging Reviewed:  [x] YES  [ ] NO    Consultant(s) Notes Reviewed:  [x] YES  [ ] NO    Care Discussed with Consultants/Other Providers [x] YES  [ ] NO Patient is a 64 year old male who presents with a chief complaint of Left MCA and JESSE CVA (17 Feb 2020 08:25)    Patient seen and examined. Patient unable to provide history due to aphasia.    MEDICATIONS  (STANDING):  amLODIPine   Tablet 10 milliGRAM(s) Oral every 24 hours  aspirin  chewable 81 milliGRAM(s) Oral <User Schedule>  atorvastatin 80 milliGRAM(s) Oral at bedtime  FLUoxetine 10 milliGRAM(s) Oral <User Schedule>  losartan 25 milliGRAM(s) Oral daily    MEDICATIONS  (PRN):  acetaminophen   Tablet .. 650 milliGRAM(s) Oral every 6 hours PRN Temp greater or equal to 38C (100.4F), Mild Pain (1 - 3)      REVIEW OF SYSTEMS:  Patient unable to provide due to aphasia      PHYSICAL EXAM:  T(C): 36.6 (02-18-20 @ 07:34), Max: 36.6 (02-18-20 @ 07:34)  HR: 71 (02-18-20 @ 07:34) (64 - 71)  BP: 124/80 (02-18-20 @ 07:34) (124/80 - 134/84)  RR: 14 (02-18-20 @ 07:34) (14 - 15)  SpO2: 96% (02-18-20 @ 07:34) (94% - 96%)    I&O's Summary    17 Feb 2020 07:01  -  18 Feb 2020 07:00  --------------------------------------------------------  IN: 600 mL / OUT: 184 mL / NET: 416 mL        GENERAL: NAD, well-developed  HEAD:  Atraumatic, Normocephalic  EYES: EOMI, PERRL, conjunctiva and sclera clear  ENMT:  Moist mucous membranes  NECK: Supple, No JVD, Normal thyroid  NERVOUS SYSTEM:  Alert, awake, has aphasia, has right side neglect, right sided facial droop, right sided weakness, limited exam as intermittently follows commands   CHEST/LUNG: Clear to ascultation bilaterally; No rales, rhonchi, wheezing, or rubs  HEART: Regular rate and rhythm; No murmurs, rubs, or gallops  ABDOMEN: Soft, Nontender, Nondistended; Bowel sounds present  EXTREMITIES:  2+ Peripheral Pulses, No clubbing, cyanosis, or edema  SKIN: No rash      LABS:                        12.4   9.12  )-----------( 350      ( 17 Feb 2020 05:30 )             37.5     02-17    138  |  100  |  17  ----------------------------<  105<H>  3.9   |  31  |  0.73    Ca    8.8      17 Feb 2020 05:30    TPro  6.8  /  Alb  2.4<L>  /  TBili  0.3  /  DBili  x   /  AST  52<H>  /  ALT  37  /  AlkPhos  76  02-17      RADIOLOGY & ADDITIONAL TESTS:    Imaging Reviewed:  [x] YES  [ ] NO    Consultant(s) Notes Reviewed:  [x] YES  [ ] NO    Care Discussed with Consultants/Other Providers [x] YES  [ ] NO

## 2020-02-18 NOTE — CHART NOTE - NSCHARTNOTEFT_GEN_A_CORE
REHABILITATION DIAGNOSIS/IMPAIRMENT GROUP CODE:  CVA    COMORBIDITIES/COMPLICATING CONDITIONS IMPACTING REHABILITATION:  HEALTH ISSUES - PROBLEM Dx: HTN        PAST MEDICAL & SURGICAL HISTORY:  No pertinent past medical history  No significant past surgical history      Based upon consideration of the patient's impairments, functional status, complicating conditions and any other contributing factors and after information garnered from the assessments of all therapy disciplines involved in treating the patient and other pertinent clinicians:    INTERDISCIPLINARY REHABILITATION INTERVENTIONS:    [ x  ] Transfer Training  [ x  ] Bed Mobility  [ x  ] Therapeutic Exercise  [ x  ] Balance/Coordination Exercises  [ x  ] Locomotion retraining  [ x  ] Stairs  [ x  ] Functional Transfer Training  [ x  ] Bowel/Bladder program  [   ] Pain Management  [   ] Skin/Wound Care  [   ] Visual/Perceptual Training  [   ] Therapeutic Recreation Activities  [ x  ] Neuromuscular Re-education  [ x  ] Activities of Daily Living  [ x  ] Speech Exercise  [ x  ] Swallowing Exercises  [   ] Vital Stim  [   ] Dietary Supplements  [   ] Calorie Count  [ x  ] Cognitive Exercises  [ x  ] Congnitive/Linguistic Treatment  [   ] Behavior Program  [   ] Neuropsych Therapy  [ x  ] Patient/Family Counseling  [ x  ] Family Training  [ x  ] Community Re-entry  [   ] Orthotic Evaluation  [   ] Prosthetic Eval/Training    MEDICAL PROGNOSIS:  good     REHAB POTENTIAL:  fair    EXPECTED DAILY THERAPY:         PT: 1hr/day       OT: 1hr/day       ST: 1hr/day       P&O: 0    EXPECTED INTENSITY OF PROGRAM:  3 hrs/day    EXPECTED FREQUENCY OF PROGRAM:  5 days/week    ESTIMATED LOS:  21-28 days    ESTIMATED DISPOSITION:  home    INTERDISCIPLINARY FUNCTIONAL OUTCOMES?GOALS:         Gait/Mobility: wheelchair level       Transfers: mod-assist       ADLs: min-to-mod assist       Functional Transfers: mod-assist       Medication Management: supervision       Communication: dependent       Cognitive: supervision       Dysphagia: n/a       Bladder: continent       Bowel: continent REHABILITATION DIAGNOSIS/IMPAIRMENT GROUP CODE:  JESSE/MCA stroke with right hemiplegia    COMORBIDITIES/COMPLICATING CONDITIONS IMPACTING REHABILITATION:  HEALTH ISSUES - PROBLEM Dx: HTN  global aphasia        PAST MEDICAL & SURGICAL HISTORY:  No pertinent past medical history  No significant past surgical history      Based upon consideration of the patient's impairments, functional status, complicating conditions and any other contributing factors and after information garnered from the assessments of all therapy disciplines involved in treating the patient and other pertinent clinicians:    INTERDISCIPLINARY REHABILITATION INTERVENTIONS:    [ x  ] Transfer Training  [ x  ] Bed Mobility  [ x  ] Therapeutic Exercise  [ x  ] Balance/Coordination Exercises  [ x  ] Locomotion retraining  [ x  ] Stairs  [ x  ] Functional Transfer Training  [ x  ] Bowel/Bladder program  [   ] Pain Management  [   ] Skin/Wound Care  [   ] Visual/Perceptual Training  [   ] Therapeutic Recreation Activities  [ x  ] Neuromuscular Re-education  [ x  ] Activities of Daily Living  [ x  ] Speech Exercise  [ x  ] Swallowing Exercises  [   ] Vital Stim  [   ] Dietary Supplements  [   ] Calorie Count  [ x  ] Cognitive Exercises  [ x  ] Cognitive/Linguistic Treatment  [   ] Behavior Program  [   ] Neuropsych Therapy  [ x  ] Patient/Family Counseling  [ x  ] Family Training  [ x  ] Community Re-entry  [   ] Orthotic Evaluation  [   ] Prosthetic Eval/Training    MEDICAL PROGNOSIS:  good     REHAB POTENTIAL:  fair    EXPECTED DAILY THERAPY:         PT: 1hr/day       OT: 1hr/day       ST: 1hr/day       P&O: eval as needed     EXPECTED INTENSITY OF PROGRAM:  3 hrs/day    EXPECTED FREQUENCY OF PROGRAM:  5 days/week    ESTIMATED LOS:  21-28 days    ESTIMATED DISPOSITION:  home    INTERDISCIPLINARY FUNCTIONAL OUTCOMES/GOALS:         Gait/Mobility: wheelchair level- min assist        Transfers: mod-assist       ADLs: min-to-mod assist       Functional Transfers: mod-assist       Medication Management: max assist        Communication: dependent       Cognitive: max assist        Dysphagia: up grade to soft with thin        Bladder: mod assist        Bowel: mod assist

## 2020-02-18 NOTE — PROGRESS NOTE ADULT - ASSESSMENT
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    Rehab: Gait Instability, ADL impairments and Functional impairments: Continue Comprehensive Rehab Program of PT/OT/ST    #Left JESSE/MCA CVA with petechial hemorrhage  - Continue ASA 81mg daily  - Continue Lipitor 80mg QHS  - Continue Prozac 10mg daily for mood motor recovery     #Dissection in Right ICA  - Per neuro at [facility] given size of stroke with petechial hemorrhage avoid anticoagulation for 6-8 weeks, continue ASA but at 81 mg daily.  "This should provide protection for stroke from Right ICA dissection while minimizing chance for worsening petechial ICH."    #Leukocytosis - resolved  - 2/17 WBC 9.12    #Pain: Tylenol PRN  - Prior opioid use: well managed without meds at the moment, patient has h/o opioid dependence, was on Suboxone treatment as outpatient.      #Hypertension  - Continue Losartan 25mg daily  - Continue amlodipine 10mg daily     #MA prolongation  - Per cardio at [facility] "This is associated with MA prolongation consistent with elevated vagal tone. Patient was reportedly awake during these times. It is possible elevated vagal tone from cerebral disease is causing these pauses. Pacemaker implantation would not help this and fortunately he is asymptomatic. He should be evaluated for sleep apnea at some point."  - Avoid metoprolol and/or other AVN blockers  - Outpatient cardiology followup     #Resp  - desat during last night at [facility] hospital, placed on O2 PRN and nocturnal. No issues overnight.   - may need r/o sleep apnea with overnight pulse ox.     #GI/Bowel: Colace, Senna, Miralax PRN    #Diet: Mechanical soft with thin liquids    #Renal/Bladder: Bladder scans q8    #Precautions / PROPHYLAXIS:   - Falls  - Ortho: Weight bearing status: WBAT   - Lungs: Aspiration, Incentive Spirometer   - Pressure injury/Skin: Turn Q2hrs while in bed, OOB to Chair, PT/OT    - DVT: Lovenox, SCDs, TEDs     IDT rounds 2/18  dc date 3/6 - goals of Sajan with grooming and UBD, mod-A with LBD and transfers. WC level for mobility. 24hr supervision.   OT: Sajan with eating. maxA with grooming, UBD. total with LBD, transfers  PT: mod-max A with popover transfers. nonfunctional ambulation 15ft with RW, requires blocking and leg advancement by therapist  SLP: poor attention and carry over, motor apraxia. aphasia. yes/no unreliable.

## 2020-02-18 NOTE — PROGRESS NOTE ADULT - ATTENDING COMMENTS
Chart reviewed. Patient seen at  bedside.  Limited due to severe aphasia. But today attempted to verbalise. Right hemiplegia unchanged  Lab stable  Continue rehab program    Team conference today: Goals of min/ mod assist with PT/OT, WC level of mobility. improve verbal expression/ in SLP. TDD 3/6 based on progress and insurance coverage

## 2020-02-18 NOTE — DIETITIAN INITIAL EVALUATION ADULT. - OTHER INFO
Patient is a 64M with no previous PMH who 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 Patient currently receiving a mechanical soft diet with Ensure Enlive TID . Patient noted to have decrease  po intake due dislikes current diet consistency. Po intake noted varying from 25-75% , receiving supplement TID , encouraged patient to consume. (+) Bm (2/17) Skin is intact. patient noted with mild muscle loss, temporal noted

## 2020-02-18 NOTE — CHART NOTE - NSCHARTNOTEFT_GEN_A_CORE
Upon Nutritional Assessment by the Registered Dietitian your patient was determined to meet criteria / has evidence of the following diagnosis/diagnoses:               [X]  Moderate Protein Calorie Malnutrition        Findings as based on:  [X] Comprehensive Nutrition Assessment   [X] Nutrition Focused Physical Exam - Temporal, , Clavicle Wasting Observed  [X] Other: Poor PO Intake 7+ Days     Nutrition Plan/Recommendations:    1) Ensure enlive TID    PROVIDER Section:   By signing this assessment you are acknowledging and agree with the diagnosis/diagnoses assigned by the Registered Dietitian    Abi Olmstead RDN

## 2020-02-19 PROCEDURE — 99232 SBSQ HOSP IP/OBS MODERATE 35: CPT | Mod: GC

## 2020-02-19 PROCEDURE — 99232 SBSQ HOSP IP/OBS MODERATE 35: CPT

## 2020-02-19 RX ADMIN — AMLODIPINE BESYLATE 10 MILLIGRAM(S): 2.5 TABLET ORAL at 05:36

## 2020-02-19 RX ADMIN — LOSARTAN POTASSIUM 25 MILLIGRAM(S): 100 TABLET, FILM COATED ORAL at 05:36

## 2020-02-19 RX ADMIN — Medication 10 MILLIGRAM(S): at 05:35

## 2020-02-19 RX ADMIN — Medication 81 MILLIGRAM(S): at 05:35

## 2020-02-19 RX ADMIN — ATORVASTATIN CALCIUM 80 MILLIGRAM(S): 80 TABLET, FILM COATED ORAL at 20:50

## 2020-02-19 NOTE — PROGRESS NOTE ADULT - SUBJECTIVE AND OBJECTIVE BOX
Patient is a 64 year old male who presents with a chief complaint of Left MCA and JESSE CVA (18 Feb 2020 12:03)    Patient seen and examined. Patient unable to provide history due to aphasia.      MEDICATIONS  (STANDING):  amLODIPine   Tablet 10 milliGRAM(s) Oral every 24 hours  aspirin  chewable 81 milliGRAM(s) Oral <User Schedule>  atorvastatin 80 milliGRAM(s) Oral at bedtime  FLUoxetine 10 milliGRAM(s) Oral <User Schedule>  losartan 25 milliGRAM(s) Oral daily    MEDICATIONS  (PRN):  acetaminophen   Tablet .. 650 milliGRAM(s) Oral every 6 hours PRN Temp greater or equal to 38C (100.4F), Mild Pain (1 - 3)      REVIEW OF SYSTEMS:  Patient unable to provide due to aphasia        PHYSICAL EXAM:    T(C): 36.8 (02-19-20 @ 07:29), Max: 36.8 (02-18-20 @ 19:41)  HR: 70 (02-19-20 @ 07:29) (68 - 74)  BP: 141/81 (02-19-20 @ 07:29) (121/74 - 149/93)  RR: 15 (02-19-20 @ 07:29) (14 - 15)  SpO2: 95% (02-19-20 @ 07:29) (94% - 96%)    I&O's Summary    18 Feb 2020 07:01  -  19 Feb 2020 07:00  --------------------------------------------------------  IN: 480 mL / OUT: 3 mL / NET: 477 mL      GENERAL: NAD, well-developed  HEAD:  Atraumatic, Normocephalic  EYES: EOMI, PERRL, conjunctiva and sclera clear  ENMT:  Moist mucous membranes  NECK: Supple, No JVD, Normal thyroid  NERVOUS SYSTEM:  Alert, awake, has aphasia, has right side neglect, right sided facial droop, right sided weakness, limited exam as intermittently follows commands   CHEST/LUNG: Clear to ascultation bilaterally; No rales, rhonchi, wheezing, or rubs  HEART: Regular rate and rhythm; No murmurs, rubs, or gallops  ABDOMEN: Soft, Nontender, Nondistended; Bowel sounds present  EXTREMITIES:  2+ Peripheral Pulses, No clubbing, cyanosis, or edema  SKIN: No rash      LABS: none today      RADIOLOGY & ADDITIONAL TESTS:    Imaging Reviewed:  [x] YES  [ ] NO    Consultant(s) Notes Reviewed:  [x] YES  [ ] NO    Care Discussed with Consultants/Other Providers [x] YES  [ ] NO

## 2020-02-19 NOTE — PROGRESS NOTE ADULT - ATTENDING COMMENTS
Chart reviewed.   Patient seen at bedside.   No new issues overnight.   Right hemiplegia unchanged.   Attempting to verbalise   Continue rehab program  Plan to increase dose of SSRI soon

## 2020-02-19 NOTE — PROGRESS NOTE ADULT - ASSESSMENT
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    Rehab: Gait Instability, ADL impairments and Functional impairments: Continue Comprehensive Rehab Program of PT/OT/ST    #Left JESSE/MCA CVA with petechial hemorrhage  - Continue ASA 81mg daily  - Continue Lipitor 80mg QHS  - Continue Prozac 10mg daily for mood motor recovery     #Dissection in Right ICA  - Per neuro at [facility] given size of stroke with petechial hemorrhage avoid anticoagulation for 6-8 weeks, continue ASA but at 81 mg daily.  "This should provide protection for stroke from Right ICA dissection while minimizing chance for worsening petechial ICH."    #Leukocytosis - resolved  - 2/17 WBC 9.12    #Pain: Tylenol PRN  - Prior opioid use: well managed without meds at the moment, patient has h/o opioid dependence, was on Suboxone treatment as outpatient.      #Hypertension  - Continue Losartan 25mg daily  - Continue amlodipine 10mg daily     #NJ prolongation  - Per cardio at [facility] "This is associated with NJ prolongation consistent with elevated vagal tone. Patient was reportedly awake during these times. It is possible elevated vagal tone from cerebral disease is causing these pauses. Pacemaker implantation would not help this and fortunately he is asymptomatic. He should be evaluated for sleep apnea at some point."  - Avoid metoprolol and/or other AVN blockers  - Outpatient cardiology followup     #Resp  - desat during last night at [facility] hospital, placed on O2 PRN and nocturnal.   - may need r/o sleep apnea with overnight pulse ox.     #GI/Bowel: Continent    #Diet: Mechanical soft with thin liquids    #Renal/Bladder: Bladder scans q8    #Precautions / PROPHYLAXIS:   - Falls  - Ortho: Weight bearing status: WBAT   - Lungs: Aspiration, Incentive Spirometer   - Pressure injury/Skin: Turn Q2hrs while in bed, OOB to Chair, PT/OT    - DVT ppx: SCDs, TEDs     IDT rounds 2/18  dc date 3/6 - goals of Sajan with grooming and UBD, mod-A with LBD and transfers. WC level for mobility. 24hr supervision.   OT: Sajan with eating. maxA with grooming, UBD. total with LBD, transfers  PT: mod-max A with popover transfers. nonfunctional ambulation 15ft with RW, requires blocking and leg advancement by therapist  SLP: poor attention and carry over, motor apraxia. aphasia. yes/no unreliable.

## 2020-02-19 NOTE — PROGRESS NOTE ADULT - SUBJECTIVE AND OBJECTIVE BOX
Patient is a 64y old  Male who presents with a chief complaint of Left MCA and JESSE CVA (18 Feb 2020 09:41)    HPI:  63 y/o M w/ no significant PMHx who was transferred from [facility] ED on 2/5/20 after presenting with aphasia, dysarthria and right hemiplegia, s/p tPA administration at 22:48 2/5/20. CTA head performed at [facility] revealing of total left ICA occlusion. LNK 6:30PM 2/5/20. S/p tPA administration, pt w/o improvement in right-sided weakness. Pt was transferred to [facility] for further neurologic evaluation. Upon arrival to ED, /100 so cardene gtt was re-started. CT head perfusion revealing of small to moderate sized left frontal infarction w/ moderate to large region of low flow and/or ischemic state in left frontoparietal region. CTA neck significant for occlusion of left internal carotid. No Neurovascular intervention at this time. Admitted to MICU for post tPA monitoring. CT-A also noted with right ICA dissection. While admitted, patient was treated with cardene drip for HTN which was stopped on 2/7 and was then transferred to medical services. Patient was seen by vascular surgery in ICU for Left Carotid occlusion and recommended for patient to be started on full anticoagulation in at least 6 weeks. Neuro was consulted, repeat CT showing petechial hemorrhages.    On 2/12 Was planned for dc to acute rehab but admission was held due to 3-3.5 second pauses on monitor. Cardiology was consulted and stated that it is associated with NE prolongation consistent with increased vagal tone possibly due to his cerebral disease. Pacemaker would not be beneficial. Patient also noted to be asymptomatic during the pauses. Metoprolol and AVN blockers held as per cardio. Patient planned for [facility].     Wife: Ca Bey (H) [phone], (C) [phone] (13 Feb 2020 11:29)      SUBJECTIVE: Patient seen and examined at bedside with brother present. No acute events overnight, No new nursing concerns. Pt responds with head nods to questions. No verbalization. Denies any pain, no headaches, slept well    REVIEW OF SYSTEMS - Nods for yes and shrugs for no.  Constitutional: No fever  HEENT: No visual disturbances  Pulm: No cough,  No shortness of breath  Cardio: No chest pain, No palpitations  GI:  No abdominal pain, No constipation  Neuro: No headaches, +loss of strength  MSK: No Neck or back pain  Psych:  No depression, No anxiety      MEDICATIONS  (STANDING):  amLODIPine   Tablet 10 milliGRAM(s) Oral every 24 hours  aspirin  chewable 81 milliGRAM(s) Oral <User Schedule>  atorvastatin 80 milliGRAM(s) Oral at bedtime  FLUoxetine 10 milliGRAM(s) Oral <User Schedule>  losartan 25 milliGRAM(s) Oral daily    MEDICATIONS  (PRN):  acetaminophen   Tablet .. 650 milliGRAM(s) Oral every 6 hours PRN Temp greater or equal to 38C (100.4F), Mild Pain (1 - 3)      VITALS  64y  Vital Signs (24 Hrs):  T(C): 36.8 (02-19-20 @ 07:29), Max: 36.8 (02-18-20 @ 19:41)  HR: 70 (02-19-20 @ 07:29) (68 - 74)  BP: 141/81 (02-19-20 @ 07:29) (121/74 - 149/93)  RR: 15 (02-19-20 @ 07:29) (14 - 15)  SpO2: 95% (02-19-20 @ 07:29) (94% - 96%)  Wt(kg): --        RECENT LABS:                          12.4   9.12  )-----------( 350      ( 17 Feb 2020 05:30 )             37.5     02-17    138  |  100  |  17  ----------------------------<  105<H>  3.9   |  31  |  0.73    Ca    8.8      17 Feb 2020 05:30    TPro  6.8  /  Alb  2.4<L>  /  TBili  0.3  /  DBili  x   /  AST  52<H>  /  ALT  37  /  AlkPhos  76  02-17    LIVER FUNCTIONS - ( 17 Feb 2020 05:30 )  Alb: 2.4 g/dL / Pro: 6.8 g/dL / ALK PHOS: 76 U/L / ALT: 37 U/L / AST: 52 U/L / GGT: x             PHYSICAL EXAM:   Gen - NAD, Comfortable  HEENT - NCAT, EOMI,   Pulm - CTAB, No wheezing  Cardiovascular - RRR, S1S2  Abdomen - Soft, NT/ND, +BS  Extremities - No C/C/E, No calf tenderness  Neuro-     Cognitive - AAO to person, nods when name called. Unable to verbalize responses. Intermittently follows command.      Communication - nonverbal, will nod and shrug to answer questions. Can be cued to form word shapes with his mouth. Expression > Receptive aphasia.      Attention: Intact      Cranial Nerves - +right facial droop, +Right sided neglect     Motor -                     LEFT    UE - ShAB 5/5, EF 5/5, EE 5/5, WE 5/5,  5/5                    RIGHT UE - ShAB 0/5, EF 0/5, EE 0/5, WE 0/5,  0/5                    LEFT    LE - HF 5/5, KE 5/5, DF 5/5, PF 5/5                    RIGHT LE - HF 0/5, KE 0/5, DF 0/5, PF 0/5        Sensory - Intact to LT     Reflexes - 1+ reflexes throughout. +R ankle clonus 3-4 beats     Tone - RUE and RLE flaccid  Psychiatric - Seems frustrated      FUNCTIONAL STATUS:  OT: Sajan with eating. maxA with grooming, UBD. total with LBD, transfers  PT: mod-max A with popover transfers. nonfunctional ambulation 15ft with RW, requires blocking and leg advancement by therapist  SLP: poor attention and carry over, motor apraxia. aphasia. yes/no unreliable.

## 2020-02-19 NOTE — PROGRESS NOTE ADULT - ASSESSMENT
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      #Left JESSE/MCA CVA with petechial hemorrhage  - Continue ASA 81 mg daily, Lipitor 80 mg at bedtime  - Continue Prozac 10 mg daily for mood motor recovery   - comprehensive PT/OT/SLP/Rehab program    # Gait abnormality   - comprehensive PT/OT/Rehab    #Dissection in Right ICA  - Per neuro at [facility] given size of stroke with petechial hemorrhage avoid anticoagulation for 6-8 weeks, continue ASA but at 81 mg daily.  "This should provide protection for stroke from Right ICA dissection while minimizing chance for worsening petechial ICH."    #Pain  - Tylenol PRN  - Prior opioid use: well managed without meds at the moment, patient has h/o opioid dependence, was on Suboxone treatment as outpatient.      #Hypertension - BP control  - Continue Losartan 25 mg daily, amlodipine 10mg daily     #[place] prolongation  - Per cardiologist at [facility] "This is associated with [place] prolongation consistent with elevated vagal tone. Patient was reportedly awake during these times. It is possible elevated vagal tone from cerebral disease is causing these pauses. Pacemaker implantation would not help this and fortunately he is asymptomatic. He should be evaluated for sleep apnea at some point."  - Avoid metoprolol and/or other AVN blockers  - Outpatient cardiology followup     #Hypoxia  - desat night before at acute care hospital, placed on O2 PRN and nocturnal  - may need r/o sleep apnea  - f/u pulmonary    #Constipation  - had BM, continue bowel regimen, Colace, Senna, Miralax PRN    #Dysphagia  - continue Mechanical soft diet with thin liquids    #DVT ppx  - SCDs, TEDs

## 2020-02-20 LAB
ALBUMIN SERPL ELPH-MCNC: 2.5 G/DL — LOW (ref 3.3–5)
ALP SERPL-CCNC: 81 U/L — SIGNIFICANT CHANGE UP (ref 40–120)
ALT FLD-CCNC: 30 U/L — SIGNIFICANT CHANGE UP (ref 10–45)
ANION GAP SERPL CALC-SCNC: 8 MMOL/L — SIGNIFICANT CHANGE UP (ref 5–17)
AST SERPL-CCNC: 35 U/L — SIGNIFICANT CHANGE UP (ref 10–40)
BASOPHILS # BLD AUTO: 0.07 K/UL — SIGNIFICANT CHANGE UP (ref 0–0.2)
BASOPHILS NFR BLD AUTO: 0.5 % — SIGNIFICANT CHANGE UP (ref 0–2)
BILIRUB SERPL-MCNC: 0.3 MG/DL — SIGNIFICANT CHANGE UP (ref 0.2–1.2)
BUN SERPL-MCNC: 12 MG/DL — SIGNIFICANT CHANGE UP (ref 7–23)
CALCIUM SERPL-MCNC: 8.9 MG/DL — SIGNIFICANT CHANGE UP (ref 8.4–10.5)
CHLORIDE SERPL-SCNC: 102 MMOL/L — SIGNIFICANT CHANGE UP (ref 96–108)
CO2 SERPL-SCNC: 29 MMOL/L — SIGNIFICANT CHANGE UP (ref 22–31)
CREAT SERPL-MCNC: 0.74 MG/DL — SIGNIFICANT CHANGE UP (ref 0.5–1.3)
EOSINOPHIL # BLD AUTO: 0.09 K/UL — SIGNIFICANT CHANGE UP (ref 0–0.5)
EOSINOPHIL NFR BLD AUTO: 0.7 % — SIGNIFICANT CHANGE UP (ref 0–6)
GLUCOSE SERPL-MCNC: 109 MG/DL — HIGH (ref 70–99)
HCT VFR BLD CALC: 36.4 % — LOW (ref 39–50)
HGB BLD-MCNC: 11.9 G/DL — LOW (ref 13–17)
IMM GRANULOCYTES NFR BLD AUTO: 0.4 % — SIGNIFICANT CHANGE UP (ref 0–1.5)
LYMPHOCYTES # BLD AUTO: 1.17 K/UL — SIGNIFICANT CHANGE UP (ref 1–3.3)
LYMPHOCYTES # BLD AUTO: 8.6 % — LOW (ref 13–44)
MCHC RBC-ENTMCNC: 29 PG — SIGNIFICANT CHANGE UP (ref 27–34)
MCHC RBC-ENTMCNC: 32.7 GM/DL — SIGNIFICANT CHANGE UP (ref 32–36)
MCV RBC AUTO: 88.8 FL — SIGNIFICANT CHANGE UP (ref 80–100)
MONOCYTES # BLD AUTO: 0.92 K/UL — HIGH (ref 0–0.9)
MONOCYTES NFR BLD AUTO: 6.8 % — SIGNIFICANT CHANGE UP (ref 2–14)
NEUTROPHILS # BLD AUTO: 11.28 K/UL — HIGH (ref 1.8–7.4)
NEUTROPHILS NFR BLD AUTO: 83 % — HIGH (ref 43–77)
NRBC # BLD: 0 /100 WBCS — SIGNIFICANT CHANGE UP (ref 0–0)
PLATELET # BLD AUTO: 366 K/UL — SIGNIFICANT CHANGE UP (ref 150–400)
POTASSIUM SERPL-MCNC: 3.8 MMOL/L — SIGNIFICANT CHANGE UP (ref 3.5–5.3)
POTASSIUM SERPL-SCNC: 3.8 MMOL/L — SIGNIFICANT CHANGE UP (ref 3.5–5.3)
PROT SERPL-MCNC: 6.6 G/DL — SIGNIFICANT CHANGE UP (ref 6–8.3)
RBC # BLD: 4.1 M/UL — LOW (ref 4.2–5.8)
RBC # FLD: 12.5 % — SIGNIFICANT CHANGE UP (ref 10.3–14.5)
SODIUM SERPL-SCNC: 139 MMOL/L — SIGNIFICANT CHANGE UP (ref 135–145)
WBC # BLD: 13.58 K/UL — HIGH (ref 3.8–10.5)
WBC # FLD AUTO: 13.58 K/UL — HIGH (ref 3.8–10.5)

## 2020-02-20 PROCEDURE — 99232 SBSQ HOSP IP/OBS MODERATE 35: CPT | Mod: GC

## 2020-02-20 PROCEDURE — 99232 SBSQ HOSP IP/OBS MODERATE 35: CPT

## 2020-02-20 RX ORDER — FLUOXETINE HCL 10 MG
20 CAPSULE ORAL DAILY
Refills: 0 | Status: DISCONTINUED | OUTPATIENT
Start: 2020-02-21 | End: 2020-03-06

## 2020-02-20 RX ADMIN — Medication 10 MILLIGRAM(S): at 05:49

## 2020-02-20 RX ADMIN — LOSARTAN POTASSIUM 25 MILLIGRAM(S): 100 TABLET, FILM COATED ORAL at 05:49

## 2020-02-20 RX ADMIN — Medication 81 MILLIGRAM(S): at 05:49

## 2020-02-20 RX ADMIN — ATORVASTATIN CALCIUM 80 MILLIGRAM(S): 80 TABLET, FILM COATED ORAL at 21:05

## 2020-02-20 RX ADMIN — AMLODIPINE BESYLATE 10 MILLIGRAM(S): 2.5 TABLET ORAL at 05:49

## 2020-02-20 NOTE — PROGRESS NOTE ADULT - SUBJECTIVE AND OBJECTIVE BOX
Patient is a 64 year old male who presents with a chief complaint of Left MCA and JESSE CVA (19 Feb 2020 12:54)    Patient seen and examined. Patient unable to provide history due to aphasia.      MEDICATIONS  (STANDING):  amLODIPine   Tablet 10 milliGRAM(s) Oral every 24 hours  aspirin  chewable 81 milliGRAM(s) Oral <User Schedule>  atorvastatin 80 milliGRAM(s) Oral at bedtime  FLUoxetine 10 milliGRAM(s) Oral <User Schedule>  losartan 25 milliGRAM(s) Oral daily    MEDICATIONS  (PRN):  acetaminophen   Tablet .. 650 milliGRAM(s) Oral every 6 hours PRN Temp greater or equal to 38C (100.4F), Mild Pain (1 - 3)    REVIEW OF SYSTEMS:  Patient unable to provide due to aphasia      PHYSICAL EXAM:    T(C): 36.9 (02-20-20 @ 08:52), Max: 37.1 (02-19-20 @ 20:52)  HR: 80 (02-20-20 @ 08:52) (68 - 80)  BP: 136/86 (02-20-20 @ 08:52) (136/86 - 150/88)  RR: 15 (02-20-20 @ 08:52) (15 - 15)  SpO2: 96% (02-20-20 @ 08:52) (95% - 96%)      GENERAL: NAD, well-developed  HEAD:  Atraumatic, Normocephalic  EYES: EOMI, PERRL, conjunctiva and sclera clear  ENMT:  Moist mucous membranes  NECK: Supple, No JVD, Normal thyroid  NERVOUS SYSTEM:  Alert, awake, has aphasia, has right side neglect, right sided facial droop, right sided weakness, limited exam as intermittently follows commands   CHEST/LUNG: Clear to ascultation bilaterally; No rales, rhonchi, wheezing, or rubs  HEART: Regular rate and rhythm; No murmurs, rubs, or gallops  ABDOMEN: Soft, Nontender, Nondistended; Bowel sounds present  EXTREMITIES:  2+ Peripheral Pulses, No clubbing, cyanosis, or edema  SKIN: No rash          LABS:                        11.9   13.58 )-----------( 366      ( 20 Feb 2020 06:33 )             36.4     02-20    139  |  102  |  12  ----------------------------<  109<H>  3.8   |  29  |  0.74    Ca    8.9      20 Feb 2020 06:33    TPro  6.6  /  Alb  2.5<L>  /  TBili  0.3  /  DBili  x   /  AST  35  /  ALT  30  /  AlkPhos  81  02-20      RADIOLOGY & ADDITIONAL TESTS:    Imaging Reviewed:  [x] YES  [ ] NO    Consultant(s) Notes Reviewed:  [x] YES  [ ] NO    Care Discussed with Consultants/Other Providers [x] YES  [ ] NO

## 2020-02-20 NOTE — PROGRESS NOTE ADULT - ASSESSMENT
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    Rehab: Gait Instability, ADL impairments and Functional impairments: Continue Comprehensive Rehab Program of PT/OT/ST    #Left JESSE/MCA CVA with petechial hemorrhage  - Continue ASA 81mg daily  - Continue Lipitor 80mg QHS  - 2/20 increase Prozac 20mg daily for mood motor recovery     #Dissection in Right ICA  - Per neuro at [facility] given size of stroke with petechial hemorrhage avoid anticoagulation for 6-8 weeks, continue ASA but at 81 mg daily.  "This should provide protection for stroke from Right ICA dissection while minimizing chance for worsening petechial ICH."    #Leukocytosis   - 2/20 WBC 13.58, afebrile, VSS    #Pain: Tylenol PRN  - Prior opioid use: well managed without meds at the moment, patient has h/o opioid dependence, was on Suboxone treatment as outpatient.      #Hypertension  - Continue Losartan 25mg daily  - Continue amlodipine 10mg daily     #IN prolongation  - Per cardio at [facility] "This is associated with IN prolongation consistent with elevated vagal tone. Patient was reportedly awake during these times. It is possible elevated vagal tone from cerebral disease is causing these pauses. Pacemaker implantation would not help this and fortunately he is asymptomatic. He should be evaluated for sleep apnea at some point."  - Avoid metoprolol and/or other AVN blockers  - Outpatient cardiology followup     #Resp   - desat during last night at acute care hospital, placed on O2 PRN and nocturnal.   - may need r/o sleep apnea with overnight pulse ox.     #GI/Bowel: Continent    #Diet: Mechanical soft with thin liquids    #Renal/Bladder: dc Bladder scans     #Precautions / PROPHYLAXIS:   - Falls  - Ortho: Weight bearing status: WBAT   - Lungs: Aspiration, Incentive Spirometer   - Pressure injury/Skin: Turn Q2hrs while in bed, OOB to Chair, PT/OT    - DVT ppx: SCDs, TEDs     IDT rounds 2/18  dc date 3/6   goals of Sajan with grooming and UBD, mod-A with LBD and transfers. WC level for mobility. 24hr supervision.   OT: Sajan with eating. maxA with grooming, UBD. total with LBD, transfers  PT: mod-max A with popover transfers. nonfunctional ambulation 15ft with RW, requires blocking and leg advancement by therapist  SLP: poor attention and carry over, motor apraxia. aphasia. yes/no unreliable.

## 2020-02-20 NOTE — PROGRESS NOTE ADULT - ATTENDING COMMENTS
Chart reviewed. Patient seen at bedside  No new issues overnight  Neuro exam unchanged  Labs with mildly elevated WBC. Patient afebrile fu in am     2. Continue rehab program

## 2020-02-20 NOTE — PROGRESS NOTE ADULT - ASSESSMENT
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      #Left JESSE/MCA CVA with petechial hemorrhage  - Continue ASA 81 mg daily, Lipitor 80 mg at bedtime  - Continue Prozac 10 mg daily for mood motor recovery   - comprehensive PT/OT/SLP/Rehab program    #Gait abnormality   - comprehensive PT/OT/Rehab    #Leukocytosis  - currently afebrile, vitals stable  - recent U/A, CXR negative  - monitor    #Dissection in Right ICA  - Per neuro at [facility] given size of stroke with petechial hemorrhage avoid anticoagulation for 6-8 weeks, continue ASA but at 81 mg daily.  "This should provide protection for stroke from Right ICA dissection while minimizing chance for worsening petechial ICH."    #Pain  - Tylenol PRN  - Prior opioid use: well managed without meds at the moment, patient has h/o opioid dependence, was on Suboxone treatment as outpatient.      #Hypertension - BP control  - Continue Losartan 25 mg daily, amlodipine 10mg daily     #GA prolongation  - Per cardiologist at [facility] "This is associated with GA prolongation consistent with elevated vagal tone. Patient was reportedly awake during these times. It is possible elevated vagal tone from cerebral disease is causing these pauses. Pacemaker implantation would not help this and fortunately he is asymptomatic. He should be evaluated for sleep apnea at some point."  - Avoid metoprolol and/or other AVN blockers  - Outpatient cardiology followup     #Hypoxia  - desat night before at acute care hospital, placed on O2 PRN and nocturnal  - may need r/o sleep apnea  - f/u pulmonary    #Constipation  - had BM, continue bowel regimen, Colace, Senna, Miralax PRN    #Dysphagia  - continue Mechanical soft diet with thin liquids    #DVT ppx  - SCDs, TEDs

## 2020-02-20 NOTE — PROGRESS NOTE ADULT - SUBJECTIVE AND OBJECTIVE BOX
Patient is a 64y old  Male who presents with a chief complaint of Left MCA and JESSE CVA (20 Feb 2020 10:24)      HPI:  65 y/o M w/ no significant PMHx who was transferred from [facility] ED on 2/5/20 after presenting with aphasia, dysarthria and right hemiplegia, s/p tPA administration at 22:48 2/5/20. CTA head performed at [facility] revealing of total left ICA occlusion. LNK 6:30PM 2/5/20. S/p tPA administration, pt w/o improvement in right-sided weakness. Pt was transferred to [facility] for further neurologic evaluation. Upon arrival to ED, /100 so cardene gtt was re-started. CT head perfusion revealing of small to moderate sized left frontal infarction w/ moderate to large region of low flow and/or ischemic state in left frontoparietal region. CTA neck significant for occlusion of left internal carotid. No Neurovascular intervention at this time. Admitted to MICU for post tPA monitoring. CT-A also noted with right ICA dissection. While admitted, patient was treated with cardene drip for HTN which was stopped on 2/7 and was then transferred to medical services. Patient was seen by vascular surgery in ICU for Left Carotid occlusion and recommended for patient to be started on full anticoagulation in at least 6 weeks. Neuro was consulted, repeat CT showing petechial hemorrhages.    On 2/12 Was planned for dc to acute rehab but admission was held due to 3-3.5 second pauses on monitor. Cardiology was consulted and stated that it is associated with CA prolongation consistent with increased vagal tone possibly due to his cerebral disease. Pacemaker would not be beneficial. Patient also noted to be asymptomatic during the pauses. Metoprolol and AVN blockers held as per cardio. Patient planned for dc to [facility] today.     Wife: Ca Bey (H) [phone], (C) [phone] (13 Feb 2020 11:29)        SUBJECTIVE: Patient seen and examined during breakfast. No acute overnight events, slept well. Patient encouraged to attempt to verbalize and vocalize, but shakes his head. Nods for yes. Denies any pain or discomfort.  No other complaints.       REVIEW OF SYSTEMS - Nods for yes and shrugs for no.  Constitutional: No fever  HEENT: No visual disturbances  Pulm: No cough,  No shortness of breath  Cardio: No chest pain, No palpitations  GI:  No abdominal pain, No constipation  Neuro: No headaches, +loss of strength  MSK: No Neck or back pain  Psych:  No depression, No anxiety        MEDICATIONS  (STANDING):  amLODIPine   Tablet 10 milliGRAM(s) Oral every 24 hours  aspirin  chewable 81 milliGRAM(s) Oral <User Schedule>  atorvastatin 80 milliGRAM(s) Oral at bedtime  losartan 25 milliGRAM(s) Oral daily    MEDICATIONS  (PRN):  acetaminophen   Tablet .. 650 milliGRAM(s) Oral every 6 hours PRN Temp greater or equal to 38C (100.4F), Mild Pain (1 - 3)          VITALS  64y  Vital Signs Last 24 Hrs  T(C): 36.9 (20 Feb 2020 08:52), Max: 37.1 (19 Feb 2020 20:52)  T(F): 98.5 (20 Feb 2020 08:52), Max: 98.8 (19 Feb 2020 20:52)  HR: 80 (20 Feb 2020 08:52) (68 - 80)  BP: 136/86 (20 Feb 2020 08:52) (136/86 - 150/88)  BP(mean): --  RR: 15 (20 Feb 2020 08:52) (15 - 15)  SpO2: 96% (20 Feb 2020 08:52) (95% - 96%)  Daily     Daily       RECENT LABS:                          11.9   13.58 )-----------( 366      ( 20 Feb 2020 06:33 )             36.4     02-20    139  |  102  |  12  ----------------------------<  109<H>  3.8   |  29  |  0.74    Ca    8.9      20 Feb 2020 06:33    TPro  6.6  /  Alb  2.5<L>  /  TBili  0.3  /  DBili  x   /  AST  35  /  ALT  30  /  AlkPhos  81  02-20    LIVER FUNCTIONS - ( 20 Feb 2020 06:33 )  Alb: 2.5 g/dL / Pro: 6.6 g/dL / ALK PHOS: 81 U/L / ALT: 30 U/L / AST: 35 U/L / GGT: x                   CAPILLARY BLOOD GLUCOSE            PHYSICAL EXAM:   Gen - NAD, Comfortable  HEENT - NCAT, EOMI,   Pulm - CTAB, No wheezing  Cardiovascular - RRR, S1S2  Abdomen - Soft, NT/ND, +BS  Extremities - No C/C/E, No calf tenderness  Neuro-     Cognitive - AAO to person, nods when name called. Unable to verbalize responses. Intermittently follows command.      Communication - nonverbal, will nod and shrug to answer questions. Can be cued to form word shapes with his mouth. Expression > Receptive aphasia.      Attention: Intact      Cranial Nerves - +right facial droop, +Right sided neglect     Motor -                     LEFT    UE - ShAB 5/5, EF 5/5, EE 5/5, WE 5/5,  5/5                    RIGHT UE - ShAB 0/5, EF 0/5, EE 0/5, WE 0/5,  0/5                    LEFT    LE - HF 5/5, KE 5/5, DF 5/5, PF 5/5                    RIGHT LE - HF 0/5, KE 0/5, DF 0/5, PF 0/5        Sensory - Intact to LT     Reflexes - 1+ reflexes throughout. +R ankle clonus 3-4 beats     Tone - RUE and RLE flaccid  Psychiatric - Seems frustrated      FUNCTIONAL STATUS:  OT: Sajan with eating. maxA with grooming, UBD. total with LBD, transfers  PT: mod-max A with popover transfers. nonfunctional ambulation 15ft with RW, requires blocking and leg advancement by therapist  SLP: poor attention and carry over, motor apraxia. aphasia. yes/no unreliable.

## 2020-02-21 PROCEDURE — 99232 SBSQ HOSP IP/OBS MODERATE 35: CPT | Mod: GC

## 2020-02-21 PROCEDURE — 99232 SBSQ HOSP IP/OBS MODERATE 35: CPT

## 2020-02-21 RX ADMIN — AMLODIPINE BESYLATE 10 MILLIGRAM(S): 2.5 TABLET ORAL at 05:44

## 2020-02-21 RX ADMIN — ATORVASTATIN CALCIUM 80 MILLIGRAM(S): 80 TABLET, FILM COATED ORAL at 21:14

## 2020-02-21 RX ADMIN — Medication 81 MILLIGRAM(S): at 05:44

## 2020-02-21 RX ADMIN — LOSARTAN POTASSIUM 25 MILLIGRAM(S): 100 TABLET, FILM COATED ORAL at 05:44

## 2020-02-21 RX ADMIN — Medication 20 MILLIGRAM(S): at 12:43

## 2020-02-21 NOTE — PROGRESS NOTE ADULT - ASSESSMENT
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    Rehab: Gait Instability, ADL impairments and Functional impairments: Continue Comprehensive Rehab Program of PT/OT/ST    #Left JESSE/MCA CVA with petechial hemorrhage  - Continue ASA 81mg daily  - Continue Lipitor 80mg QHS  - Prozac 20mg daily (inc. from 10mg on 2/21) for mood & motor recovery     #Expressive Aphasia  - pt attempting to verbalize couple of words  - will consider starting Namenda/Aricept for aphasia     #Dissection in Right ICA  - Per neuro at [facility] given size of stroke with petechial hemorrhage avoid anticoagulation for 6-8 weeks, continue ASA but at 81 mg daily.  "This should provide protection for stroke from Right ICA dissection while minimizing chance for worsening petechial ICH."    #Leukocytosis   - 2/20 WBC 13.58, afebrile, VSS, no signs of active infection  - repeat labs 2/24    #Pain: Tylenol PRN  - Prior opioid use: well managed without meds at the moment, patient has h/o opioid dependence, was on Suboxone treatment as outpatient.      #Hypertension  - Continue Losartan 25mg daily  - Continue amlodipine 10mg daily     #PA prolongation  - Per cardio at [facility] "This is associated with PA prolongation consistent with elevated vagal tone. Patient was reportedly awake during these times. It is possible elevated vagal tone from cerebral disease is causing these pauses. Pacemaker implantation would not help this and fortunately he is asymptomatic. He should be evaluated for sleep apnea at some point."  - Avoid metoprolol and/or other AVN blockers  - Outpatient cardiology followup     #Resp   - at prior hospital pt had an episode of desat and was placed on O2 prn   - may need r/o sleep apnea with overnight pulse ox.     #GI/Bowel: Continent    #Diet: Mechanical soft with thin liquids    #Renal/Bladder: voiding    #Precautions / PROPHYLAXIS:   - Falls  - Ortho: Weight bearing status: WBAT   - Lungs: Aspiration, Incentive Spirometer   - Pressure injury/Skin: Turn Q2hrs while in bed, OOB to Chair, PT/OT    - DVT ppx: SCDs, TEDs

## 2020-02-21 NOTE — PROGRESS NOTE ADULT - SUBJECTIVE AND OBJECTIVE BOX
Patient is a 64 year old  Male who presents with a chief complaint of Left MCA and JESSE CVA (21 Feb 2020 13:23)    Patient seen and examined. Patient unable to provide history due to aphasia.        MEDICATIONS  (STANDING):  amLODIPine   Tablet 10 milliGRAM(s) Oral every 24 hours  aspirin  chewable 81 milliGRAM(s) Oral <User Schedule>  atorvastatin 80 milliGRAM(s) Oral at bedtime  FLUoxetine 20 milliGRAM(s) Oral daily  losartan 25 milliGRAM(s) Oral daily    MEDICATIONS  (PRN):  acetaminophen   Tablet .. 650 milliGRAM(s) Oral every 6 hours PRN Temp greater or equal to 38C (100.4F), Mild Pain (1 - 3)    REVIEW OF SYSTEMS:  Patient unable to provide due to aphasia        PHYSICAL EXAM:    T(C): 36.7 (02-21-20 @ 08:35), Max: 37.1 (02-20-20 @ 20:53)  HR: 67 (02-21-20 @ 08:35) (67 - 78)  BP: 155/80 (02-21-20 @ 08:35) (155/80 - 165/87)  RR: 16 (02-21-20 @ 08:35) (14 - 16)  SpO2: 95% (02-21-20 @ 08:35) (95% - 96%)  I&O's Summary    20 Feb 2020 07:01  -  21 Feb 2020 07:00  --------------------------------------------------------  IN: 480 mL / OUT: 0 mL / NET: 480 mL      GENERAL: NAD, well-developed  HEAD:  Atraumatic, Normocephalic  EYES: EOMI, PERRL, conjunctiva and sclera clear  ENMT:  Moist mucous membranes  NECK: Supple, No JVD, Normal thyroid  NERVOUS SYSTEM:  Alert, awake, has aphasia, has right side neglect, right sided facial droop, right sided weakness, limited exam as intermittently follows commands   CHEST/LUNG: Clear to ascultation bilaterally; No rales, rhonchi, wheezing, or rubs  HEART: Regular rate and rhythm; No murmurs, rubs, or gallops  ABDOMEN: Soft, Nontender, Nondistended; Bowel sounds present  EXTREMITIES:  2+ Peripheral Pulses, No clubbing, cyanosis, or edema  SKIN: No rash          LABS:                        11.9   13.58 )-----------( 366      ( 20 Feb 2020 06:33 )             36.4     02-20    139  |  102  |  12  ----------------------------<  109<H>  3.8   |  29  |  0.74    Ca    8.9      20 Feb 2020 06:33    TPro  6.6  /  Alb  2.5<L>  /  TBili  0.3  /  DBili  x   /  AST  35  /  ALT  30  /  AlkPhos  81  02-20          RADIOLOGY & ADDITIONAL TESTS:    Imaging Reviewed:  [x] YES  [ ] NO    Consultant(s) Notes Reviewed:  [x] YES  [ ] NO    Care Discussed with Consultants/Other Providers [x] YES  [ ] NO

## 2020-02-21 NOTE — PROGRESS NOTE ADULT - ATTENDING COMMENTS
Chart reviewed. patient seen this am.  Appears comfortable  Attempting to verbalise - Able to say - I, me  Right hemiplegia unchanged  Prozac dose increased to 20mg daily     Further w/u for leucocytosis if febrile  fu labs on monday     Continue rehab program

## 2020-02-21 NOTE — PROGRESS NOTE ADULT - ASSESSMENT
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      #Left JESSE/MCA CVA with petechial hemorrhage  - Continue ASA 81 mg daily, Lipitor 80 mg at bedtime  - Continue Prozac 10 mg daily for mood motor recovery   - comprehensive PT/OT/SLP/Rehab program    #Gait abnormality   - comprehensive PT/OT/Rehab    #Leukocytosis  - currently afebrile, vitals stable  - recent U/A, CXR negative  - monitor    #Dissection in Right ICA  - Per neuro at [facility] given size of stroke with petechial hemorrhage avoid anticoagulation for 6-8 weeks, continue ASA but at 81 mg daily.  "This should provide protection for stroke from Right ICA dissection while minimizing chance for worsening petechial ICH."    #Pain  - Tylenol PRN  - Prior opioid use: well managed without meds at the moment, patient has h/o opioid dependence, was on Suboxone treatment as outpatient.      #Hypertension - BP control  - Continue Losartan 25 mg daily, amlodipine 10mg daily     #HI prolongation  - Per cardiologist at [facility] "This is associated with HI prolongation consistent with elevated vagal tone. Patient was reportedly awake during these times. It is possible elevated vagal tone from cerebral disease is causing these pauses. Pacemaker implantation would not help this and fortunately he is asymptomatic. He should be evaluated for sleep apnea at some point."  - Avoid metoprolol and/or other AVN blockers  - Outpatient cardiology followup     #Hypoxia  - desat night before at acute care hospital, placed on O2 PRN and nocturnal  - may need r/o sleep apnea  - f/u pulmonary    #Constipation  - had BM, continue bowel regimen, Colace, Senna, Miralax PRN    #Dysphagia  - continue Mechanical soft diet with thin liquids    #DVT ppx  - SCDs, TEDs

## 2020-02-21 NOTE — PROGRESS NOTE ADULT - SUBJECTIVE AND OBJECTIVE BOX
Patient is a 64y old  Male who presents with a chief complaint of Left MCA and JESSE CVA (20 Feb 2020 10:24)    HPI:  65 y/o M w/ no significant PMHx who was transferred from [facility] ED on 2/5/20 after presenting with aphasia, dysarthria and right hemiplegia, s/p tPA administration at 22:48 2/5/20. CTA head performed at [facility] revealing of total left ICA occlusion. LNK 6:30PM 2/5/20. S/p tPA administration, pt w/o improvement in right-sided weakness. Pt was transferred to [facility] for further neurologic evaluation. Upon arrival to ED, /100 so cardene gtt was re-started. CT head perfusion revealing of small to moderate sized left frontal infarction w/ moderate to large region of low flow and/or ischemic state in left frontoparietal region. CTA neck significant for occlusion of left internal carotid. No Neurovascular intervention at this time. Admitted to MICU for post tPA monitoring. CT-A also noted with right ICA dissection. While admitted, patient was treated with cardene drip for HTN which was stopped on 2/7 and was then transferred to medical services. Patient was seen by vascular surgery in ICU for Left Carotid occlusion and recommended for patient to be started on full anticoagulation in at least 6 weeks. Neuro was consulted, repeat CT showing petechial hemorrhages.    On 2/12 Was planned for dc to acute rehab but admission was held due to 3-3.5 second pauses on monitor. Cardiology was consulted and stated that it is associated with AR prolongation consistent with increased vagal tone possibly due to his cerebral disease. Pacemaker would not be beneficial. Patient also noted to be asymptomatic during the pauses. Metoprolol and AVN blockers held as per cardio. Patient planned for dc to [facility] today.     Wife: Ca Bey (H) [phone], (C) [phone] (13 Feb 2020 11:29)      SUBJECTIVE: Patient seen and examined . No acute overnight events, slept well. As per therapy, pt attempting to verbalize more today. Able to say few words. Denies any pain or discomfort.  No other complaints.       REVIEW OF SYSTEMS - Nods for yes and shrugs for no.  Constitutional: No fever  HEENT: No visual disturbances  Pulm: No cough,  No shortness of breath  Cardio: No chest pain, No palpitations  GI:  No abdominal pain, No constipation  Neuro: No headaches, +loss of strength  MSK: No Neck or back pain  Psych:  No depression, No anxiety      VITALS  Vital Signs (24 Hrs):  T(C): 36.7 (02-21-20 @ 08:35), Max: 37.1 (02-20-20 @ 20:53)  HR: 67 (02-21-20 @ 08:35) (67 - 78)  BP: 155/80 (02-21-20 @ 08:35) (155/80 - 165/87)  RR: 16 (02-21-20 @ 08:35) (14 - 16)  SpO2: 95% (02-21-20 @ 08:35) (95% - 96%)  Wt(kg): --      PHYSICAL EXAM:   Gen - NAD, Comfortable  HEENT - NCAT, EOMI,   Pulm - CTAB, No wheezing  Cardiovascular - RRR, S1S2  Abdomen - Soft, NT/ND, +BS  Extremities - No C/C/E, No calf tenderness  Neuro-     Cognitive - AAO to person, nods when name called. Unable to verbalize responses. Intermittently follows command.      Communication - nonverbal, will nod and shrug to answer questions. Can be cued to form word shapes with his mouth. Expression > Receptive aphasia.      Attention: Intact      Cranial Nerves - +right facial droop, +Right sided neglect     Motor -                     LEFT    UE - ShAB 5/5, EF 5/5, EE 5/5, WE 5/5,  5/5                    RIGHT UE - ShAB 0/5, EF 0/5, EE 0/5, WE 0/5,  0/5                    LEFT    LE - HF 5/5, KE 5/5, DF 5/5, PF 5/5                    RIGHT LE - HF 0/5, KE 0/5, DF 0/5, PF 0/5        Sensory - Intact to LT     Reflexes - 1+ reflexes throughout. +R ankle clonus 3-4 beats     Tone - RUE and RLE flaccid  Psychiatric - Seems frustrated      FUNCTIONAL STATUS:  IDT rounds 2/18  dc date 3/6   goals of Sajan with grooming and UBD, mod-A with LBD and transfers. WC level for mobility. 24hr supervision.   OT: Sajan with eating. maxA with grooming, UBD. total with LBD, transfers  PT: mod-max A with popover transfers. nonfunctional ambulation 15ft with RW, requires blocking and leg advancement by therapist  SLP: poor attention and carry over, motor apraxia. aphasia. yes/no unreliable.     RECENT LABS:                          11.9   13.58 )-----------( 366      ( 20 Feb 2020 06:33 )             36.4     02-20    139  |  102  |  12  ----------------------------<  109<H>  3.8   |  29  |  0.74    Ca    8.9      20 Feb 2020 06:33    TPro  6.6  /  Alb  2.5<L>  /  TBili  0.3  /  DBili  x   /  AST  35  /  ALT  30  /  AlkPhos  81  02-20    LIVER FUNCTIONS - ( 20 Feb 2020 06:33 )  Alb: 2.5 g/dL / Pro: 6.6 g/dL / ALK PHOS: 81 U/L / ALT: 30 U/L / AST: 35 U/L / GGT: x        MEDICATIONS  (STANDING):  amLODIPine   Tablet 10 milliGRAM(s) Oral every 24 hours  aspirin  chewable 81 milliGRAM(s) Oral <User Schedule>  atorvastatin 80 milliGRAM(s) Oral at bedtime  FLUoxetine 20 milliGRAM(s) Oral daily  losartan 25 milliGRAM(s) Oral daily    MEDICATIONS  (PRN):  acetaminophen   Tablet .. 650 milliGRAM(s) Oral every 6 hours PRN Temp greater or equal to 38C (100.4F), Mild Pain (1 - 3)

## 2020-02-22 LAB
ALBUMIN SERPL ELPH-MCNC: 2.7 G/DL — LOW (ref 3.3–5)
ALP SERPL-CCNC: 80 U/L — SIGNIFICANT CHANGE UP (ref 40–120)
ALT FLD-CCNC: 36 U/L — SIGNIFICANT CHANGE UP (ref 10–45)
ANION GAP SERPL CALC-SCNC: 9 MMOL/L — SIGNIFICANT CHANGE UP (ref 5–17)
AST SERPL-CCNC: 37 U/L — SIGNIFICANT CHANGE UP (ref 10–40)
BASOPHILS # BLD AUTO: 0.07 K/UL — SIGNIFICANT CHANGE UP (ref 0–0.2)
BASOPHILS NFR BLD AUTO: 0.6 % — SIGNIFICANT CHANGE UP (ref 0–2)
BILIRUB SERPL-MCNC: 0.3 MG/DL — SIGNIFICANT CHANGE UP (ref 0.2–1.2)
BUN SERPL-MCNC: 14 MG/DL — SIGNIFICANT CHANGE UP (ref 7–23)
CALCIUM SERPL-MCNC: 9.1 MG/DL — SIGNIFICANT CHANGE UP (ref 8.4–10.5)
CHLORIDE SERPL-SCNC: 104 MMOL/L — SIGNIFICANT CHANGE UP (ref 96–108)
CO2 SERPL-SCNC: 27 MMOL/L — SIGNIFICANT CHANGE UP (ref 22–31)
CREAT SERPL-MCNC: 0.71 MG/DL — SIGNIFICANT CHANGE UP (ref 0.5–1.3)
EOSINOPHIL # BLD AUTO: 0.08 K/UL — SIGNIFICANT CHANGE UP (ref 0–0.5)
EOSINOPHIL NFR BLD AUTO: 0.7 % — SIGNIFICANT CHANGE UP (ref 0–6)
GLUCOSE SERPL-MCNC: 113 MG/DL — HIGH (ref 70–99)
HCT VFR BLD CALC: 38.7 % — LOW (ref 39–50)
HGB BLD-MCNC: 12.6 G/DL — LOW (ref 13–17)
IMM GRANULOCYTES NFR BLD AUTO: 0.6 % — SIGNIFICANT CHANGE UP (ref 0–1.5)
LYMPHOCYTES # BLD AUTO: 1.23 K/UL — SIGNIFICANT CHANGE UP (ref 1–3.3)
LYMPHOCYTES # BLD AUTO: 10.6 % — LOW (ref 13–44)
MCHC RBC-ENTMCNC: 29.1 PG — SIGNIFICANT CHANGE UP (ref 27–34)
MCHC RBC-ENTMCNC: 32.6 GM/DL — SIGNIFICANT CHANGE UP (ref 32–36)
MCV RBC AUTO: 89.4 FL — SIGNIFICANT CHANGE UP (ref 80–100)
MONOCYTES # BLD AUTO: 0.77 K/UL — SIGNIFICANT CHANGE UP (ref 0–0.9)
MONOCYTES NFR BLD AUTO: 6.6 % — SIGNIFICANT CHANGE UP (ref 2–14)
NEUTROPHILS # BLD AUTO: 9.43 K/UL — HIGH (ref 1.8–7.4)
NEUTROPHILS NFR BLD AUTO: 80.9 % — HIGH (ref 43–77)
NRBC # BLD: 0 /100 WBCS — SIGNIFICANT CHANGE UP (ref 0–0)
PLATELET # BLD AUTO: 399 K/UL — SIGNIFICANT CHANGE UP (ref 150–400)
POTASSIUM SERPL-MCNC: 4 MMOL/L — SIGNIFICANT CHANGE UP (ref 3.5–5.3)
POTASSIUM SERPL-SCNC: 4 MMOL/L — SIGNIFICANT CHANGE UP (ref 3.5–5.3)
PROT SERPL-MCNC: 7.1 G/DL — SIGNIFICANT CHANGE UP (ref 6–8.3)
RBC # BLD: 4.33 M/UL — SIGNIFICANT CHANGE UP (ref 4.2–5.8)
RBC # FLD: 12.5 % — SIGNIFICANT CHANGE UP (ref 10.3–14.5)
SODIUM SERPL-SCNC: 140 MMOL/L — SIGNIFICANT CHANGE UP (ref 135–145)
WBC # BLD: 11.65 K/UL — HIGH (ref 3.8–10.5)
WBC # FLD AUTO: 11.65 K/UL — HIGH (ref 3.8–10.5)

## 2020-02-22 PROCEDURE — 99233 SBSQ HOSP IP/OBS HIGH 50: CPT

## 2020-02-22 PROCEDURE — 93970 EXTREMITY STUDY: CPT | Mod: 26

## 2020-02-22 PROCEDURE — 99232 SBSQ HOSP IP/OBS MODERATE 35: CPT

## 2020-02-22 RX ORDER — LOSARTAN POTASSIUM 100 MG/1
25 TABLET, FILM COATED ORAL
Refills: 0 | Status: DISCONTINUED | OUTPATIENT
Start: 2020-02-22 | End: 2020-02-24

## 2020-02-22 RX ADMIN — AMLODIPINE BESYLATE 10 MILLIGRAM(S): 2.5 TABLET ORAL at 05:16

## 2020-02-22 RX ADMIN — LOSARTAN POTASSIUM 25 MILLIGRAM(S): 100 TABLET, FILM COATED ORAL at 05:16

## 2020-02-22 RX ADMIN — Medication 20 MILLIGRAM(S): at 11:48

## 2020-02-22 RX ADMIN — ATORVASTATIN CALCIUM 80 MILLIGRAM(S): 80 TABLET, FILM COATED ORAL at 21:04

## 2020-02-22 RX ADMIN — Medication 81 MILLIGRAM(S): at 05:16

## 2020-02-22 RX ADMIN — LOSARTAN POTASSIUM 25 MILLIGRAM(S): 100 TABLET, FILM COATED ORAL at 17:27

## 2020-02-22 NOTE — PROGRESS NOTE ADULT - SUBJECTIVE AND OBJECTIVE BOX
HPI:  65 y/o M w/ no significant PMHx who was transferred from [facility] ED on 2/5/20 after presenting with aphasia, dysarthria and right hemiplegia, s/p tPA administration at 22:48 2/5/20. CTA head performed at [facility] revealing of total left ICA occlusion. LNK 6:30PM 2/5/20. S/p tPA administration, pt w/o improvement in right-sided weakness. Pt was transferred to [facility] for further neurologic evaluation. Upon arrival to ED, /100 so cardene gtt was re-started. CT head perfusion revealing of small to moderate sized left frontal infarction w/ moderate to large region of low flow and/or ischemic state in left frontoparietal region. CTA neck significant for occlusion of left internal carotid. No Neurovascular intervention at this time. Admitted to MICU for post tPA monitoring. CT-A also noted with right ICA dissection. While admitted, patient was treated with cardene drip for HTN which was stopped on 2/7 and was then transferred to medical services. Patient was seen by vascular surgery in ICU for Left Carotid occlusion and recommended for patient to be started on full anticoagulation in at least 6 weeks. Neuro was consulted, repeat CT showing petechial hemorrhages.    On 2/12 Was planned for dc to acute rehab but admission was held due to 3-3.5 second pauses on monitor. Cardiology was consulted and stated that it is associated with ME prolongation consistent with increased vagal tone possibly due to his cerebral disease. Pacemaker would not be beneficial. Patient also noted to be asymptomatic during the pauses. Metoprolol and AVN blockers held as per cardio. Patient planned for dc to [facility] today.     Subjective  no non verbal but shakes/nods head to questions.    PAST MEDICAL & SURGICAL HISTORY:  No pertinent past medical history  No significant past surgical history      MedsMEDICATIONS  (STANDING):  amLODIPine   Tablet 10 milliGRAM(s) Oral every 24 hours  aspirin  chewable 81 milliGRAM(s) Oral <User Schedule>  atorvastatin 80 milliGRAM(s) Oral at bedtime  FLUoxetine 20 milliGRAM(s) Oral daily  losartan 25 milliGRAM(s) Oral daily    MEDICATIONS  (PRN):  acetaminophen   Tablet .. 650 milliGRAM(s) Oral every 6 hours PRN Temp greater or equal to 38C (100.4F), Mild Pain (1 - 3)      Vital Signs Last 24 Hrs  T(C): 36.6 (22 Feb 2020 07:40), Max: 36.7 (21 Feb 2020 20:28)  T(F): 97.9 (22 Feb 2020 07:40), Max: 98 (21 Feb 2020 20:28)  HR: 72 (22 Feb 2020 07:40) (71 - 80)  BP: 141/87 (22 Feb 2020 07:40) (141/87 - 166/87)  BP(mean): --  RR: 14 (22 Feb 2020 07:40) (14 - 15)  SpO2: 95% (22 Feb 2020 07:40) (95% - 97%)  I&O's Summary    21 Feb 2020 07:01  -  22 Feb 2020 07:00  --------------------------------------------------------  IN: 480 mL / OUT: 0 mL / NET: 480 mL        PHYSICAL EXAM:  GENERAL: NAD  NECK: Supple  NERVOUS SYSTEM:  awake and alert  HEART: S1s2 NL , RRR  CHEST/LUNG: Clear to percussion bilaterally  ABDOMEN: Soft, Nontender, Nondistended; Bowel sounds present  EXTREMITIES:  No edema      LABS:(02-22 @ 06:40)                      12.6  11.65 )-----------( 399                 38.7    Neutrophils = 9.43 (80.9%)  Lymphocytes = 1.23 (10.6%)  Eosinophils = 0.08 (0.7%)  Basophils = 0.07 (0.6%)  Monocytes = 0.77 (6.6%)  Bands = --%    02-22    140  |  104  |  14  ----------------------------<  113<H>  4.0   |  27  |  0.71    Ca    9.1      22 Feb 2020 06:40    TPro  7.1  /  Alb  2.7<L>  /  TBili  0.3  /  DBili  x   /  AST  37  /  ALT  36  /  AlkPhos  80  02-22    Imp/Rec    CVA s/p tPA with petechial hemorrhage  ASA/Statin  PT/OT per rehab    Leukocytosis afebrile, asym  Monitor for now    R ICA dissection  ASA    HTN  Novasc  Inc losartan 25 BID

## 2020-02-22 NOTE — CHART NOTE - NSCHARTNOTEFT_GEN_A_CORE
NUTRITION FOLLOW UP    SOURCE: Patient [X)   Family [ ]    Medical Record (X)    DIET: Mechanical Soft c thin liquids  SUPPLEMENTS: Ensure Enlive 8oz TID (350kcals, 20g protein)  Pt also received house-made smoothie daily (8oz whole milk, 1 banana, 1 tbsp peanut butter, 1 scoop protein powder)    Pt seen in restorative dining room this morning. He consumed 100% of Ensure supplement, 100% of house-made smoothie, 100% of yogurt, but only few bites of Khmer toast and farina. Per previous conversation with pt's daughter, family also brings smoothies from outside. Appetite varies, % per nursing documentation, but pt always consumes supplements provided.     CURRENT WEIGHT:   (2/17) 201.7lbs     PERTINENT MEDS:   Pertinent Medications: MEDICATIONS  (STANDING):  amLODIPine   Tablet 10 milliGRAM(s) Oral every 24 hours  aspirin  chewable 81 milliGRAM(s) Oral <User Schedule>  atorvastatin 80 milliGRAM(s) Oral at bedtime  FLUoxetine 20 milliGRAM(s) Oral daily  losartan 25 milliGRAM(s) Oral daily    MEDICATIONS  (PRN):  acetaminophen   Tablet .. 650 milliGRAM(s) Oral every 6 hours PRN Temp greater or equal to 38C (100.4F), Mild Pain (1 - 3)      PERTINENT LABS:  02-22 Na140 mmol/L Glu 113 mg/dL<H> K+ 4.0 mmol/L Cr  0.71 mg/dL BUN 14 mg/dL 02-22 Alb 2.7 g/dL<L> 02-06 [id number] 6.0 %<H> 02-06 Chol 187 mg/dL  mg/dL HDL 75 mg/dL Trig 42 mg/dL    SKIN:  no pressure ulcers  EDEMA: none  LAST BM: 2/21    ESTIMATED NEEDS:   [X] no change since previous assessment  [ ] recalculated:     PREVIOUS NUTRITION DIAGNOSIS:    1. Moderate malnutrition- pt continues on Ensure Enlive TID, house-made smoothies daily, diet consistency per speech therapy     NUTRITION DIAGNOSIS is :  (X)  Ongoing    NEW NUTRITION DIAGNOSIS: N/A    NUTRITION RECOMMENDATIONS:   1. Recommend continue current nutrition plan of care, Ensure TID, house made smoothie daily   2. Diet consistency per speech therapy  3. Obtain and honor food preferences as able    MONITORING AND EVALUATION:   1. Tolerance to diet prescription   2. PO intake  3. Weights  4. Labs  5. Follow Up per protocol     RD to remain available   Shaneka Webb RDN   Pager #[phone]

## 2020-02-22 NOTE — PROGRESS NOTE ADULT - SUBJECTIVE AND OBJECTIVE BOX
Chief complaint: Aphasia, hemiparesis.     Patient is a 64y old  Male who presents with a chief complaint of Left MCA and JESSE CVA (21 Feb 2020 14:55)      PAST MEDICAL & SURGICAL HISTORY:  No pertinent past medical history  No significant past surgical history       REVIEW OF SYMPTOMS  [X] Constitutional WNL           [X] Resp WNL           [X] GI WNL                          [X]  WNL              [X] Endo WNL                     [X] Skin WNL                 [X] MSK WNL                  VITALS  Vital Signs Last 24 Hrs  T(C): 36.7 (22 Feb 2020 05:17), Max: 36.7 (21 Feb 2020 08:35)  T(F): 98 (22 Feb 2020 05:17), Max: 98.1 (21 Feb 2020 08:35)  HR: 71 (22 Feb 2020 05:17) (67 - 80)  BP: 166/87 (22 Feb 2020 05:17) (147/77 - 166/87)  BP(mean): --  RR: 15 (22 Feb 2020 05:17) (15 - 16)  SpO2: 97% (22 Feb 2020 05:17) (95% - 97%)      PHYSICAL EXAM  Constitutional - NAD, Comfortable  HEENT - NCAT, EOMI  Neck - Supple, No limited ROM  Chest - CTA bilaterally  Cardiovascular - RRR, S1S2, No murmurs  Abdomen - BS+, Soft, NTND  Extremities - No C/C/E, No calf tenderness, no edema   Neurologic Exam -Awake, Alert, aphasia and hemiparesis persisting.      No new focal deficits            RECENT LABS                        12.6   11.65 )-----------( 399      ( 22 Feb 2020 06:40 )             38.7                   RADIOLOGY/OTHER RESULTS       EXAM:  CT BRAIN                          PROCEDURE DATE:  02/12/2020          INTERPRETATION:  CLINICAL INDICATION: CVA follow-up.    TECHNIQUE: CT of the head was performed without the administration of intravenous contrast.    COMPARISON: CT head 2/9/2020. MRI brain 2/6/2020.     FINDINGS:    There is evolving low attenuation in the left parasagittal frontal lobe, left frontoparietal and left parietotemporal regions, findings consistent with evolving acute infarcts in the left JESSE and left MCA vascular territory distribution. There are associated scattered curvilinear regions of focal high attenuation likely representing associated petechial hemorrhages, which are slightly more prominent than on prior imaging. There is associated localized mass effect on the adjacent brain parenchyma, mild effacement of the left lateral ventricle and minimal midline shift measuring 2 mm from left-to-right at the level of foramen of Monro. There is no obstructive hydrocephalus.    There is no evidenceof interval acute infarction, intraparenchymal hematoma or mass lesion.  There is hypoattenuation of the subcortical and periventricular white matter, which is nonspecific finding, but most likely represents sequela of chronic microvascular ischemic disease. There are atherosclerotic calcifications of the cavernous internal carotid arteries bilaterally.     There are no extra-axial fluid collections.    The visualized intraorbital contents are normal. The imaged portions of the paranasal sinuses are aerated. The mastoid air cells are clear. The visualized soft tissues and osseous structures appear unremarkable.      IMPRESSION:    Evolving acute infarcts in the left JESSE and left MCA vascular territory distribution.     Associated petechial hemorrhages, which appears more prominent than on prior imaging.        BARBARA RAM M.D., ATTENDING RADIOLOGIST  This document has been electronically signed. Feb 12 2020 10:19AM                  CURRENT MEDICATIONS    MEDICATIONS  (STANDING):  amLODIPine   Tablet 10 milliGRAM(s) Oral every 24 hours  aspirin  chewable 81 milliGRAM(s) Oral <User Schedule>  atorvastatin 80 milliGRAM(s) Oral at bedtime  FLUoxetine 20 milliGRAM(s) Oral daily  losartan 25 milliGRAM(s) Oral daily    MEDICATIONS  (PRN):  acetaminophen   Tablet .. 650 milliGRAM(s) Oral every 6 hours PRN Temp greater or equal to 38C (100.4F), Mild Pain (1 - 3)    ASSESSMENT & PLAN          GI/Bowel Management - prn   Management - Toilet Q2  Skin - Turn Q2  Pain - Tylenol PRN  DVT PPX - TEDs. Not on Rx DVT ppx as per primary team.      NAD. denies any headache, appears comfortable, hemiparesis and aphasia continue. Denies any extremity pain-US Doppler today r/o DVT- as part of leukocytosis work up. Afebrile. VSS. Continue comprehensive acute rehab program consisting of 3hrs/day of OT/PT and SLP.

## 2020-02-23 LAB
BASOPHILS # BLD AUTO: 0.07 K/UL — SIGNIFICANT CHANGE UP (ref 0–0.2)
BASOPHILS NFR BLD AUTO: 0.5 % — SIGNIFICANT CHANGE UP (ref 0–2)
EOSINOPHIL # BLD AUTO: 0.03 K/UL — SIGNIFICANT CHANGE UP (ref 0–0.5)
EOSINOPHIL NFR BLD AUTO: 0.2 % — SIGNIFICANT CHANGE UP (ref 0–6)
HCT VFR BLD CALC: 39.6 % — SIGNIFICANT CHANGE UP (ref 39–50)
HGB BLD-MCNC: 13.1 G/DL — SIGNIFICANT CHANGE UP (ref 13–17)
IMM GRANULOCYTES NFR BLD AUTO: 0.7 % — SIGNIFICANT CHANGE UP (ref 0–1.5)
LYMPHOCYTES # BLD AUTO: 1.21 K/UL — SIGNIFICANT CHANGE UP (ref 1–3.3)
LYMPHOCYTES # BLD AUTO: 9.2 % — LOW (ref 13–44)
MCHC RBC-ENTMCNC: 29.4 PG — SIGNIFICANT CHANGE UP (ref 27–34)
MCHC RBC-ENTMCNC: 33.1 GM/DL — SIGNIFICANT CHANGE UP (ref 32–36)
MCV RBC AUTO: 89 FL — SIGNIFICANT CHANGE UP (ref 80–100)
MONOCYTES # BLD AUTO: 0.62 K/UL — SIGNIFICANT CHANGE UP (ref 0–0.9)
MONOCYTES NFR BLD AUTO: 4.7 % — SIGNIFICANT CHANGE UP (ref 2–14)
NEUTROPHILS # BLD AUTO: 11.08 K/UL — HIGH (ref 1.8–7.4)
NEUTROPHILS NFR BLD AUTO: 84.7 % — HIGH (ref 43–77)
NRBC # BLD: 0 /100 WBCS — SIGNIFICANT CHANGE UP (ref 0–0)
PLATELET # BLD AUTO: 448 K/UL — HIGH (ref 150–400)
RBC # BLD: 4.45 M/UL — SIGNIFICANT CHANGE UP (ref 4.2–5.8)
RBC # FLD: 12.6 % — SIGNIFICANT CHANGE UP (ref 10.3–14.5)
WBC # BLD: 13.1 K/UL — HIGH (ref 3.8–10.5)
WBC # FLD AUTO: 13.1 K/UL — HIGH (ref 3.8–10.5)

## 2020-02-23 PROCEDURE — 99232 SBSQ HOSP IP/OBS MODERATE 35: CPT

## 2020-02-23 RX ADMIN — Medication 20 MILLIGRAM(S): at 11:27

## 2020-02-23 RX ADMIN — LOSARTAN POTASSIUM 25 MILLIGRAM(S): 100 TABLET, FILM COATED ORAL at 05:36

## 2020-02-23 RX ADMIN — AMLODIPINE BESYLATE 10 MILLIGRAM(S): 2.5 TABLET ORAL at 05:36

## 2020-02-23 RX ADMIN — Medication 81 MILLIGRAM(S): at 05:36

## 2020-02-23 RX ADMIN — ATORVASTATIN CALCIUM 80 MILLIGRAM(S): 80 TABLET, FILM COATED ORAL at 21:36

## 2020-02-23 RX ADMIN — LOSARTAN POTASSIUM 25 MILLIGRAM(S): 100 TABLET, FILM COATED ORAL at 15:59

## 2020-02-23 NOTE — PROGRESS NOTE ADULT - SUBJECTIVE AND OBJECTIVE BOX
Chief complaint: Aphasia s/p CVA.     Patient is a 64y old  Male who presents with a chief complaint of Left MCA and JESSE CVA (22 Feb 2020 12:57)    PAST MEDICAL & SURGICAL HISTORY:  No pertinent past medical history  No significant past surgical history       REVIEW OF SYMPTOMS  [X] Constitutional WNL           [X] Resp WNL           [X] GI WNL                          [X]  WNL              [X] Endo WNL                     [X] Skin WNL                 [X] MSK WNL                  VITALS  Vital Signs Last 24 Hrs  T(C): 36.8 (23 Feb 2020 07:07), Max: 36.8 (23 Feb 2020 07:07)  T(F): 98.2 (23 Feb 2020 07:07), Max: 98.2 (23 Feb 2020 07:07)  HR: 76 (23 Feb 2020 07:07) (72 - 96)  BP: 166/92 (23 Feb 2020 07:07) (141/87 - 166/92)  BP(mean): --  RR: 15 (23 Feb 2020 07:07) (14 - 15)  SpO2: 94% (23 Feb 2020 07:07) (94% - 95%)    PHYSICAL EXAM  Constitutional - NAD, Comfortable  HEENT - NCAT, EOMI  Neck - Supple, No limited ROM  Chest - CTA bilaterally  Cardiovascular - RRR, S1S2, No murmurs  Abdomen - BS+, Soft, NTND  Extremities - No C/C/E, No calf tenderness, no edema   Neurologic Exam -Awake, Alert, aphasia and hemiparesis persisting.      No new focal deficits    RECENT LABS                        12.6   11.65 )-----------( 399      ( 22 Feb 2020 06:40 )             38.7     02-22    140  |  104  |  14  ----------------------------<  113<H>  4.0   |  27  |  0.71    Ca    9.1      22 Feb 2020 06:40    TPro  7.1  /  Alb  2.7<L>  /  TBili  0.3  /  DBili  x   /  AST  37  /  ALT  36  /  AlkPhos  80  02-22            RADIOLOGY/OTHER RESULTS      EXAM:  US DPLX LWR EXT VEINS COMPL BI      PROCEDURE DATE:  02/22/2020        INTERPRETATION:  CLINICAL INFORMATION: Immobility after stroke    COMPARISON: None available.    TECHNIQUE: Duplex sonography of the BILATERAL LOWER extremity veins with color and spectral Doppler, with and without compression.      FINDINGS:    There is normal compressibility of the bilateral common femoral, femoral and popliteal veins.     Doppler examination shows normal spontaneous and phasic flow.    No calf veinthrombosis is detected.    IMPRESSION:     No evidence of deep venous thrombosis in either lower extremity.                        ELENA DORADO   This document has been electronically signed. Feb 22 2020 11:52AM                CURRENT MEDICATIONS    MEDICATIONS  (STANDING):  amLODIPine   Tablet 10 milliGRAM(s) Oral every 24 hours  aspirin  chewable 81 milliGRAM(s) Oral <User Schedule>  atorvastatin 80 milliGRAM(s) Oral at bedtime  FLUoxetine 20 milliGRAM(s) Oral daily  losartan 25 milliGRAM(s) Oral two times a day    MEDICATIONS  (PRN):  acetaminophen   Tablet .. 650 milliGRAM(s) Oral every 6 hours PRN Temp greater or equal to 38C (100.4F), Mild Pain (1 - 3)    ASSESSMENT & PLAN    GI/Bowel Management - prn   Management - Toilet Q2  Skin - Turn Q2  Pain - Tylenol PRN  DVT PPX - TEDs. Not on Rx DVT ppx as per primary team.      NAD, comfortable in bed, hemiparesis and aphasia continue. Denies any extremity pain-US Doppler 2/22 neg DVT. CBC diff today-follow up leukocytosis. Afebrile. Cozaar increased for HTN. Monitor VS closely today. Continue comprehensive acute rehab program consisting of 3hrs/day of OT/PT and SLP.

## 2020-02-23 NOTE — PROGRESS NOTE ADULT - SUBJECTIVE AND OBJECTIVE BOX
HPI:  65 y/o M w/ no significant PMHx who was transferred from [facility] ED on 2/5/20 after presenting with aphasia, dysarthria and right hemiplegia, s/p tPA administration at 22:48 2/5/20. CTA head performed at [facility] revealing of total left ICA occlusion. LNK 6:30PM 2/5/20. S/p tPA administration, pt w/o improvement in right-sided weakness. Pt was transferred to [facility] for further neurologic evaluation. Upon arrival to ED, /100 so cardene gtt was re-started. CT head perfusion revealing of small to moderate sized left frontal infarction w/ moderate to large region of low flow and/or ischemic state in left frontoparietal region. CTA neck significant for occlusion of left internal carotid. No Neurovascular intervention at this time. Admitted to MICU for post tPA monitoring. CT-A also noted with right ICA dissection. While admitted, patient was treated with cardene drip for HTN which was stopped on 2/7 and was then transferred to medical services. Patient was seen by vascular surgery in ICU for Left Carotid occlusion and recommended for patient to be started on full anticoagulation in at least 6 weeks. Neuro was consulted, repeat CT showing petechial hemorrhages.    On 2/12 Was planned for dc to acute rehab but admission was held due to 3-3.5 second pauses on monitor. Cardiology was consulted and stated that it is associated with [place] prolongation consistent with increased vagal tone possibly due to his cerebral disease. Pacemaker would not be beneficial. Patient also noted to be asymptomatic during the pauses. Metoprolol and AVN blockers held as per cardio. Patient planned for dc [place] [facility] today.     Wife: Ca Bey (H) [phone], (C) [phone] (13 Feb 2020 11:29)      Subjective   no issues. Aphasia but nods head. no complaints.         PAST MEDICAL & SURGICAL HISTORY:  No pertinent past medical history  No significant past surgical history      MedsMEDICATIONS  (STANDING):  amLODIPine   Tablet 10 milliGRAM(s) Oral every 24 hours  aspirin  chewable 81 milliGRAM(s) Oral <User Schedule>  atorvastatin 80 milliGRAM(s) Oral at bedtime  FLUoxetine 20 milliGRAM(s) Oral daily  losartan 25 milliGRAM(s) Oral two times a day    MEDICATIONS  (PRN):  acetaminophen   Tablet .. 650 milliGRAM(s) Oral every 6 hours PRN Temp greater or equal to 38C (100.4F), Mild Pain (1 - 3)      Vital Signs Last 24 Hrs  T(C): 36.8 (23 Feb 2020 07:07), Max: 36.8 (23 Feb 2020 07:07)  T(F): 98.2 (23 Feb 2020 07:07), Max: 98.2 (23 Feb 2020 07:07)  HR: 76 (23 Feb 2020 07:07) (73 - 96)  BP: 166/92 (23 Feb 2020 07:07) (155/88 - 166/92)  BP(mean): --  RR: 15 (23 Feb 2020 07:07) (14 - 15)  SpO2: 94% (23 Feb 2020 07:07) (94% - 95%)  I&O's Summary    22 Feb 2020 07:01  -  23 Feb 2020 07:00  --------------------------------------------------------  IN: 480 mL / OUT: 0 mL / NET: 480 mL        PHYSICAL EXAM:  GENERAL: NAD  NECK: Supple  NERVOUS SYSTEM:  awake and alert  HEART: S1s2 NL , RRR  CHEST/LUNG: Clear to percussion bilaterally  ABDOMEN: Soft, Nontender, Nondistended; Bowel sounds present  EXTREMITIES:  No edema      LABS:(02-23 @ 08:30)                      13.1  13.10 )-----------( 448                 39.6    Neutrophils = 11.08 (84.7%)  Lymphocytes = 1.21 (9.2%)  Eosinophils = 0.03 (0.2%)  Basophils = 0.07 (0.5%)  Monocytes = 0.62 (4.7%)  Bands = --%    02-22    140  |  104  |  14  ----------------------------<  113<H>  4.0   |  27  |  0.71    Ca    9.1      22 Feb 2020 06:40    TPro  7.1  /  Alb  2.7<L>  /  TBili  0.3  /  DBili  x   /  AST  37  /  ALT  36  /  AlkPhos  80  02-22    Care Discussed with Consultants/Other Providers [ x] YES  [ ] NO    CVA s/p tPA with petechial hemorrhage  ASA/Statin  PT/OT per rehab    Leukocytosis, fluctuating afebrile, asym  Monitor for now    R ICA dissection  ASA    HTN  Novasc  Increased losartan 25 BID 2/22

## 2020-02-24 DIAGNOSIS — I65.21 OCCLUSION AND STENOSIS OF RIGHT CAROTID ARTERY: ICD-10-CM

## 2020-02-24 DIAGNOSIS — D72.829 ELEVATED WHITE BLOOD CELL COUNT, UNSPECIFIED: ICD-10-CM

## 2020-02-24 DIAGNOSIS — I10 ESSENTIAL (PRIMARY) HYPERTENSION: ICD-10-CM

## 2020-02-24 DIAGNOSIS — R13.12 DYSPHAGIA, OROPHARYNGEAL PHASE: ICD-10-CM

## 2020-02-24 DIAGNOSIS — R09.02 HYPOXEMIA: ICD-10-CM

## 2020-02-24 DIAGNOSIS — I63.9 CEREBRAL INFARCTION, UNSPECIFIED: ICD-10-CM

## 2020-02-24 LAB
ALBUMIN SERPL ELPH-MCNC: 2.7 G/DL — LOW (ref 3.3–5)
ALP SERPL-CCNC: 82 U/L — SIGNIFICANT CHANGE UP (ref 40–120)
ALT FLD-CCNC: 37 U/L — SIGNIFICANT CHANGE UP (ref 10–45)
ANION GAP SERPL CALC-SCNC: 9 MMOL/L — SIGNIFICANT CHANGE UP (ref 5–17)
AST SERPL-CCNC: 33 U/L — SIGNIFICANT CHANGE UP (ref 10–40)
BASOPHILS # BLD AUTO: 0.07 K/UL — SIGNIFICANT CHANGE UP (ref 0–0.2)
BASOPHILS NFR BLD AUTO: 0.5 % — SIGNIFICANT CHANGE UP (ref 0–2)
BILIRUB SERPL-MCNC: 0.4 MG/DL — SIGNIFICANT CHANGE UP (ref 0.2–1.2)
BUN SERPL-MCNC: 14 MG/DL — SIGNIFICANT CHANGE UP (ref 7–23)
CALCIUM SERPL-MCNC: 9.2 MG/DL — SIGNIFICANT CHANGE UP (ref 8.4–10.5)
CHLORIDE SERPL-SCNC: 102 MMOL/L — SIGNIFICANT CHANGE UP (ref 96–108)
CO2 SERPL-SCNC: 29 MMOL/L — SIGNIFICANT CHANGE UP (ref 22–31)
CREAT SERPL-MCNC: 0.79 MG/DL — SIGNIFICANT CHANGE UP (ref 0.5–1.3)
EOSINOPHIL # BLD AUTO: 0.03 K/UL — SIGNIFICANT CHANGE UP (ref 0–0.5)
EOSINOPHIL NFR BLD AUTO: 0.2 % — SIGNIFICANT CHANGE UP (ref 0–6)
GLUCOSE SERPL-MCNC: 116 MG/DL — HIGH (ref 70–99)
HCT VFR BLD CALC: 40.3 % — SIGNIFICANT CHANGE UP (ref 39–50)
HGB BLD-MCNC: 13 G/DL — SIGNIFICANT CHANGE UP (ref 13–17)
IMM GRANULOCYTES NFR BLD AUTO: 0.5 % — SIGNIFICANT CHANGE UP (ref 0–1.5)
LYMPHOCYTES # BLD AUTO: 1.38 K/UL — SIGNIFICANT CHANGE UP (ref 1–3.3)
LYMPHOCYTES # BLD AUTO: 9.2 % — LOW (ref 13–44)
MCHC RBC-ENTMCNC: 28.8 PG — SIGNIFICANT CHANGE UP (ref 27–34)
MCHC RBC-ENTMCNC: 32.3 GM/DL — SIGNIFICANT CHANGE UP (ref 32–36)
MCV RBC AUTO: 89.2 FL — SIGNIFICANT CHANGE UP (ref 80–100)
MONOCYTES # BLD AUTO: 0.87 K/UL — SIGNIFICANT CHANGE UP (ref 0–0.9)
MONOCYTES NFR BLD AUTO: 5.8 % — SIGNIFICANT CHANGE UP (ref 2–14)
NEUTROPHILS # BLD AUTO: 12.65 K/UL — HIGH (ref 1.8–7.4)
NEUTROPHILS NFR BLD AUTO: 83.8 % — HIGH (ref 43–77)
NRBC # BLD: 0 /100 WBCS — SIGNIFICANT CHANGE UP (ref 0–0)
PLATELET # BLD AUTO: 407 K/UL — HIGH (ref 150–400)
POTASSIUM SERPL-MCNC: 4 MMOL/L — SIGNIFICANT CHANGE UP (ref 3.5–5.3)
POTASSIUM SERPL-SCNC: 4 MMOL/L — SIGNIFICANT CHANGE UP (ref 3.5–5.3)
PROT SERPL-MCNC: 7.3 G/DL — SIGNIFICANT CHANGE UP (ref 6–8.3)
RBC # BLD: 4.52 M/UL — SIGNIFICANT CHANGE UP (ref 4.2–5.8)
RBC # FLD: 12.5 % — SIGNIFICANT CHANGE UP (ref 10.3–14.5)
SODIUM SERPL-SCNC: 140 MMOL/L — SIGNIFICANT CHANGE UP (ref 135–145)
WBC # BLD: 15.08 K/UL — HIGH (ref 3.8–10.5)
WBC # FLD AUTO: 15.08 K/UL — HIGH (ref 3.8–10.5)

## 2020-02-24 PROCEDURE — 99232 SBSQ HOSP IP/OBS MODERATE 35: CPT

## 2020-02-24 PROCEDURE — 99233 SBSQ HOSP IP/OBS HIGH 50: CPT

## 2020-02-24 RX ORDER — LOSARTAN POTASSIUM 100 MG/1
50 TABLET, FILM COATED ORAL
Refills: 0 | Status: DISCONTINUED | OUTPATIENT
Start: 2020-02-24 | End: 2020-03-06

## 2020-02-24 RX ORDER — MEMANTINE HYDROCHLORIDE 10 MG/1
5 TABLET ORAL
Refills: 0 | Status: DISCONTINUED | OUTPATIENT
Start: 2020-02-24 | End: 2020-03-03

## 2020-02-24 RX ADMIN — LOSARTAN POTASSIUM 50 MILLIGRAM(S): 100 TABLET, FILM COATED ORAL at 16:40

## 2020-02-24 RX ADMIN — AMLODIPINE BESYLATE 10 MILLIGRAM(S): 2.5 TABLET ORAL at 05:28

## 2020-02-24 RX ADMIN — LOSARTAN POTASSIUM 25 MILLIGRAM(S): 100 TABLET, FILM COATED ORAL at 05:28

## 2020-02-24 RX ADMIN — Medication 81 MILLIGRAM(S): at 05:28

## 2020-02-24 RX ADMIN — Medication 20 MILLIGRAM(S): at 11:24

## 2020-02-24 RX ADMIN — ATORVASTATIN CALCIUM 80 MILLIGRAM(S): 80 TABLET, FILM COATED ORAL at 21:15

## 2020-02-24 RX ADMIN — MEMANTINE HYDROCHLORIDE 5 MILLIGRAM(S): 10 TABLET ORAL at 16:40

## 2020-02-24 NOTE — PROGRESS NOTE ADULT - SUBJECTIVE AND OBJECTIVE BOX
Patient is a 64y old  Male who presents with a chief complaint of Left MCA and JESSE CVA (23 Feb 2020 12:25)      HPI:  63 y/o M w/ no significant PMHx who was transferred from [facility] ED on 2/5/20 after presenting with aphasia, dysarthria and right hemiplegia, s/p tPA administration at 22:48 2/5/20. CTA head performed at [facility] revealing of total left ICA occlusion. LNK 6:30PM 2/5/20. S/p tPA administration, pt w/o improvement in right-sided weakness. Pt was transferred to [facility] for further neurologic evaluation. Upon arrival to ED, /100 so cardene gtt was re-started. CT head perfusion revealing of small to moderate sized left frontal infarction w/ moderate to large region of low flow and/or ischemic state in left frontoparietal region. CTA neck significant for occlusion of left internal carotid. No Neurovascular intervention at this time. Admitted to MICU for post tPA monitoring. CT-A also noted with right ICA dissection. While admitted, patient was treated with cardene drip for HTN which was stopped on 2/7 and was then transferred to medical services. Patient was seen by vascular surgery in ICU for Left Carotid occlusion and recommended for patient to be started on full anticoagulation in at least 6 weeks. Neuro was consulted, repeat CT showing petechial hemorrhages.    On 2/12 Was planned for dc to acute rehab but admission was held due to 3-3.5 second pauses on monitor. Cardiology was consulted and stated that it is associated with [place] prolongation consistent with increased vagal tone possibly due to his cerebral disease. Pacemaker would not be beneficial. Patient also noted to be asymptomatic during the pauses. Metoprolol and AVN blockers held as per cardio. Patient planned for dc to [facility] today.     Significant Other: Ca Bey (H) [phone], (C) [phone] (13 Feb 2020 11:29)        SUBJECTIVE: Patient seen and examined. No acute overnight events, slept poorly. Is able to make some vocalizations, but no distinct words today. Nodded yes when asked if his brother visited this weekend. Nods yes to right shoulder pain. Otherwise shakes his head no when asked about any discomfort. Discussed possibility of starting a trial of Aricept or Namenda for aphasia, discussed risks and benefits, which patient nodded his head yes in agreement. No other complaints.       REVIEW OF SYSTEMS - Nods for yes and shrugs for no.  Constitutional: No fever  HEENT: No visual disturbances  Pulm: No cough,  No shortness of breath  Cardio: No chest pain, No palpitations  GI:  No abdominal pain, No constipation  Neuro: No headaches, +loss of strength  MSK: No Neck or back pain, +Right shoulder pain   Psych:  No depression, No anxiety      VITALS  64y  Vital Signs Last 24 Hrs  T(C): 36.4 (24 Feb 2020 07:22), Max: 36.9 (23 Feb 2020 20:07)  T(F): 97.6 (24 Feb 2020 07:22), Max: 98.5 (23 Feb 2020 20:07)  HR: 85 (24 Feb 2020 07:22) (85 - 92)  BP: 159/94 (24 Feb 2020 07:22) (157/89 - 159/94)  BP(mean): --  RR: 15 (24 Feb 2020 07:22) (14 - 15)  SpO2: 96% (24 Feb 2020 07:22) (93% - 96%)  Daily     Daily         PHYSICAL EXAM:   Gen - NAD, Comfortable  HEENT - NCAT, EOMI,   Pulm - CTAB, No wheezing  Cardiovascular - RRR, S1S2  Abdomen - Soft, NT/ND, +BS  Extremities - +Right shoulder subluxation, +Tenderness to palpation over right AC joint and with R shoulder abduction and flexion to 90degreees. No C/C/E, No calf tenderness  Neuro-     Cognitive - AAO to person, nods when name called. Unable to verbalize responses. Intermittently follows command.      Communication - nonverbal, will nod and shrug to answer questions. Can be cued to form word shapes with his mouth. Makes sounds, but not distinct words Expression > Receptive aphasia.      Attention: Intact      Cranial Nerves - +right facial droop, +Right sided neglect     Motor -                     LEFT    UE - ShAB 5/5, EF 5/5, EE 5/5, WE 5/5,  5/5                    RIGHT UE - ShAB 0/5, EF 0/5, EE 0/5, WE 0/5,  0/5                    LEFT    LE - HF 5/5, KE 5/5, DF 5/5, PF 5/5                    RIGHT LE - Hip Adduction 1/5, HF 0/5, KE 0/5, DF 0/5, PF 0/5        Sensory - diminished to LT RLE     Reflexes - 1+ reflexes throughout. +R ankle clonus 3-4 beats     Tone - RUE and RLE flaccid  Psychiatric - Seems frustrated      FUNCTIONAL STATUS:  IDT rounds 2/18  dc date 3/6   goals of Sajan with grooming and UBD, mod-A with LBD and transfers. WC level for mobility. 24hr supervision.   OT: Sajan with eating. maxA with grooming, UBD. total with LBD, transfers  PT: mod-max A with popover transfers. nonfunctional ambulation 15ft with RW, requires blocking and leg advancement by therapist  SLP: poor attention and carry over, motor apraxia. aphasia. yes/no unreliable.       RECENT LABS:                        13.0   15.08 )-----------( 407      ( 24 Feb 2020 06:00 )             40.3     02-24    140  |  102  |  14  ----------------------------<  116<H>  4.0   |  29  |  0.79    Ca    9.2      24 Feb 2020 06:00    TPro  7.3  /  Alb  2.7<L>  /  TBili  0.4  /  DBili  x   /  AST  33  /  ALT  37  /  AlkPhos  82  02-24    LIVER FUNCTIONS - ( 24 Feb 2020 06:00 )  Alb: 2.7 g/dL / Pro: 7.3 g/dL / ALK PHOS: 82 U/L / ALT: 37 U/L / AST: 33 U/L / GGT: x                   CAPILLARY BLOOD GLUCOSE      < from: US Duplex Venous Lower Ext Complete, Bilateral (02.22.20 @ 11:32) >    EXAM:  US DPLX LWR EXT VEINS COMPL BI      PROCEDURE DATE:  02/22/2020      INTERPRETATION:  CLINICAL INFORMATION: Immobility after stroke    COMPARISON: None available.    TECHNIQUE: Duplex sonography of the BILATERAL LOWER extremity veins with color and spectral Doppler, with and without compression.      FINDINGS:    There is normal compressibility of the bilateral common femoral, femoral and popliteal veins.     Doppler examination shows normal spontaneous and phasic flow.    No calf veinthrombosis is detected.    IMPRESSION:     No evidence of deep venous thrombosis in either lower extremity.      ELENARANDA DIALLO   This document has been electronically signed. Feb 22 2020 11:52AM    < end of copied text >        MEDICATIONS:  MEDICATIONS  (STANDING):  amLODIPine   Tablet 10 milliGRAM(s) Oral every 24 hours  aspirin  chewable 81 milliGRAM(s) Oral <User Schedule>  atorvastatin 80 milliGRAM(s) Oral at bedtime  FLUoxetine 20 milliGRAM(s) Oral daily  losartan 25 milliGRAM(s) Oral two times a day    MEDICATIONS  (PRN):  acetaminophen   Tablet .. 650 milliGRAM(s) Oral every 6 hours PRN Temp greater or equal to 38C (100.4F), Mild Pain (1 - 3) Patient is a 64y old  Male who presents with a chief complaint of Left MCA and JESSE CVA (23 Feb 2020 12:25)      HPI:  65 y/o M w/ no significant PMHx who was transferred from [facility] ED on 2/5/20 after presenting with aphasia, dysarthria and right hemiplegia, s/p tPA administration at 22:48 2/5/20. CTA head performed at [facility] revealing of total left ICA occlusion. LNK 6:30PM 2/5/20. S/p tPA administration, pt w/o improvement in right-sided weakness. Pt was transferred to [facility] for further neurologic evaluation. Upon arrival to ED, /100 so cardene gtt was re-started. CT head perfusion revealing of small to moderate sized left frontal infarction w/ moderate to large region of low flow and/or ischemic state in left frontoparietal region. CTA neck significant for occlusion of left internal carotid. No Neurovascular intervention at this time. Admitted to MICU for post tPA monitoring. CT-A also noted with right ICA dissection. While admitted, patient was treated with cardene drip for HTN which was stopped on 2/7 and was then transferred to medical services. Patient was seen by vascular surgery in ICU for Left Carotid occlusion and recommended for patient to be started on full anticoagulation in at least 6 weeks. Neuro was consulted, repeat CT showing petechial hemorrhages.    On 2/12 Was planned for dc to acute rehab but admission was held due to 3-3.5 second pauses on monitor. Cardiology was consulted and stated that it is associated with [place] prolongation consistent with increased vagal tone possibly due to his cerebral disease. Pacemaker would not be beneficial. Patient also noted to be asymptomatic during the pauses. Metoprolol and AVN blockers held as per cardio. Patient planned for dc [place] [facility] today.     Significant Other: Ca Bey (H) [phone], (C) [phone] (13 Feb 2020 11:29)        SUBJECTIVE: Patient seen and examined. No acute overnight events, slept poorly. Is able to make some vocalizations, but no distinct words today. Nodded yes when asked if his brother visited this weekend. Nods yes to right shoulder pain. Otherwise shakes his head no when asked about any discomfort. Discussed possibility of starting a trial of Aricept or Namenda for aphasia, discussed risks and benefits, which patient nodded his head yes in agreement. No other complaints.       REVIEW OF SYSTEMS - Nods for yes and shrugs for no.  Constitutional: No fever  HEENT: No visual disturbances  Pulm: No cough,  No shortness of breath  Cardio: No chest pain, No palpitations  GI:  No abdominal pain, No constipation  Neuro: No headaches, +loss of strength  MSK: No Neck or back pain, +Right shoulder pain   Psych:  No depression, No anxiety      VITALS  64y  Vital Signs Last 24 Hrs  T(C): 36.4 (24 Feb 2020 07:22), Max: 36.9 (23 Feb 2020 20:07)  T(F): 97.6 (24 Feb 2020 07:22), Max: 98.5 (23 Feb 2020 20:07)  HR: 85 (24 Feb 2020 07:22) (85 - 92)  BP: 159/94 (24 Feb 2020 07:22) (157/89 - 159/94)  BP(mean): --  RR: 15 (24 Feb 2020 07:22) (14 - 15)  SpO2: 96% (24 Feb 2020 07:22) (93% - 96%)  Daily     Daily         PHYSICAL EXAM:   Gen - NAD, Comfortable  HEENT - NCAT, EOMI,   Pulm - CTAB, No wheezing  Cardiovascular - RRR, S1S2  Abdomen - Soft, NT/ND, +BS  Extremities - +Right shoulder subluxation, +Tenderness to palpation over right AC joint and with R shoulder abduction and flexion to 90degreees. No C/C/E, No calf tenderness  Neuro-     Cognitive - AAO to person, nods when name called. Unable to verbalize responses. Intermittently follows command.      Communication - nonverbal, will nod and shrug to answer questions. Can be cued to form word shapes with his mouth. Makes sounds, but not distinct words Expression > Receptive aphasia.      Attention: Intact      Cranial Nerves - +right facial droop, +Right sided neglect     Motor -                     LEFT    UE - ShAB 5/5, EF 5/5, EE 5/5, WE 5/5,  5/5                    RIGHT UE - ShAB 0/5, EF 0/5, EE 0/5, WE 0/5,  0/5                    LEFT    LE - HF 5/5, KE 5/5, DF 5/5, PF 5/5                    RIGHT LE - Hip Adduction 1/5, HF 0/5, KE 0/5, DF 0/5, PF 0/5        Sensory - diminished to LT Right UE and LE     Reflexes - 1+ reflexes throughout. +R ankle clonus 3-4 beats     Tone - RUE and RLE flaccid  Psychiatric -  frustrated      FUNCTIONAL STATUS:  IDT rounds 2/18  dc date 3/6   goals of Sajan with grooming and UBD, mod-A with LBD and transfers. WC level for mobility. 24hr supervision.   OT: Sajan with eating. maxA with grooming, UBD. total with LBD, transfers  PT: mod-max A with popover transfers. nonfunctional ambulation 15ft with RW, requires blocking and leg advancement by therapist  SLP: poor attention and carry over, motor apraxia. aphasia. yes/no unreliable.       RECENT LABS:                        13.0   15.08 )-----------( 407      ( 24 Feb 2020 06:00 )             40.3     02-24    140  |  102  |  14  ----------------------------<  116<H>  4.0   |  29  |  0.79    Ca    9.2      24 Feb 2020 06:00    TPro  7.3  /  Alb  2.7<L>  /  TBili  0.4  /  DBili  x   /  AST  33  /  ALT  37  /  AlkPhos  82  02-24    LIVER FUNCTIONS - ( 24 Feb 2020 06:00 )  Alb: 2.7 g/dL / Pro: 7.3 g/dL / ALK PHOS: 82 U/L / ALT: 37 U/L / AST: 33 U/L / GGT: x                   CAPILLARY BLOOD GLUCOSE      < from: US Duplex Venous Lower Ext Complete, Bilateral (02.22.20 @ 11:32) >    EXAM:  US DPLX LWR EXT VEINS COMPL BI      PROCEDURE DATE:  02/22/2020      INTERPRETATION:  CLINICAL INFORMATION: Immobility after stroke    COMPARISON: None available.    TECHNIQUE: Duplex sonography of the BILATERAL LOWER extremity veins with color and spectral Doppler, with and without compression.      FINDINGS:    There is normal compressibility of the bilateral common femoral, femoral and popliteal veins.     Doppler examination shows normal spontaneous and phasic flow.    No calf veinthrombosis is detected.    IMPRESSION:     No evidence of deep venous thrombosis in either lower extremity.      ELENARANDA DORADO   This document has been electronically signed. Feb 22 2020 11:52AM    < end of copied text >        MEDICATIONS:  MEDICATIONS  (STANDING):  amLODIPine   Tablet 10 milliGRAM(s) Oral every 24 hours  aspirin  chewable 81 milliGRAM(s) Oral <User Schedule>  atorvastatin 80 milliGRAM(s) Oral at bedtime  FLUoxetine 20 milliGRAM(s) Oral daily  losartan 25 milliGRAM(s) Oral two times a day    MEDICATIONS  (PRN):  acetaminophen   Tablet .. 650 milliGRAM(s) Oral every 6 hours PRN Temp greater or equal to 38C (100.4F), Mild Pain (1 - 3)

## 2020-02-24 NOTE — PROGRESS NOTE ADULT - ASSESSMENT
64 male with no previous pmh s/p left JESSE/MCA CVA with petechial hemorhage now with decreased functional mobility, dysphagia, global aphasia, right hemiplegia and neglect, gait instability and ADL impairments

## 2020-02-24 NOTE — PROGRESS NOTE ADULT - ATTENDING COMMENTS
Pt. seen with resident and fellow this AM.  Agree with documentation above as per resident with amendments made as appropriate. Patient medically stable. Making gradual progress towards rehab goals.     HTN--bp elevated--- increase losartan to 50mg bid.  SBP goal 120-150.      Pt. amenable to trial of aricept or namenda for severe non-fluent aphasia.  Will start namenda 5mg BID.  will monitor for SE's (dizziness, headache, GI, cough, etc.)

## 2020-02-24 NOTE — PROGRESS NOTE ADULT - SUBJECTIVE AND OBJECTIVE BOX
Patient is a 64y old  Male who presents with a chief complaint of Left MCA and JESSE CVA (23 Feb 2020 12:25)  No acute events overnight      Patient seen and examined at bedside.    ALLERGIES:  No Known Allergies        Vital Signs Last 24 Hrs  T(F): 97.6 (24 Feb 2020 07:22), Max: 98.5 (23 Feb 2020 20:07)  HR: 85 (24 Feb 2020 07:22) (85 - 92)  BP: 159/94 (24 Feb 2020 07:22) (157/89 - 159/94)  RR: 15 (24 Feb 2020 07:22) (14 - 15)  SpO2: 96% (24 Feb 2020 07:22) (93% - 96%)  I&O's Summary    23 Feb 2020 07:01  -  24 Feb 2020 07:00  --------------------------------------------------------  IN: 480 mL / OUT: 0 mL / NET: 480 mL      MEDICATIONS:  acetaminophen   Tablet .. 650 milliGRAM(s) Oral every 6 hours PRN  amLODIPine   Tablet 10 milliGRAM(s) Oral every 24 hours  aspirin  chewable 81 milliGRAM(s) Oral <User Schedule>  atorvastatin 80 milliGRAM(s) Oral at bedtime  FLUoxetine 20 milliGRAM(s) Oral daily  losartan 25 milliGRAM(s) Oral two times a day      PHYSICAL EXAM:  General: NAD, Appears comfortable  ENT: MMM, no oral thrush  Neck: Supple, No JVD  Lungs: Clear to auscultation bilaterally, bilateral air entry  Cardio: S1S2 regular  Abdomen: Soft, Nontender  Extremities: No cyanosis, No edema    LABS:                        13.0   15.08 )-----------( 407      ( 24 Feb 2020 06:00 )             40.3     02-24    140  |  102  |  14  ----------------------------<  116  4.0   |  29  |  0.79    Ca    9.2      24 Feb 2020 06:00    TPro  7.3  /  Alb  2.7  /  TBili  0.4  /  DBili  x   /  AST  33  /  ALT  37  /  AlkPhos  82  02-24    eGFR if Non African American: 94 mL/min/1.73M2 (02-24-20 @ 06:00)  eGFR if : 109 mL/min/1.73M2 (02-24-20 @ 06:00)            02-06 Chol 187 mg/dL  mg/dL HDL 75 mg/dL Trig 42 mg/dL                02-06 [id number] 6.0          RADIOLOGY & ADDITIONAL TESTS:    Care Discussed with Consultants/Other Providers:

## 2020-02-24 NOTE — PROGRESS NOTE ADULT - PROBLEM SELECTOR PLAN 6
supplemental oxygen therapy via nasal canula if necessary  sleep related? consider outpatient sleep apnea workup

## 2020-02-24 NOTE — PROGRESS NOTE ADULT - PROBLEM SELECTOR PLAN 1
left rodo/mca cva with petechial hemorrhage  continue comprehensive acute rehab therapies  continue asa, lipitor

## 2020-02-25 PROCEDURE — 99232 SBSQ HOSP IP/OBS MODERATE 35: CPT

## 2020-02-25 RX ADMIN — MEMANTINE HYDROCHLORIDE 5 MILLIGRAM(S): 10 TABLET ORAL at 17:38

## 2020-02-25 RX ADMIN — LOSARTAN POTASSIUM 50 MILLIGRAM(S): 100 TABLET, FILM COATED ORAL at 05:40

## 2020-02-25 RX ADMIN — Medication 81 MILLIGRAM(S): at 05:39

## 2020-02-25 RX ADMIN — Medication 20 MILLIGRAM(S): at 12:23

## 2020-02-25 RX ADMIN — ATORVASTATIN CALCIUM 80 MILLIGRAM(S): 80 TABLET, FILM COATED ORAL at 21:08

## 2020-02-25 RX ADMIN — MEMANTINE HYDROCHLORIDE 5 MILLIGRAM(S): 10 TABLET ORAL at 05:39

## 2020-02-25 RX ADMIN — AMLODIPINE BESYLATE 10 MILLIGRAM(S): 2.5 TABLET ORAL at 05:39

## 2020-02-25 RX ADMIN — LOSARTAN POTASSIUM 50 MILLIGRAM(S): 100 TABLET, FILM COATED ORAL at 17:38

## 2020-02-25 NOTE — PROGRESS NOTE ADULT - ATTENDING COMMENTS
Pt. seen with resident & fellow.  Agree with documentation above as per resident with amendments made as appropriate. Patient medically stable. Making progress towards rehab goals.     Tolerating namenda for aphasia--denies side effects    Pt. depressed and frustrated regarding deficits.  Cont. Prozac.    Spoke with pt's brother regarding pt's progress and medical and rehab plan of care and discharge planning.  All questions answered.

## 2020-02-25 NOTE — PROGRESS NOTE ADULT - ASSESSMENT
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    Rehab: Gait Instability, ADL impairments and Functional impairments: Continue Comprehensive Rehab Program of PT/OT/ST    #Left JESSE/MCA CVA with petechial hemorrhage  - Continue ASA 81mg daily  - Continue Lipitor 80mg QHS  - Prozac 20mg daily (inc. from 10mg on 2/21) for mood & motor recovery     #Expressive Aphasia  - pt attempting to verbalize couple of words  - 2/24 started Namenda 5 BID for aphasia     #Dissection in Right ICA  - Per neuro at [facility] given size of stroke with petechial hemorrhage avoid anticoagulation for 6-8 weeks, continue ASA but at 81 mg daily.  "This should provide protection for stroke from Right ICA dissection while minimizing chance for worsening petechial ICH."    #Leukocytosis   - repeat labs 2/24 15.08, afebrile, VSS, no signs of active infection  - monitor    #Pain: Tylenol PRN  - Prior opioid use: well managed without meds at the moment, patient has h/o opioid dependence, was on Suboxone treatment as outpatient.      #Hypertension--SBP elevated to 160s  - Increased Losartan 50mg BID  - Continue amlodipine 10mg daily     #NC prolongation  - Per cardio at [facility] "This is associated with NC prolongation consistent with elevated vagal tone. Patient was reportedly awake during these times. It is possible elevated vagal tone from cerebral disease is causing these pauses. Pacemaker implantation would not help this and fortunately he is asymptomatic. He should be evaluated for sleep apnea at some point."  - Avoid metoprolol and/or other AVN blockers  - Outpatient cardiology followup     #mood  - cont prozac  - neuropsych follow up    #Resp   - at prior hospital pt had an episode of desat and was placed on O2 prn   - may need r/o sleep apnea with overnight pulse ox.     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    Rehab: Gait Instability, ADL impairments and Functional impairments: Continue Comprehensive Rehab Program of PT/OT/ST    #Left JESSE/MCA CVA with petechial hemorrhage  - Continue ASA 81mg daily  - Continue Lipitor 80mg QHS  - Prozac 20mg daily (inc. 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 given size of stroke with petechial hemorrhage avoid anticoagulation for 6-8 weeks, continue ASA but at 81 mg daily.  "This should provide protection for stroke from Right ICA dissection while minimizing chance for worsening petechial ICH."    #Leukocytosis   - repeat labs 2/24 15.08, afebrile, VSS, no signs of active infection  - monitor    #Pain: Tylenol PRN  - Prior opioid use: well managed without meds at the moment, patient has h/o opioid dependence, was on Suboxone treatment as outpatient.      #Hypertension--SBP elevated yesterday --improved after increase in Losartan yesterday.   - Cont. Losartan 50mg BID  - Continue amlodipine 10mg daily     #NE prolongation  - Per cardio at [facility] "This is associated with NE prolongation consistent with elevated vagal tone. Patient was reportedly awake during these times. It is possible elevated vagal tone from cerebral disease is causing these pauses. Pacemaker implantation would not help this and fortunately he is asymptomatic. He should be evaluated for sleep apnea at some point."  - Avoid metoprolol and/or other AVN blockers  - Outpatient cardiology followup     #mood  - cont prozac  - neuropsych follow up    #Resp   - at prior hospital pt had an episode of desat and was placed on O2 prn   - may need r/o sleep apnea with overnight pulse ox.     #GI/Bowel: Continent    #Diet: Mechanical soft with thin liquids    #Renal/Bladder: voiding    #Precautions / PROPHYLAXIS:   - Falls    - Lungs: Aspiration, Incentive Spirometer   - Pressure injury/Skin: Turn Q2hrs while in bed, OOB to Chair, PT/OT    - DVT ppx: SCDs, TEDs

## 2020-02-25 NOTE — PROGRESS NOTE ADULT - SUBJECTIVE AND OBJECTIVE BOX
Patient is a 64y old  Male who presents with a chief complaint of Left MCA and JESSE CVA (24 Feb 2020 10:43)      HPI:  63 y/o M w/ no significant PMHx who was transferred from [facility] ED on 2/5/20 after presenting with aphasia, dysarthria and right hemiplegia, s/p tPA administration at 22:48 2/5/20. CTA head performed at [facility] revealing of total left ICA occlusion. LNK 6:30PM 2/5/20. S/p tPA administration, pt w/o improvement in right-sided weakness. Pt was transferred to [facility] for further neurologic evaluation. Upon arrival to ED, /100 so cardene gtt was re-started. CT head perfusion revealing of small to moderate sized left frontal infarction w/ moderate to large region of low flow and/or ischemic state in left frontoparietal region. CTA neck significant for occlusion of left internal carotid. No Neurovascular intervention at this time. Admitted to MICU for post tPA monitoring. CT-A also noted with right ICA dissection. While admitted, patient was treated with cardene drip for HTN which was stopped on 2/7 and was then transferred to medical services. Patient was seen by vascular surgery in ICU for Left Carotid occlusion and recommended for patient to be started on full anticoagulation in at least 6 weeks. Neuro was consulted, repeat CT showing petechial hemorrhages.    On 2/12 Was planned for dc to acute rehab but admission was held due to 3-3.5 second pauses on monitor. Cardiology was consulted and stated that it is associated with FL prolongation consistent with increased vagal tone possibly due to his cerebral disease. Pacemaker would not be beneficial. Patient also noted to be asymptomatic during the pauses. Metoprolol and AVN blockers held as per cardio. Patient planned for dc to [facility] today.     Wife: Ca Bey (H) [phone], (C) [phone] (13 Feb 2020 11:29)        SUBJECTIVE: Patient seen and examined. No acute overnight events, slept well. Expresses frustration and slow progress of therapies, but denies any discomfort or pain. Denies any side effects from starting Namenda yesterday. Is agreeable to continuing medication. No fevers or chills overnight. No other complaints.       REVIEW OF SYSTEMS - Nods for yes and shrugs for no.  Constitutional: No fever  HEENT: No visual disturbances  Pulm: No cough,  No shortness of breath  Cardio: No chest pain, No palpitations  GI:  No abdominal pain, No constipation  Neuro: No headaches, +loss of strength  MSK: No Neck or back pain, +Right shoulder pain   Psych:  No depression, No anxiety      VITALS  64y  Vital Signs Last 24 Hrs  T(C): 36.7 (25 Feb 2020 08:34), Max: 37 (24 Feb 2020 21:11)  T(F): 98 (25 Feb 2020 08:34), Max: 98.6 (24 Feb 2020 21:11)  HR: 92 (25 Feb 2020 08:34) (84 - 92)  BP: 116/78 (25 Feb 2020 08:34) (116/78 - 161/93)  BP(mean): --  RR: 16 (25 Feb 2020 08:34) (15 - 16)  SpO2: 99% (25 Feb 2020 08:34) (96% - 99%)  Daily     Daily         PHYSICAL EXAM:   Gen - NAD, Comfortable  HEENT - NCAT, EOMI,   Pulm - CTAB, No wheezing  Cardiovascular - RRR, S1S2  Abdomen - Soft, NT/ND, +BS  Extremities - +Right shoulder subluxation, +Tenderness to palpation over right AC joint and with R shoulder abduction and flexion to 90degreees. No C/C/E, No calf tenderness  Neuro-     Cognitive - AAO to person, nods when name called. Unable to verbalize responses. Intermittently follows command.      Communication - nonverbal, will nod and shrug to answer questions. Can be cued to form word shapes with his mouth. Makes sounds, but not distinct words Expression > Receptive aphasia.      Attention: Intact      Cranial Nerves - +right facial droop, +Right sided neglect     Motor -                     LEFT    UE - ShAB 5/5, EF 5/5, EE 5/5, WE 5/5,  5/5                    RIGHT UE - ShAB 0/5, EF 0/5, EE 0/5, WE 0/5,  0/5                    LEFT    LE - HF 5/5, KE 5/5, DF 5/5, PF 5/5                    RIGHT LE - Hip Adduction 1/5, HF 0/5, KE 0/5, DF 0/5, PF 0/5        Sensory - diminished to LT Right UE and LE     Reflexes - 1+ reflexes throughout. +R ankle clonus 3-4 beats     Tone - RUE and RLE flaccid  Psychiatric -  frustrated      FUNCTIONAL STATUS:  IDT rounds 2/18  dc date 3/6   goals of Sajan with grooming and UBD, mod-A with LBD and transfers. WC level for mobility. 24hr supervision.   OT: Sajan with eating. maxA with grooming, UBD. total with LBD, transfers  PT: mod-max A with popover transfers. nonfunctional ambulation 15ft with RW, requires blocking and leg advancement by therapist  SLP: poor attention and carry over, motor apraxia. aphasia. yes/no unreliable.         RECENT LABS:                        13.0   15.08 )-----------( 407      ( 24 Feb 2020 06:00 )             40.3     02-24    140  |  102  |  14  ----------------------------<  116<H>  4.0   |  29  |  0.79    Ca    9.2      24 Feb 2020 06:00    TPro  7.3  /  Alb  2.7<L>  /  TBili  0.4  /  DBili  x   /  AST  33  /  ALT  37  /  AlkPhos  82  02-24    LIVER FUNCTIONS - ( 24 Feb 2020 06:00 )  Alb: 2.7 g/dL / Pro: 7.3 g/dL / ALK PHOS: 82 U/L / ALT: 37 U/L / AST: 33 U/L / GGT: x                   CAPILLARY BLOOD GLUCOSE            MEDICATIONS:  MEDICATIONS  (STANDING):  amLODIPine   Tablet 10 milliGRAM(s) Oral every 24 hours  aspirin  chewable 81 milliGRAM(s) Oral <User Schedule>  atorvastatin 80 milliGRAM(s) Oral at bedtime  FLUoxetine 20 milliGRAM(s) Oral daily  losartan 50 milliGRAM(s) Oral two times a day  memantine 5 milliGRAM(s) Oral two times a day    MEDICATIONS  (PRN):  acetaminophen   Tablet .. 650 milliGRAM(s) Oral every 6 hours PRN Temp greater or equal to 38C (100.4F), Mild Pain (1 - 3) Patient is a 64y old  Male who presents with a chief complaint of Left MCA and JESSE CVA (24 Feb 2020 10:43)      HPI:  63 y/o M w/ no significant PMHx who was transferred from [facility] ED on 2/5/20 after presenting with aphasia, dysarthria and right hemiplegia, s/p tPA administration at 22:48 2/5/20. CTA head performed at [facility] revealing of total left ICA occlusion. LNK 6:30PM 2/5/20. S/p tPA administration, pt w/o improvement in right-sided weakness. Pt was transferred to [facility] for further neurologic evaluation. Upon arrival to ED, /100 so cardene gtt was re-started. CT head perfusion revealing of small to moderate sized left frontal infarction w/ moderate to large region of low flow and/or ischemic state in left frontoparietal region. CTA neck significant for occlusion of left internal carotid. No Neurovascular intervention at this time. Admitted to MICU for post tPA monitoring. CT-A also noted with right ICA dissection. While admitted, patient was treated with cardene drip for HTN which was stopped on 2/7 and was then transferred to medical services. Patient was seen by vascular surgery in ICU for Left Carotid occlusion and recommended for patient to be started on full anticoagulation in at least 6 weeks. Neuro was consulted, repeat CT showing petechial hemorrhages.    On 2/12 Was planned for dc to acute rehab but admission was held due to 3-3.5 second pauses on monitor. Cardiology was consulted and stated that it is associated with GA prolongation consistent with increased vagal tone possibly due to his cerebral disease. Pacemaker would not be beneficial. Patient also noted to be asymptomatic during the pauses. Metoprolol and AVN blockers held as per cardio. Patient planned for dc to [facility] today.     Wife: Ca Bey (H) [phone], (C) [phone] (13 Feb 2020 11:29)        SUBJECTIVE: Patient seen and examined. No acute overnight events, slept well. Expresses frustration and slow progress of therapies, but denies any discomfort or pain. Denies any side effects from starting Namenda yesterday. Is agreeable to continuing medication. No fevers or chills overnight. No other complaints.       REVIEW OF SYSTEMS - Nods for yes and shrugs for no.  Constitutional: No fever  HEENT: No visual disturbances  Pulm: No cough,  No shortness of breath  Cardio: No chest pain, No palpitations  GI:  No abdominal pain, No constipation  Neuro: No headaches, +loss of strength  MSK: No Neck or back pain, +Right shoulder pain   Psych:  No depression, No anxiety      VITALS  64y  Vital Signs Last 24 Hrs  T(C): 36.7 (25 Feb 2020 08:34), Max: 37 (24 Feb 2020 21:11)  T(F): 98 (25 Feb 2020 08:34), Max: 98.6 (24 Feb 2020 21:11)  HR: 92 (25 Feb 2020 08:34) (84 - 92)  BP: 116/78 (25 Feb 2020 08:34) (116/78 - 161/93)  BP(mean): --  RR: 16 (25 Feb 2020 08:34) (15 - 16)  SpO2: 99% (25 Feb 2020 08:34) (96% - 99%)  Daily     Daily         PHYSICAL EXAM:   Gen - NAD, Comfortable  HEENT - NCAT, EOMI,   Pulm - CTAB, No wheezing  Cardiovascular - RRR, S1S2  Abdomen - Soft, NT/ND, +BS  Extremities - +Right shoulder subluxation, +Tenderness to palpation over right AC joint and with R shoulder abduction and flexion to 90degreees. No C/C/E, No calf tenderness  Neuro-     Cognitive - AAO to person, nods when name called. Unable to verbalize responses. Intermittently follows command.      Communication - non-fluent, will nod and shrug to answer questions. Good comprehension     Attention: Intact      Cranial Nerves - +right facial droop,      Motor -                     LEFT    UE - ShAB 5/5, EF 5/5, EE 5/5, WE 5/5,  5/5                    RIGHT UE - ShAB 0/5, EF 0/5, EE 0/5, WE 0/5,  0/5                    LEFT    LE - HF 5/5, KE 5/5, DF 5/5, PF 5/5                    RIGHT LE - Hip Adduction 1/5, HF 0/5, KE 0/5, DF 0/5, PF 0/5        Sensory - diminished to LT Right UE and LE     Reflexes - 1+ reflexes throughout. +R ankle clonus 3-4 beats     Tone - RUE and RLE flaccid  Psychiatric -  frustrated      FUNCTIONAL STATUS:  IDT rounds 2/18  dc date 3/6   goals of Sajan with grooming and UBD, mod-A with LBD and transfers. WC level for mobility. 24hr supervision.   as of 2/24  OT: Sajan with eating. min-mod A UBD. max A with LBD,   PT: mod-max A with popover transfers. ambulation 30 with RW mod A, es blocking and leg advancement by therapist  SLP: improving--comprehension 80-90% accuracy, improving attention and carry over, motor apraxia. aphasia.       RECENT LABS:                        13.0   15.08 )-----------( 407      ( 24 Feb 2020 06:00 )             40.3     02-24    140  |  102  |  14  ----------------------------<  116<H>  4.0   |  29  |  0.79    Ca    9.2      24 Feb 2020 06:00    TPro  7.3  /  Alb  2.7<L>  /  TBili  0.4  /  DBili  x   /  AST  33  /  ALT  37  /  AlkPhos  82  02-24    LIVER FUNCTIONS - ( 24 Feb 2020 06:00 )  Alb: 2.7 g/dL / Pro: 7.3 g/dL / ALK PHOS: 82 U/L / ALT: 37 U/L / AST: 33 U/L / GGT: x                   CAPILLARY BLOOD GLUCOSE            MEDICATIONS:  MEDICATIONS  (STANDING):  amLODIPine   Tablet 10 milliGRAM(s) Oral every 24 hours  aspirin  chewable 81 milliGRAM(s) Oral <User Schedule>  atorvastatin 80 milliGRAM(s) Oral at bedtime  FLUoxetine 20 milliGRAM(s) Oral daily  losartan 50 milliGRAM(s) Oral two times a day  memantine 5 milliGRAM(s) Oral two times a day    MEDICATIONS  (PRN):  acetaminophen   Tablet .. 650 milliGRAM(s) Oral every 6 hours PRN Temp greater or equal to 38C (100.4F), Mild Pain (1 - 3)

## 2020-02-26 DIAGNOSIS — R47.01 APHASIA: ICD-10-CM

## 2020-02-26 DIAGNOSIS — F39 UNSPECIFIED MOOD [AFFECTIVE] DISORDER: ICD-10-CM

## 2020-02-26 PROCEDURE — 99233 SBSQ HOSP IP/OBS HIGH 50: CPT

## 2020-02-26 PROCEDURE — 99232 SBSQ HOSP IP/OBS MODERATE 35: CPT | Mod: GC

## 2020-02-26 RX ADMIN — MEMANTINE HYDROCHLORIDE 5 MILLIGRAM(S): 10 TABLET ORAL at 17:11

## 2020-02-26 RX ADMIN — MEMANTINE HYDROCHLORIDE 5 MILLIGRAM(S): 10 TABLET ORAL at 05:17

## 2020-02-26 RX ADMIN — ATORVASTATIN CALCIUM 80 MILLIGRAM(S): 80 TABLET, FILM COATED ORAL at 21:12

## 2020-02-26 RX ADMIN — Medication 81 MILLIGRAM(S): at 05:17

## 2020-02-26 RX ADMIN — Medication 20 MILLIGRAM(S): at 11:45

## 2020-02-26 RX ADMIN — AMLODIPINE BESYLATE 10 MILLIGRAM(S): 2.5 TABLET ORAL at 05:17

## 2020-02-26 RX ADMIN — LOSARTAN POTASSIUM 50 MILLIGRAM(S): 100 TABLET, FILM COATED ORAL at 17:11

## 2020-02-26 RX ADMIN — LOSARTAN POTASSIUM 50 MILLIGRAM(S): 100 TABLET, FILM COATED ORAL at 05:16

## 2020-02-26 NOTE — PROGRESS NOTE ADULT - SUBJECTIVE AND OBJECTIVE BOX
Patient is a 64y old  Male who presents with a chief complaint of Left MCA and JESSE CVA (24 Feb 2020 10:43)      HPI:  63 y/o M w/ no significant PMHx who was transferred from [facility] ED on 2/5/20 after presenting with aphasia, dysarthria and right hemiplegia, s/p tPA administration at 22:48 2/5/20. CTA head performed at [facility] revealing of total left ICA occlusion. LNK 6:30PM 2/5/20. S/p tPA administration, pt w/o improvement in right-sided weakness. Pt was transferred to [facility] for further neurologic evaluation. Upon arrival to ED, /100 so cardene gtt was re-started. CT head perfusion revealing of small to moderate sized left frontal infarction w/ moderate to large region of low flow and/or ischemic state in left frontoparietal region. CTA neck significant for occlusion of left internal carotid. No Neurovascular intervention at this time. Admitted to MICU for post tPA monitoring. CT-A also noted with right ICA dissection. While admitted, patient was treated with cardene drip for HTN which was stopped on 2/7 and was then transferred to medical services. Patient was seen by vascular surgery in ICU for Left Carotid occlusion and recommended for patient to be started on full anticoagulation in at least 6 weeks. Neuro was consulted, repeat CT showing petechial hemorrhages.    On 2/12 Was planned for dc to acute rehab but admission was held due to 3-3.5 second pauses on monitor. Cardiology was consulted and stated that it is associated with MI prolongation consistent with increased vagal tone possibly due to his cerebral disease. Pacemaker would not be beneficial. Patient also noted to be asymptomatic during the pauses. Metoprolol and AVN blockers held as per cardio. Patient [facility] [place] [facility] [place].     Wife: aC Bey (H) [phone], (C) [phone] (13 Feb 2020 11:29)        SUBJECTIVE: Patient seen and examined this morning in dining room. No acute overnight events, slept well. No acute complaints. Denies any pain or discomfort      REVIEW OF SYSTEMS - Nods for yes and shrugs for no.  Constitutional: No fever  HEENT: No visual disturbances  Pulm: No cough,  No shortness of breath  Cardio: No chest pain, No palpitations  GI:  No abdominal pain, No constipation  Neuro: No headaches, +loss of strength  MSK: No Neck or back pain, +Right shoulder pain   Psych:  No depression, No anxiety      VITALS  Vital Signs (24 Hrs):  T(C): 36.9 (02-26-20 @ 08:49), Max: 37.1 (02-25-20 @ 20:40)  HR: 88 (02-26-20 @ 08:49) (74 - 94)  BP: 148/89 (02-26-20 @ 08:49) (137/83 - 157/91)  RR: 15 (02-26-20 @ 08:49) (14 - 15)  SpO2: 97% (02-26-20 @ 08:49) (94% - 97%)  Wt(kg): --      PHYSICAL EXAM:   Gen - NAD, Comfortable  HEENT - NCAT, EOMI,   Pulm - CTAB, No wheezing  Cardiovascular - RRR, S1S2  Abdomen - Soft, NT/ND, +BS  Extremities - +Right shoulder subluxation,  No C/C/E, No calf tenderness  Neuro-     Cognitive - AAO to person, nods when name called. Unable to verbalize responses. Intermittently follows command.      Communication - non-fluent, will nod and shrug to answer questions. Good comprehension     Attention: Intact      Cranial Nerves - +right facial droop,      Motor -                     LEFT    UE - ShAB 5/5, EF 5/5, EE 5/5, WE 5/5,  5/5                    RIGHT UE - ShAB 0/5, EF 0/5, EE 0/5, WE 0/5,  0/5                    LEFT    LE - HF 5/5, KE 5/5, DF 5/5, PF 5/5                    RIGHT LE - Hip Adduction 1/5, HF 0/5, KE 0/5, DF 0/5, PF 0/5        Sensory - diminished to LT Right UE and LE     Reflexes - 1+ reflexes throughout. +R ankle clonus 3-4 beats     Tone - RUE and RLE flaccid  Psychiatric -  mood stable, flat affect, frustrated at times    FUNCTIONAL STATUS:  IDT rounds 2/18  dc date 3/6   goals of Sajan with grooming and UBD, mod-A with LBD and transfers. WC level for mobility. 24hr supervision.   as of 2/24  OT: Sajan with eating. min-mod A UBD. max A with LBD,   PT: mod-max A with popover transfers. ambulation 30 with RW mod A, es blocking and leg advancement by therapist  SLP: improving--comprehension 80-90% accuracy, improving attention and carry over, motor apraxia. aphasia.       RECENT LABS:                        13.0   15.08 )-----------( 407      ( 24 Feb 2020 06:00 )             40.3     02-24    140  |  102  |  14  ----------------------------<  116<H>  4.0   |  29  |  0.79    Ca    9.2      24 Feb 2020 06:00    TPro  7.3  /  Alb  2.7<L>  /  TBili  0.4  /  DBili  x   /  AST  33  /  ALT  37  /  AlkPhos  82  02-24    LIVER FUNCTIONS - ( 24 Feb 2020 06:00 )  Alb: 2.7 g/dL / Pro: 7.3 g/dL / ALK PHOS: 82 U/L / ALT: 37 U/L / AST: 33 U/L / GGT: x             MEDICATIONS:  MEDICATIONS  (STANDING):  amLODIPine   Tablet 10 milliGRAM(s) Oral every 24 hours  aspirin  chewable 81 milliGRAM(s) Oral <User Schedule>  atorvastatin 80 milliGRAM(s) Oral at bedtime  FLUoxetine 20 milliGRAM(s) Oral daily  losartan 50 milliGRAM(s) Oral two times a day  memantine 5 milliGRAM(s) Oral two times a day    MEDICATIONS  (PRN):  acetaminophen   Tablet .. 650 milliGRAM(s) Oral every 6 hours PRN Temp greater or equal to 38C (100.4F), Mild Pain (1 - 3) Patient is a 64y old  Male who presents with a chief complaint of Left MCA and JESSE CVA (24 Feb 2020 10:43)      HPI:  63 y/o M w/ no significant PMHx who was transferred from [facility] ED on 2/5/20 after presenting with aphasia, dysarthria and right hemiplegia, s/p tPA administration at 22:48 2/5/20. CTA head performed at [facility] revealing of total left ICA occlusion. LNK 6:30PM 2/5/20. S/p tPA administration, pt w/o improvement in right-sided weakness. Pt was transferred to [facility] for further neurologic evaluation. Upon arrival to ED, /100 so cardene gtt was re-started. CT head perfusion revealing of small to moderate sized left frontal infarction w/ moderate to large region of low flow and/or ischemic state in left frontoparietal region. CTA neck significant for occlusion of left internal carotid. No Neurovascular intervention at this time. Admitted to MICU for post tPA monitoring. CT-A also noted with right ICA dissection. While admitted, patient was treated with cardene drip for HTN which was stopped on 2/7 and was then transferred to medical services. Patient was seen by vascular surgery in ICU for Left Carotid occlusion and recommended for patient to be started on full anticoagulation in at least 6 weeks. Neuro was consulted, repeat CT showing petechial hemorrhages.    On 2/12 Was planned for dc to acute rehab but admission was held due to 3-3.5 second pauses on monitor. Cardiology was consulted and stated that it is associated with [place] prolongation consistent with increased vagal tone possibly due to his cerebral disease. Pacemaker would not be beneficial. Patient also noted to be asymptomatic during the pauses. Metoprolol and AVN blockers held as per cardio. [facility] [place] [facility] [place]     Wife: Ca Bey (H) [phone], (C) [phone] (13 Feb 2020 11:29)        SUBJECTIVE: Patient seen and examined this morning in dining room. No acute overnight events, slept well. No acute complaints. Denies any pain or discomfort      REVIEW OF SYSTEMS - Nods for yes and shrugs for no.  Constitutional: No fever  Pulm: No cough,  No shortness of breath  Cardio: No chest pain,  GI:  No abdominal pain,   Neuro: No headaches, +loss of strength  MSK: No Neck or back pain, +Right shoulder pain   Psych:  No depression, No anxiety      VITALS  Vital Signs (24 Hrs):  T(C): 36.9 (02-26-20 @ 08:49), Max: 37.1 (02-25-20 @ 20:40)  HR: 88 (02-26-20 @ 08:49) (74 - 94)  BP: 148/89 (02-26-20 @ 08:49) (137/83 - 157/91)  RR: 15 (02-26-20 @ 08:49) (14 - 15)  SpO2: 97% (02-26-20 @ 08:49) (94% - 97%)  Wt(kg): --      PHYSICAL EXAM:   Gen - NAD, Comfortable  HEENT - NCAT, EOMI,   Pulm - CTAB, No wheezing  Cardiovascular - RRR, S1S2  Abdomen - Soft, NT/ND, +BS  Extremities - +Right shoulder subluxation,  No C/C/E, No calf tenderness  Neuro-     Cognitive - AAO to person, nods when name called. Unable to verbalize responses. Intermittently follows command.      Communication - non-fluent, will nod and shrug to answer questions. Good comprehension     Attention: Intact      Cranial Nerves - +right facial droop,      Motor -                     LEFT    UE - ShAB 5/5, EF 5/5, EE 5/5, WE 5/5,  5/5                    RIGHT UE - ShAB 0/5, EF 0/5, EE 0/5, WE 0/5,  0/5                    LEFT    LE - HF 5/5, KE 5/5, DF 5/5, PF 5/5                    RIGHT LE - Hip Adduction 1/5, HF 0/5, KE 0/5, DF 0/5, PF 0/5        Sensory - diminished to LT Right UE and LE     Reflexes - 1+ reflexes throughout. +R ankle clonus 3-4 beats     Tone - RUE and RLE flaccid  Psychiatric -  mood stable, flat affect, frustrated at times    FUNCTIONAL STATUS:  IDT rounds 2/18  dc date 3/6   goals of Sajan with grooming and UBD, mod-A with LBD and transfers. WC level for mobility. 24hr supervision.   as of 2/24  OT: Sajan with eating. min-mod A UBD. max A with LBD,   PT: mod-max A with popover transfers. ambulation 30 with RW mod A, es blocking and leg advancement by therapist  SLP: improving--comprehension 80-90% accuracy, improving attention and carry over, motor apraxia. aphasia.       RECENT LABS:                        13.0   15.08 )-----------( 407      ( 24 Feb 2020 06:00 )             40.3     02-24    140  |  102  |  14  ----------------------------<  116<H>  4.0   |  29  |  0.79    Ca    9.2      24 Feb 2020 06:00    TPro  7.3  /  Alb  2.7<L>  /  TBili  0.4  /  DBili  x   /  AST  33  /  ALT  37  /  AlkPhos  82  02-24    LIVER FUNCTIONS - ( 24 Feb 2020 06:00 )  Alb: 2.7 g/dL / Pro: 7.3 g/dL / ALK PHOS: 82 U/L / ALT: 37 U/L / AST: 33 U/L / GGT: x             MEDICATIONS:  MEDICATIONS  (STANDING):  amLODIPine   Tablet 10 milliGRAM(s) Oral every 24 hours  aspirin  chewable 81 milliGRAM(s) Oral <User Schedule>  atorvastatin 80 milliGRAM(s) Oral at bedtime  FLUoxetine 20 milliGRAM(s) Oral daily  losartan 50 milliGRAM(s) Oral two times a day  memantine 5 milliGRAM(s) Oral two times a day    MEDICATIONS  (PRN):  acetaminophen   Tablet .. 650 milliGRAM(s) Oral every 6 hours PRN Temp greater or equal to 38C (100.4F), Mild Pain (1 - 3)

## 2020-02-26 NOTE — PROGRESS NOTE ADULT - ATTENDING COMMENTS
Patient : Marine Merino Age: 45 year old Sex: male   MRN: [id number] Encounter Date: 2/27/2019      History     Chief Complaint   Patient presents with   • Sore Throat     Pt is [place] speaking. History was obtained with the assistance of a .    HPI  2/27/2019  11:05 AM Marine Merino is a 45 year old male who presents to the ED for evaluation of R sided throat pain exacerbated with swallowing that began 3 days ago but worsened today. Pt reports the pain began after pt ate a piece of meat and has since had the sensation of something stuck in his throat. He also reports R ear and CP secondary to throat pain, as well as cough. Pt denies fever, vomiting, abd pain, diminished hearing, or other associated sx. The pt says he has been able to eat but it exacerbates his throat pain. He has been taking diclofenac prn for R knee pain but denies any other regular meds. The pt denies any cardiac hx but reports he had a CXR in the past and was told he has a problem with his lungs. The pt verbalizes no further complaints or modifying factors at this time.    PCP: Katalina Leung MD    No Known Allergies    Current Discharge Medication List        History reviewed. Hx of CXR with \"lung problem,\" unspecified by pt.    History reviewed. No pertinent past surgical history.    History reviewed. No pertinent family history.      Social History     Tobacco Use   • Smoking status: Not on file   Substance Use Topics   • Alcohol use: Not on file   • Drug use: Not on file       Review of Systems   Constitutional: Negative for activity change, chills, diaphoresis and fever.   HENT: Positive for ear pain (R ear, secondary to throat pain) and sore throat (R sided throat pain, sensation of something stuck in throat). Negative for hearing loss and trouble swallowing.    Eyes: Negative for pain, discharge, redness and visual disturbance.   Respiratory: Positive for cough. Negative for  shortness of breath.    Cardiovascular: Positive for chest pain (secondary to throat pain). Negative for palpitations and leg swelling.   Gastrointestinal: Negative for abdominal pain, diarrhea, nausea and vomiting.   Genitourinary: Negative for dysuria, frequency and urgency.   Musculoskeletal: Negative for arthralgias, joint swelling and myalgias.   Skin: Negative for rash.   Neurological: Negative for weakness, numbness and headaches.   Psychiatric/Behavioral: Negative for behavioral problems.       Physical Exam     ED Triage Vitals [02/27/19 1035]   ED Triage Vitals Group      Temp 97.7 °F (36.5 °C)      Pulse 72      Resp 18      /84      SpO2 99 %      EtCO2 mmHg       Height       Weight       Weight Scale Used        Physical Exam   Constitutional: He is oriented to person, place, and time. He appears well-developed and well-nourished.   HENT:   Head: Normocephalic and atraumatic.   Left Ear: Tympanic membrane normal.   Mouth/Throat: Oropharynx is clear and moist. No posterior oropharyngeal erythema.   Dullness to R TM   Eyes: EOM are normal. Pupils are equal, round, and reactive to light.   Neck: Normal range of motion. Neck supple.   Pain in R anterior cervical submandibular region   No significant lymphadenopathy   Cardiovascular: Normal rate, regular rhythm, normal heart sounds and intact distal pulses.   Pulmonary/Chest: Effort normal and breath sounds normal. No respiratory distress.   Abdominal: Soft. Bowel sounds are normal. He exhibits no distension. There is no tenderness. There is no rebound and no guarding.   Musculoskeletal: Normal range of motion. He exhibits no edema or tenderness.   Neurological: He is alert and oriented to person, place, and time. He has normal strength. No cranial nerve deficit. Coordination normal.   Skin: Skin is warm and dry.   Psychiatric: He has a normal mood and affect.   Nursing note and vitals reviewed.      ED Course     Procedures    EKG Results     EKG  Interpretation  Rate: 67  Rhythm: normal sinus rhythm   Abnormality: Nonspecific ST changes  No prior for comparison    EKG interpreted by ED physician    Radiology Results     Imaging Results          XR Chest PA and Lateral (Final result)  Result time 02/27/19 11:52:35    Final result                 Impression:    IMPRESSION:  Unremarkable PA and lateral chest.               Narrative:    EXAM: XR CHEST PA AND LATERAL    CLINICAL HISTORY:  Right-sided pain for 3 days, worsening today following  ingesting piece of meat.    COMPARISON:  None.    FINDINGS:  There is no evidence for any radiopaque density in the expected  course of the esophagus. The lungs are clear. There is no evidence for  pleural effusion or pneumothorax. The heart size and pulmonary vascularity  are within normal limits. No significant osseous abnormality.                                   XR Neck Soft Tissue (Final result)  Result time 02/27/19 11:58:34    Final result                 Impression:    FINDINGS/IMPRESSION:    1. No radiopaque foreign body.  2. Patent airway.  3. Mild to moderate degenerative disc disease at C3-C4. Mild degenerative  disc disease throughout the cervical spine.               Narrative:    EXAM: XR NECK SOFT TISSUE    INDICATION: neck pain concern for FB    COMPARISON: None.                                ED Medication Orders (From admission, onward)    Start Ordered     Status Ordering Provider    02/27/19 1130 02/27/19 1131  aluminum-magnesium hydroxide-simethicone (MAALOX) 200-200-20 MG/5ML suspension 30 mL  (GI Cocktail)  PRN      Last MAR action:  Given GOLDBERG, AARON I    02/27/19 1130 02/27/19 1131  lidocaine viscous (XYLOCAINE) 2 % oral solution 15 mL  (GI Cocktail)  PRN      Last MAR action:  Given GOLDBERG, AARON I               [facility]  Vitals  Vitals:    02/27/19 1035   BP: 159/84   Pulse: 72   Resp: 18   Temp: 97.7 °F (36.5 °C)   TempSrc: Oral   SpO2: 99%       ED Course    Initial impression: The pt is a  45 year old male with no pertinent PMHx who presents with throat pain that onset after pt ate a piece of meat 3 days ago. Pt reports the sensation of a food bolus and also notes R ear pain and CP secondary to throat pain, as well as a cough. Initial assessment and evaluation of the patient performed. Plan for CXR and to attempt GI cocktail. All questions addressed at this time.    1:27 PM I rechecked the patient who is resting comfortably. He reports no improvement in sx after GI cocktail. I explained pt's CXR was negative for a bolus or other acute abnormality of the esophagus, and given pt's clear oropharynx I am not concerned for strep throat. I explained that it is possible pt sustained an abrasion to the throat by swallowing a sharp or irritating food, which would cause similar sx. Discussed plan for pt to return home with rx for antacid and recommended pt f/u with GI. The pt was made aware to return to the ED for difficulty breathing, swallowing, or other new or worsening sx. The patient indicates understanding of these issues and agrees with the plan.        MDM  Critical Care time spent on this patient outside of billable procedures:  None    Clinical Impression  ED Diagnosis        Final diagnosis    Esophageal pain likely due to mechanical food abrasion               The patient was provided with a recommendation to follow up with a primary care provider and obtain reassessment of his/her blood pressure within three months.    Follow Up:  Katalina Leung MD  [address] [facility] [address]  [phone]    In 1 week         New Prescriptions    RANITIDINE (ZANTAC) 150 MG TABLET    Take 1 tablet by mouth 2 times daily.       Pt is discharged to home/self care in stable condition.       I have reviewed the information recorded by the scribe for accuracy and agree with its contents.    ____________________________________________________________________    Jaycee Flores acting as a scribe for  Dr. Aaron Goldberg Dr. Aaron Goldberg  Dictation # [id number]  Scribe: Michaela Hoffmann Aaron I Goldberg, MD  02/27/19 1955     Chart reviewed. Patient seen this am  Appears comfortable and smiling and shrugs shoulder  Neuro exam unchanged with expressive and receptive aphasia, right hemiplegia  Labs with leucocytosis. fu in am. Patient afebrile  Trial of namenda for aphasia  Continue rehab program

## 2020-02-26 NOTE — PROGRESS NOTE ADULT - PROBLEM SELECTOR PLAN 1
left rodo/mca cva with petechial hemorrhage  continue comprehensive acute rehab therapies pt/ot/slp  continue asa, lipitor

## 2020-02-26 NOTE — PROGRESS NOTE ADULT - SUBJECTIVE AND OBJECTIVE BOX
Patient is a 64y old  Male who presents with a chief complaint of Left MCA and JESSE CVA (25 Feb 2020 10:05)  No acute events overnight        Patient seen and examined at bedside.    ALLERGIES:  No Known Allergies        Vital Signs Last 24 Hrs  T(F): 98.5 (26 Feb 2020 08:49), Max: 98.7 (25 Feb 2020 20:40)  HR: 88 (26 Feb 2020 08:49) (74 - 94)  BP: 148/89 (26 Feb 2020 08:49) (137/83 - 157/91)  RR: 15 (26 Feb 2020 08:49) (14 - 15)  SpO2: 97% (26 Feb 2020 08:49) (94% - 97%)  I&O's Summary    25 Feb 2020 07:01  -  26 Feb 2020 07:00  --------------------------------------------------------  IN: 240 mL / OUT: 0 mL / NET: 240 mL      MEDICATIONS:  acetaminophen   Tablet .. 650 milliGRAM(s) Oral every 6 hours PRN  amLODIPine   Tablet 10 milliGRAM(s) Oral every 24 hours  aspirin  chewable 81 milliGRAM(s) Oral <User Schedule>  atorvastatin 80 milliGRAM(s) Oral at bedtime  FLUoxetine 20 milliGRAM(s) Oral daily  losartan 50 milliGRAM(s) Oral two times a day  memantine 5 milliGRAM(s) Oral two times a day      PHYSICAL EXAM:  General: NAD, Appears Comfortable  ENT: MMM, no oral thrush  Neck: Supple, No JVD  Lungs: Clear to auscultation bilaterally, non labored breathing  Cardio: S1S2 regular  Abdomen: Soft, Nontender  Extremities: No cyanosis, No edema, R shoulder pain    LABS:                        13.0   15.08 )-----------( 407      ( 24 Feb 2020 06:00 )             40.3     02-24    140  |  102  |  14  ----------------------------<  116  4.0   |  29  |  0.79    Ca    9.2      24 Feb 2020 06:00    TPro  7.3  /  Alb  2.7  /  TBili  0.4  /  DBili  x   /  AST  33  /  ALT  37  /  AlkPhos  82  02-24    eGFR if Non African American: 94 mL/min/1.73M2 (02-24-20 @ 06:00)  eGFR if : 109 mL/min/1.73M2 (02-24-20 @ 06:00)            02-06 Chol 187 mg/dL  mg/dL HDL 75 mg/dL Trig 42 mg/dL                02-06 [id number] 6.0          RADIOLOGY & ADDITIONAL TESTS:    Care Discussed with Consultants/Other Providers:

## 2020-02-26 NOTE — PROGRESS NOTE ADULT - ASSESSMENT
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    Rehab: Gait Instability, ADL impairments and Functional impairments: Continue Comprehensive Rehab Program of PT/OT/ST    #Left JESSE/MCA CVA with petechial hemorrhage  - Continue ASA 81mg daily  - Continue Lipitor 80mg QHS  - Prozac 20mg daily (inc. from 10mg on 2/21) for mood & motor recovery     #Expressive Aphasia  - pt attempting to verbalize   - continue Namenda 5 BID (2/24) for aphasia     #Dissection in Right ICA  - Per neuro at [facility] given size of stroke with petechial hemorrhage avoid anticoagulation for 6-8 weeks, continue ASA but at 81 mg daily.  "This should provide protection for stroke from Right ICA dissection while minimizing chance for worsening petechial ICH."    #Leukocytosis   - repeat labs 2/24 15.08, afebrile, VSS, no signs of active infection  - continue to monitor    #Pain: Tylenol PRN  - Prior opioid use: well managed without meds at the moment, patient has h/o opioid dependence, was on Suboxone treatment as outpatient.      #Hypertension  - Cont. Losartan 50mg BID (inc. from 25mg on 2/24)  - Continue amlodipine 10mg daily     #[place] prolongation  - Per cardio at [facility] "This is associated with [place] prolongation consistent with elevated vagal tone. Patient was reportedly awake during these times. It is possible elevated vagal tone from cerebral disease is causing these pauses. Pacemaker implantation would not help this and fortunately he is asymptomatic. He should be evaluated for sleep apnea at some point."  - Avoid metoprolol and/or other AVN blockers  - Outpatient cardiology followup     #mood  - cont prozac  - neuropsych follow up    #Resp   - at prior hospital pt had an episode of desat and was placed on O2 prn   - may need r/o sleep apnea as outpatient    #GI/Bowel: Continent    #Diet: Mechanical soft with thin liquids    #Renal/Bladder: voiding    #Precautions / PROPHYLAXIS:   - Falls    - Lungs: Aspiration, Incentive Spirometer   - Pressure injury/Skin: Turn Q2hrs while in bed, OOB to Chair, PT/OT    - DVT ppx: SCDs, TEDs

## 2020-02-27 DIAGNOSIS — I77.71 DISSECTION OF CAROTID ARTERY: ICD-10-CM

## 2020-02-27 LAB
ALBUMIN SERPL ELPH-MCNC: 2.3 G/DL — LOW (ref 3.3–5)
ALP SERPL-CCNC: 76 U/L — SIGNIFICANT CHANGE UP (ref 40–120)
ALT FLD-CCNC: 63 U/L — HIGH (ref 10–45)
ANION GAP SERPL CALC-SCNC: 10 MMOL/L — SIGNIFICANT CHANGE UP (ref 5–17)
AST SERPL-CCNC: 43 U/L — HIGH (ref 10–40)
BASOPHILS # BLD AUTO: 0.07 K/UL — SIGNIFICANT CHANGE UP (ref 0–0.2)
BASOPHILS NFR BLD AUTO: 0.6 % — SIGNIFICANT CHANGE UP (ref 0–2)
BILIRUB SERPL-MCNC: 0.3 MG/DL — SIGNIFICANT CHANGE UP (ref 0.2–1.2)
BUN SERPL-MCNC: 16 MG/DL — SIGNIFICANT CHANGE UP (ref 7–23)
CALCIUM SERPL-MCNC: 8.7 MG/DL — SIGNIFICANT CHANGE UP (ref 8.4–10.5)
CHLORIDE SERPL-SCNC: 102 MMOL/L — SIGNIFICANT CHANGE UP (ref 96–108)
CO2 SERPL-SCNC: 28 MMOL/L — SIGNIFICANT CHANGE UP (ref 22–31)
CREAT SERPL-MCNC: 0.69 MG/DL — SIGNIFICANT CHANGE UP (ref 0.5–1.3)
EOSINOPHIL # BLD AUTO: 1.11 K/UL — HIGH (ref 0–0.5)
EOSINOPHIL NFR BLD AUTO: 8.9 % — HIGH (ref 0–6)
GLUCOSE SERPL-MCNC: 130 MG/DL — HIGH (ref 70–99)
HCT VFR BLD CALC: 38.1 % — LOW (ref 39–50)
HGB BLD-MCNC: 12.1 G/DL — LOW (ref 13–17)
IMM GRANULOCYTES NFR BLD AUTO: 0.5 % — SIGNIFICANT CHANGE UP (ref 0–1.5)
LYMPHOCYTES # BLD AUTO: 1.08 K/UL — SIGNIFICANT CHANGE UP (ref 1–3.3)
LYMPHOCYTES # BLD AUTO: 8.6 % — LOW (ref 13–44)
MCHC RBC-ENTMCNC: 28.4 PG — SIGNIFICANT CHANGE UP (ref 27–34)
MCHC RBC-ENTMCNC: 31.8 GM/DL — LOW (ref 32–36)
MCV RBC AUTO: 89.4 FL — SIGNIFICANT CHANGE UP (ref 80–100)
MONOCYTES # BLD AUTO: 0.64 K/UL — SIGNIFICANT CHANGE UP (ref 0–0.9)
MONOCYTES NFR BLD AUTO: 5.1 % — SIGNIFICANT CHANGE UP (ref 2–14)
NEUTROPHILS # BLD AUTO: 9.54 K/UL — HIGH (ref 1.8–7.4)
NEUTROPHILS NFR BLD AUTO: 76.3 % — SIGNIFICANT CHANGE UP (ref 43–77)
NRBC # BLD: 0 /100 WBCS — SIGNIFICANT CHANGE UP (ref 0–0)
PLATELET # BLD AUTO: 348 K/UL — SIGNIFICANT CHANGE UP (ref 150–400)
POTASSIUM SERPL-MCNC: 3.7 MMOL/L — SIGNIFICANT CHANGE UP (ref 3.5–5.3)
POTASSIUM SERPL-SCNC: 3.7 MMOL/L — SIGNIFICANT CHANGE UP (ref 3.5–5.3)
PROT SERPL-MCNC: 6.8 G/DL — SIGNIFICANT CHANGE UP (ref 6–8.3)
RBC # BLD: 4.26 M/UL — SIGNIFICANT CHANGE UP (ref 4.2–5.8)
RBC # FLD: 12.6 % — SIGNIFICANT CHANGE UP (ref 10.3–14.5)
SODIUM SERPL-SCNC: 140 MMOL/L — SIGNIFICANT CHANGE UP (ref 135–145)
WBC # BLD: 12.5 K/UL — HIGH (ref 3.8–10.5)
WBC # FLD AUTO: 12.5 K/UL — HIGH (ref 3.8–10.5)

## 2020-02-27 PROCEDURE — 99232 SBSQ HOSP IP/OBS MODERATE 35: CPT

## 2020-02-27 RX ADMIN — AMLODIPINE BESYLATE 10 MILLIGRAM(S): 2.5 TABLET ORAL at 05:37

## 2020-02-27 RX ADMIN — ATORVASTATIN CALCIUM 80 MILLIGRAM(S): 80 TABLET, FILM COATED ORAL at 22:30

## 2020-02-27 RX ADMIN — LOSARTAN POTASSIUM 50 MILLIGRAM(S): 100 TABLET, FILM COATED ORAL at 05:37

## 2020-02-27 RX ADMIN — Medication 81 MILLIGRAM(S): at 05:37

## 2020-02-27 RX ADMIN — Medication 20 MILLIGRAM(S): at 11:26

## 2020-02-27 RX ADMIN — MEMANTINE HYDROCHLORIDE 5 MILLIGRAM(S): 10 TABLET ORAL at 05:37

## 2020-02-27 RX ADMIN — LOSARTAN POTASSIUM 50 MILLIGRAM(S): 100 TABLET, FILM COATED ORAL at 17:28

## 2020-02-27 RX ADMIN — MEMANTINE HYDROCHLORIDE 5 MILLIGRAM(S): 10 TABLET ORAL at 17:28

## 2020-02-27 NOTE — PROGRESS NOTE ADULT - SUBJECTIVE AND OBJECTIVE BOX
Patient is a 64y old  Male who presents with a chief complaint of Left MCA and JESSE CVA (27 Feb 2020 09:51)      HPI:  65 y/o M w/ no significant PMHx who was transferred from [facility] ED on 2/5/20 after presenting with aphasia, dysarthria and right hemiplegia, s/p tPA administration at 22:48 2/5/20. CTA head performed at [facility] revealing of total left ICA occlusion. LNK 6:30PM 2/5/20. S/p tPA administration, pt w/o improvement in right-sided weakness. Pt was transferred to [facility] for further neurologic evaluation. Upon arrival to ED, /100 so cardene gtt was re-started. CT head perfusion revealing of small to moderate sized left frontal infarction w/ moderate to large region of low flow and/or ischemic state in left frontoparietal region. CTA neck significant for occlusion of left internal carotid. No Neurovascular intervention at this time. Admitted to MICU for post tPA monitoring. CT-A also noted with right ICA dissection. While admitted, patient was treated with cardene drip for HTN which was stopped on 2/7 and was then transferred to medical services. Patient was seen by vascular surgery in ICU for Left Carotid occlusion and recommended for patient to be started on full anticoagulation in at least 6 weeks. Neuro was consulted, repeat CT showing petechial hemorrhages.    On 2/12 Was planned for dc to acute rehab but admission was held due to 3-3.5 second pauses on monitor. Cardiology was consulted and stated that it is associated with UT prolongation consistent with increased vagal tone possibly due to his cerebral disease. Pacemaker would not be beneficial. Patient also noted to be asymptomatic during the pauses. Metoprolol and AVN blockers held as per cardio. Patient planned for dc to [facility] today.     Wife: Ca Bey (H) [phone], (C) [phone] (13 Feb 2020 11:29)        SUBJECTIVE: Patient seen and examined. No acute overnight events, slept well. Able to vocalize "no" and "um" with encouragement this morning. Experienced dizziness with standing during PT sessions, /81, resolved with sitting and drinking water. No other complaints.       REVIEW OF SYSTEMS - Nods for yes and shrugs for no.  Constitutional: No fever  Pulm: No cough,  No shortness of breath  Cardio: No chest pain,  GI:  No abdominal pain,   Neuro: No headaches, +loss of strength  MSK: No Neck or back pain, +Right shoulder pain   Psych:  No depression, No anxiety      VITALS  64y  Vital Signs Last 24 Hrs  T(C): 36.8 (27 Feb 2020 08:40), Max: 37.5 (26 Feb 2020 20:31)  T(F): 98.2 (27 Feb 2020 08:40), Max: 99.5 (26 Feb 2020 20:31)  HR: 90 (27 Feb 2020 08:40) (81 - 90)  BP: 120/80 (27 Feb 2020 08:40) (116/64 - 151/81)  BP(mean): --  RR: 16 (27 Feb 2020 08:40) (14 - 16)  SpO2: 96% (27 Feb 2020 08:40) (94% - 96%)  Daily     Daily         PHYSICAL EXAM:   Gen - NAD, Comfortable  HEENT - NCAT, EOMI,   Pulm - CTAB, No wheezing  Cardiovascular - RRR, S1S2  Abdomen - Soft, NT/ND, +BS  Extremities - +Right shoulder subluxation,  No C/C/E, No calf tenderness  Neuro-     Cognitive - AAO to person, nods when name called. Unable to verbalize responses. Intermittently follows command.      Communication - non-fluent, will nod and shrug to answer questions. +hypophonia, can say "no" and "um" with encouragement and effort. Good comprehension     Attention: Intact      Cranial Nerves - +right facial droop,      Motor -                     LEFT    UE - ShAB 5/5, EF 5/5, EE 5/5, WE 5/5,  5/5                    RIGHT UE - ShAB 0/5, EF 0/5, EE 0/5, WE 0/5,  0/5                    LEFT    LE - HF 5/5, KE 5/5, DF 5/5, PF 5/5                    RIGHT LE - Hip Adduction 1/5, HF 0/5, KE 0/5, DF 0/5, PF 0/5        Sensory - diminished to LT Right UE and LE     Reflexes - 1+ reflexes throughout. +R ankle clonus 3-4 beats     Tone - RUE and RLE flaccid  Psychiatric -  mood stable, flat affect, frustrated at times    FUNCTIONAL STATUS:  IDT rounds 2/18  dc date 3/6   goals of Sajan with grooming and UBD, mod-A with LBD and transfers. WC level for mobility. 24hr supervision.   as of 2/24  OT: Sajan with eating. min-mod A UBD. max A with LBD,   PT: mod-max A with popover transfers. ambulation 30 with RW mod A, es blocking and leg advancement by therapist  SLP: improving--comprehension 80-90% accuracy, improving attention and carry over, motor apraxia. aphasia.         RECENT LABS:                        12.1   12.50 )-----------( 348      ( 27 Feb 2020 06:10 )             38.1     02-27    140  |  102  |  16  ----------------------------<  130<H>  3.7   |  28  |  0.69    Ca    8.7      27 Feb 2020 06:10    TPro  6.8  /  Alb  2.3<L>  /  TBili  0.3  /  DBili  x   /  AST  43<H>  /  ALT  63<H>  /  AlkPhos  76  02-27    LIVER FUNCTIONS - ( 27 Feb 2020 06:10 )  Alb: 2.3 g/dL / Pro: 6.8 g/dL / ALK PHOS: 76 U/L / ALT: 63 U/L / AST: 43 U/L / GGT: x                   CAPILLARY BLOOD GLUCOSE            MEDICATIONS:  MEDICATIONS  (STANDING):  amLODIPine   Tablet 10 milliGRAM(s) Oral every 24 hours  aspirin  chewable 81 milliGRAM(s) Oral <User Schedule>  atorvastatin 80 milliGRAM(s) Oral at bedtime  FLUoxetine 20 milliGRAM(s) Oral daily  losartan 50 milliGRAM(s) Oral two times a day  memantine 5 milliGRAM(s) Oral two times a day    MEDICATIONS  (PRN):  acetaminophen   Tablet .. 650 milliGRAM(s) Oral every 6 hours PRN Temp greater or equal to 38C (100.4F), Mild Pain (1 - 3)

## 2020-02-27 NOTE — PROGRESS NOTE ADULT - ASSESSMENT
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    Rehab: Gait Instability, ADL impairments and Functional impairments: Continue Comprehensive Rehab Program of PT/OT/ST    #Left JESSE/MCA CVA with petechial hemorrhage  - Continue ASA 81mg daily  - Continue Lipitor 80mg QHS  - Prozac 20mg daily (inc. from 10mg on 2/21) for mood & motor recovery     #Expressive Aphasia  - pt attempting to verbalize   - continue Namenda 5 BID (2/24) for aphasia     #Dissection in Right ICA  - Per neuro at [facility] given size of stroke with petechial hemorrhage avoid anticoagulation for 6-8 weeks, continue ASA but at 81 mg daily.  "This should provide protection for stroke from Right ICA dissection while minimizing chance for worsening petechial ICH."    #Leukocytosis   - repeat labs 2/27 12.5, afebrile, VSS, no signs of active infection  - continue to monitor    #Pain: Tylenol PRN  - Prior opioid use: well managed without meds at the moment, patient has h/o opioid dependence, was on Suboxone treatment as outpatient.      #Hypertension  - Cont. Losartan 50mg BID (inc. from 25mg on 2/24)  - Continue amlodipine 10mg daily     #NE prolongation  - Per cardio at [facility] "This is associated with NE prolongation consistent with elevated vagal tone. Patient was reportedly awake during these times. It is possible elevated vagal tone from cerebral disease is causing these pauses. Pacemaker implantation would not help this and fortunately he is asymptomatic. He should be evaluated for sleep apnea at some point."  - Avoid metoprolol and/or other AVN blockers  - Outpatient cardiology followup     #mood  - cont prozac  - neuropsych follow up    #Resp   - at prior hospital pt had an episode of desat and was placed on O2 prn   - may need r/o sleep apnea as outpatient    #GI/Bowel: Continent    #Diet: Mechanical soft with thin liquids    #Renal/Bladder: voiding    #Precautions / PROPHYLAXIS:   - Falls    - Lungs: Aspiration, Incentive Spirometer   - Pressure injury/Skin: Turn Q2hrs while in bed, OOB to Chair, PT/OT    - DVT ppx: SCDs, TEDs     discharge 3/5 [facility]

## 2020-02-27 NOTE — PROGRESS NOTE ADULT - SUBJECTIVE AND OBJECTIVE BOX
Patient is a 64y old  Male who presents with a chief complaint of Left MCA and JESSE CVA (26 Feb 2020 10:52)    Patient seen and examined at bedside. Pt denies pain.     ALLERGIES:  No Known Allergies    MEDICATIONS  (STANDING):  amLODIPine   Tablet 10 milliGRAM(s) Oral every 24 hours  aspirin  chewable 81 milliGRAM(s) Oral <User Schedule>  atorvastatin 80 milliGRAM(s) Oral at bedtime  FLUoxetine 20 milliGRAM(s) Oral daily  losartan 50 milliGRAM(s) Oral two times a day  memantine 5 milliGRAM(s) Oral two times a day    MEDICATIONS  (PRN):  acetaminophen   Tablet .. 650 milliGRAM(s) Oral every 6 hours PRN Temp greater or equal to 38C (100.4F), Mild Pain (1 - 3)    Vital Signs Last 24 Hrs  T(F): 98.2 (27 Feb 2020 08:40), Max: 99.5 (26 Feb 2020 20:31)  HR: 90 (27 Feb 2020 08:40) (81 - 90)  BP: 120/80 (27 Feb 2020 08:40) (116/64 - 151/81)  RR: 16 (27 Feb 2020 08:40) (14 - 16)  SpO2: 96% (27 Feb 2020 08:40) (94% - 96%)    PHYSICAL EXAM:  GENERAL: NAD, well-groomed, well-developed  HEAD:  Atraumatic, Normocephalic  EYES: EOMI  ENMT: Moist mucous membranes  NECK: Supple, No JVD  CHEST/LUNG: Clear to auscultation bilaterally, good air entry, non-labored breathing  HEART: RRR; +S1/S2  ABDOMEN: Soft, Nontender, Nondistended; Bowel sounds present  VASCULAR: Normal pulses, Normal capillary refill  EXTREMITIES: No calf tenderness, No cyanosis, No edema  SKIN: Warm, Intact  NERVOUS SYSTEM:  Alert, right sided weakness               LABS:                        12.1   12.50 )-----------( 348      ( 27 Feb 2020 06:10 )             38.1     02-27    140  |  102  |  16  ----------------------------<  130  3.7   |  28  |  0.69    Ca    8.7      27 Feb 2020 06:10    TPro  6.8  /  Alb  2.3  /  TBili  0.3  /  DBili  x   /  AST  43  /  ALT  63  /  AlkPhos  76  02-27    eGFR if Non African American: 100 mL/min/1.73M2 (02-27-20 @ 06:10)  eGFR if African American: 115 mL/min/1.73M2 (02-27-20 @ 06:10)    02-06 Chol 187 mg/dL  mg/dL HDL 75 mg/dL Trig 42 mg/dL    02-06 [id number] 6.0    RADIOLOGY & ADDITIONAL TESTS:    Care Discussed with Consultants/Other Providers: Patient is a 64y old  Male who presents with a chief complaint of Left MCA and JESSE CVA (26 Feb 2020 10:52)    Patient seen and examined at bedside. Pt denies pain.     ALLERGIES:  No Known Allergies    MEDICATIONS  (STANDING):  amLODIPine   Tablet 10 milliGRAM(s) Oral every 24 hours  aspirin  chewable 81 milliGRAM(s) Oral <User Schedule>  atorvastatin 80 milliGRAM(s) Oral at bedtime  FLUoxetine 20 milliGRAM(s) Oral daily  losartan 50 milliGRAM(s) Oral two times a day  memantine 5 milliGRAM(s) Oral two times a day    MEDICATIONS  (PRN):  acetaminophen   Tablet .. 650 milliGRAM(s) Oral every 6 hours PRN Temp greater or equal to 38C (100.4F), Mild Pain (1 - 3)    Vital Signs Last 24 Hrs  T(F): 98.2 (27 Feb 2020 08:40), Max: 99.5 (26 Feb 2020 20:31)  HR: 90 (27 Feb 2020 08:40) (81 - 90)  BP: 120/80 (27 Feb 2020 08:40) (116/64 - 151/81)  RR: 16 (27 Feb 2020 08:40) (14 - 16)  SpO2: 96% (27 Feb 2020 08:40) (94% - 96%)    PHYSICAL EXAM:  GENERAL: NAD, well-groomed, well-developed  HEAD:  Atraumatic, Normocephalic  EYES: EOMI  ENMT: Moist mucous membranes  NECK: Supple, No JVD  CHEST/LUNG: Clear to auscultation bilaterally, good air entry, non-labored breathing  HEART: RRR; +S1/S2  ABDOMEN: Soft, Nontender, Nondistended; Bowel sounds present  VASCULAR: Normal pulses, Normal capillary refill  EXTREMITIES: No calf tenderness, No cyanosis, No edema  SKIN: Warm, Intact  NERVOUS SYSTEM:  Alert, aphasic, right sided weakness               LABS:                        12.1   12.50 )-----------( 348      ( 27 Feb 2020 06:10 )             38.1     02-27    140  |  102  |  16  ----------------------------<  130  3.7   |  28  |  0.69    Ca    8.7      27 Feb 2020 06:10    TPro  6.8  /  Alb  2.3  /  TBili  0.3  /  DBili  x   /  AST  43  /  ALT  63  /  AlkPhos  76  02-27    eGFR if Non African American: 100 mL/min/1.73M2 (02-27-20 @ 06:10)  eGFR if African American: 115 mL/min/1.73M2 (02-27-20 @ 06:10)    02-06 Chol 187 mg/dL  mg/dL HDL 75 mg/dL Trig 42 mg/dL    02-06 [id number] 6.0    RADIOLOGY & ADDITIONAL TESTS:    Care Discussed with Consultants/Other Providers:

## 2020-02-27 NOTE — PROGRESS NOTE ADULT - ATTENDING COMMENTS
Pt. seen with resident & fellow this AM.  Agree with documentation above as per resident with amendments made as appropriate. Patient medically stable. Making progress towards rehab goals.     Had episode of dizziness when standing in PT.  BP stable --not orthostatic.  Pt. and nursing indicated poor PO hydration.  Pt's symptoms improved with PO hydration in therapy.  Pt. advised to maintain adequate PO hydration and food intake.     Cont. namenda for aphasia--tolerating w/o SEs

## 2020-02-27 NOTE — PROGRESS NOTE ADULT - PROBLEM SELECTOR PLAN 1
- Left JESSE/MCA CVA with petechial hemorrhage  - Continue comprehensive acute rehab therapies PT/OT/SLP  - Continue ASA, lipitor

## 2020-02-27 NOTE — ED ADULT TRIAGE NOTE - IDEAL BODY WEIGHT(KG)
Melody called she goes to [facility] Cant get in until March 20th.  Wants to know if any liver or kidney tests should be done before hand.       73

## 2020-02-27 NOTE — CHART NOTE - NSCHARTNOTEFT_GEN_A_CORE
Nutrition Follow Up Note  Hospital Course (Per Electronic Medical Record):   Source: Medical Record [X] Patient [X] Family [X] Nursing Staff [X]     Diet: Mechanical Soft , Ensure Enlive TID     Patient noted with varied po intake % noted. Patient is receiving Ensure Enlive TID  with meals . No GI issues noted. Patient also receiving home made smoothie(banana, 1 tbp  peanut butter, protein powder) QD.    Current Weight: (2/17) 201.7/91.5kg, no weight change since admission    Pertinent Medications: MEDICATIONS  (STANDING):  amLODIPine   Tablet 10 milliGRAM(s) Oral every 24 hours  aspirin  chewable 81 milliGRAM(s) Oral <User Schedule>  atorvastatin 80 milliGRAM(s) Oral at bedtime  FLUoxetine 20 milliGRAM(s) Oral daily  losartan 50 milliGRAM(s) Oral two times a day  memantine 5 milliGRAM(s) Oral two times a day    MEDICATIONS  (PRN):  acetaminophen   Tablet .. 650 milliGRAM(s) Oral every 6 hours PRN Temp greater or equal to 38C (100.4F), Mild Pain (1 - 3)      Pertinent Labs:  02-27 Na140 mmol/L Glu 130 mg/dL<H> K+ 3.7 mmol/L Cr  0.69 mg/dL BUN 16 mg/dL 02-27 Alb 2.3 g/dL<L> 02-06 [id number] 6.0 %<H> 02-06 Chol 187 mg/dL  mg/dL HDL 75 mg/dL Trig 42 mg/dL        Skin: intact    Edema: none    Last BM: on (2/26)    Estimated Needs:   [X] No Change since Previous Assessment      Previous Nutrition Diagnosis: Moderate Malnutrition     Nutrition Diagnosis is [X] Ongoing         New Nutrition Diagnosis: [X] Not Applicable    Interventions:   1. Recommend advance diet as per SLP   2. Continue current po supplements due to varied po intake    Monitoring & Evaluation: will monitor:  [X] Weights   [X] PO Intake   [X] Follow Up (Per Protocol)  [X] Tolerance to Diet Prescription       RD to follow as per Nutrition protocol  Abi Olmstead RDN

## 2020-02-27 NOTE — PROGRESS NOTE ADULT - ASSESSMENT
64 male with no previous PMH, s/p left JESSE/MCA CVA with petechial hemorrhage now with decreased functional mobility, dysphagia, global aphasia, right hemiplegia and neglect, gait instability and ADL impairments     .

## 2020-02-28 PROCEDURE — 99232 SBSQ HOSP IP/OBS MODERATE 35: CPT

## 2020-02-28 RX ADMIN — LOSARTAN POTASSIUM 50 MILLIGRAM(S): 100 TABLET, FILM COATED ORAL at 17:57

## 2020-02-28 RX ADMIN — LOSARTAN POTASSIUM 50 MILLIGRAM(S): 100 TABLET, FILM COATED ORAL at 05:56

## 2020-02-28 RX ADMIN — MEMANTINE HYDROCHLORIDE 5 MILLIGRAM(S): 10 TABLET ORAL at 17:57

## 2020-02-28 RX ADMIN — AMLODIPINE BESYLATE 10 MILLIGRAM(S): 2.5 TABLET ORAL at 05:56

## 2020-02-28 RX ADMIN — MEMANTINE HYDROCHLORIDE 5 MILLIGRAM(S): 10 TABLET ORAL at 05:56

## 2020-02-28 RX ADMIN — ATORVASTATIN CALCIUM 80 MILLIGRAM(S): 80 TABLET, FILM COATED ORAL at 21:53

## 2020-02-28 RX ADMIN — Medication 20 MILLIGRAM(S): at 11:38

## 2020-02-28 RX ADMIN — Medication 81 MILLIGRAM(S): at 05:56

## 2020-02-29 PROCEDURE — 99232 SBSQ HOSP IP/OBS MODERATE 35: CPT | Mod: GC

## 2020-02-29 PROCEDURE — 99232 SBSQ HOSP IP/OBS MODERATE 35: CPT

## 2020-02-29 RX ADMIN — Medication 20 MILLIGRAM(S): at 11:21

## 2020-02-29 RX ADMIN — LOSARTAN POTASSIUM 50 MILLIGRAM(S): 100 TABLET, FILM COATED ORAL at 05:27

## 2020-02-29 RX ADMIN — ATORVASTATIN CALCIUM 80 MILLIGRAM(S): 80 TABLET, FILM COATED ORAL at 21:44

## 2020-02-29 RX ADMIN — MEMANTINE HYDROCHLORIDE 5 MILLIGRAM(S): 10 TABLET ORAL at 17:58

## 2020-02-29 RX ADMIN — Medication 81 MILLIGRAM(S): at 05:27

## 2020-02-29 RX ADMIN — MEMANTINE HYDROCHLORIDE 5 MILLIGRAM(S): 10 TABLET ORAL at 05:27

## 2020-02-29 RX ADMIN — AMLODIPINE BESYLATE 10 MILLIGRAM(S): 2.5 TABLET ORAL at 05:27

## 2020-02-29 RX ADMIN — LOSARTAN POTASSIUM 50 MILLIGRAM(S): 100 TABLET, FILM COATED ORAL at 17:58

## 2020-02-29 NOTE — PROGRESS NOTE ADULT - ASSESSMENT
Mr Bauman is a pleasant 64 year-old male with no previous PMH, s/p left JESSE/MCA CVA with petechial hemorrhage now with decreased functional mobility, dysphagia, global aphasia, right hemiplegia and neglect, gait instability and ADL impairments    Neurology  CVA  - Left JESSE/MCA CVA with petechial hemorrhage  - Continue comprehensive acute rehab therapies PT/OT/SLP  - Continue ASA, lipitor  - namenda 5mg bid  Right ICA dissection  - As per neuro avoid a/c for 6-8 weeks  - Continue asa 81 daily    Psych  Mood Disorder  - prozac 20mg qd    Cardiovascular  Essential Hypertension  - Continue losartan 50mg bid  - Continue norvasc 10mg qd  - if BP still above goal by tomorrow will adjust regimen    DVT prophylaxis: scd  Diet: per primary  Pain management per primary team  Case discussed with PMR team  If a clinical question should arise regarding this patient, please do not hesitate to contact me at [phone] until 7pm on 2/29/2020 Mr Bauman is a pleasant 64 year-old male with no previous PMH, s/p left JESSE/MCA CVA with petechial hemorrhage now with decreased functional mobility, dysphagia, global aphasia, right hemiplegia and neglect, gait instability and ADL impairments    Neurology  CVA  - Left JESSE/MCA CVA with petechial hemorrhage  - Continue comprehensive acute rehab therapies PT/OT/SLP  - Continue ASA, lipitor  - namenda 5mg bid  Right ICA dissection  - As per neuro avoid a/c for 6-8 weeks  - Continue asa 81 daily    Psych  Mood Disorder  - prozac 20mg qd    Cardiovascular  Essential Hypertension  - Continue losartan 50mg bid  - Continue norvasc 10mg qd  - if BP still above goal by tomorrow will adjust regimen    Hematology  Leukocytosis  - downtrending  - unclear etiology  - no other signs of infection, continue to monitor    DVT prophylaxis: scd  Diet: per primary  Pain management per primary team  Case discussed with PMR team  If a clinical question should arise regarding this patient, please do not hesitate to contact me at [phone] until 7pm on 2/29/2020

## 2020-02-29 NOTE — PROGRESS NOTE ADULT - PROBLEM SELECTOR PLAN 3
- monitor blood pressure carefully
- BP: 120/80 (27 Feb 2020 08:40) (116/64 - 151/81)  - Continue losartan  - Continue norvasc   - monitor blood pressure carefully
blood pressure still high  consider inc losartan, continue norvasc
plan to continue losartan 50 bid, norvasc 10 daily  monitor blood pressure carefully

## 2020-02-29 NOTE — PROGRESS NOTE ADULT - PROBLEM SELECTOR PLAN 4
- afebrile, no signs of infection  - continue to trend
- afebrile, no signs of infection  - continue to trend
afebrile, no signs of infection  continue to trend
afebrile, no signs of infection  continue to trend

## 2020-02-29 NOTE — PROGRESS NOTE ADULT - SUBJECTIVE AND OBJECTIVE BOX
Subjective: No new complaints or overnight issues      REVIEW OF SYSTEMS  Pertinent in last 24 h: Neurological deficits    VITALS  T(C): 36.7 (02-29-20 @ 09:15), Max: 36.8 (02-28-20 @ 20:37)  HR: 82 (02-29-20 @ 09:15) (82 - 90)  BP: 130/78 (02-29-20 @ 09:15) (130/78 - 161/81)  RR: 16 (02-29-20 @ 09:15) (16 - 16)  SpO2: 96% (02-29-20 @ 09:15) (96% - 96%)  Wt(kg): --    Physical Exam:  Gen - NAD, Comfortable  HEENT - NCAT, EOMI,   Pulm - CTAB, No wheezing  Cardiovascular - RRR, S1S2  Abdomen - Soft, NT/ND, +BS  Extremities - +Right shoulder subluxation,  No C/C/E, No calf tenderness  Neuro-     Cognitive - AAO to person, nods when name called. Unable to verbalize responses. Intermittently follows command.      Communication - non-fluent, will nod and shrug to answer questions. Comprehension intact     Attention: Intact      Cranial Nerves - +right facial droop,      Motor -  Right HP                       Sensory - diminished to LT Right UE and LE       Tone - RUE and RLE flaccid  Psychiatric -  mood stable, flat affect, frustrated at times  -    RECENT LABS/IMAGING                  MEDICATIONS   acetaminophen   Tablet .. 650 milliGRAM(s) every 6 hours PRN  amLODIPine   Tablet 10 milliGRAM(s) every 24 hours  aspirin  chewable 81 milliGRAM(s) <User Schedule>  atorvastatin 80 milliGRAM(s) at bedtime  FLUoxetine 20 milliGRAM(s) daily  losartan 50 milliGRAM(s) two times a day  memantine 5 milliGRAM(s) two times a day      --------------------------------------------------------------------

## 2020-02-29 NOTE — PROGRESS NOTE ADULT - SUBJECTIVE AND OBJECTIVE BOX
Patient seen and examined at bedside. No overnight events per nursing or chart review. Patient 10 point ROS is unremarkable.     ALLERGIES:  No Known Allergies    MEDICATIONS  (STANDING):  amLODIPine   Tablet 10 milliGRAM(s) Oral every 24 hours  aspirin  chewable 81 milliGRAM(s) Oral <User Schedule>  atorvastatin 80 milliGRAM(s) Oral at bedtime  FLUoxetine 20 milliGRAM(s) Oral daily  losartan 50 milliGRAM(s) Oral two times a day  memantine 5 milliGRAM(s) Oral two times a day    MEDICATIONS  (PRN):  acetaminophen   Tablet .. 650 milliGRAM(s) Oral every 6 hours PRN Temp greater or equal to 38C (100.4F), Mild Pain (1 - 3)    Vital Signs Last 24 Hrs  T(F): 98 (29 Feb 2020 09:15), Max: 98.3 (28 Feb 2020 20:37)  HR: 82 (29 Feb 2020 09:15) (82 - 90)  BP: 130/78 (29 Feb 2020 09:15) (130/78 - 161/81)  RR: 16 (29 Feb 2020 09:15) (16 - 16)  SpO2: 96% (29 Feb 2020 09:15) (96% - 96%)  I&O's Summary    28 Feb 2020 07:01  -  29 Feb 2020 07:00  --------------------------------------------------------  IN: 480 mL / OUT: 1 mL / NET: 479 mL        PHYSICAL EXAM:  Gen: nad, resting in bed  Neuro: aaox3, left sided hemiparesis  Heent: eomi b/l, no jvd, no oral exudates  Pulm: cta b/l, no w/r/r  CV: +s1s2, no m/r/g  Ab: soft, nt/nd, normoactive bs x 4  Extrem: no edema, pulses intact and equal      LABS:                        12.1   12.50 )-----------( 348      ( 27 Feb 2020 06:10 )             38.1       02-27    140  |  102  |  16  ----------------------------<  130  3.7   |  28  |  0.69    Ca    8.7      27 Feb 2020 06:10    TPro  6.8  /  Alb  2.3  /  TBili  0.3  /  DBili  x   /  AST  43  /  ALT  63  /  AlkPhos  76  02-27     eGFR if Non African American: 100 mL/min/1.73M2 (02-27-20 @ 06:10)  eGFR if African American: 115 mL/min/1.73M2 (02-27-20 @ 06:10)        02-06 Chol 187 mg/dL  mg/dL HDL 75 mg/dL Trig 42 mg/dL  02-06 [id number] 6.0

## 2020-02-29 NOTE — PROGRESS NOTE ADULT - ASSESSMENT
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    Stable--no acute issues or med changes    Rehab: Gait Instability, ADL impairments and Functional impairments: Continue Comprehensive Rehab Program of PT/OT/ST    #Left JESSE/MCA CVA with petechial hemorrhage  - Continue ASA 81mg daily  - Continue Lipitor 80mg QHS  - Prozac 20mg daily (inc. from 10mg on 2/21) for mood & motor recovery     #Expressive Aphasia  - pt attempting to verbalize   - continue Namenda 5 BID (2/24) for aphasia     #Dissection in Right ICA  - Per neuro at [facility] given size of stroke with petechial hemorrhage avoid anticoagulation for 6-8 weeks, continue ASA but at 81 mg daily.  "This should provide protection for stroke from Right ICA dissection while minimizing chance for worsening petechial ICH."    #Leukocytosis   - repeat labs 2/27 12.5, afebrile, VSS, no signs of active infection  - continue to monitor    #Pain: Tylenol PRN  - Prior opioid use: well managed without meds at the moment, patient has h/o opioid dependence, was on Suboxone treatment as outpatient.      #Hypertension  - Cont. Losartan 50mg BID (inc. from 25mg on 2/24)  - Continue amlodipine 10mg daily       #mood  - cont prozac  - neuropsych follow up        #GI/Bowel: Continent    #Diet: Mechanical soft with thin liquids    #Renal/Bladder: voiding    #Precautions / PROPHYLAXIS:   - Falls    - Lungs: Aspiration, Incentive Spirometer   - Pressure injury/Skin: Turn Q2hrs while in bed, OOB to Chair, PT/OT    - DVT ppx: SCDs, TEDs     discharge 3/5 [facility]

## 2020-03-01 PROCEDURE — 99232 SBSQ HOSP IP/OBS MODERATE 35: CPT | Mod: GC

## 2020-03-01 PROCEDURE — 99232 SBSQ HOSP IP/OBS MODERATE 35: CPT

## 2020-03-01 RX ORDER — AMLODIPINE BESYLATE 2.5 MG/1
10 TABLET ORAL
Refills: 0 | Status: DISCONTINUED | OUTPATIENT
Start: 2020-03-02 | End: 2020-03-06

## 2020-03-01 RX ADMIN — MEMANTINE HYDROCHLORIDE 5 MILLIGRAM(S): 10 TABLET ORAL at 05:18

## 2020-03-01 RX ADMIN — ATORVASTATIN CALCIUM 80 MILLIGRAM(S): 80 TABLET, FILM COATED ORAL at 21:02

## 2020-03-01 RX ADMIN — Medication 20 MILLIGRAM(S): at 13:24

## 2020-03-01 RX ADMIN — MEMANTINE HYDROCHLORIDE 5 MILLIGRAM(S): 10 TABLET ORAL at 17:58

## 2020-03-01 RX ADMIN — AMLODIPINE BESYLATE 10 MILLIGRAM(S): 2.5 TABLET ORAL at 04:52

## 2020-03-01 RX ADMIN — LOSARTAN POTASSIUM 50 MILLIGRAM(S): 100 TABLET, FILM COATED ORAL at 05:19

## 2020-03-01 RX ADMIN — LOSARTAN POTASSIUM 50 MILLIGRAM(S): 100 TABLET, FILM COATED ORAL at 17:58

## 2020-03-01 RX ADMIN — Medication 81 MILLIGRAM(S): at 05:19

## 2020-03-01 NOTE — PROGRESS NOTE ADULT - ASSESSMENT
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    Stable--no acute issues or med changes    Rehab: Gait Instability, ADL impairments and Functional impairments: Continue Comprehensive Rehab Program of PT/OT/ST    #Left JESSE/MCA CVA with petechial hemorrhage  - Continue ASA 81mg daily  - Continue Lipitor 80mg QHS  - Prozac 20mg daily (inc. from 10mg on 2/21) for mood & motor recovery     #Expressive Aphasia  - pt attempting to verbalize   - continue Namenda 5 BID (2/24) for aphasia     #Dissection in Right ICA  - Per neuro at [facility] given size of stroke with petechial hemorrhage avoid anticoagulation for 6-8 weeks, continue ASA but at 81 mg daily.  "This should provide protection for stroke from Right ICA dissection while minimizing chance for worsening petechial ICH."    #Leukocytosis   - repeat labs 2/27 12.5, afebrile, VSS, no signs of active infection  - continue to monitor    #Pain: Tylenol PRN  - Prior opioid use: well managed without meds at the moment, patient has h/o opioid dependence, was on Suboxone treatment as outpatient.      #Hypertension  - Cont. Losartan 50mg BID (inc. from 25mg on 2/24)  - Continue amlodipine 10mg daily       #mood  - cont prozac  - neuropsych follow up        #GI/Bowel: Continent    #Diet: Mechanical soft with thin liquids    #Renal/Bladder: voiding    #Precautions / PROPHYLAXIS:   - Falls    - Lungs: Aspiration, Incentive Spirometer   - Pressure injury/Skin: Turn Q2hrs while in bed, OOB to Chair, PT/OT    - DVT ppx: SCDs, TEDs     discharge 3/5 [facility]

## 2020-03-01 NOTE — PROGRESS NOTE ADULT - ASSESSMENT
Mr Bauman is a pleasant 64 year-old male with no previous PMH, s/p left JESSE/MCA CVA with petechial hemorrhage now with decreased functional mobility, dysphagia, global aphasia, right hemiplegia and neglect, gait instability and ADL impairments    Neurology  CVA  - Left JESSE/MCA CVA with petechial hemorrhage  - Continue comprehensive acute rehab therapies PT/OT/SLP  - Continue ASA, lipitor  - namenda 5mg bid  Right ICA dissection  - As per neuro avoid a/c for 6-8 weeks  - Continue asa 81 daily    Psych  Mood Disorder  - prozac 20mg qd    Cardiovascular  Essential Hypertension  - Continue losartan 50mg bid   - Continue norvasc 10mg qd, please change administration time to PM dose      Hematology  Leukocytosis  - downtrending  - unclear etiology  - no other signs of infection, continue to monitor    DVT prophylaxis: scd  Diet: per primary  Pain management per primary team  Case discussed with PMR team  If a clinical question should arise regarding this patient, please do not hesitate to contact me at [phone] until 7pm on 3/1/2020

## 2020-03-01 NOTE — PROGRESS NOTE ADULT - SUBJECTIVE AND OBJECTIVE BOX
Patient seen and examined at bedside. No overnight events per nursing or chart review. Patient is without complaints, answers via head nods. 10 point ROS is otherwise unremarkable.     ALLERGIES:  No Known Allergies    MEDICATIONS  (STANDING):  amLODIPine   Tablet 10 milliGRAM(s) Oral every 24 hours  aspirin  chewable 81 milliGRAM(s) Oral <User Schedule>  atorvastatin 80 milliGRAM(s) Oral at bedtime  FLUoxetine 20 milliGRAM(s) Oral daily  losartan 50 milliGRAM(s) Oral two times a day  memantine 5 milliGRAM(s) Oral two times a day    MEDICATIONS  (PRN):  acetaminophen   Tablet .. 650 milliGRAM(s) Oral every 6 hours PRN Temp greater or equal to 38C (100.4F), Mild Pain (1 - 3)    Vital Signs Last 24 Hrs  T(F): 98.4 (01 Mar 2020 07:58), Max: 98.4 (01 Mar 2020 07:58)  HR: 79 (01 Mar 2020 07:58) (78 - 89)  BP: 137/80 (01 Mar 2020 07:58) (134/78 - 155/78)  RR: 15 (01 Mar 2020 07:58) (14 - 15)  SpO2: 94% (01 Mar 2020 07:58) (94% - 95%)  I&O's Summary    29 Feb 2020 07:01  -  01 Mar 2020 07:00  --------------------------------------------------------  IN: 320 mL / OUT: 1 mL / NET: 319 mL        PHYSICAL EXAM:  Gen: nad, resting in bed  Neuro: aaox3, left sided focal weakness noted  Heent: eomi b/l, no jvd, no oral exudates  Pulm: cta b/l, no w/r/r  CV: +s1s2, no m/r/g  Ab: soft, nt/nd, normoactive bs x 4  Extrem: no edema, pulses intact and equal      LABS:        02-06 Chol 187 mg/dL  mg/dL HDL 75 mg/dL Trig 42 mg/dL    02-06 [id number] 6.0

## 2020-03-01 NOTE — PROGRESS NOTE ADULT - SUBJECTIVE AND OBJECTIVE BOX
Subjective: No new complaints or overnight issues      REVIEW OF SYSTEMS  Pertinent in last 24 h: Neurological deficits    VITALS  T(C): 36.9 (03-01-20 @ 07:58), Max: 36.9 (03-01-20 @ 07:58)  HR: 79 (03-01-20 @ 07:58) (78 - 89)  BP: 137/80 (03-01-20 @ 07:58) (134/78 - 155/78)  RR: 15 (03-01-20 @ 07:58) (14 - 15)  SpO2: 94% (03-01-20 @ 07:58) (94% - 95%)  Wt(kg): --    Physical Exam:  -Gen - NAD, Comfortable  HEENT - NCAT, EOMI,   Pulm - CTAB, No wheezing  Cardiovascular - RRR, S1S2  Abdomen - Soft, NT/ND, +BS  Extremities - +Right shoulder subluxation,  No C/C/E, No calf tenderness  Neuro-     Cognitive - AAO to person, nods when name called. Unable to verbalize responses. Intermittently follows command.      Communication - non-fluent, will nod and shrug to answer questions. Comprehension intact     Attention: Intact      Cranial Nerves - +right facial droop,      Motor -  Right HP                       Sensory - diminished to LT Right UE and LE       Tone - RUE and RLE flaccid  Psychiatric -  mood stable, flat affect, frustrated at times      RECENT LABS/IMAGING                  MEDICATIONS   acetaminophen   Tablet .. 650 milliGRAM(s) every 6 hours PRN  amLODIPine   Tablet 10 milliGRAM(s) every 24 hours  aspirin  chewable 81 milliGRAM(s) <User Schedule>  atorvastatin 80 milliGRAM(s) at bedtime  FLUoxetine 20 milliGRAM(s) daily  losartan 50 milliGRAM(s) two times a day  memantine 5 milliGRAM(s) two times a day      --------------------------------------------------------------------

## 2020-03-02 ENCOUNTER — TRANSCRIPTION ENCOUNTER (OUTPATIENT)
Age: 65
End: 2020-03-02

## 2020-03-02 LAB
ALBUMIN SERPL ELPH-MCNC: 2.4 G/DL — LOW (ref 3.3–5)
ALP SERPL-CCNC: 79 U/L — SIGNIFICANT CHANGE UP (ref 40–120)
ALT FLD-CCNC: 102 U/L — HIGH (ref 10–45)
ANION GAP SERPL CALC-SCNC: 7 MMOL/L — SIGNIFICANT CHANGE UP (ref 5–17)
AST SERPL-CCNC: 50 U/L — HIGH (ref 10–40)
BASOPHILS # BLD AUTO: 0.06 K/UL — SIGNIFICANT CHANGE UP (ref 0–0.2)
BASOPHILS NFR BLD AUTO: 0.7 % — SIGNIFICANT CHANGE UP (ref 0–2)
BILIRUB SERPL-MCNC: 0.2 MG/DL — SIGNIFICANT CHANGE UP (ref 0.2–1.2)
BUN SERPL-MCNC: 18 MG/DL — SIGNIFICANT CHANGE UP (ref 7–23)
CALCIUM SERPL-MCNC: 8.9 MG/DL — SIGNIFICANT CHANGE UP (ref 8.4–10.5)
CHLORIDE SERPL-SCNC: 106 MMOL/L — SIGNIFICANT CHANGE UP (ref 96–108)
CO2 SERPL-SCNC: 28 MMOL/L — SIGNIFICANT CHANGE UP (ref 22–31)
CREAT SERPL-MCNC: 0.75 MG/DL — SIGNIFICANT CHANGE UP (ref 0.5–1.3)
EOSINOPHIL # BLD AUTO: 0.09 K/UL — SIGNIFICANT CHANGE UP (ref 0–0.5)
EOSINOPHIL NFR BLD AUTO: 1.1 % — SIGNIFICANT CHANGE UP (ref 0–6)
GLUCOSE SERPL-MCNC: 108 MG/DL — HIGH (ref 70–99)
HCT VFR BLD CALC: 37.9 % — LOW (ref 39–50)
HGB BLD-MCNC: 12.3 G/DL — LOW (ref 13–17)
IMM GRANULOCYTES NFR BLD AUTO: 0.2 % — SIGNIFICANT CHANGE UP (ref 0–1.5)
LYMPHOCYTES # BLD AUTO: 1.38 K/UL — SIGNIFICANT CHANGE UP (ref 1–3.3)
LYMPHOCYTES # BLD AUTO: 16.2 % — SIGNIFICANT CHANGE UP (ref 13–44)
MCHC RBC-ENTMCNC: 29.1 PG — SIGNIFICANT CHANGE UP (ref 27–34)
MCHC RBC-ENTMCNC: 32.5 GM/DL — SIGNIFICANT CHANGE UP (ref 32–36)
MCV RBC AUTO: 89.8 FL — SIGNIFICANT CHANGE UP (ref 80–100)
MONOCYTES # BLD AUTO: 0.45 K/UL — SIGNIFICANT CHANGE UP (ref 0–0.9)
MONOCYTES NFR BLD AUTO: 5.3 % — SIGNIFICANT CHANGE UP (ref 2–14)
NEUTROPHILS # BLD AUTO: 6.53 K/UL — SIGNIFICANT CHANGE UP (ref 1.8–7.4)
NEUTROPHILS NFR BLD AUTO: 76.5 % — SIGNIFICANT CHANGE UP (ref 43–77)
NRBC # BLD: 0 /100 WBCS — SIGNIFICANT CHANGE UP (ref 0–0)
PLATELET # BLD AUTO: 381 K/UL — SIGNIFICANT CHANGE UP (ref 150–400)
POTASSIUM SERPL-MCNC: 3.9 MMOL/L — SIGNIFICANT CHANGE UP (ref 3.5–5.3)
POTASSIUM SERPL-SCNC: 3.9 MMOL/L — SIGNIFICANT CHANGE UP (ref 3.5–5.3)
PROT SERPL-MCNC: 6.8 G/DL — SIGNIFICANT CHANGE UP (ref 6–8.3)
RBC # BLD: 4.22 M/UL — SIGNIFICANT CHANGE UP (ref 4.2–5.8)
RBC # FLD: 12.6 % — SIGNIFICANT CHANGE UP (ref 10.3–14.5)
SODIUM SERPL-SCNC: 141 MMOL/L — SIGNIFICANT CHANGE UP (ref 135–145)
WBC # BLD: 8.53 K/UL — SIGNIFICANT CHANGE UP (ref 3.8–10.5)
WBC # FLD AUTO: 8.53 K/UL — SIGNIFICANT CHANGE UP (ref 3.8–10.5)

## 2020-03-02 PROCEDURE — 99232 SBSQ HOSP IP/OBS MODERATE 35: CPT

## 2020-03-02 RX ORDER — AMLODIPINE BESYLATE 2.5 MG/1
1 TABLET ORAL
Qty: 0 | Refills: 0 | DISCHARGE
Start: 2020-03-02

## 2020-03-02 RX ORDER — FLUOXETINE HCL 10 MG
1 CAPSULE ORAL
Qty: 0 | Refills: 0 | DISCHARGE
Start: 2020-03-02

## 2020-03-02 RX ORDER — LOSARTAN POTASSIUM 100 MG/1
1 TABLET, FILM COATED ORAL
Qty: 0 | Refills: 0 | DISCHARGE
Start: 2020-03-02

## 2020-03-02 RX ORDER — ASPIRIN/CALCIUM CARB/MAGNESIUM 324 MG
1 TABLET ORAL
Qty: 0 | Refills: 0 | DISCHARGE
Start: 2020-03-02

## 2020-03-02 RX ORDER — ACETAMINOPHEN 500 MG
2 TABLET ORAL
Qty: 0 | Refills: 0 | DISCHARGE
Start: 2020-03-02

## 2020-03-02 RX ORDER — MEMANTINE HYDROCHLORIDE 10 MG/1
1 TABLET ORAL
Qty: 0 | Refills: 0 | DISCHARGE
Start: 2020-03-02

## 2020-03-02 RX ORDER — ATORVASTATIN CALCIUM 80 MG/1
1 TABLET, FILM COATED ORAL
Qty: 0 | Refills: 0 | DISCHARGE
Start: 2020-03-02

## 2020-03-02 RX ADMIN — MEMANTINE HYDROCHLORIDE 5 MILLIGRAM(S): 10 TABLET ORAL at 18:00

## 2020-03-02 RX ADMIN — LOSARTAN POTASSIUM 50 MILLIGRAM(S): 100 TABLET, FILM COATED ORAL at 17:59

## 2020-03-02 RX ADMIN — MEMANTINE HYDROCHLORIDE 5 MILLIGRAM(S): 10 TABLET ORAL at 05:28

## 2020-03-02 RX ADMIN — ATORVASTATIN CALCIUM 80 MILLIGRAM(S): 80 TABLET, FILM COATED ORAL at 21:01

## 2020-03-02 RX ADMIN — Medication 81 MILLIGRAM(S): at 05:28

## 2020-03-02 RX ADMIN — Medication 20 MILLIGRAM(S): at 12:12

## 2020-03-02 RX ADMIN — AMLODIPINE BESYLATE 10 MILLIGRAM(S): 2.5 TABLET ORAL at 17:59

## 2020-03-02 RX ADMIN — LOSARTAN POTASSIUM 50 MILLIGRAM(S): 100 TABLET, FILM COATED ORAL at 05:28

## 2020-03-02 NOTE — DISCHARGE NOTE PROVIDER - CARE PROVIDERS DIRECT ADDRESSES
,[email or web address]

## 2020-03-02 NOTE — PROGRESS NOTE ADULT - ATTENDING COMMENTS
Pt. seen with resident & fellow.  Agree with documentation above as per resident. Patient medically stable. Making progress towards rehab goals.    Mild Leukocytosis resolved.    d/c planning to [facility] 3/4

## 2020-03-02 NOTE — DISCHARGE NOTE PROVIDER - NSDCMRMEDTOKEN_GEN_ALL_CORE_FT
acetaminophen 325 mg oral tablet: 2 tab(s) orally every 6 hours, As needed, Temp greater or equal to 38C (100.4F), Mild Pain (1 - 3)  amLODIPine 10 mg oral tablet: 1 tab(s) orally   aspirin 81 mg oral tablet, chewable: 1 tab(s) orally   atorvastatin 80 mg oral tablet: 1 tab(s) orally once a day (at bedtime)  FLUoxetine 20 mg oral capsule: 1 cap(s) orally once a day  losartan 50 mg oral tablet: 1 tab(s) orally 2 times a day  memantine 5 mg oral tablet: 1 tab(s) orally 2 times a day acetaminophen 325 mg oral tablet: 2 tab(s) orally every 6 hours, As needed, Temp greater or equal to 38C (100.4F), Mild Pain (1 - 3)  amLODIPine 10 mg oral tablet: 1 tab(s) orally   aspirin 81 mg oral tablet, chewable: 1 tab(s) orally   atorvastatin 80 mg oral tablet: 1 tab(s) orally once a day (at bedtime)  FLUoxetine 20 mg oral capsule: 1 cap(s) orally once a day  losartan 50 mg oral tablet: 1 tab(s) orally 2 times a day  Namenda 10 mg oral tablet: 1 tab(s) orally 2 times a day  nystatin 100,000 units/g topical powder: 1 application topically 2 times a day

## 2020-03-02 NOTE — DISCHARGE NOTE PROVIDER - CARE PROVIDER_API CALL
London Storey)  Neurology  [address], [address]  [place] [address]  Phone: [phone]  Fax: [phone]  Follow Up Time: Routine    Christiano Barrera)  Surgery; Vascular Surgery  [address] [place] [address]  Phone: [phone]  Fax: [phone]  Follow Up Time: 1 month London Storey)  Neurology  [address], [address]  [place] [address]  Phone: [phone]  Fax: [phone]  Follow Up Time: Routine    Christiano Barrera)  Surgery; Vascular Surgery  [address] [place] [address]  Phone: [phone]  Fax: [phone]  Follow Up Time: 1 month    Crystal Lew ()  Brain Injury Medicine; PhysicalRehab Medicine  [address] [place] [address]  Phone: [phone]  Fax: [phone]  Follow Up Time:

## 2020-03-02 NOTE — PROGRESS NOTE ADULT - SUBJECTIVE AND OBJECTIVE BOX
Patient is a 64y old  Male who presents with a chief complaint of Left MCA and JESSE CVA (01 Mar 2020 12:45)      HPI:  65 y/o M w/ no significant PMHx who was transferred from [facility] ED on 20 after presenting with aphasia, dysarthria and right hemiplegia, s/p tPA administration at 22:48 20. CTA head performed at [facility] revealing of total left ICA occlusion. LNK 6:30PM 20. S/p tPA administration, pt w/o improvement in right-sided weakness. Pt was transferred to [facility] for further neurologic evaluation. Upon arrival to ED, /100 so cardene gtt was re-started. CT head perfusion revealing of small to moderate sized left frontal infarction w/ moderate to large region of low flow and/or ischemic state in left frontoparietal region. CTA neck significant for occlusion of left internal carotid. No Neurovascular intervention at this time. Admitted to MICU for post tPA monitoring. CT-A also noted with right ICA dissection. While admitted, patient was treated with cardene drip for HTN which was stopped on  and was then transferred to medical services. Patient was seen by vascular surgery in ICU for Left Carotid occlusion and recommended for patient to be started on full anticoagulation in at least 6 weeks. Neuro was consulted, repeat CT showing petechial hemorrhages.    On  Was planned for dc to acute rehab but admission was held due to 3-3.5 second pauses on monitor. Cardiology was consulted and stated that it is associated with AK prolongation consistent with increased vagal tone possibly due to his cerebral disease. Pacemaker would not be beneficial. Patient also noted to be asymptomatic during the pauses. Metoprolol and AVN blockers held as per cardio. [facility]     Wife: Ca Rehn (H) [phone], (C) [phone] (2020 11:29)        SUBJECTIVE: Patient seen and examined. No acute overnight events, slept well. Able to say "hi" this morning, making more of an effort to verbalize. Enjoyed visits from his family and friends over the weekend.   Overall is doing well, No other complaints.       REVIEW OF SYSTEMS  Constitutional: No fever, No Chills, No fatigue  Pulm: No cough,  No shortness of breath  Cardio: No chest pain, No palpitations  GI:  No abdominal pain, No constipation  Neuro: No headaches, No memory loss, +loss of strength  MSK: no shoulder pain  Psych:  No depression, No anxiety      VITALS  64y  Vital Signs Last 24 Hrs  T(C): 36.8 (02 Mar 2020 07:28), Max: 36.9 (01 Mar 2020 20:41)  T(F): 98.2 (02 Mar 2020 07:28), Max: 98.5 (01 Mar 2020 20:41)  HR: 98 (02 Mar 2020 07:28) (84 - 99)  BP: 122/84 (02 Mar 2020 07:28) (122/84 - 154/92)  BP(mean): --  RR: 16 (02 Mar 2020 07:28) (15 - 16)  SpO2: 98% (02 Mar 2020 07:28) (96% - 98%)  Daily     Daily Weight in k.2 (01 Mar 2020 22:35)        Physical Exam:  -Gen - NAD, Comfortable  HEENT - NCAT, EOMI,   Pulm - CTAB, No wheezing  Cardiovascular - RRR, S1S2  Abdomen - Soft, NT/ND, +BS  Extremities - +Right shoulder subluxation,  No C/C/E, No calf tenderness  Neuro-     Cognitive - AAO to person, nods when name called. Unable to verbalize responses. Intermittently follows command.      Communication - non-fluent, will nod and shrug to answer questions. Comprehension intact     Attention: Intact      Cranial Nerves - +right facial droop,      Motor -  Right HP                       Sensory - diminished to LT Right UE and LE     Tone - RUE and RLE flaccid  Psychiatric -  mood stable, flat affect, frustrated at times        FUNCTIONAL STATUS:  IDT rounds   dc date 3/6   goals of Sajan with grooming and UBD, mod-A with LBD and transfers. WC level for mobility. 24hr supervision.   as of   OT: Sajan with eating. min-mod A UBD. max A with LBD,   PT: mod-max A with popover transfers. ambulation 30 with RW mod A, es blocking and leg advancement by therapist  SLP: improving--comprehension 80-90% accuracy, improving attention and carry over, motor apraxia. aphasia.           RECENT LABS:                        12.3   8.53  )-----------( 381      ( 02 Mar 2020 06:30 )             37.9     03-02    141  |  106  |  18  ----------------------------<  108<H>  3.9   |  28  |  0.75    Ca    8.9      02 Mar 2020 06:30    TPro  6.8  /  Alb  2.4<L>  /  TBili  0.2  /  DBili  x   /  AST  50<H>  /  ALT  102<H>  /  AlkPhos  79  03-02    LIVER FUNCTIONS - ( 02 Mar 2020 06:30 )  Alb: 2.4 g/dL / Pro: 6.8 g/dL / ALK PHOS: 79 U/L / ALT: 102 U/L / AST: 50 U/L / GGT: x                   CAPILLARY BLOOD GLUCOSE            MEDICATIONS:  MEDICATIONS  (STANDING):  amLODIPine   Tablet 10 milliGRAM(s) Oral <User Schedule>  aspirin  chewable 81 milliGRAM(s) Oral <User Schedule>  atorvastatin 80 milliGRAM(s) Oral at bedtime  FLUoxetine 20 milliGRAM(s) Oral daily  losartan 50 milliGRAM(s) Oral two times a day  memantine 5 milliGRAM(s) Oral two times a day    MEDICATIONS  (PRN):  acetaminophen   Tablet .. 650 milliGRAM(s) Oral every 6 hours PRN Temp greater or equal to 38C (100.4F), Mild Pain (1 - 3)

## 2020-03-02 NOTE — DISCHARGE NOTE PROVIDER - NSDCCPCAREPLAN_GEN_ALL_CORE_FT
PRINCIPAL DISCHARGE DIAGNOSIS  Diagnosis: Acute CVA (cerebrovascular accident)  Assessment and Plan of Treatment: continue baby aspirin, statin, rehab   pt will need to follow up with neurology and vascular surgery after discharge for further management / treatment. Follow up 1-2 weeks post discharge. Contact info provided.      SECONDARY DISCHARGE DIAGNOSES  Diagnosis: Urinary retention  Assessment and Plan of Treatment: Retained > 500cc of urine today, now s/p straight cath. To continue bladder scans Q6 and straight cath as needed at acute rehab. Further work up as per MD at acute rehab. Flomax to be continued.    Diagnosis: HTN (hypertension)  Assessment and Plan of Treatment: Continue with BP regimen as prescribed. Furtehr mgt as per PMD at acute rehab    Diagnosis: Internal carotid artery occlusion  Assessment and Plan of Treatment: Right internal carotid artery dissection   Cannot be anticoagulated due to petechial hemorrhage per neurologist. To be reevaulated in 6-8 weeks.

## 2020-03-02 NOTE — DISCHARGE NOTE PROVIDER - NSDCQMAMI_CARD_ALL_CORE
No Medical Necessity Statement: Based on my medical judgement, Mohs surgery is the most appropriate treatment for this cancer compared to other treatments.

## 2020-03-02 NOTE — DISCHARGE NOTE PROVIDER - NSDCACTIVITY_GEN_ALL_CORE
Showering allowed/No heavy lifting/straining/Do not make important decisions/Do not drive or operate machinery/Walking - Indoors allowed

## 2020-03-02 NOTE — DISCHARGE NOTE PROVIDER - PROVIDER TOKENS
PROVIDER:[TOKEN:[[id number]],FOLLOWUP:[Routine]],PROVIDER:[TOKEN:[[id number]],FOLLOWUP:[1 month]] PROVIDER:[TOKEN:[[id number]],FOLLOWUP:[Routine]],PROVIDER:[TOKEN:[[id number]],FOLLOWUP:[1 month]],PROVIDER:[TOKEN:[[id number]]]

## 2020-03-02 NOTE — PROGRESS NOTE ADULT - ASSESSMENT
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    Stable--no acute issues or med changes    Rehab: Gait Instability, ADL impairments and Functional impairments: Continue Comprehensive Rehab Program of PT/OT/ST    #Left JESSE/MCA CVA with petechial hemorrhage  - Continue ASA 81mg daily  - Continue Lipitor 80mg QHS  - Prozac 20mg daily (inc. from 10mg on 2/21) for mood & motor recovery     #Expressive Aphasia  - pt attempting to verbalize   - continue Namenda 5 BID (2/24) for aphasia     #Dissection in Right ICA  - Per neuro at [facility] given size of stroke with petechial hemorrhage avoid anticoagulation for 6-8 weeks, continue ASA but at 81 mg daily.  "This should provide protection for stroke from Right ICA dissection while minimizing chance for worsening petechial ICH."    #Leukocytosis - resolved  - repeat labs 3/2 8.53, afebrile, VSS, no signs of active infection  - continue to monitor    #Pain: Tylenol PRN  - Prior opioid use: well managed without meds at the moment, patient has h/o opioid dependence, was on Suboxone treatment as outpatient.      #Hypertension  - Cont. Losartan 50mg BID (inc. from 25mg on 2/24)  - Continue amlodipine 10mg daily qhs    #mood  - cont prozac  - neuropsych follow up    #GI/Bowel: Continent    #Diet: 3/2 upgraded to regular diet    #Renal/Bladder: voiding    #Precautions / PROPHYLAXIS:   - Falls    - Lungs: Aspiration, Incentive Spirometer   - Pressure injury/Skin: Turn Q2hrs while in bed, OOB to Chair, PT/OT    - DVT ppx: SCDs, TEDs     discharge 3/5 [facility]

## 2020-03-02 NOTE — DISCHARGE NOTE PROVIDER - HOSPITAL COURSE
HPI:    63 y/o M w/ no significant PMHx who was transferred from [facility] ED on 2/5/20 after presenting with aphasia, dysarthria and right hemiplegia, s/p tPA administration at 22:48 2/5/20. CTA head performed at [facility] revealing of total left ICA occlusion. LNK 6:30PM 2/5/20. S/p tPA administration, pt w/o improvement in right-sided weakness. Pt was transferred to [facility] for further neurologic evaluation. Upon arrival to ED, /100 so cardene gtt was re-started. CT head perfusion revealing of small to moderate sized left frontal infarction w/ moderate to large region of low flow and/or ischemic state in left frontoparietal region. CTA neck significant for occlusion of left internal carotid. No Neurovascular intervention at this time. Admitted to MICU for post tPA monitoring. CT-A also noted with right ICA dissection. While admitted, patient was treated with cardene drip for HTN which was stopped on 2/7 and was then transferred to medical services. Patient was seen by vascular surgery in ICU for Left Carotid occlusion and recommended for patient to be started on full anticoagulation in at least 6 weeks. Neuro was consulted, repeat CT showing petechial hemorrhages.        On 2/12 Was planned for dc to acute rehab but admission was held due to 3-3.5 second pauses on monitor. Cardiology was consulted and stated that it is associated with TN prolongation consistent with increased vagal tone possibly due to his cerebral disease. Pacemaker would not be beneficial. Patient also noted to be asymptomatic during the pauses. Metoprolol and AVN blockers held as per cardio. Patient was deemed stable for discharge to IRF on 2/13/20.        Patient participated in daily therapies and made good functional gains.  Rehab course notable for asymptomatic leukocytosis that resolved without intervention. Patient's Prozac dose was increased for motor recovery and mood. He was started on Namenda for his aphasia. During the course of rehab, his blood pressure was elevated, therefore, Losartan was increased to 50mg BID and amlodipine was rescheduled to be taken in the evening, with appropriate response in BP measurements. On 3/2, patient was upgraded to a regular diet.             Patient tolerated course of inpatient PT/OT/SLP rehab with significant functional improvements. Patient seen and examined on day of discharge.  Medications, medication side effects, and discharge instructions were reviewed with the patient, who expressed understanding of all information.  Patient was medically and functionally optimized and cleared for discharge to [facility] HPI:    63 y/o M w/ no significant PMHx who was transferred from [facility] ED on 2/5/20 after presenting with aphasia, dysarthria and right hemiplegia, s/p tPA administration at 22:48 2/5/20. CTA head performed at [facility] revealing of total left ICA occlusion. LNK 6:30PM 2/5/20. S/p tPA administration, pt w/o improvement in right-sided weakness. Pt was transferred to [facility] for further neurologic evaluation. Upon arrival to ED, /100 so cardene gtt was re-started. CT head perfusion revealing of small to moderate sized left frontal infarction w/ moderate to large region of low flow and/or ischemic state in left frontoparietal region. CTA neck significant for occlusion of left internal carotid. No Neurovascular intervention at this time. Admitted to MICU for post tPA monitoring. CT-A also noted with right ICA dissection. While admitted, patient was treated with cardene drip for HTN which was stopped on 2/7 and was then transferred to medical services. Patient was seen by vascular surgery in ICU for Left Carotid occlusion and recommended for patient to be started on full anticoagulation in at least 6 weeks. Neuro was consulted, repeat CT showing petechial hemorrhages.        On 2/12 Was planned for dc to acute rehab but admission was held due to 3-3.5 second pauses on monitor. Cardiology was consulted and stated that it is associated with PA prolongation consistent with increased vagal tone possibly due to his cerebral disease. Pacemaker would not be beneficial. Patient also noted to be asymptomatic during the pauses. Metoprolol and AVN blockers held as per cardio. Patient was deemed stable for discharge to IRF on 2/13/20.        Patient participated in daily therapies and made good functional gains.  Rehab course notable for asymptomatic leukocytosis that resolved without intervention. Patient's Prozac dose was increased for motor recovery and mood. He was started on Namenda 5mg BID for his aphasia, which was increased to 10mg BID 1 week later. During the course of rehab, his blood pressure was elevated, therefore, Losartan was increased to 50mg BID and amlodipine was rescheduled to be taken in the evening, with appropriate response in BP measurements. On 3/2, patient was upgraded to a regular diet.         Patient tolerated course of inpatient PT/OT/SLP rehab with significant functional improvements. Patient seen and examined on day of discharge.  Medications, medication side effects, and discharge instructions were reviewed with the patient, who expressed understanding of all information.  Patient was medically and functionally optimized and cleared for discharge to [facility] HPI:    65 y/o M w/ no significant PMHx who was transferred from [facility] ED on 2/5/20 after presenting with aphasia, dysarthria and right hemiplegia, s/p tPA administration at 22:48 2/5/20. CTA head performed at [facility] revealing of total left ICA occlusion. LNK 6:30PM 2/5/20. S/p tPA administration, pt w/o improvement in right-sided weakness. Pt was transferred to [facility] for further neurologic evaluation. Upon arrival to ED, /100 so cardene gtt was re-started. CT head perfusion revealing of small to moderate sized left frontal infarction w/ moderate to large region of low flow and/or ischemic state in left frontoparietal region. CTA neck significant for occlusion of left internal carotid. No Neurovascular intervention was needed at the time. Admitted to MICU for post tPA monitoring. CT-A also noted with right ICA dissection. While admitted, patient was treated with cardene drip for HTN which was stopped on 2/7 and was then transferred to medical services. Patient was seen by vascular surgery in ICU for Left Carotid occlusion and recommended for patient to be started on full anticoagulation in at least 6 weeks. Neuro was consulted, repeat CT showing petechial hemorrhages.        On 2/12 Was planned for dc to acute rehab but admission was held due to 3-3.5 second pauses on monitor. Cardiology was consulted and stated that it is associated with AR prolongation consistent with increased vagal tone possibly due to his cerebral disease. Pacemaker would not be beneficial. Patient also noted to be asymptomatic during the pauses. Metoprolol and AVN blockers held as per cardio. Patient was deemed stable for discharge to IRF on 2/13/20.        Patient participated in daily therapies and made good functional gains.  Rehab course notable for asymptomatic leukocytosis that resolved without intervention. Patient's Prozac dose was increased for motor recovery and mood. He was started on Namenda 5mg BID for his aphasia, which was increased to 10mg BID 1 week later. During the course of rehab, his blood pressure was elevated, therefore, Losartan was increased to 50mg BID and amlodipine was rescheduled to be taken in the evening, with appropriate response in BP measurements. On 3/2, patient was upgraded to a regular diet.         Patient tolerated course of inpatient PT/OT/SLP rehab with significant functional improvements. Patient seen and examined on day of discharge.  Medications, medication side effects, and discharge instructions were reviewed with the patient, who expressed understanding of all information.  Patient was medically and functionally optimized and cleared for discharge to [facility]        Discharge function:    OT:    eating supervision    grooming Sajan    UBD Sajan    LBD maxA    toilet/tub tx: mod-max A        PT:     amb 50ft with hemicane, maxA    transfers: min-modA        SLP: diet: reg/thins. improving--comprehension 80-90% accuracy, improving attention and carry over, motor severe apraxia, aphasia.

## 2020-03-03 PROCEDURE — 99232 SBSQ HOSP IP/OBS MODERATE 35: CPT

## 2020-03-03 RX ORDER — NYSTATIN CREAM 100000 [USP'U]/G
1 CREAM TOPICAL
Refills: 0 | Status: DISCONTINUED | OUTPATIENT
Start: 2020-03-03 | End: 2020-03-06

## 2020-03-03 RX ORDER — MEMANTINE HYDROCHLORIDE 10 MG/1
10 TABLET ORAL
Refills: 0 | Status: DISCONTINUED | OUTPATIENT
Start: 2020-03-03 | End: 2020-03-06

## 2020-03-03 RX ORDER — MEMANTINE HYDROCHLORIDE 10 MG/1
1 TABLET ORAL
Qty: 0 | Refills: 0 | DISCHARGE
Start: 2020-03-03

## 2020-03-03 RX ORDER — NYSTATIN CREAM 100000 [USP'U]/G
1 CREAM TOPICAL
Qty: 0 | Refills: 0 | DISCHARGE
Start: 2020-03-03

## 2020-03-03 RX ADMIN — Medication 20 MILLIGRAM(S): at 11:34

## 2020-03-03 RX ADMIN — LOSARTAN POTASSIUM 50 MILLIGRAM(S): 100 TABLET, FILM COATED ORAL at 17:26

## 2020-03-03 RX ADMIN — LOSARTAN POTASSIUM 50 MILLIGRAM(S): 100 TABLET, FILM COATED ORAL at 06:13

## 2020-03-03 RX ADMIN — Medication 81 MILLIGRAM(S): at 06:13

## 2020-03-03 RX ADMIN — MEMANTINE HYDROCHLORIDE 10 MILLIGRAM(S): 10 TABLET ORAL at 17:26

## 2020-03-03 RX ADMIN — MEMANTINE HYDROCHLORIDE 5 MILLIGRAM(S): 10 TABLET ORAL at 06:13

## 2020-03-03 RX ADMIN — AMLODIPINE BESYLATE 10 MILLIGRAM(S): 2.5 TABLET ORAL at 17:26

## 2020-03-03 RX ADMIN — ATORVASTATIN CALCIUM 80 MILLIGRAM(S): 80 TABLET, FILM COATED ORAL at 21:19

## 2020-03-03 RX ADMIN — NYSTATIN CREAM 1 APPLICATION(S): 100000 CREAM TOPICAL at 17:27

## 2020-03-03 NOTE — PROGRESS NOTE ADULT - ASSESSMENT
64 year-old male with no previous PMH, s/p left JESSE/MCA CVA with petechial hemorrhage now with decreased functional mobility, dysphagia, global aphasia, right hemiplegia and neglect, gait instability and ADL impairments    Neurology  CVA  - Left JESSE/MCA CVA with petechial hemorrhage  - Continue comprehensive acute rehab therapies PT/OT/SLP  - Continue ASA, lipitor  - namenda 5mg bid  Right ICA dissection  - As per neuro avoid a/c for 6-8 weeks  - Continue asa 81 daily    Psych  Mood Disorder  - prozac 20mg qd    Cardiovascular  Essential Hypertension  - Continue losartan 50mg bid and norvasc 10 daily   -    Hematology  Leukocytosis  resolved    DVT prophylaxis: scd

## 2020-03-03 NOTE — PROGRESS NOTE ADULT - ATTENDING COMMENTS
Pt. seen with resident & fellow on 3/3 AM.  Agree with documentation above as per resident. Patient medically stable. Making progress towards rehab goals.     Increase namenda to 10mg BID for aphasia.    d/c planning to [facility] 3/5

## 2020-03-03 NOTE — PROGRESS NOTE ADULT - ASSESSMENT
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    Stable--no acute issues or med changes    Rehab: Gait Instability, ADL impairments and Functional impairments: Continue Comprehensive Rehab Program of PT/OT/ST    #Left JESSE/MCA CVA with petechial hemorrhage  - Continue ASA 81mg daily  - Continue Lipitor 80mg QHS  - Prozac 20mg daily (inc. from 10mg on 2/21) for mood & motor recovery     #Expressive Aphasia  - pt attempting to verbalize   - 3/3 increase Namenda 10 BID (started 5 BID on 2/24) for aphasia     #Dissection in Right ICA  - Per neuro at [facility] given size of stroke with petechial hemorrhage avoid anticoagulation for 6-8 weeks, continue ASA but at 81 mg daily.  "This should provide protection for stroke from Right ICA dissection while minimizing chance for worsening petechial ICH."    #Leukocytosis - resolved  - repeat labs 3/2 8.53, afebrile, VSS, no signs of active infection  - continue to monitor    #Pain: Tylenol PRN  - Prior opioid use: well managed without meds at the moment, patient has h/o opioid dependence, was on Suboxone treatment as outpatient.      #Hypertension  - Cont. Losartan 50mg BID (inc. from 25mg on 2/24)  - Continue amlodipine 10mg daily qhs    #mood  - cont prozac  - neuropsych follow up    #GI/Bowel: Continent    #Diet: 3/2 upgraded to regular diet    #Renal/Bladder: voiding    #Precautions / PROPHYLAXIS:   - Falls    - Lungs: Aspiration, Incentive Spirometer   - Pressure injury/Skin: Turn Q2hrs while in bed, OOB to Chair, PT/OT    - DVT ppx: SCDs, TEDs     discharge 3/5 [facility]

## 2020-03-03 NOTE — PROGRESS NOTE ADULT - SUBJECTIVE AND OBJECTIVE BOX
Patient is a 64y old  Male who presents with a chief complaint of Left MCA and JESSE CVA (03 Mar 2020 11:57). Brother at bedside. No acute medical issues      Patient seen and examined at bedside.    ALLERGIES:  No Known Allergies    MEDICATIONS  (STANDING):  amLODIPine   Tablet 10 milliGRAM(s) Oral <User Schedule>  aspirin  chewable 81 milliGRAM(s) Oral <User Schedule>  atorvastatin 80 milliGRAM(s) Oral at bedtime  FLUoxetine 20 milliGRAM(s) Oral daily  losartan 50 milliGRAM(s) Oral two times a day  memantine 10 milliGRAM(s) Oral two times a day  nystatin Powder 1 Application(s) Topical two times a day    MEDICATIONS  (PRN):  acetaminophen   Tablet .. 650 milliGRAM(s) Oral every 6 hours PRN Temp greater or equal to 38C (100.4F), Mild Pain (1 - 3)    Vital Signs Last 24 Hrs  T(F): 98.3 (03 Mar 2020 07:30), Max: 98.4 (02 Mar 2020 20:56)  HR: 79 (03 Mar 2020 07:30) (79 - 97)  BP: 139/85 (03 Mar 2020 07:30) (139/85 - 149/89)  RR: 15 (03 Mar 2020 07:30) (15 - 15)  SpO2: 95% (03 Mar 2020 07:30) (95% - 97%)  I&O's Summary    02 Mar 2020 07:01  -  03 Mar 2020 07:00  --------------------------------------------------------  IN: 480 mL / OUT: 0 mL / NET: 480 mL        PHYSICAL EXAM:  General: NAD, nods to answer questions  Neuro: R hemiperesis  ENT: MMM  Neck: Supple, No JVD  Lungs: Clear to auscultation bilaterally  Cardio: RRR, S1/S2, no pitting edema   Abdomen: Soft, Nontender, Nondistended; Bowel sounds present  Extremities: No calf tenderness,     LABS:                        12.3   8.53  )-----------( 381      ( 02 Mar 2020 06:30 )             37.9       03-02    141  |  106  |  18  ----------------------------<  108  3.9   |  28  |  0.75    Ca    8.9      02 Mar 2020 06:30    TPro  6.8  /  Alb  2.4  /  TBili  0.2  /  DBili  x   /  AST  50  /  ALT  102  /  AlkPhos  79  03-02     eGFR if Non African American: 96 mL/min/1.73M2 (03-02-20 @ 06:30)  eGFR if : 112 mL/min/1.73M2 (03-02-20 @ 06:30)             02-06 Chol 187 mg/dL  mg/dL HDL 75 mg/dL Trig 42 mg/dL                02-06 [id number] 6.0          RADIOLOGY & ADDITIONAL TESTS:    Care Discussed with Consultants/Other Providers:

## 2020-03-03 NOTE — PROGRESS NOTE ADULT - SUBJECTIVE AND OBJECTIVE BOX
Patient is a 64y old  Male who presents with a chief complaint of CVA (02 Mar 2020 16:45)      HPI:  63 y/o M w/ no significant PMHx who was transferred from [facility] ED on 2/5/20 after presenting with aphasia, dysarthria and right hemiplegia, s/p tPA administration at 22:48 2/5/20. CTA head performed at [facility] revealing of total left ICA occlusion. LNK 6:30PM 2/5/20. S/p tPA administration, pt w/o improvement in right-sided weakness. Pt was transferred to [facility] for further neurologic evaluation. Upon arrival to ED, /100 so cardene gtt was re-started. CT head perfusion revealing of small to moderate sized left frontal infarction w/ moderate to large region of low flow and/or ischemic state in left frontoparietal region. CTA neck significant for occlusion of left internal carotid. No Neurovascular intervention at this time. Admitted to MICU for post tPA monitoring. CT-A also noted with right ICA dissection. While admitted, patient was treated with cardene drip for HTN which was stopped on 2/7 and was then transferred to medical services. Patient was seen by vascular surgery in ICU for Left Carotid occlusion and recommended for patient to be started on full anticoagulation in at least 6 weeks. Neuro was consulted, repeat CT showing petechial hemorrhages.    On 2/12 Was planned for dc to acute rehab but admission was held due to 3-3.5 second pauses on monitor. Cardiology was consulted and stated that it is associated with ND prolongation consistent with increased vagal tone possibly due to his cerebral disease. Pacemaker would not be beneficial. Patient also noted to be asymptomatic during the pauses. Metoprolol and AVN blockers held as per cardio. Patient planned for dc to [facility] today.     Wife: Ca Rehn (H) [phone], (C) [phone] (13 Feb 2020 11:29)        SUBJECTIVE: Patient seen and examined during SLP session. No acute overnight events, slept well. Per Speech Pathologist, patient is making improvements in comprehension and reading. Encouraged to say simple words such as "hi" and "bye." Patient seems to be in better spirits today. Patient expressed that he does not like the food here, and was informed that family members may bring meals. Patient's brother was informed as well. No other complaints.       REVIEW OF SYSTEMS  Constitutional: No fever, No Chills, No fatigue  Pulm: No cough,  No shortness of breath  Cardio: No chest pain, No palpitations  GI:  No abdominal pain, No constipation  Neuro: No headaches, No memory loss, +loss of strength  MSK: no shoulder pain  Psych:  No depression, No anxiety      VITALS  64y  Vital Signs Last 24 Hrs  T(C): 36.8 (03 Mar 2020 07:30), Max: 36.9 (02 Mar 2020 20:56)  T(F): 98.3 (03 Mar 2020 07:30), Max: 98.4 (02 Mar 2020 20:56)  HR: 79 (03 Mar 2020 07:30) (79 - 97)  BP: 139/85 (03 Mar 2020 07:30) (139/85 - 149/89)  BP(mean): --  RR: 15 (03 Mar 2020 07:30) (15 - 15)  SpO2: 95% (03 Mar 2020 07:30) (95% - 97%)  Daily     Daily         Physical Exam:  -Gen - NAD, Comfortable  HEENT - NCAT, EOMI,   Pulm - CTAB, No wheezing  Cardiovascular - RRR, S1S2  Abdomen - Soft, NT/ND, +BS  Extremities - +Right shoulder subluxation,  No C/C/E, No calf tenderness  Neuro-     Cognitive - AAO to person, nods when name called. Unable to verbalize responses. Intermittently follows command.      Communication - non-fluent, will nod and shrug to answer questions. Comprehension intact     Attention: Intact      Cranial Nerves - +right facial droop,      Motor -  Right HP                       Sensory - diminished to LT Right UE and LE     Tone - RUE and RLE flaccid  Psychiatric -  mood stable, flat affect, frustrated at times        FUNCTIONAL STATUS:  IDT rounds 3/3  dc date 3/5   goals of Sajan with grooming and UBD, mod-A with LBD and transfers. WC level for mobility. 24hr supervision.  OT:  eating supervision  grooming Sajan  UBD Sajan  LBD maxA  toilet/tub tx: mod-max A    PT:   amb 50ft with hemicane, maxA  transfers: min-modA    SLP: diet: reg/thins. improving--comprehension 80-90% accuracy, improving attention and carry over, motor severe apraxia, aphasia.         RECENT LABS:                        12.3   8.53  )-----------( 381      ( 02 Mar 2020 06:30 )             37.9     03-02    141  |  106  |  18  ----------------------------<  108<H>  3.9   |  28  |  0.75    Ca    8.9      02 Mar 2020 06:30    TPro  6.8  /  Alb  2.4<L>  /  TBili  0.2  /  DBili  x   /  AST  50<H>  /  ALT  102<H>  /  AlkPhos  79  03-02    LIVER FUNCTIONS - ( 02 Mar 2020 06:30 )  Alb: 2.4 g/dL / Pro: 6.8 g/dL / ALK PHOS: 79 U/L / ALT: 102 U/L / AST: 50 U/L / GGT: x                   CAPILLARY BLOOD GLUCOSE            MEDICATIONS:  MEDICATIONS  (STANDING):  amLODIPine   Tablet 10 milliGRAM(s) Oral <User Schedule>  aspirin  chewable 81 milliGRAM(s) Oral <User Schedule>  atorvastatin 80 milliGRAM(s) Oral at bedtime  FLUoxetine 20 milliGRAM(s) Oral daily  losartan 50 milliGRAM(s) Oral two times a day  memantine 10 milliGRAM(s) Oral two times a day  nystatin Powder 1 Application(s) Topical two times a day    MEDICATIONS  (PRN):  acetaminophen   Tablet .. 650 milliGRAM(s) Oral every 6 hours PRN Temp greater or equal to 38C (100.4F), Mild Pain (1 - 3)

## 2020-03-04 PROCEDURE — 99232 SBSQ HOSP IP/OBS MODERATE 35: CPT

## 2020-03-04 RX ADMIN — MEMANTINE HYDROCHLORIDE 10 MILLIGRAM(S): 10 TABLET ORAL at 06:07

## 2020-03-04 RX ADMIN — MEMANTINE HYDROCHLORIDE 10 MILLIGRAM(S): 10 TABLET ORAL at 18:32

## 2020-03-04 RX ADMIN — Medication 81 MILLIGRAM(S): at 06:06

## 2020-03-04 RX ADMIN — AMLODIPINE BESYLATE 10 MILLIGRAM(S): 2.5 TABLET ORAL at 18:32

## 2020-03-04 RX ADMIN — Medication 20 MILLIGRAM(S): at 12:46

## 2020-03-04 RX ADMIN — LOSARTAN POTASSIUM 50 MILLIGRAM(S): 100 TABLET, FILM COATED ORAL at 06:12

## 2020-03-04 RX ADMIN — NYSTATIN CREAM 1 APPLICATION(S): 100000 CREAM TOPICAL at 18:32

## 2020-03-04 RX ADMIN — ATORVASTATIN CALCIUM 80 MILLIGRAM(S): 80 TABLET, FILM COATED ORAL at 22:10

## 2020-03-04 RX ADMIN — LOSARTAN POTASSIUM 50 MILLIGRAM(S): 100 TABLET, FILM COATED ORAL at 18:32

## 2020-03-04 RX ADMIN — NYSTATIN CREAM 1 APPLICATION(S): 100000 CREAM TOPICAL at 06:11

## 2020-03-04 NOTE — PROGRESS NOTE ADULT - ATTENDING COMMENTS
Pt. seen with resident this AM.  Agree with documentation above as per resident with amendments made as appropriate. Patient medically stable. Making progress towards rehab goals.     d/c to [facility] tomorrow    Tolerating namenda

## 2020-03-04 NOTE — CHART NOTE - NSCHARTNOTEFT_GEN_A_CORE
Nutrition Follow Up Note  Hospital Course (Per Electronic Medical Record):   Source: Medical Record [X] Patient [X] Family [X] Nursing Staff [X]     Diet: Regular , ensure enlive TID    Patient noted with good po 100% noted , appetite remains good.?  decrease in weight  since admission , patient was noted with good po intake & consumingpo supplements since admission. Patient pleased with advancement to regular consistency.     Current Weight: (3/3) 190.2/86.3kg , patient with slow decline win weight since admission.     Pertinent Medications: MEDICATIONS  (STANDING):  amLODIPine   Tablet 10 milliGRAM(s) Oral <User Schedule>  aspirin  chewable 81 milliGRAM(s) Oral <User Schedule>  atorvastatin 80 milliGRAM(s) Oral at bedtime  FLUoxetine 20 milliGRAM(s) Oral daily  losartan 50 milliGRAM(s) Oral two times a day  memantine 10 milliGRAM(s) Oral two times a day  nystatin Powder 1 Application(s) Topical two times a day    MEDICATIONS  (PRN):  acetaminophen   Tablet .. 650 milliGRAM(s) Oral every 6 hours PRN Temp greater or equal to 38C (100.4F), Mild Pain (1 - 3)      Pertinent Labs:  03-02 Na141 mmol/L Glu 108 mg/dL<H> K+ 3.9 mmol/L Cr  0.75 mg/dL BUN 18 mg/dL 03-02 Alb 2.4 g/dL<L> 02-06 [id number] 6.0 %<H> 02-06 Chol 187 mg/dL  mg/dL HDL 75 mg/dL Trig 42 mg/dL        Skin: intact    Edema: none    Last BM: on (3/3)     Estimated Needs:   [X] No Change since Previous Assessment      Previous Nutrition Diagnosis: Moderate Malnutrition     Nutrition Diagnosis is [X] Ongoing, encourage consumption of Po supplements         New Nutrition Diagnosis: [X] Not Applicable    Interventions:   1. Recommend continue current diet regimen.      Monitoring & Evaluation: will monitor:  [X] Weights   [X] PO Intake   [X] Follow Up (Per Protocol)  [X] Tolerance to Diet Prescription       RD to follow as per Nutrition protocol  Abi Olmstead RDN

## 2020-03-04 NOTE — PROGRESS NOTE ADULT - ASSESSMENT
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    Stable--no acute issues or med changes    Rehab: Gait Instability, ADL impairments and Functional impairments: Continue Comprehensive Rehab Program of PT/OT/ST    #Left JESSE/MCA CVA with petechial hemorrhage  - Continue ASA 81mg daily  - Continue Lipitor 80mg QHS  - Prozac 20mg daily (inc. from 10mg on 2/21) for mood & motor recovery     #Expressive Aphasia  - pt attempting to verbalize   - continue Namenda 10 BID (started 5 BID on 2/24, increased 3/3) for aphasia     #Dissection in Right ICA  - Per neuro at [facility] given size of stroke with petechial hemorrhage avoid anticoagulation for 6-8 weeks, continue ASA but at 81 mg daily.  "This should provide protection for stroke from Right ICA dissection while minimizing chance for worsening petechial ICH."    #Leukocytosis - resolved  - repeat labs 3/2 8.53, afebrile, VSS, no signs of active infection  - continue to monitor    #Pain: Tylenol PRN  - Prior opioid use: well managed without meds at the moment, patient has h/o opioid dependence, was on Suboxone treatment as outpatient.      #Hypertension  - Cont. Losartan 50mg BID (inc. from 25mg on 2/24)  - Continue amlodipine 10mg daily qhs    #mood  - cont prozac  - neuropsych follow up    #GI/Bowel: Continent    #Diet: 3/2 upgraded to regular diet    #Renal/Bladder: voiding    #Precautions / PROPHYLAXIS:   - Falls    - Lungs: Aspiration, Incentive Spirometer   - Pressure injury/Skin: Turn Q2hrs while in bed, OOB to Chair, PT/OT    - DVT ppx: SCDs, TEDs     discharge 3/5 [facility]

## 2020-03-04 NOTE — PROGRESS NOTE ADULT - ASSESSMENT
65 yo M  with no previous PMH, s/p left JESSE/MCA CVA with petechial hemorrhage now with decreased functional mobility, dysphagia, global aphasia, right hemiplegia and neglect, gait instability and ADL impairments    Neurology  CVA  - Left JESSE/MCA CVA with petechial hemorrhage  - Continue comprehensive acute rehab therapies PT/OT/SLP  - Continue ASA, lipitor  - namenda 5mg bid  Right ICA dissection  - As per neuro avoid a/c for 6-8 weeks  - Continue asa 81 daily    Psych  Mood Disorder  - prozac 20mg qd    Cardiovascular  Essential Hypertension  - Continue losartan 50mg bid and norvasc 10 daily   -    Hematology  Leukocytosis  resolved    DVT prophylaxis: SCD

## 2020-03-04 NOTE — PROGRESS NOTE ADULT - SUBJECTIVE AND OBJECTIVE BOX
Patient is a 64y old  Male who presents with a chief complaint of Left MCA and JESSE CVA (04 Mar 2020 10:33). No acute issues overnight. Slept well      Patient seen and examined at bedside.    ALLERGIES:  No Known Allergies    MEDICATIONS  (STANDING):  amLODIPine   Tablet 10 milliGRAM(s) Oral <User Schedule>  aspirin  chewable 81 milliGRAM(s) Oral <User Schedule>  atorvastatin 80 milliGRAM(s) Oral at bedtime  FLUoxetine 20 milliGRAM(s) Oral daily  losartan 50 milliGRAM(s) Oral two times a day  memantine 10 milliGRAM(s) Oral two times a day  nystatin Powder 1 Application(s) Topical two times a day    MEDICATIONS  (PRN):  acetaminophen   Tablet .. 650 milliGRAM(s) Oral every 6 hours PRN Temp greater or equal to 38C (100.4F), Mild Pain (1 - 3)    Vital Signs Last 24 Hrs  T(F): 98.1 (04 Mar 2020 07:40), Max: 98.4 (03 Mar 2020 20:16)  HR: 90 (04 Mar 2020 07:40) (88 - 90)  BP: 126/82 (04 Mar 2020 07:40) (123/75 - 130/77)  RR: 15 (04 Mar 2020 07:40) (15 - 15)  SpO2: 94% (04 Mar 2020 07:40) (94% - 96%)  I&O's Summary    03 Mar 2020 07:01  -  04 Mar 2020 07:00  --------------------------------------------------------  IN: 480 mL / OUT: 0 mL / NET: 480 mL    04 Mar 2020 07:01  -  04 Mar 2020 11:41  --------------------------------------------------------  IN: 0 mL / OUT: 1 mL / NET: -1 mL        PHYSICAL EXAM:  General: NAD, nods to answer questions  Neuro: intermittently follows commands, R facial droop, RUE and RLE flaccid  ENT: MMM  Neck: Supple, No JVD  Lungs: Clear to auscultation bilaterally  Cardio: RRR, S1/S2,   Abdomen: Soft, Nontender, Nondistended; Bowel sounds present  Extremities: No calf tenderness, No pitting edema    LABS:                        12.3   8.53  )-----------( 381      ( 02 Mar 2020 06:30 )             37.9       03-02    141  |  106  |  18  ----------------------------<  108  3.9   |  28  |  0.75    Ca    8.9      02 Mar 2020 06:30    TPro  6.8  /  Alb  2.4  /  TBili  0.2  /  DBili  x   /  AST  50  /  ALT  102  /  AlkPhos  79  03-02     eGFR if Non African American: 96 mL/min/1.73M2 (03-02-20 @ 06:30)  eGFR if : 112 mL/min/1.73M2 (03-02-20 @ 06:30)             02-06 Chol 187 mg/dL  mg/dL HDL 75 mg/dL Trig 42 mg/dL                02-06 [id number] 6.0          RADIOLOGY & ADDITIONAL TESTS:    Care Discussed with Consultants/Other Providers:

## 2020-03-04 NOTE — PROGRESS NOTE ADULT - SUBJECTIVE AND OBJECTIVE BOX
Patient is a 64y old  Male who presents with a chief complaint of Left MCA and JESSE CVA (03 Mar 2020 14:19)      HPI:  63 y/o M w/ no significant PMHx who was transferred from [facility] ED on 20 after presenting with aphasia, dysarthria and right hemiplegia, s/p tPA administration at 22:48 20. CTA head performed at [facility] revealing of total left ICA occlusion. LNK 6:30PM 20. S/p tPA administration, pt w/o improvement in right-sided weakness. Pt was transferred to [facility] for further neurologic evaluation. Upon arrival to ED, /100 so cardene gtt was re-started. CT head perfusion revealing of small to moderate sized left frontal infarction w/ moderate to large region of low flow and/or ischemic state in left frontoparietal region. CTA neck significant for occlusion of left internal carotid. No Neurovascular intervention at this time. Admitted to MICU for post tPA monitoring. CT-A also noted with right ICA dissection. While admitted, patient was treated with cardene drip for HTN which was stopped on  and was then transferred to medical services. Patient was seen by vascular surgery in ICU for Left Carotid occlusion and recommended for patient to be started on full anticoagulation in at least 6 weeks. Neuro was consulted, repeat CT showing petechial hemorrhages.    On  Was planned for dc to acute rehab but admission was held due to 3-3.5 second pauses on monitor. Cardiology was consulted and stated that it is associated with [place] prolongation consistent with increased vagal tone possibly due to his cerebral disease. Pacemaker would not be beneficial. Patient also noted to be asymptomatic during the pauses. Metoprolol and AVN blockers held as per cardio. Patient planned for dc [facility] today.     Wife: Ca Rehn (H) [phone], (C) [phone] (2020 11:29)        SUBJECTIVE: Patient seen and examined. No acute overnight events, slept well. Denies any pain or discomfort. Encouraged to say simple words such as "hi" and "bye." Patient seems to be in better spirits today. Denies Right shoulder pain. No other complaints.       REVIEW OF SYSTEMS  Constitutional: No fever, No Chills, No fatigue  Pulm: No cough,  No shortness of breath  Cardio: No chest pain, No palpitations  GI:  No abdominal pain, No constipation  Neuro: No headaches, No memory loss, +loss of strength  MSK: no shoulder pain  Psych:  No depression, No anxiety    VITALS  64y  Vital Signs Last 24 Hrs  T(C): 36.7 (04 Mar 2020 07:40), Max: 36.9 (03 Mar 2020 20:16)  T(F): 98.1 (04 Mar 2020 07:40), Max: 98.4 (03 Mar 2020 20:16)  HR: 90 (04 Mar 2020 07:40) (88 - 90)  BP: 126/82 (04 Mar 2020 07:40) (123/75 - 130/77)  BP(mean): --  RR: 15 (04 Mar 2020 07:40) (15 - 15)  SpO2: 94% (04 Mar 2020 07:40) (94% - 96%)  Daily     Daily Weight in k.3 (03 Mar 2020 22:59)        Physical Exam:  -Gen - NAD, Comfortable  HEENT - NCAT, EOMI,   Pulm - CTAB, No wheezing  Cardiovascular - RRR, S1S2  Abdomen - Soft, NT/ND, +BS  Extremities - +Right shoulder subluxation,  No C/C/E, No calf tenderness  Neuro-     Cognitive - AAO to person, nods when name called. Unable to verbalize responses. Intermittently follows command.      Communication - non-fluent, will nod and shrug to answer questions. Comprehension intact     Attention: Intact      Cranial Nerves - +right facial droop,      Motor -  Right HP                       Sensory - diminished to LT Right UE and LE     Tone - RUE and RLE flaccid  Psychiatric -  mood improved, affect WNL        FUNCTIONAL STATUS:    IDT rounds 3/3  dc date 3/5   goals of Sajan with grooming and UBD, mod-A with LBD and transfers. WC level for mobility. 24hr supervision.  OT:  eating supervision  grooming Sajan  UBD Sajan  LBD maxA  toilet/tub tx: mod-max A    PT:   amb 50ft with hemicane, maxA  transfers: min-modA    SLP: diet: reg/thins. improving--comprehension 80-90% accuracy, improving attention and carry over, motor severe apraxia, aphasia.           RECENT LABS:                    CAPILLARY BLOOD GLUCOSE            MEDICATIONS:  MEDICATIONS  (STANDING):  amLODIPine   Tablet 10 milliGRAM(s) Oral <User Schedule>  aspirin  chewable 81 milliGRAM(s) Oral <User Schedule>  atorvastatin 80 milliGRAM(s) Oral at bedtime  FLUoxetine 20 milliGRAM(s) Oral daily  losartan 50 milliGRAM(s) Oral two times a day  memantine 10 milliGRAM(s) Oral two times a day  nystatin Powder 1 Application(s) Topical two times a day    MEDICATIONS  (PRN):  acetaminophen   Tablet .. 650 milliGRAM(s) Oral every 6 hours PRN Temp greater or equal to 38C (100.4F), Mild Pain (1 - 3)

## 2020-03-05 ENCOUNTER — TRANSCRIPTION ENCOUNTER (OUTPATIENT)
Age: 65
End: 2020-03-05

## 2020-03-05 LAB
ALBUMIN SERPL ELPH-MCNC: 2.6 G/DL — LOW (ref 3.3–5)
ALP SERPL-CCNC: 80 U/L — SIGNIFICANT CHANGE UP (ref 40–120)
ALT FLD-CCNC: 68 U/L — HIGH (ref 10–45)
ANION GAP SERPL CALC-SCNC: 8 MMOL/L — SIGNIFICANT CHANGE UP (ref 5–17)
AST SERPL-CCNC: 32 U/L — SIGNIFICANT CHANGE UP (ref 10–40)
BASOPHILS # BLD AUTO: 0.09 K/UL — SIGNIFICANT CHANGE UP (ref 0–0.2)
BASOPHILS NFR BLD AUTO: 1.2 % — SIGNIFICANT CHANGE UP (ref 0–2)
BILIRUB SERPL-MCNC: 0.2 MG/DL — SIGNIFICANT CHANGE UP (ref 0.2–1.2)
BUN SERPL-MCNC: 16 MG/DL — SIGNIFICANT CHANGE UP (ref 7–23)
CALCIUM SERPL-MCNC: 8.8 MG/DL — SIGNIFICANT CHANGE UP (ref 8.4–10.5)
CHLORIDE SERPL-SCNC: 105 MMOL/L — SIGNIFICANT CHANGE UP (ref 96–108)
CO2 SERPL-SCNC: 27 MMOL/L — SIGNIFICANT CHANGE UP (ref 22–31)
CREAT SERPL-MCNC: 0.76 MG/DL — SIGNIFICANT CHANGE UP (ref 0.5–1.3)
EOSINOPHIL # BLD AUTO: 0.07 K/UL — SIGNIFICANT CHANGE UP (ref 0–0.5)
EOSINOPHIL NFR BLD AUTO: 0.9 % — SIGNIFICANT CHANGE UP (ref 0–6)
GLUCOSE SERPL-MCNC: 110 MG/DL — HIGH (ref 70–99)
HCT VFR BLD CALC: 38.2 % — LOW (ref 39–50)
HGB BLD-MCNC: 12.3 G/DL — LOW (ref 13–17)
IMM GRANULOCYTES NFR BLD AUTO: 0.4 % — SIGNIFICANT CHANGE UP (ref 0–1.5)
LYMPHOCYTES # BLD AUTO: 1.37 K/UL — SIGNIFICANT CHANGE UP (ref 1–3.3)
LYMPHOCYTES # BLD AUTO: 18 % — SIGNIFICANT CHANGE UP (ref 13–44)
MCHC RBC-ENTMCNC: 28.7 PG — SIGNIFICANT CHANGE UP (ref 27–34)
MCHC RBC-ENTMCNC: 32.2 GM/DL — SIGNIFICANT CHANGE UP (ref 32–36)
MCV RBC AUTO: 89.3 FL — SIGNIFICANT CHANGE UP (ref 80–100)
MONOCYTES # BLD AUTO: 0.44 K/UL — SIGNIFICANT CHANGE UP (ref 0–0.9)
MONOCYTES NFR BLD AUTO: 5.8 % — SIGNIFICANT CHANGE UP (ref 2–14)
NEUTROPHILS # BLD AUTO: 5.62 K/UL — SIGNIFICANT CHANGE UP (ref 1.8–7.4)
NEUTROPHILS NFR BLD AUTO: 73.7 % — SIGNIFICANT CHANGE UP (ref 43–77)
NRBC # BLD: 0 /100 WBCS — SIGNIFICANT CHANGE UP (ref 0–0)
PLATELET # BLD AUTO: 396 K/UL — SIGNIFICANT CHANGE UP (ref 150–400)
POTASSIUM SERPL-MCNC: 3.8 MMOL/L — SIGNIFICANT CHANGE UP (ref 3.5–5.3)
POTASSIUM SERPL-SCNC: 3.8 MMOL/L — SIGNIFICANT CHANGE UP (ref 3.5–5.3)
PROT SERPL-MCNC: 6.6 G/DL — SIGNIFICANT CHANGE UP (ref 6–8.3)
RBC # BLD: 4.28 M/UL — SIGNIFICANT CHANGE UP (ref 4.2–5.8)
RBC # FLD: 13.1 % — SIGNIFICANT CHANGE UP (ref 10.3–14.5)
SODIUM SERPL-SCNC: 140 MMOL/L — SIGNIFICANT CHANGE UP (ref 135–145)
WBC # BLD: 7.62 K/UL — SIGNIFICANT CHANGE UP (ref 3.8–10.5)
WBC # FLD AUTO: 7.62 K/UL — SIGNIFICANT CHANGE UP (ref 3.8–10.5)

## 2020-03-05 PROCEDURE — 99238 HOSP IP/OBS DSCHRG MGMT 30/<: CPT

## 2020-03-05 RX ADMIN — LOSARTAN POTASSIUM 50 MILLIGRAM(S): 100 TABLET, FILM COATED ORAL at 06:36

## 2020-03-05 RX ADMIN — NYSTATIN CREAM 1 APPLICATION(S): 100000 CREAM TOPICAL at 06:37

## 2020-03-05 RX ADMIN — Medication 81 MILLIGRAM(S): at 06:36

## 2020-03-05 RX ADMIN — MEMANTINE HYDROCHLORIDE 10 MILLIGRAM(S): 10 TABLET ORAL at 06:36

## 2020-03-05 RX ADMIN — AMLODIPINE BESYLATE 10 MILLIGRAM(S): 2.5 TABLET ORAL at 17:15

## 2020-03-05 RX ADMIN — LOSARTAN POTASSIUM 50 MILLIGRAM(S): 100 TABLET, FILM COATED ORAL at 17:15

## 2020-03-05 RX ADMIN — MEMANTINE HYDROCHLORIDE 10 MILLIGRAM(S): 10 TABLET ORAL at 17:15

## 2020-03-05 RX ADMIN — NYSTATIN CREAM 1 APPLICATION(S): 100000 CREAM TOPICAL at 17:15

## 2020-03-05 RX ADMIN — ATORVASTATIN CALCIUM 80 MILLIGRAM(S): 80 TABLET, FILM COATED ORAL at 21:53

## 2020-03-05 RX ADMIN — Medication 20 MILLIGRAM(S): at 11:37

## 2020-03-05 NOTE — PROGRESS NOTE ADULT - ASSESSMENT
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    Stable--no acute issues or med changes    Rehab: Gait Instability, ADL impairments and Functional impairments: Continue Comprehensive Rehab Program of PT/OT/ST    #Left JESSE/MCA CVA with petechial hemorrhage  - Continue ASA 81mg daily  - Continue Lipitor 80mg QHS  - Prozac 20mg daily (inc. from 10mg on 2/21) for mood & motor recovery     #Expressive Aphasia  - pt attempting to verbalize   - continue Namenda 10 BID (started 5 BID on 2/24, increased 3/3) for aphasia     #Dissection in Right ICA  - Per neuro at [facility] given size of stroke with petechial hemorrhage avoid anticoagulation for 6-8 weeks, continue ASA but at 81 mg daily.  "This should provide protection for stroke from Right ICA dissection while minimizing chance for worsening petechial ICH."    #Leukocytosis - resolved  - repeat labs 3/2 8.53, afebrile, VSS, no signs of active infection  - continue to monitor    #Pain: Tylenol PRN  - Prior opioid use: well managed without meds at the moment, patient has h/o opioid dependence, was on Suboxone treatment as outpatient.      #Hypertension  - Cont. Losartan 50mg BID (inc. from 25mg on 2/24)  - Continue amlodipine 10mg daily qhs    #mood  - cont prozac  - neuropsych follow up    #GI/Bowel: Continent    #Diet: 3/2 upgraded to regular diet    #Renal/Bladder: voiding    #Precautions / PROPHYLAXIS:   - Falls    - Lungs: Aspiration, Incentive Spirometer   - Pressure injury/Skin: Turn Q2hrs while in bed, OOB to Chair, PT/OT    - DVT ppx: SCDs, TEDs     discharge 3/5 [facility] CLAYTON COX is a 64M with no previous PMH who 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    Stable--no acute issues or med changes    Rehab: Gait Instability, ADL impairments and Functional impairments: Continue Comprehensive Rehab Program of PT/OT/ST in [facility]    #Left JESSE/MCA CVA with petechial hemorrhage  - Continue ASA 81mg daily  - Continue Lipitor 80mg QHS  - Prozac 20mg daily (inc. from 10mg on 2/21) for mood & motor recovery     #Expressive Aphasia  - pt attempting to verbalize   - continue Namenda 10 BID (started 5 BID on 2/24, increased 3/3) for aphasia     #Dissection in Right ICA  - Per neuro at [facility] given size of stroke with petechial hemorrhage avoid anticoagulation for 6-8 weeks, continue ASA but at 81 mg daily.  "This should provide protection for stroke from Right ICA dissection while minimizing chance for worsening petechial ICH."    #Leukocytosis - resolved  - repeat labs 3/2 8.53, afebrile, VSS, no signs of active infection      #Pain: Tylenol PRN  - Prior opioid use: well managed without meds at the moment, patient has h/o opioid dependence, was on Suboxone treatment as outpatient.      #Hypertension  - Cont. Losartan 50mg BID (inc. from 25mg on 2/24)  - Continue amlodipine 10mg daily qhs    #mood  - cont prozac      #GI/Bowel: Continent    #Diet: 3/2 upgraded to regular diet    #Renal/Bladder: voiding    #Precautions / PROPHYLAXIS:   - Falls    - Lungs: Aspiration, Incentive Spirometer   - Pressure injury/Skin: Turn Q2hrs while in bed, OOB to Chair, PT/OT    - DVT ppx: SCDs, TEDs     discharge 3/5 [facility]

## 2020-03-05 NOTE — PROGRESS NOTE ADULT - SUBJECTIVE AND OBJECTIVE BOX
Patient is a 64y old  Male who presents with a chief complaint of Left MCA and JESSE CVA (04 Mar 2020 11:41)      HPI:  65 y/o M w/ no significant PMHx who was transferred from [facility] ED on 20 after presenting with aphasia, dysarthria and right hemiplegia, s/p tPA administration at 22:48 20. CTA head performed at [facility] revealing of total left ICA occlusion. LNK 6:30PM 20. S/p tPA administration, pt w/o improvement in right-sided weakness. Pt was transferred to [facility] for further neurologic evaluation. Upon arrival to ED, /100 so cardene gtt was re-started. CT head perfusion revealing of small to moderate sized left frontal infarction w/ moderate to large region of low flow and/or ischemic state in left frontoparietal region. CTA neck significant for occlusion of left internal carotid. No Neurovascular intervention at this time. Admitted to MICU for post tPA monitoring. CT-A also noted with right ICA dissection. While admitted, patient was treated with cardene drip for HTN which was stopped on  and was then transferred to medical services. Patient was seen by vascular surgery in ICU for Left Carotid occlusion and recommended for patient to be started on full anticoagulation in at least 6 weeks. Neuro was consulted, repeat CT showing petechial hemorrhages.    On  Was planned for dc to acute rehab but admission was held due to 3-3.5 second pauses on monitor. Cardiology was consulted and stated that it is associated with [place] prolongation consistent with increased vagal tone possibly due to his cerebral disease. Pacemaker would not be beneficial. Patient also noted to be asymptomatic during the pauses. Metoprolol and AVN blockers held as per cardio. Patient planned for dc [facility] today.     Wife: Ca Rehn (H) [phone], (C) [phone] (2020 11:29)        SUBJECTIVE: Patient seen and examined. No acute overnight events, slept well. Enjoyed food brought in by family members. Denies right shoulder pain or other discomfort. Tolerating therapies well. No other complaints.       REVIEW OF SYSTEMS  Constitutional: No fever, No Chills, No fatigue  Pulm: No cough,  No shortness of breath  Cardio: No chest pain, No palpitations  GI:  No abdominal pain, No constipation  Neuro: No headaches, No memory loss, +loss of strength  MSK: no shoulder pain  Psych:  No depression, No anxiety      VITALS  64y  Vital Signs Last 24 Hrs  T(C): 36.9 (05 Mar 2020 07:35), Max: 37.1 (04 Mar 2020 22:08)  T(F): 98.4 (05 Mar 2020 07:35), Max: 98.7 (04 Mar 2020 22:08)  HR: 74 (05 Mar 2020 07:35) (65 - 83)  BP: 125/83 (05 Mar 2020 07:35) (125/83 - 139/78)  BP(mean): --  RR: 15 (05 Mar 2020 07:35) (15 - 16)  SpO2: 98% (05 Mar 2020 07:35) (96% - 98%)  Daily     Daily Weight in k.7 (04 Mar 2020 22:08)        Physical Exam:  -Gen - NAD, Comfortable  HEENT - NCAT, EOMI,   Pulm - CTAB, No wheezing  Cardiovascular - RRR, S1S2  Abdomen - Soft, NT/ND, +BS  Extremities - +Right shoulder subluxation,  No C/C/E, No calf tenderness  Neuro-     Cognitive - AAO to person, nods when name called. Unable to verbalize responses. Intermittently follows command.      Communication - non-fluent, will nod and shrug to answer questions. Comprehension intact     Attention: Intact      Cranial Nerves - +right facial droop,      Motor -  Right HP                       Sensory - diminished to LT Right UE and LE     Tone - RUE and RLE flaccid  Psychiatric -  mood improved, affect WNL        FUNCTIONAL STATUS:    IDT rounds 3/3  dc date 3/5   goals of Sajan with grooming and UBD, mod-A with LBD and transfers. WC level for mobility. 24hr supervision.  OT:  eating supervision  grooming Sajan  UBD Sajan  LBD maxA  toilet/tub tx: mod-max A    PT:   amb 50ft with hemicane, maxA  transfers: min-modA    SLP: diet: reg/thins. improving--comprehension 80-90% accuracy, improving attention and carry over, motor severe apraxia, aphasia.         RECENT LABS:                        12.3   7.62  )-----------( 396      ( 05 Mar 2020 06:30 )             38.2     03-05    140  |  105  |  16  ----------------------------<  110<H>  3.8   |  27  |  0.76    Ca    8.8      05 Mar 2020 06:30    TPro  6.6  /  Alb  2.6<L>  /  TBili  0.2  /  DBili  x   /  AST  32  /  ALT  68<H>  /  AlkPhos  80  03-05    LIVER FUNCTIONS - ( 05 Mar 2020 06:30 )  Alb: 2.6 g/dL / Pro: 6.6 g/dL / ALK PHOS: 80 U/L / ALT: 68 U/L / AST: 32 U/L / GGT: x                   CAPILLARY BLOOD GLUCOSE            MEDICATIONS:  MEDICATIONS  (STANDING):  amLODIPine   Tablet 10 milliGRAM(s) Oral <User Schedule>  aspirin  chewable 81 milliGRAM(s) Oral <User Schedule>  atorvastatin 80 milliGRAM(s) Oral at bedtime  FLUoxetine 20 milliGRAM(s) Oral daily  losartan 50 milliGRAM(s) Oral two times a day  memantine 10 milliGRAM(s) Oral two times a day  nystatin Powder 1 Application(s) Topical two times a day    MEDICATIONS  (PRN):  acetaminophen   Tablet .. 650 milliGRAM(s) Oral every 6 hours PRN Temp greater or equal to 38C (100.4F), Mild Pain (1 - 3)

## 2020-03-05 NOTE — DISCHARGE NOTE NURSING/CASE MANAGEMENT/SOCIAL WORK - PATIENT PORTAL LINK FT
You can access the FollowMyHealth Patient Portal offered by Health system by registering at the following website: [email or web address]. By joining [employer]’s FollowMyHealth portal, you will also be able to view your health information using other applications (apps) compatible with our system.

## 2020-03-05 NOTE — PROGRESS NOTE ADULT - ATTENDING COMMENTS
Patient medically stable for discharge to [facility] today.  Discharge medications and instructions reviewed.

## 2020-03-06 VITALS
DIASTOLIC BLOOD PRESSURE: 77 MMHG | OXYGEN SATURATION: 96 % | HEART RATE: 84 BPM | SYSTOLIC BLOOD PRESSURE: 113 MMHG | TEMPERATURE: 99 F | RESPIRATION RATE: 15 BRPM

## 2020-03-06 PROCEDURE — 97535 SELF CARE MNGMENT TRAINING: CPT

## 2020-03-06 PROCEDURE — 85027 COMPLETE CBC AUTOMATED: CPT

## 2020-03-06 PROCEDURE — 92507 TX SP LANG VOICE COMM INDIV: CPT

## 2020-03-06 PROCEDURE — 71046 X-RAY EXAM CHEST 2 VIEWS: CPT

## 2020-03-06 PROCEDURE — 97530 THERAPEUTIC ACTIVITIES: CPT

## 2020-03-06 PROCEDURE — 97112 NEUROMUSCULAR REEDUCATION: CPT

## 2020-03-06 PROCEDURE — 99238 HOSP IP/OBS DSCHRG MGMT 30/<: CPT

## 2020-03-06 PROCEDURE — 97110 THERAPEUTIC EXERCISES: CPT

## 2020-03-06 PROCEDURE — 36415 COLL VENOUS BLD VENIPUNCTURE: CPT

## 2020-03-06 PROCEDURE — 80053 COMPREHEN METABOLIC PANEL: CPT

## 2020-03-06 PROCEDURE — 97163 PT EVAL HIGH COMPLEX 45 MIN: CPT

## 2020-03-06 PROCEDURE — 97116 GAIT TRAINING THERAPY: CPT

## 2020-03-06 PROCEDURE — 81001 URINALYSIS AUTO W/SCOPE: CPT

## 2020-03-06 PROCEDURE — 93970 EXTREMITY STUDY: CPT

## 2020-03-06 RX ADMIN — MEMANTINE HYDROCHLORIDE 10 MILLIGRAM(S): 10 TABLET ORAL at 05:22

## 2020-03-06 RX ADMIN — LOSARTAN POTASSIUM 50 MILLIGRAM(S): 100 TABLET, FILM COATED ORAL at 05:22

## 2020-03-06 RX ADMIN — Medication 20 MILLIGRAM(S): at 11:33

## 2020-03-06 RX ADMIN — NYSTATIN CREAM 1 APPLICATION(S): 100000 CREAM TOPICAL at 05:25

## 2020-03-06 RX ADMIN — Medication 81 MILLIGRAM(S): at 05:22

## 2020-03-06 NOTE — PROGRESS NOTE ADULT - REASON FOR ADMISSION
Left MCA and JESSE CVA
Left MCA and JESSE CVA with deficits
Left MCA and JESSE CVA

## 2020-03-06 NOTE — PROGRESS NOTE ADULT - SUBJECTIVE AND OBJECTIVE BOX
Patient is a 64y old  Male who presents with a chief complaint of Left MCA and JESSE CVA (05 Mar 2020 12:00)      HPI:  65 y/o M w/ no significant PMHx who was transferred from [facility] ED on 20 after presenting with aphasia, dysarthria and right hemiplegia, s/p tPA administration at 22:48 20. CTA head performed at [facility] revealing of total left ICA occlusion. LNK 6:30PM 20. S/p tPA administration, pt w/o improvement in right-sided weakness. Pt was transferred to [facility] for further neurologic evaluation. Upon arrival to ED, /100 so cardene gtt was re-started. CT head perfusion revealing of small to moderate sized left frontal infarction w/ moderate to large region of low flow and/or ischemic state in left frontoparietal region. CTA neck significant for occlusion of left internal carotid. No Neurovascular intervention at this time. Admitted to MICU for post tPA monitoring. CT-A also noted with right ICA dissection. While admitted, patient was treated with cardene drip for HTN which was stopped on  and was then transferred to medical services. Patient was seen by vascular surgery in ICU for Left Carotid occlusion and recommended for patient to be started on full anticoagulation in at least 6 weeks. Neuro was consulted, repeat CT showing petechial hemorrhages.    On  Was planned for dc to acute rehab but admission was held due to 3-3.5 second pauses on monitor. Cardiology was consulted and stated that it is associated with [place] prolongation consistent with increased vagal tone possibly due to his cerebral disease. Pacemaker would not be beneficial. Patient also noted to be asymptomatic during the pauses. Metoprolol and AVN blockers held as per cardio. Patient planned for dc to [facility] [place] today.     Wife: Ca Rehn (H) [phone], (C) [phone] (2020 11:29)        SUBJECTIVE: Patient seen and examined. No acute overnight events, slept well. Patient is accepted for discharge to [facility] today. No other complaints.       REVIEW OF SYSTEMS  Constitutional: No fever, No Chills, No fatigue  Pulm: No cough,  No shortness of breath  Cardio: No chest pain, No palpitations  GI:  No abdominal pain, No constipation  Neuro: No headaches, No memory loss, +loss of strength  MSK: no shoulder pain  Psych:  No depression, No anxiety      VITALS  64y  Vital Signs Last 24 Hrs  T(C): 37 (06 Mar 2020 07:53), Max: 37 (06 Mar 2020 07:53)  T(F): 98.6 (06 Mar 2020 07:53), Max: 98.6 (06 Mar 2020 07:53)  HR: 84 (06 Mar 2020 07:53) (75 - 90)  BP: 113/77 (06 Mar 2020 07:53) (113/77 - 151/83)  BP(mean): --  RR: 15 (06 Mar 2020 07:53) (14 - 15)  SpO2: 96% (06 Mar 2020 07:53) (94% - 96%)  Daily     Daily Weight in k.3 (05 Mar 2020 22:42)        Physical Exam:  Gen - NAD, Comfortable  HEENT - NCAT, EOMI,   Pulm - CTAB, No wheezing  Cardiovascular - RRR, S1S2  Abdomen - Soft, NT/ND, +BS  Extremities - +Right shoulder subluxation nontender to palpation,  No C/C/E, No calf tenderness  Neuro-     Cognitive - AAO to person, nods when name called. Unable to verbalize responses. Follows command.      Communication - non-fluent, will nod and shrug to answer questions. Comprehension intact     Attention: Intact      Cranial Nerves - +right facial droop,      Motor -  Right HP                       Sensory - diminished to LT Right UE and LE     Tone - RUE and RLE flaccid  Psychiatric -  mood improved, affect WNL        FUNCTIONAL STATUS:    IDT rounds 3/3  dc date 3/5   goals of Sajan with grooming and UBD, mod-A with LBD and transfers. WC level for mobility. 24hr supervision.  OT:  eating supervision  grooming Sajan  UBD Sajan  LBD maxA  toilet/tub tx: mod-max A    PT:   amb 50ft with hemicane, maxA  transfers: min-modA    SLP: diet: reg/thins. improving--comprehension 80-90% accuracy, improving attention and carry over, motor severe apraxia, aphasia.         RECENT LABS:                        12.3   7.62  )-----------( 396      ( 05 Mar 2020 06:30 )             38.2     03-05    140  |  105  |  16  ----------------------------<  110<H>  3.8   |  27  |  0.76    Ca    8.8      05 Mar 2020 06:30    TPro  6.6  /  Alb  2.6<L>  /  TBili  0.2  /  DBili  x   /  AST  32  /  ALT  68<H>  /  AlkPhos  80  03-05    LIVER FUNCTIONS - ( 05 Mar 2020 06:30 )  Alb: 2.6 g/dL / Pro: 6.6 g/dL / ALK PHOS: 80 U/L / ALT: 68 U/L / AST: 32 U/L / GGT: x                   CAPILLARY BLOOD GLUCOSE            MEDICATIONS:  MEDICATIONS  (STANDING):  amLODIPine   Tablet 10 milliGRAM(s) Oral <User Schedule>  aspirin  chewable 81 milliGRAM(s) Oral <User Schedule>  atorvastatin 80 milliGRAM(s) Oral at bedtime  FLUoxetine 20 milliGRAM(s) Oral daily  losartan 50 milliGRAM(s) Oral two times a day  memantine 10 milliGRAM(s) Oral two times a day  nystatin Powder 1 Application(s) Topical two times a day    MEDICATIONS  (PRN):  acetaminophen   Tablet .. 650 milliGRAM(s) Oral every 6 hours PRN Temp greater or equal to 38C (100.4F), Mild Pain (1 - 3)

## 2020-03-06 NOTE — PROGRESS NOTE ADULT - ATTENDING COMMENTS
Pt. seen with resident.  Agree with documentation above as per resident with amendments made as appropriate.     Did not go to [facility] yesterday due to issue with insurance authorization.  Approved for discharge today    Patient medically stable for discharge to [facility] today.  Discharge medications and instructions reviewed.

## 2020-03-06 NOTE — PROGRESS NOTE ADULT - ASSESSMENT
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    Stable--no acute issues or med changes    Rehab: Gait Instability, ADL impairments and Functional impairments: Continue Comprehensive Rehab Program of PT/OT/ST in [place]    #Left JESSE/MCA CVA with petechial hemorrhage  - Continue ASA 81mg daily  - Continue Lipitor 80mg QHS  - Prozac 20mg daily (inc. 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  "This should provide protection for stroke from Right ICA dissection while minimizing chance for worsening petechial ICH."    #Leukocytosis - resolved  - repeat labs 3/2 8.53, afebrile, VSS, no signs of active infection    #Pain: Tylenol PRN  - Prior opioid use: well managed without meds at the moment, patient has h/o opioid dependence, was on Suboxone treatment as outpatient.      #Hypertension  - Cont. Losartan 50mg BID (inc. 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    Stable--no acute issues or med changes.      Rehab: Gait Instability, ADL impairments and Functional impairments: Continue Comprehensive Rehab Program of PT/OT/ST in [place]    #Left JESSE/MCA CVA with petechial hemorrhage  - Continue ASA 81mg daily  - Continue Lipitor 80mg QHS  - Prozac 20mg daily (inc. 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 given size of stroke with petechial hemorrhage avoid anticoagulation for 6-8 weeks, continue ASA but at 81 mg daily.  "This should provide protection for stroke from Right ICA dissection while minimizing chance for worsening petechial ICH."    #Leukocytosis - resolved  - repeat labs 3/2 8.53, afebrile, VSS, no signs of active infection    #Pain: Tylenol PRN  - Prior opioid use: well managed without meds at the moment, patient has h/o opioid dependence, was on Suboxone treatment as outpatient.      #Hypertension  - Cont. Losartan 50mg BID (inc. from 25mg on 2/24)  - Continue amlodipine 10mg daily qhs    #mood  - cont prozac    #GI/Bowel: Continent    #Diet: 3/2 upgraded to regular diet    #Renal/Bladder: voiding    #Precautions / PROPHYLAXIS:   - Falls    - Lungs: Aspiration, Incentive Spirometer   - Pressure injury/Skin: Turn Q2hrs while in bed, OOB to Chair, PT/OT    - DVT ppx: SCDs, TEDs     discharge 3/6 [facility]

## 2020-06-30 ENCOUNTER — APPOINTMENT (OUTPATIENT)
Dept: INTERNAL MEDICINE | Facility: CLINIC | Age: 65
End: 2020-06-30
Payer: MEDICARE

## 2020-06-30 VITALS
OXYGEN SATURATION: 96 % | DIASTOLIC BLOOD PRESSURE: 70 MMHG | HEART RATE: 89 BPM | RESPIRATION RATE: 18 BRPM | SYSTOLIC BLOOD PRESSURE: 114 MMHG | BODY MASS INDEX: 25.2 KG/M2 | WEIGHT: 180 LBS | TEMPERATURE: 98.2 F | HEIGHT: 71 IN

## 2020-06-30 PROCEDURE — 99205 OFFICE O/P NEW HI 60 MIN: CPT

## 2020-06-30 RX ORDER — PREDNISONE 10 MG/1
10 TABLET ORAL TWICE DAILY
Qty: 20 | Refills: 1 | Status: DISCONTINUED | COMMUNITY
Start: 2019-01-18 | End: 2020-06-30

## 2020-06-30 RX ORDER — PREDNISONE 10 MG/1
10 TABLET ORAL
Qty: 30 | Refills: 1 | Status: DISCONTINUED | COMMUNITY
Start: 2019-02-15 | End: 2020-06-30

## 2020-07-07 ENCOUNTER — APPOINTMENT (OUTPATIENT)
Dept: NEUROLOGY | Facility: CLINIC | Age: 65
End: 2020-07-07
Payer: MEDICARE

## 2020-07-07 VITALS
HEART RATE: 90 BPM | WEIGHT: 180 LBS | SYSTOLIC BLOOD PRESSURE: 94 MMHG | BODY MASS INDEX: 25.2 KG/M2 | DIASTOLIC BLOOD PRESSURE: 64 MMHG | TEMPERATURE: 97.8 F | HEIGHT: 71 IN

## 2020-07-07 DIAGNOSIS — Z82.49 FAMILY HISTORY OF ISCHEMIC HEART DISEASE AND OTHER DISEASES OF THE CIRCULATORY SYSTEM: ICD-10-CM

## 2020-07-07 DIAGNOSIS — Z80.8 FAMILY HISTORY OF MALIGNANT NEOPLASM OF OTHER ORGANS OR SYSTEMS: ICD-10-CM

## 2020-07-07 PROCEDURE — 99214 OFFICE O/P EST MOD 30 MIN: CPT

## 2020-07-07 NOTE — REASON FOR VISIT
[Consultation] : a consultation visit [Family Member] : family member [FreeTextEntry1] : Evaluation for Recent Acute cVA with Aphasia, Rt hemiplegia with RT ICA occlusion with ICA dissection

## 2020-07-07 NOTE — REVIEW OF SYSTEMS
[Arm Weakness] : arm weakness [Hand Weakness] :  hand weakness [Leg Weakness] : leg weakness [Difficulties in Speech] : speech difficulties [Inability to Walk] : inability to walk [Negative] : Heme/Lymph [de-identified] : Rt hemiplegia\par Expressive aphasia

## 2020-07-07 NOTE — PHYSICAL EXAM
[General Appearance - Alert] : alert [General Appearance - In No Acute Distress] : in no acute distress [Impaired Insight] : insight and judgment were intact [Oriented To Time, Place, And Person] : oriented to person, place, and time [Affect] : the affect was normal [Place] : oriented to place [Person] : oriented to person [Time] : oriented to time [Concentration Intact] : normal concentrating ability [Visual Intact] : visual attention was ~T not ~L decreased [Writing A Sentence] : no difficulty writing a sentence [Fluency] : fluency intact [Reading] : reading intact [Past History] : adequate knowledge of personal past history [Cranial Nerves Oculomotor (III)] : extraocular motion intact [Cranial Nerves Optic (II)] : visual acuity intact bilaterally,  visual fields full to confrontation, pupils equal round and reactive to light [Cranial Nerves Facial (VII)] : face symmetrical [Cranial Nerves Trigeminal (V)] : facial sensation intact symmetrically [Cranial Nerves Glossopharyngeal (IX)] : tongue and palate midline [Cranial Nerves Vestibulocochlear (VIII)] : hearing was intact bilaterally [Cranial Nerves Accessory (XI - Cranial And Spinal)] : head turning and shoulder shrug symmetric [Cranial Nerves Hypoglossal (XII)] : there was no tongue deviation with protrusion [Motor Strength] : muscle strength was normal in all four extremities [No Muscle Atrophy] : normal bulk in all four extremities [Hemiplegia Of Right Side] : hemiplegia was present on the right [Arm, Leg Weaker than Face] : with the arm and leg weaker than the face [Sensation Tactile Decrease] : light touch was intact [Non-ambulatory] : Non-ambulatory [3+] : Ankle jerk right 3+ [2+] : Ankle jerk left 2+ [Plantar Reflex Right Only] : abnormal on the right [Sclera] : the sclera and conjunctiva were normal [PERRL With Normal Accommodation] : pupils were equal in size, round, reactive to light, with normal accommodation [Extraocular Movements] : extraocular movements were intact [Outer Ear] : the ears and nose were normal in appearance [Oropharynx] : the oropharynx was normal [Neck Appearance] : the appearance of the neck was normal [Neck Cervical Mass (___cm)] : no neck mass was observed [Jugular Venous Distention Increased] : there was no jugular-venous distention [Thyroid Diffuse Enlargement] : the thyroid was not enlarged [Thyroid Nodule] : there were no palpable thyroid nodules [Auscultation Breath Sounds / Voice Sounds] : lungs were clear to auscultation bilaterally [Heart Rate And Rhythm] : heart rate was normal and rhythm regular [Heart Sounds] : normal S1 and S2 [Heart Sounds Gallop] : no gallops [Murmurs] : no murmurs [Heart Sounds Pericardial Friction Rub] : no pericardial rub [Full Pulse] : the pedal pulses are present [Edema] : there was no peripheral edema [Bowel Sounds] : normal bowel sounds [Abdomen Soft] : soft [Abdomen Tenderness] : non-tender [Abdomen Mass (___ Cm)] : no abdominal mass palpated [No CVA Tenderness] : no ~M costovertebral angle tenderness [No Spinal Tenderness] : no spinal tenderness [Nail Clubbing] : no clubbing  or cyanosis of the fingernails [Musculoskeletal - Swelling] : no joint swelling seen [Motor Tone] : muscle strength and tone were normal [Skin Color & Pigmentation] : normal skin color and pigmentation [Skin Turgor] : normal skin turgor [] : no rash [Naming Objects] : difficulty naming common objects [Repeating Phrases] : difficulty repeating a phrase [Comprehension] : comprehension not intact [Vocabulary] : inadequate range of vocabulary [Past-pointing] : there was no past-pointing [Tremor] : no tremor present [Plantar Reflex Left Only] : normal on the left [FreeTextEntry4] : Expressive aphasia  [FreeTextEntry6] :  RT UE  0/5 with spasticity LE distal 0-2/5 in brace proximally 4/5  [FreeTextEntry1] : Non ambulatory

## 2020-07-07 NOTE — HISTORY OF PRESENT ILLNESS
[FreeTextEntry1] : He is 64-year-old patient with a history of hypertension, hyperlipidemia admitted to [facility] on 2/5/20 with sudden onset of aphasia, right hemiplegia given TPA and transferred to [facility] with CT, CT angiogram positive for left I see occlusion with questionable dissection. Subsequently the patient had workup showed left carotid occlusion with dissection and recommended anticoagulation but unable to treat with anticoagulation due to petechial hemorrhage is seen on MRI scan.\par History obtained from patient's daughter Dr. Dawn. Patient was transferred subsequently to clinic or Hospital for rehabilitation and then to change him then so subacute rehabilitation and discharged home recently. Patient is able to speak currently but still has expressive aphasia with difficulties and persistent right upper extremity more than lower extremity weakness.\par Denies any headaches, dizziness. Seen by vascular surgery and primary care physicians carotid Doppler done did  show occlusion of the L ICA Rt 50-79% .\par pt's daughter states was told he will  be finishing home PT soon.

## 2020-07-07 NOTE — CONSULT LETTER
[Dear  ___] : Dear  [unfilled], [Please see my note below.] : Please see my note below. [Consult Letter:] : I had the pleasure of evaluating your patient, [unfilled]. [Consult Closing:] : Thank you very much for allowing me to participate in the care of this patient.  If you have any questions, please do not hesitate to contact me. [Sincerely,] : Sincerely, [DrYumiko  ___] : Dr. FAYE

## 2020-07-07 NOTE — DISCUSSION/SUMMARY
[FreeTextEntry1] : He is 64-year-old patient with a history of acute left ICA distribution CVA with right hemiplegia and expressive aphasia.\par \par Patient treated with a t-PA initially and transferred to outside hospital and diagnosed to have left ICA occlusion with dissection.\par No intervention was performed.\par Vascular surgery recommended anticoagulation for 6 weeks but repeat CT scan showed petechial hemorrhages and patient treated with medical management without anticoagulation.\par Discharge to rehabilitation and stabilized.\par Currently home with home physical therapy and speech therapy occupational therapy.\par As per patient's daughter stable.\par Continue present medications.\par Continue outpatient physical therapy, speech therapy and occupational therapy.\par For spastic hemiparesis recommend Botox injections for upper extremity.\par Follow up with vascular surgery for right ICA stenosis for a followup evaluations.\par Discussed with the patient and his daughter at length.\par Patient education provided regarding secondary stroke prevention.\par Continue blood pressure control and statin.\par Aspirin.\par Return for reevaluation in 3 months or as needed.

## 2020-07-24 ENCOUNTER — APPOINTMENT (OUTPATIENT)
Dept: NEUROLOGY | Facility: CLINIC | Age: 65
End: 2020-07-24

## 2020-07-24 ENCOUNTER — APPOINTMENT (OUTPATIENT)
Dept: NEUROLOGY | Facility: CLINIC | Age: 65
End: 2020-07-24
Payer: MEDICARE

## 2020-07-24 VITALS
BODY MASS INDEX: 25.2 KG/M2 | TEMPERATURE: 97.9 F | SYSTOLIC BLOOD PRESSURE: 100 MMHG | WEIGHT: 180 LBS | HEART RATE: 90 BPM | HEIGHT: 71 IN | DIASTOLIC BLOOD PRESSURE: 60 MMHG

## 2020-07-24 PROCEDURE — 64644 CHEMODENERV 1 EXTREM 5/> MUS: CPT

## 2020-07-24 PROCEDURE — 99213 OFFICE O/P EST LOW 20 MIN: CPT | Mod: 25

## 2020-07-24 NOTE — DATA REVIEWED
[de-identified] :   EXAM:  MR BRAIN IC                      \par \par PROCEDURE DATE:  02/06/2020  \par \par \par \par INTERPRETATION:  CLINICAL STATEMENT: CVA\par \par TECHNIQUE: MRI of the brain was performed with and without gadolinium. 9 cc Gadavist administered\par \par COMPARISON: CT head 2/6/2020\par \par FINDINGS:\par \par There is mild diffuse parenchymal volume loss. There are T2 prolongation signal abnormalities in the periventricular white matter likely related to mild chronic microvascular ischemic changes.\par \par Acute infarct left frontal lobe noted measuring approximately 7.4 x 4.1 cm. Punctate acute infarct also noted left centrum semiovale. Small acute infarct also noted measuring approximately 1.4 x 1.2 cm in the left posterior temporal lobe. No abnormal intraparenchymal or leptomeningeal enhancement.\par \par There is no acute parenchymal hemorrhage, parenchymal mass, or midline shift. There is no extra-axial fluid collection.  There is no hydrocephalus. Partial empty sella\par \par Loss of normal high T2 signal distal left internal carotid artery compatible with occlusion\par \par IMPRESSION:\par Left-sided acute infarcts as described above.\par \par No acute intracranial hemorrhage.\par \par Occluded distal left internal carotid artery

## 2020-07-24 NOTE — DISCUSSION/SUMMARY
[FreeTextEntry1] : 64-year-old man with a history of left-sided stroke, with aphasia, right spastic hemiparesis. Reviewed and discussed her options to improve spasticity, improve access to the extremity, leaning.\par Agrees with try Botox. Consent obtained.\par Return to office for evaluation in one month.Schedule. Next Botox appointment in 3 months.

## 2020-07-24 NOTE — HISTORY OF PRESENT ILLNESS
[FreeTextEntry1] : Dural man with a history of hypertension, previous stroke February 2020 a failure, and right spastic hemiplegia. Here for possible Botox treatment, of his right upper extremity spasticity, held in a flexion at the elbow and wrist, pronation of the forearm and flexed fingers. It is difficult to fully extend arm, for cleaning, dressing, patient has limited movement or ability to carry objects on the right upper extremity. No pain.\par Previous imaging demonstrated a left frontal stroke with hemorrhagic conversion.,

## 2020-07-24 NOTE — PROCEDURE
[FreeTextEntry1] : Botox 200 unit vial's, x2. Makes with 2 cc of sterile saline. Each vial. Using aseptic technique, injected with 75 units into the right pectoralis, right biceps 75 units, 75 units in the: right Brachioradialis, 50 units in the right pronator teres, 50 units in the right flexor carpi ulnaris and right palmaris longus.  \par Patient tolerated procedure well. Bleeding controlled local pressure.

## 2020-07-24 NOTE — PHYSICAL EXAM
[General Appearance - Alert] : alert [General Appearance - In No Acute Distress] : in no acute distress [Cranial Nerves Optic (II)] : visual acuity intact bilaterally,  visual fields full to confrontation, pupils equal round and reactive to light [Cranial Nerves Facial (VII)] : face symmetrical [Cranial Nerves Trigeminal (V)] : facial sensation intact symmetrically [Cranial Nerves Vestibulocochlear (VIII)] : hearing was intact bilaterally [Cranial Nerves Oculomotor (III)] : extraocular motion intact [Cranial Nerves Accessory (XI - Cranial And Spinal)] : head turning and shoulder shrug symmetric [Cranial Nerves Glossopharyngeal (IX)] : tongue and palate midline [Sensation Tactile Decrease] : light touch was intact [3+] : Patella right 3+ [FreeTextEntry6] : Increased L. noted of the upper and right lower extremities with bduction of the upper arm to the body, flexion of the elbow and wrist, maintain pronation of the forearm, and finger flexion.\par The fingers and extend it was fully, there is some range of motion about the wrist and elbows, no pain noticeable.Minimal movement noted of the shoulder, flexion of the elbow and wrist,2-3/5 [2+] : Patella left 2+ [FreeTextEntry8] : Wheelchair in office

## 2020-08-19 ENCOUNTER — INPATIENT (INPATIENT)
Facility: HOSPITAL | Age: 65
LOS: 1 days | Discharge: HOME | DRG: 261 | End: 2020-08-21
Attending: INTERNAL MEDICINE | Admitting: INTERNAL MEDICINE
Payer: MEDICARE

## 2020-08-19 VITALS — WEIGHT: 190.04 LBS

## 2020-08-19 DIAGNOSIS — Z29.9 ENCOUNTER FOR PROPHYLACTIC MEASURES, UNSPECIFIED: ICD-10-CM

## 2020-08-19 DIAGNOSIS — I10 ESSENTIAL (PRIMARY) HYPERTENSION: ICD-10-CM

## 2020-08-19 DIAGNOSIS — R55 SYNCOPE AND COLLAPSE: ICD-10-CM

## 2020-08-19 DIAGNOSIS — I63.9 CEREBRAL INFARCTION, UNSPECIFIED: ICD-10-CM

## 2020-08-19 DIAGNOSIS — D72.829 ELEVATED WHITE BLOOD CELL COUNT, UNSPECIFIED: ICD-10-CM

## 2020-08-19 DIAGNOSIS — R68.89 OTHER GENERAL SYMPTOMS AND SIGNS: ICD-10-CM

## 2020-08-19 LAB
ALBUMIN SERPL ELPH-MCNC: 3.3 G/DL — SIGNIFICANT CHANGE UP (ref 3.3–5)
ALP SERPL-CCNC: 91 U/L — SIGNIFICANT CHANGE UP (ref 40–120)
ALT FLD-CCNC: 26 U/L — SIGNIFICANT CHANGE UP (ref 12–78)
ANION GAP SERPL CALC-SCNC: 7 MMOL/L — SIGNIFICANT CHANGE UP (ref 5–17)
APPEARANCE UR: CLEAR — SIGNIFICANT CHANGE UP
AST SERPL-CCNC: 15 U/L — SIGNIFICANT CHANGE UP (ref 15–37)
BASOPHILS # BLD AUTO: 0.05 K/UL — SIGNIFICANT CHANGE UP (ref 0–0.2)
BASOPHILS NFR BLD AUTO: 0.4 % — SIGNIFICANT CHANGE UP (ref 0–2)
BILIRUB SERPL-MCNC: 0.2 MG/DL — SIGNIFICANT CHANGE UP (ref 0.2–1.2)
BILIRUB UR-MCNC: NEGATIVE — SIGNIFICANT CHANGE UP
BUN SERPL-MCNC: 13 MG/DL — SIGNIFICANT CHANGE UP (ref 7–23)
CALCIUM SERPL-MCNC: 9 MG/DL — SIGNIFICANT CHANGE UP (ref 8.5–10.1)
CHLORIDE SERPL-SCNC: 100 MMOL/L — SIGNIFICANT CHANGE UP (ref 96–108)
CO2 SERPL-SCNC: 30 MMOL/L — SIGNIFICANT CHANGE UP (ref 22–31)
COLOR SPEC: YELLOW — SIGNIFICANT CHANGE UP
CREAT SERPL-MCNC: 0.85 MG/DL — SIGNIFICANT CHANGE UP (ref 0.5–1.3)
DIFF PNL FLD: NEGATIVE — SIGNIFICANT CHANGE UP
EOSINOPHIL # BLD AUTO: 0.02 K/UL — SIGNIFICANT CHANGE UP (ref 0–0.5)
EOSINOPHIL NFR BLD AUTO: 0.2 % — SIGNIFICANT CHANGE UP (ref 0–6)
GLUCOSE SERPL-MCNC: 122 MG/DL — HIGH (ref 70–99)
GLUCOSE UR QL: NEGATIVE MG/DL — SIGNIFICANT CHANGE UP
HCT VFR BLD CALC: 41.1 % — SIGNIFICANT CHANGE UP (ref 39–50)
HGB BLD-MCNC: 12.8 G/DL — LOW (ref 13–17)
IMM GRANULOCYTES NFR BLD AUTO: 0.2 % — SIGNIFICANT CHANGE UP (ref 0–1.5)
KETONES UR-MCNC: NEGATIVE — SIGNIFICANT CHANGE UP
LEUKOCYTE ESTERASE UR-ACNC: NEGATIVE — SIGNIFICANT CHANGE UP
LYMPHOCYTES # BLD AUTO: 0.87 K/UL — LOW (ref 1–3.3)
LYMPHOCYTES # BLD AUTO: 7.1 % — LOW (ref 13–44)
MCHC RBC-ENTMCNC: 28 PG — SIGNIFICANT CHANGE UP (ref 27–34)
MCHC RBC-ENTMCNC: 31.1 GM/DL — LOW (ref 32–36)
MCV RBC AUTO: 89.9 FL — SIGNIFICANT CHANGE UP (ref 80–100)
MONOCYTES # BLD AUTO: 0.63 K/UL — SIGNIFICANT CHANGE UP (ref 0–0.9)
MONOCYTES NFR BLD AUTO: 5.2 % — SIGNIFICANT CHANGE UP (ref 2–14)
NEUTROPHILS # BLD AUTO: 10.6 K/UL — HIGH (ref 1.8–7.4)
NEUTROPHILS NFR BLD AUTO: 86.9 % — HIGH (ref 43–77)
NITRITE UR-MCNC: NEGATIVE — SIGNIFICANT CHANGE UP
PH UR: 7 — SIGNIFICANT CHANGE UP (ref 5–8)
PLATELET # BLD AUTO: 279 K/UL — SIGNIFICANT CHANGE UP (ref 150–400)
POTASSIUM SERPL-MCNC: 3.7 MMOL/L — SIGNIFICANT CHANGE UP (ref 3.5–5.3)
POTASSIUM SERPL-SCNC: 3.7 MMOL/L — SIGNIFICANT CHANGE UP (ref 3.5–5.3)
PROT SERPL-MCNC: 7.2 GM/DL — SIGNIFICANT CHANGE UP (ref 6–8.3)
PROT UR-MCNC: NEGATIVE MG/DL — SIGNIFICANT CHANGE UP
RBC # BLD: 4.57 M/UL — SIGNIFICANT CHANGE UP (ref 4.2–5.8)
RBC # FLD: 12.8 % — SIGNIFICANT CHANGE UP (ref 10.3–14.5)
SODIUM SERPL-SCNC: 137 MMOL/L — SIGNIFICANT CHANGE UP (ref 135–145)
SP GR SPEC: 1.01 — SIGNIFICANT CHANGE UP (ref 1.01–1.02)
TROPONIN I SERPL-MCNC: <0.015 NG/ML — SIGNIFICANT CHANGE UP (ref 0.01–0.04)
UROBILINOGEN FLD QL: NEGATIVE MG/DL — SIGNIFICANT CHANGE UP
WBC # BLD: 12.2 K/UL — HIGH (ref 3.8–10.5)
WBC # FLD AUTO: 12.2 K/UL — HIGH (ref 3.8–10.5)

## 2020-08-19 PROCEDURE — 81003 URINALYSIS AUTO W/O SCOPE: CPT

## 2020-08-19 PROCEDURE — 95816 EEG AWAKE AND DROWSY: CPT

## 2020-08-19 PROCEDURE — 33285 INSJ SUBQ CAR RHYTHM MNTR: CPT

## 2020-08-19 PROCEDURE — C1764: CPT

## 2020-08-19 PROCEDURE — 83735 ASSAY OF MAGNESIUM: CPT

## 2020-08-19 PROCEDURE — 85027 COMPLETE CBC AUTOMATED: CPT

## 2020-08-19 PROCEDURE — 93925 LOWER EXTREMITY STUDY: CPT

## 2020-08-19 PROCEDURE — 93010 ELECTROCARDIOGRAM REPORT: CPT

## 2020-08-19 PROCEDURE — 36415 COLL VENOUS BLD VENIPUNCTURE: CPT

## 2020-08-19 PROCEDURE — 99223 1ST HOSP IP/OBS HIGH 75: CPT

## 2020-08-19 PROCEDURE — 87086 URINE CULTURE/COLONY COUNT: CPT

## 2020-08-19 PROCEDURE — 71045 X-RAY EXAM CHEST 1 VIEW: CPT | Mod: 26

## 2020-08-19 PROCEDURE — 93306 TTE W/DOPPLER COMPLETE: CPT

## 2020-08-19 PROCEDURE — 70450 CT HEAD/BRAIN W/O DYE: CPT | Mod: 26

## 2020-08-19 PROCEDURE — 80048 BASIC METABOLIC PNL TOTAL CA: CPT

## 2020-08-19 RX ORDER — LOSARTAN POTASSIUM 100 MG/1
50 TABLET, FILM COATED ORAL DAILY
Refills: 0 | Status: DISCONTINUED | OUTPATIENT
Start: 2020-08-19 | End: 2020-08-21

## 2020-08-19 RX ORDER — ATORVASTATIN CALCIUM 80 MG/1
80 TABLET, FILM COATED ORAL AT BEDTIME
Refills: 0 | Status: DISCONTINUED | OUTPATIENT
Start: 2020-08-19 | End: 2020-08-21

## 2020-08-19 RX ORDER — ENOXAPARIN SODIUM 100 MG/ML
40 INJECTION SUBCUTANEOUS DAILY
Refills: 0 | Status: DISCONTINUED | OUTPATIENT
Start: 2020-08-19 | End: 2020-08-20

## 2020-08-19 RX ORDER — FLUOXETINE HCL 10 MG
20 CAPSULE ORAL
Refills: 0 | Status: DISCONTINUED | OUTPATIENT
Start: 2020-08-19 | End: 2020-08-21

## 2020-08-19 RX ORDER — ONDANSETRON 8 MG/1
4 TABLET, FILM COATED ORAL EVERY 6 HOURS
Refills: 0 | Status: DISCONTINUED | OUTPATIENT
Start: 2020-08-19 | End: 2020-08-21

## 2020-08-19 RX ORDER — AMLODIPINE BESYLATE 2.5 MG/1
5 TABLET ORAL DAILY
Refills: 0 | Status: DISCONTINUED | OUTPATIENT
Start: 2020-08-19 | End: 2020-08-21

## 2020-08-19 RX ORDER — ASPIRIN/CALCIUM CARB/MAGNESIUM 324 MG
81 TABLET ORAL DAILY
Refills: 0 | Status: DISCONTINUED | OUTPATIENT
Start: 2020-08-19 | End: 2020-08-21

## 2020-08-19 RX ORDER — SODIUM CHLORIDE 9 MG/ML
1000 INJECTION INTRAMUSCULAR; INTRAVENOUS; SUBCUTANEOUS
Refills: 0 | Status: DISCONTINUED | OUTPATIENT
Start: 2020-08-19 | End: 2020-08-21

## 2020-08-19 RX ADMIN — SODIUM CHLORIDE 125 MILLILITER(S): 9 INJECTION INTRAMUSCULAR; INTRAVENOUS; SUBCUTANEOUS at 21:09

## 2020-08-19 RX ADMIN — ATORVASTATIN CALCIUM 80 MILLIGRAM(S): 80 TABLET, FILM COATED ORAL at 21:08

## 2020-08-19 RX ADMIN — Medication 20 MILLIGRAM(S): at 21:08

## 2020-08-19 NOTE — H&P ADULT - PROBLEM SELECTOR PLAN 4
w/ Rt side (arm and leg) residual weakness, can feed himself, but requires assistance to transfer and ambulates w/ walker.  supportive care  fall risk- place on precautions

## 2020-08-19 NOTE — ED PROVIDER NOTE - CONSTITUTIONAL, MLM
normal... Middle-aged male laying in be in no acute distress. Speech at baseline per aide at bedside.

## 2020-08-19 NOTE — H&P ADULT - HISTORY OF PRESENT ILLNESS
HPI:      PAST MEDICAL & SURGICAL HISTORY:  CVA (cerebral vascular accident)  No significant past surgical history      Review of Systems:   CONSTITUTIONAL: No fever.  EYES: No eye pain or discharge.  ENMT:  No sinus or throat pain  NECK: No pain or stiffness  RESPIRATORY: No cough, wheezing, chills or hemoptysis; No shortness of breath  CARDIOVASCULAR: No chest pain, palpitations, dizziness, or leg swelling  GASTROINTESTINAL: No abdominal or epigastric pain. No nausea, vomiting, or hematemesis; No diarrhea or constipation. No melena or hematochezia.  GENITOURINARY: No dysuria or incontinence  NEUROLOGICAL: No headaches, memory loss, loss of strength, numbness, or tremors  SKIN: No rashes.  LYMPH NODES: No enlarged glands  ENDOCRINE: No heat or cold intolerance; No hair loss  MUSCULOSKELETAL: No joint pain or swelling; No muscle, back, or extremity pain  PSYCHIATRIC: No depression, anxiety, mood swings, or difficulty sleeping  HEME/LYMPH: No easy bruising, or bleeding gums  ALLERY AND IMMUNOLOGIC: No hives or eczema    Allergies    No Known Allergies    Intolerances        Social History:     FAMILY HISTORY:  No pertinent family history in first degree relatives      Home Medications:  amLODIPine 5 mg oral tablet: 1 tab(s) orally once a day (19 Aug 2020 14:30)  aspirin 81 mg oral tablet, chewable: 1 tab(s) orally  (19 Aug 2020 13:38)  atorvastatin 80 mg oral tablet: 1 tab(s) orally once a day (at bedtime) (19 Aug 2020 13:38)  FLUoxetine 20 mg oral capsule: 1 cap(s) orally 3 times a day (19 Aug 2020 14:30)  losartan 50 mg oral tablet: 1 tab(s) orally once a day (19 Aug 2020 14:30)  tiZANidine 4 mg oral tablet: 1-2 tab(s) orally every 8 hours as needed (19 Aug 2020 14:30)      MEDICATIONS  (STANDING):  amLODIPine   Tablet 5 milliGRAM(s) Oral daily  aspirin  chewable 81 milliGRAM(s) Oral daily  atorvastatin 80 milliGRAM(s) Oral at bedtime  enoxaparin Injectable 40 milliGRAM(s) SubCutaneous daily  FLUoxetine 20 milliGRAM(s) Oral <User Schedule>  losartan 50 milliGRAM(s) Oral daily    MEDICATIONS  (PRN):  ondansetron Injectable 4 milliGRAM(s) IV Push every 6 hours PRN Nausea      CAPILLARY BLOOD GLUCOSE        I&O's Summary      PHYSICAL EXAM:  Vital Signs Last 24 Hrs  T(C): 36.7 (19 Aug 2020 15:36), Max: 37.1 (19 Aug 2020 12:27)  T(F): 98 (19 Aug 2020 15:36), Max: 98.7 (19 Aug 2020 12:27)  HR: 73 (19 Aug 2020 15:36) (73 - 82)  BP: 117/72 (19 Aug 2020 15:36) (117/72 - 134/78)  BP(mean): 91 (19 Aug 2020 12:27) (91 - 91)  RR: 18 (19 Aug 2020 15:36) (18 - 18)  SpO2: 98% (19 Aug 2020 15:36) (98% - 100%)  GENERAL: NAD, well-developed  HEAD:  Atraumatic, Normocephalic  EYES: EOMI, PERRLA, conjunctiva and sclera clear  ENT: normal hearing, no nasal discharge, throat clear, dentition normal  NECK: Supple, No JVD, no LAD, no thyromegaly   CHEST/LUNG: Clear to auscultation bilaterally; No wheeze, respirations unlabored  HEART: Regular rate and rhythm; No murmurs, rubs, or gallops  ABDOMEN: Soft, Nontender, Nondistended; Bowel sounds present, no HSM  EXTREMITIES:  2+ Peripheral Pulses, No clubbing, cyanosis, or edema  PSYCH: AAOx3, normal behavior  NEUROLOGY: non-focal, sensory and cn 2-12 intact, speech/language intact  SKIN: No visible rashes or lesions    LABS:                        12.8   12.20 )-----------( 279      ( 19 Aug 2020 13:03 )             41.1     08-19    137  |  100  |  13  ----------------------------<  122<H>  3.7   |  30  |  0.85    Ca    9.0      19 Aug 2020 13:03    TPro  7.2  /  Alb  3.3  /  TBili  0.2  /  DBili  x   /  AST  15  /  ALT  26  /  AlkPhos  91  08-19      CARDIAC MARKERS ( 19 Aug 2020 13:03 )  <0.015 ng/mL / x     / x     / x     / x          RADIOLOGY & ADDITIONAL TESTS:    Imaging Personally Reviewed:  CT head- chronic stroke  cxr neg   EKG Personally Reviewed:  NSR  Consultant(s) Notes Reviewed:    n/a  Care Discussed with Consultants/Other Providers:  n/a HPI:  64M w/PMH Left frontal stroke w/ Left ICA occlusion, s/p Rt sided residual weakness, s/p tPA in Feb/'20, presents today c/o syncope x2.  Pt has 24/7 aide, who is now at bedside, Edyta, and was present during both episodes.  Pt refers to her for history although he is AAOX3.  1st episode, 2 days ago, she helped pt out of his WC and onto the shower chair.  As she was about to bathe him, he LOC for a few seconds.  She lifted him up and put him back in his WC.  This AM, pt was on the toilet, when he told her does not feel right and again, syncopized for a few seconds.  Pt endorses he knew he was going to pass out and recalls waking up again.  Aid denies pt had any n/v/d, no cp, sob, abd pain, HA, seizure activity.  Pt endorses briefly dizzy prior to syncope and when he wakes up he feels very thirsty.  Back in 2/12/20, prior to d/c to rehab, pt found w/ 3-3.5 sec pause.  He was evaluated by cardio and determined episode was d/t [place] prolongation c/w increased vagal tone d/t cerebral disease and PPM would not be beneficial.      In ED, EKG NSR, cxr neg, CT head - old stroke, UA neg, WBC 12.2, trop neg, VSS.      (pt is Father of Dr Jayde Dawn- I called her but n/a)     PAST MEDICAL & SURGICAL HISTORY:  CVA (cerebral vascular accident)  HTN  No significant past surgical history      Review of Systems:   CONSTITUTIONAL: No fever. +syncope  EYES: No eye pain or discharge.  ENMT:  No sinus or throat pain  NECK: No pain or stiffness  RESPIRATORY: No cough, wheezing, chills or hemoptysis; No shortness of breath  CARDIOVASCULAR: No chest pain, palpitations, dizziness, or leg swelling  GASTROINTESTINAL: No abdominal or epigastric pain. No nausea, vomiting, or hematemesis; No diarrhea or constipation. No melena or hematochezia.  GENITOURINARY: No dysuria or incontinence  NEUROLOGICAL: No headaches, memory loss, loss of strength, numbness, or tremors  SKIN: No rashes.  MUSCULOSKELETAL: No joint pain or swelling; No muscle, back, or extremity pain  PSYCHIATRIC: No depression, anxiety, mood swings, or difficulty sleeping      Allergies  No Known Allergies      Social History:   lives w/ wife/sig other  has 24/7 aide  denies smoking  requires full assist    FAMILY HISTORY:  unknown to this elderly gentleman     Home Medications:  amLODIPine 5 mg oral tablet: 1 tab(s) orally once a day (19 Aug 2020 14:30)  aspirin 81 mg oral tablet, chewable: 1 tab(s) orally  (19 Aug 2020 13:38)  atorvastatin 80 mg oral tablet: 1 tab(s) orally once a day (at bedtime) (19 Aug 2020 13:38)  FLUoxetine 20 mg oral capsule: 1 cap(s) orally 3 times a day (19 Aug 2020 14:30)  losartan 50 mg oral tablet: 1 tab(s) orally once a day (19 Aug 2020 14:30)  tiZANidine 4 mg oral tablet: 1-2 tab(s) orally every 8 hours as needed (19 Aug 2020 14:30)      MEDICATIONS  (STANDING):  amLODIPine   Tablet 5 milliGRAM(s) Oral daily  aspirin  chewable 81 milliGRAM(s) Oral daily  atorvastatin 80 milliGRAM(s) Oral at bedtime  enoxaparin Injectable 40 milliGRAM(s) SubCutaneous daily  FLUoxetine 20 milliGRAM(s) Oral <User Schedule>  losartan 50 milliGRAM(s) Oral daily    MEDICATIONS  (PRN):  ondansetron Injectable 4 milliGRAM(s) IV Push every 6 hours PRN Nausea      PHYSICAL EXAM:  Vital Signs Last 24 Hrs  T(C): 36.7 (19 Aug 2020 15:36), Max: 37.1 (19 Aug 2020 12:27)  T(F): 98 (19 Aug 2020 15:36), Max: 98.7 (19 Aug 2020 12:27)  HR: 73 (19 Aug 2020 15:36) (73 - 82)  BP: 117/72 (19 Aug 2020 15:36) (117/72 - 134/78)  BP(mean): 91 (19 Aug 2020 12:27) (91 - 91)  RR: 18 (19 Aug 2020 15:36) (18 - 18)  SpO2: 98% (19 Aug 2020 15:36) (98% - 100%)  GENERAL: NAD, well-developed, follows commands, but doesn't answer all questions, refers to aid  HEAD:  Atraumatic, Normocephalic  EYES: EOMI, PERRLA, conjunctiva and sclera clear  ENT: normal hearing, no nasal discharge, throat clear, dentition normal  NECK: Supple, No JVD, no LAD, no thyromegaly   CHEST/LUNG: Clear to auscultation bilaterally; No wheeze, respirations unlabored  HEART: Regular rate and rhythm; No murmurs, rubs, or gallops  ABDOMEN: Soft, Nontender, Nondistended; Bowel sounds present, no HSM  EXTREMITIES:  PT/DP unpalpable Pulses, No clubbing, cyanosis, or edema, very cold LE extremities, R>>L  PSYCH: AAOx3, normal behavior  NEUROLOGY: weakness in RU/RLE,  sensory and cn 2-12 intact grossly intact,  speech/language intact, but slowed   SKIN: No visible rashes or lesions    LABS:                        12.8   12.20 )-----------( 279      ( 19 Aug 2020 13:03 )             41.1     08-19    137  |  100  |  13  ----------------------------<  122<H>  3.7   |  30  |  0.85    Ca    9.0      19 Aug 2020 13:03    TPro  7.2  /  Alb  3.3  /  TBili  0.2  /  DBili  x   /  AST  15  /  ALT  26  /  AlkPhos  91  08-19      CARDIAC MARKERS ( 19 Aug 2020 13:03 )  <0.015 ng/mL / x     / x     / x     / x          RADIOLOGY & ADDITIONAL TESTS:    Imaging Personally Reviewed:  CT head- chronic stroke  cxr neg   EKG Personally Reviewed:  NSR  Consultant(s) Notes Reviewed:    n/a  Care Discussed with Consultants/Other Providers:  n/a

## 2020-08-19 NOTE — ED ADULT NURSE NOTE - NSIMPLEMENTINTERV_GEN_ALL_ED
Implemented All Fall Risk Interventions:  [place] to call system. Call bell, personal items and telephone within reach. Instruct patient to call for assistance. Room bathroom lighting operational. Non-slip footwear when patient is off stretcher. Physically safe environment: no spills, clutter or unnecessary equipment. Stretcher in lowest position, wheels locked, appropriate side rails in place. Provide visual cue, wrist band, yellow gown, etc. Monitor gait and stability. Monitor for mental status changes and reorient to person, place, and time. Review medications for side effects contributing to fall risk. Reinforce activity limits and safety measures with patient and family.

## 2020-08-19 NOTE — H&P ADULT - PROBLEM SELECTOR PLAN 5
bp stable, monitor   c/w home dose of amlodipine, losartan for now and f/u cardio recs, vitals  monitor bp and titrate meds as needed.

## 2020-08-19 NOTE — ED ADULT NURSE NOTE - CHPI ED NUR SYMPTOMS NEG
no congestion/no dizziness/no diaphoresis/no fever/no chest pain/no nausea/no vomiting/no back pain/no shortness of breath/no chills

## 2020-08-19 NOTE — ED PROVIDER NOTE - CLINICAL SUMMARY MEDICAL DECISION MAKING FREE TEXT BOX
63 y/o male with a PMHx of CVA presents to the ED s/p 2 episodes of syncope, one episode on Monday and one episode today. Pt urinated during both episodes. Will work up and admit for syncope vs seizure

## 2020-08-19 NOTE — ED PROVIDER NOTE - OBJECTIVE STATEMENT
65 y/o male with a PMHx of CVA in 02/2020 presents to the ED, accompanied by aide, s/p multiple syncopal episodes. Per aide at bedside pt was using the toilet today when pt reported feeling dizzy and then passed out. Aide states she caught pt and pt did not fall to ground. Aide states she "smacked him around" and gave chest compressions and threw water on his face to awake him. Aide notes pt urinated himself and began sweating and suddenly woke up. Pt denies biting tongue. Aide reports similar syncopal episode that occurred 2 days ago. Aide states pt urinated himself during syncopal episode 2 days ago. No Hx of seizures. No other complaints at this time. NKDA. PCP: Dr. Resendiz.

## 2020-08-19 NOTE — ED STATDOCS - PROGRESS NOTE DETAILS
Scribe Jaclyn Casale for attending Dr Webb: 63 y/o male with pmhx of stroke presents to the ED s/p multiple syncopal episodes. aid in the room presenting hx. thi9s morning he states he wasn't feeling well while on the toilet and then he passed out. Pt had previous syncopal episodes on 08/17. Hx of stroke. At baseline pt need help ambulating. will send to main for further eval.

## 2020-08-19 NOTE — ED ADULT TRIAGE NOTE - CHIEF COMPLAINT QUOTE
c/o syncopal episodes yesterday morning and today, HX: CVA with right sided weakness and aphasia from 02/2020

## 2020-08-19 NOTE — H&P ADULT - ASSESSMENT
64M w/PMH Left frontal stroke w/ Left ICA occlusion, s/p Rt sided residual weakness, s/p tPA in Feb/'20, w/ 3sec pauses (no PPM), presents today c/o syncope x2.   In ED, EKG NSR, cxr neg, CT head - old stroke, UA neg, WBC 12.2, trop neg, VSS.

## 2020-08-19 NOTE — ED ADULT NURSE NOTE - OBJECTIVE STATEMENT
pt is 65 yo male brought in by his home aid for syncopal episode, pt has hx of stroke in 2/2020, +aphasia noted.  pt denies any dizziness, weakness, cp, sob, or nvd.

## 2020-08-19 NOTE — ED PROVIDER NOTE - NEUROLOGICAL, MLM
Awake, alert and oriented, Cranial nerves II-XII grossly intact. No new changes from baseline as per aide at bedside.

## 2020-08-20 PROBLEM — I63.9 CEREBRAL INFARCTION, UNSPECIFIED: Chronic | Status: ACTIVE | Noted: 2020-08-19

## 2020-08-20 LAB
ANION GAP SERPL CALC-SCNC: 5 MMOL/L — SIGNIFICANT CHANGE UP (ref 5–17)
BUN SERPL-MCNC: 12 MG/DL — SIGNIFICANT CHANGE UP (ref 7–23)
CALCIUM SERPL-MCNC: 8.7 MG/DL — SIGNIFICANT CHANGE UP (ref 8.5–10.1)
CHLORIDE SERPL-SCNC: 106 MMOL/L — SIGNIFICANT CHANGE UP (ref 96–108)
CO2 SERPL-SCNC: 31 MMOL/L — SIGNIFICANT CHANGE UP (ref 22–31)
CREAT SERPL-MCNC: 0.89 MG/DL — SIGNIFICANT CHANGE UP (ref 0.5–1.3)
CULTURE RESULTS: SIGNIFICANT CHANGE UP
GLUCOSE SERPL-MCNC: 146 MG/DL — HIGH (ref 70–99)
HCT VFR BLD CALC: 41.4 % — SIGNIFICANT CHANGE UP (ref 39–50)
HGB BLD-MCNC: 12.8 G/DL — LOW (ref 13–17)
MCHC RBC-ENTMCNC: 27.9 PG — SIGNIFICANT CHANGE UP (ref 27–34)
MCHC RBC-ENTMCNC: 30.9 GM/DL — LOW (ref 32–36)
MCV RBC AUTO: 90.4 FL — SIGNIFICANT CHANGE UP (ref 80–100)
PLATELET # BLD AUTO: 279 K/UL — SIGNIFICANT CHANGE UP (ref 150–400)
POTASSIUM SERPL-MCNC: 4.3 MMOL/L — SIGNIFICANT CHANGE UP (ref 3.5–5.3)
POTASSIUM SERPL-SCNC: 4.3 MMOL/L — SIGNIFICANT CHANGE UP (ref 3.5–5.3)
RBC # BLD: 4.58 M/UL — SIGNIFICANT CHANGE UP (ref 4.2–5.8)
RBC # FLD: 12.9 % — SIGNIFICANT CHANGE UP (ref 10.3–14.5)
SARS-COV-2 IGG SERPL QL IA: POSITIVE
SARS-COV-2 IGM SERPL IA-ACNC: 1.6 INDEX — HIGH
SARS-COV-2 RNA SPEC QL NAA+PROBE: SIGNIFICANT CHANGE UP
SODIUM SERPL-SCNC: 142 MMOL/L — SIGNIFICANT CHANGE UP (ref 135–145)
SPECIMEN SOURCE: SIGNIFICANT CHANGE UP
WBC # BLD: 6.8 K/UL — SIGNIFICANT CHANGE UP (ref 3.8–10.5)
WBC # FLD AUTO: 6.8 K/UL — SIGNIFICANT CHANGE UP (ref 3.8–10.5)

## 2020-08-20 PROCEDURE — 95816 EEG AWAKE AND DROWSY: CPT | Mod: 26

## 2020-08-20 PROCEDURE — 99223 1ST HOSP IP/OBS HIGH 75: CPT

## 2020-08-20 PROCEDURE — 99222 1ST HOSP IP/OBS MODERATE 55: CPT

## 2020-08-20 PROCEDURE — 93925 LOWER EXTREMITY STUDY: CPT | Mod: 26

## 2020-08-20 PROCEDURE — 93306 TTE W/DOPPLER COMPLETE: CPT | Mod: 26

## 2020-08-20 PROCEDURE — 99233 SBSQ HOSP IP/OBS HIGH 50: CPT

## 2020-08-20 RX ADMIN — AMLODIPINE BESYLATE 5 MILLIGRAM(S): 2.5 TABLET ORAL at 10:50

## 2020-08-20 RX ADMIN — Medication 20 MILLIGRAM(S): at 20:39

## 2020-08-20 RX ADMIN — Medication 20 MILLIGRAM(S): at 16:29

## 2020-08-20 RX ADMIN — LOSARTAN POTASSIUM 50 MILLIGRAM(S): 100 TABLET, FILM COATED ORAL at 10:50

## 2020-08-20 RX ADMIN — ATORVASTATIN CALCIUM 80 MILLIGRAM(S): 80 TABLET, FILM COATED ORAL at 20:40

## 2020-08-20 RX ADMIN — Medication 81 MILLIGRAM(S): at 10:50

## 2020-08-20 RX ADMIN — Medication 20 MILLIGRAM(S): at 06:13

## 2020-08-20 RX ADMIN — SODIUM CHLORIDE 125 MILLILITER(S): 9 INJECTION INTRAMUSCULAR; INTRAVENOUS; SUBCUTANEOUS at 16:30

## 2020-08-20 RX ADMIN — ENOXAPARIN SODIUM 40 MILLIGRAM(S): 100 INJECTION SUBCUTANEOUS at 10:50

## 2020-08-20 NOTE — CONSULT NOTE ADULT - SUBJECTIVE AND OBJECTIVE BOX
Neurology Consult requested by:   Patient is a 64y old  Male who presents with a chief complaint of syncope x2 (20 Aug 2020 08:16)     HPI:  HPI:  64M w/PMH Left frontal stroke w/ Left ICA occlusion, s/p Rt sided residual weakness,aphasia,  presents s/p syncope x2.    1st episode, 2 days ago, she helped pt out of his WC and onto the shower chair.  As she was about to bathe him, he LOC for a few seconds.  She lifted him up and put him back in his WC.  This AM, pt was on the toilet, when he told her does not feel right and again, synopsized for a few seconds.  Pt endorses he knew he was going to pass out and recalls waking up again.  Aid denies pt had any n/v/d, no cp, sob, abd pain, HA, seizure activity.  Pt endorses briefly dizzy prior to syncope and when he wakes up he feels very thirsty.  Back in 2/12/20, prior to d/c to rehab, pt found w/ 3-3.5 sec pause.      PAST MEDICAL & SURGICAL HISTORY:  CVA (cerebral vascular accident)  No significant past surgical history    FAMILY HISTORY:  No pertinent family history in first degree relatives    Social Hx:  Nonsmoker, no drug or alcohol use  Medications and Allergies ReviewedMEDICATIONS  (STANDING):  amLODIPine   Tablet 5 milliGRAM(s) Oral daily  aspirin  chewable 81 milliGRAM(s) Oral daily  atorvastatin 80 milliGRAM(s) Oral at bedtime  enoxaparin Injectable 40 milliGRAM(s) SubCutaneous daily  FLUoxetine 20 milliGRAM(s) Oral <User Schedule>  losartan 50 milliGRAM(s) Oral daily  sodium chloride 0.9%. 1000 milliLiter(s) (125 mL/Hr) IV Continuous <Continuous>     ROS: Pertinent positives in HPI, all other ROS were reviewed and are negative.      Examination:   Vital Signs Last 24 Hrs  T(C): 36.5 (19 Aug 2020 21:18), Max: 37.1 (19 Aug 2020 12:27)  T(F): 97.7 (19 Aug 2020 21:18), Max: 98.7 (19 Aug 2020 12:27)  HR: 76 (19 Aug 2020 21:18) (67 - 82)  BP: 111/63 (19 Aug 2020 21:18) (111/63 - 134/78)  BP(mean): 91 (19 Aug 2020 12:27) (91 - 91)  RR: 18 (19 Aug 2020 21:18) (18 - 18)  SpO2: 97% (19 Aug 2020 21:18) (96% - 100%)  General: Cooperative, NAD   NECK: supple, no masses  ENT: Normal hearing   Vascular : no carotid bruits,   Lungs: CTAB  Chest: RRR, no murmurs  Extremities: nontender, no edema  Musculoskeletal: no adventitious movements, no joint stiffness  Skin: no rash    Neurological Examination:  NIHSS:  MS: Alert, appropriately interactive, normal affect. Speech dysfluent, naming difficulty   CN:  PERLL, EOMI, V1-3 sensation intact, face asymmetric-right facial droop, hearing intact, palate elevates symmetrically, tongue midline, SCM equal bilaterally  Motor: normal bulk, increased tone on right. right hemiparesis.   Sens: reduced on right to light touch.    Reflexes: 1-2/4 all over, downgoing toes b/l  Coord:  No dysmetria, ALINA intact   Gait: Cannot test    Labs: Reviewed  Imaging:   < from: CT Head No Cont (08.19.20 @ 13:56) >  FINDINGS:  Encephalomalacia and gliosis is noted involving the medial superior left frontal lobe and the left sensorimotor cortex. Additional smaller area of encephalomalacia/gliosis noted involving the anteromedial right frontal lobe.    No acute transcortical infarction or intracranial hemorrhage is identified.    White matter hypoattenuating foci are noted, compatible with age-indeterminate small vessel disease.    No hydrocephalus. No extra-axial fluid collections.    The visualized intraorbital contents are normal. The imaged portions of the paranasal sinuses are clear. The mastoid air cells are clear. The visualized soft tissues and osseous structures appear normal.    IMPRESSION:-Chronic infarctions involvingthe left frontal lobe, left sensorimotor cortex, and a small focus involving the right anteromedial frontal lobe.    -No acute transcortical infarction or intracranial hemorrhage is identified.    < from: EEG Awake or Drowsy (08.20.20 @ 10:30) >     EXAM:  EEG AWAKE AND DROWSY        PROCEDURE DATE:  08/20/2020        INTERPRETATION:  16-channel EEG recording for this 64-year-old man admitted for recurrent syncope. Rule out seizures.    The background activity consist of bilateral symmetricaloccipital predominant, low amplitude 8-9 Hz activity which attenuates with eye opening. Bilateral diffuse, predominately frontal, low amplitude, and 15-20, activity.  Drowsiness characterized by symmetrical attenuation of background activity.  Noted over the left hemisphere especially the mid temporal region, low amplitude 5-6 Hz activity.    No epileptiform discharges noted.    Impression: Abnormal EEG because of theta range activity noted over the left hemisphere, consistent with an underlying structural abnormality. Clinical correlation recommended.    < end of copied text >

## 2020-08-20 NOTE — CONSULT NOTE ADULT - SUBJECTIVE AND OBJECTIVE BOX
64Male admitted after having two brief events of syncope that were witnessed by his home health aide.  According his cardiologist Dr. Mi he had a pause of 3.5 second in february of this year. Aid denies pt had any n/v/d, no cp, sob, abd pain, HA, seizure activity.  PMH Left frontal stroke w/ Left ICA occlusion, s/p Rt sided residual weakness, s/p tPA in , presents today c/o syncope x2.      In ED, EKG NSR, cxr neg, CT head - old stroke, UA neg, WBC 12.2, trop neg, VSS.        PAST MEDICAL & SURGICAL HISTORY:  CVA (cerebral vascular accident)  HTN  No significant past surgical history      Social History:   lives w/ wife/sig other  has  aide  denies smoking  requires full assist    FAMILY HISTORY:  unknown to this elderly gentleman           Review of Systems:   CONSTITUTIONAL: No fever. +syncope  EYES: No eye pain or discharge.  ENMT:  No sinus or throat pain  NECK: No pain or stiffness  RESPIRATORY: No cough, wheezing, chills or hemoptysis; No shortness of breath  CARDIOVASCULAR: No chest pain, palpitations, dizziness, or leg swelling  GASTROINTESTINAL: No abdominal or epigastric pain. No nausea, vomiting, or hematemesis; No diarrhea or constipation. No melena or hematochezia.  GENITOURINARY: No dysuria or incontinence  NEUROLOGICAL: No headaches, memory loss, loss of strength, numbness, or tremors  SKIN: No rashes.  MUSCULOSKELETAL: No joint pain or swelling; No muscle, back, or extremity pain  PSYCHIATRIC: No depression, anxiety, mood swings, or difficulty sleeping      Allergies  No Known Allergies      an     Home Medications:  amLODIPine 5 mg oral tablet: 1 tab(s) orally once a day (19 Aug 2020 14:30)  aspirin 81 mg oral tablet, chewable: 1 tab(s) orally  (19 Aug 2020 13:38)  atorvastatin 80 mg oral tablet: 1 tab(s) orally once a day (at bedtime) (19 Aug 2020 13:38)  FLUoxetine 20 mg oral capsule: 1 cap(s) orally 3 times a day (19 Aug 2020 14:30)  losartan 50 mg oral tablet: 1 tab(s) orally once a day (19 Aug 2020 14:30)  tiZANidine 4 mg oral tablet: 1-2 tab(s) orally every 8 hours as needed (19 Aug 2020 14:30)      MEDICATIONS  (STANDING):  amLODIPine   Tablet 5 milliGRAM(s) Oral daily  aspirin  chewable 81 milliGRAM(s) Oral daily  atorvastatin 80 milliGRAM(s) Oral at bedtime  enoxaparin Injectable 40 milliGRAM(s) SubCutaneous daily  FLUoxetine 20 milliGRAM(s) Oral <User Schedule>  losartan 50 milliGRAM(s) Oral daily    MEDICATIONS  (PRN):  ondansetron Injectable 4 milliGRAM(s) IV Push every 6 hours PRN Nausea      PHYSICAL EXAM:  Vital Signs Last 24 Hrs  T(C): 36.7 (19 Aug 2020 15:36), Max: 37.1 (19 Aug 2020 12:27)  T(F): 98 (19 Aug 2020 15:36), Max: 98.7 (19 Aug 2020 12:27)  HR: 73 (19 Aug 2020 15:36) (73 - 82)  BP: 117/72 (19 Aug 2020 15:36) (117/72 - 134/78)  BP(mean): 91 (19 Aug 2020 12:27) (91 - 91)  RR: 18 (19 Aug 2020 15:36) (18 - 18)  SpO2: 98% (19 Aug 2020 15:36) (98% - 100%)  GENERAL: NAD, well-developed, follows commands, but doesn't answer all questions, refers to aid  HEAD:  Atraumatic, Normocephalic  EYES: EOMI, PERRLA, conjunctiva and sclera clear  ENT: normal hearing, no nasal discharge, throat clear, dentition normal  NECK: Supple, No JVD, no LAD, no thyromegaly   CHEST/LUNG: Clear to auscultation bilaterally; No wheeze, respirations unlabored  HEART: Regular rate and rhythm; No murmurs, rubs, or gallops  ABDOMEN: Soft, Nontender, Nondistended; Bowel sounds present, no HSM  EXTREMITIES:  PT/DP unpalpable Pulses, No clubbing, cyanosis, or edema, very cold LE extremities, R>>L  PSYCH: AAOx3, normal behavior  NEUROLOGY: weakness in RU/RLE,  sensory and cn 2-12 intact grossly intact,  speech/language intact, but slowed   SKIN: No visible rashes or lesions    LABS:                        12.8   12.20 )-----------( 279      ( 19 Aug 2020 13:03 )             41.1     08-19    137  |  100  |  13  ----------------------------<  122<H>  3.7   |  30  |  0.85    Ca    9.0      19 Aug 2020 13:03    TPro  7.2  /  Alb  3.3  /  TBili  0.2  /  DBili  x   /  AST  15  /  ALT  26  /  AlkPhos  91  08-19      CARDIAC MARKERS ( 19 Aug 2020 13:03 )  <0.015 ng/mL / x     / x     / x     / x          RADIOLOGY & ADDITIONAL TESTS:    Imaging Personally Reviewed:  CT head- chronic stroke  cxr neg   EKG Personally Reviewed:  NSR  Consultant(s) Notes Reviewed:    n/a  Care Discussed with Consultants/Other Providers:  n/a (19 Aug 2020 15:37)        EK2020  SR with RBBB and LAD, with ST abnormalities in inferior leads  TELE:  SR 70 BPM     MEDICATIONS  (STANDING):  amLODIPine   Tablet 5 milliGRAM(s) Oral daily  aspirin  chewable 81 milliGRAM(s) Oral daily  atorvastatin 80 milliGRAM(s) Oral at bedtime  enoxaparin Injectable 40 milliGRAM(s) SubCutaneous daily  FLUoxetine 20 milliGRAM(s) Oral <User Schedule>  losartan 50 milliGRAM(s) Oral daily  sodium chloride 0.9%. 1000 milliLiter(s) (125 mL/Hr) IV Continuous <Continuous>    MEDICATIONS  (PRN):  ondansetron Injectable 4 milliGRAM(s) IV Push every 6 hours PRN Nausea      Allergies    No Known Allergies    Intolerances        Vital Signs Last 24 Hrs  T(C): 36.5 (19 Aug 2020 21:18), Max: 36.7 (19 Aug 2020 15:36)  T(F): 97.7 (19 Aug 2020 21:18), Max: 98.1 (19 Aug 2020 16:35)  HR: 76 (19 Aug 2020 21:18) (67 - 76)  BP: 111/63 (19 Aug 2020 21:18) (111/63 - 129/78)  BP(mean): --  RR: 18 (19 Aug 2020 21:18) (18 - 18)  SpO2: 97% (19 Aug 2020 21:18) (96% - 98%)    PHYSICAL EXAMINATION:    GENERAL APPEARANCE:  Pt. is not currently dyspneic, in no distress. Pt. is alert, oriented, and pleasant.  HEENT:  Pupils are normal and react normally. No icterus. Mucous membranes well colored.  NECK:  Supple. No lymphadenopathy. Jugular venous pressure not elevated. Carotids equal.   HEART:   The cardiac impulse has a normal quality. There are no murmurs, rubs or gallops noted  CHEST:  Chest is clear to auscultation. Normal respiratory effort.  ABDOMEN:  Soft and nontender.   EXTREMITIES:  There is no edema.   SKIN:  No rash or significant lesions are noted.    LABS:                        12.8   6.80  )-----------( 279      ( 20 Aug 2020 11:11 )             41.4     08-20    142  |  106  |  12  ----------------------------<  146<H>  4.3   |  31  |  0.89    Ca    8.7      20 Aug 2020 11:11    TPro  7.2  /  Alb  3.3  /  TBili  0.2  /  DBili  x   /  AST  15  /  ALT  26  /  AlkPhos  91  08-19      Urinalysis Basic - ( 19 Aug 2020 15:29 )    Color: Yellow / Appearance: Clear / S.010 / pH: x  Gluc: x / Ketone: Negative  / Bili: Negative / Urobili: Negative mg/dL   Blood: x / Protein: Negative mg/dL / Nitrite: Negative   Leuk Esterase: Negative / RBC: x / WBC x   Sq Epi: x / Non Sq Epi: x / Bacteria: x      CARDIAC MARKERS ( 19 Aug 2020 13:03 )  <0.015 ng/mL / x     / x     / x     / x            RADIOLOGY & ADDITIONAL TESTS:

## 2020-08-20 NOTE — CONSULT NOTE ADULT - ASSESSMENT
64M w/PMH Left frontal stroke w/ Left ICA occlusion, s/p Rt sided residual weakness, s/p tPA in Feb/'20, presents today c/o syncope x2.  Pt has 24/7 aide, who is now at bedside, Edyta, and was present during both episodes.  Pt refers to her for history although he is AAOX3.  1st episode, 2 days ago, she helped pt out of his WC and onto the shower chair.  As she was about to bathe him, he LOC for a few seconds.  She lifted him up and put him back in his WC.  This AM, pt was on the toilet, when he told her does not feel right and again, syncopized for a few seconds.  Pt endorses he knew he was going to pass out and recalls waking up again.  Aid denies pt had any n/v/d, no cp, sob, abd pain, HA, seizure activity.  Pt endorses briefly dizzy prior to syncope and when he wakes up he feels very thirsty.  Back in 2/12/20, prior to d/c to rehab, pt found w/ 3-3.5 sec pause.  He was evaluated by cardio and determined episode was d/t WI prolongation c/w increased vagal tone d/t cerebral disease and PPM would not be beneficial.    8/20 - he has expressive aphasia but does remeber the event and felt like it was coming on . No preceding CP or palpiataions prior .   In ED, EKG NSR, cxr neg, CT head - old stroke, UA neg, WBC 12.2, trop neg, VSS.      1) sounds like orthostatic intolerance or vasovagal syncope , will check orthostatics and if positive will try Midodrine  2) will also consult EP , he has baseline conduction Dz on EKG with RBBB and LAHB , he may need EP /conduction and possible LINQ prior to DC  3) cont antihypertinsives for now   4) diminished RLE pulse going for doppler today   5) no need for echo , he had one in 2/2020 that showed normal LV function and no valve issues

## 2020-08-20 NOTE — CONSULT NOTE ADULT - SUBJECTIVE AND OBJECTIVE BOX
Patient is a 64y old  Male who presents with a chief complaint of syncope x2 (19 Aug 2020 15:37)      HPI:  HPI:  64M w/PMH Left frontal stroke w/ Left ICA occlusion, s/p Rt sided residual weakness, s/p tPA in , presents today c/o syncope x2.  Pt has 24/7 aide, who is now at bedside, Edyta, and was present during both episodes.  Pt refers to her for history although he is AAOX3.  1st episode, 2 days ago, she helped pt out of his WC and onto the shower chair.  As she was about to bathe him, he LOC for a few seconds.  She lifted him up and put him back in his WC.  This AM, pt was on the toilet, when he told her does not feel right and again, syncopized for a few seconds.  Pt endorses he knew he was going to pass out and recalls waking up again.  Aid denies pt had any n/v/d, no cp, sob, abd pain, HA, seizure activity.  Pt endorses briefly dizzy prior to syncope and when he wakes up he feels very thirsty.  Back in 20, prior to d/c to rehab, pt found w/ 3-3.5 sec pause.  He was evaluated by cardio and determined episode was d/t VT prolongation c/w increased vagal tone d/t cerebral disease and PPM would not be beneficial.     - he has expressive aphasia but does remeber the event and felt like it was coming on . No preceding CP or palpiataions prior .   In ED, EKG NSR, cxr neg, CT head - old stroke, UA neg, WBC 12.2, trop neg, VSS.      (pt is Father of Dr Jayde Dawn- I called her but n/a)     PAST MEDICAL & SURGICAL HISTORY:  CVA (cerebral vascular accident)  HTN  No significant past surgical history      Review of Systems:   CONSTITUTIONAL: No fever. +syncope  EYES: No eye pain or discharge.  ENMT:  No sinus or throat pain  NECK: No pain or stiffness  RESPIRATORY: No cough, wheezing, chills or hemoptysis; No shortness of breath  CARDIOVASCULAR: No chest pain, palpitations, dizziness, or leg swelling  GASTROINTESTINAL: No abdominal or epigastric pain. No nausea, vomiting, or hematemesis; No diarrhea or constipation. No melena or hematochezia.  GENITOURINARY: No dysuria or incontinence  NEUROLOGICAL: No headaches, memory loss, loss of strength, numbness, or tremors  SKIN: No rashes.  MUSCULOSKELETAL: No joint pain or swelling; No muscle, back, or extremity pain  PSYCHIATRIC: No depression, anxiety, mood swings, or difficulty sleeping      Allergies  No Known Allergies      Social History:   lives w/ wife/sig other  has  aide  denies smoking  requires full assist    FAMILY HISTORY:  unknown to this elderly gentleman     Home Medications:  amLODIPine 5 mg oral tablet: 1 tab(s) orally once a day (19 Aug 2020 14:30)  aspirin 81 mg oral tablet, chewable: 1 tab(s) orally  (19 Aug 2020 13:38)  atorvastatin 80 mg oral tablet: 1 tab(s) orally once a day (at bedtime) (19 Aug 2020 13:38)  FLUoxetine 20 mg oral capsule: 1 cap(s) orally 3 times a day (19 Aug 2020 14:30)  losartan 50 mg oral tablet: 1 tab(s) orally once a day (19 Aug 2020 14:30)  tiZANidine 4 mg oral tablet: 1-2 tab(s) orally every 8 hours as needed (19 Aug 2020 14:30)      MEDICATIONS  (STANDING):  amLODIPine   Tablet 5 milliGRAM(s) Oral daily  aspirin  chewable 81 milliGRAM(s) Oral daily  atorvastatin 80 milliGRAM(s) Oral at bedtime  enoxaparin Injectable 40 milliGRAM(s) SubCutaneous daily  FLUoxetine 20 milliGRAM(s) Oral <User Schedule>  losartan 50 milliGRAM(s) Oral daily    MEDICATIONS  (PRN):  ondansetron Injectable 4 milliGRAM(s) IV Push every 6 hours PRN Nausea      PHYSICAL EXAM:  Vital Signs Last 24 Hrs  T(C): 36.7 (19 Aug 2020 15:36), Max: 37.1 (19 Aug 2020 12:27)  T(F): 98 (19 Aug 2020 15:36), Max: 98.7 (19 Aug 2020 12:27)  HR: 73 (19 Aug 2020 15:36) (73 - 82)  BP: 117/72 (19 Aug 2020 15:36) (117/72 - 134/78)  BP(mean): 91 (19 Aug 2020 12:27) (91 - 91)  RR: 18 (19 Aug 2020 15:36) (18 - 18)  SpO2: 98% (19 Aug 2020 15:36) (98% - 100%)  GENERAL: NAD, well-developed, follows commands, but doesn't answer all questions, refers to aid  HEAD:  Atraumatic, Normocephalic  EYES: EOMI, PERRLA, conjunctiva and sclera clear  ENT: normal hearing, no nasal discharge, throat clear, dentition normal  NECK: Supple, No JVD, no LAD, no thyromegaly   CHEST/LUNG: Clear to auscultation bilaterally; No wheeze, respirations unlabored  HEART: Regular rate and rhythm; No murmurs, rubs, or gallops  ABDOMEN: Soft, Nontender, Nondistended; Bowel sounds present, no HSM  EXTREMITIES:  PT/DP unpalpable Pulses, No clubbing, cyanosis, or edema, very cold LE extremities, R>>L  PSYCH: AAOx3, normal behavior  NEUROLOGY: weakness in RU/RLE,  sensory and cn 2-12 intact grossly intact,  speech/language intact, but slowed   SKIN: No visible rashes or lesions    LABS:                        12.8   12.20 )-----------( 279      ( 19 Aug 2020 13:03 )             41.1     08-19    137  |  100  |  13  ----------------------------<  122<H>  3.7   |  30  |  0.85    Ca    9.0      19 Aug 2020 13:03    TPro  7.2  /  Alb  3.3  /  TBili  0.2  /  DBili  x   /  AST  15  /  ALT  26  /  AlkPhos  91  08-19      CARDIAC MARKERS ( 19 Aug 2020 13:03 )  <0.015 ng/mL / x     / x     / x     / x          RADIOLOGY & ADDITIONAL TESTS:    Imaging Personally Reviewed:  CT head- chronic stroke  cxr neg   EKG Personally Reviewed:  NSR  Consultant(s) Notes Reviewed:    n/a  Care Discussed with Consultants/Other Providers:  n/a (19 Aug 2020 15:37)      PAST MEDICAL & SURGICAL HISTORY:  CVA (cerebral vascular accident)  No significant past surgical history                                    MEDICATIONS  (STANDING):  amLODIPine   Tablet 5 milliGRAM(s) Oral daily  aspirin  chewable 81 milliGRAM(s) Oral daily  atorvastatin 80 milliGRAM(s) Oral at bedtime  enoxaparin Injectable 40 milliGRAM(s) SubCutaneous daily  FLUoxetine 20 milliGRAM(s) Oral <User Schedule>  losartan 50 milliGRAM(s) Oral daily  sodium chloride 0.9%. 1000 milliLiter(s) (125 mL/Hr) IV Continuous <Continuous>    MEDICATIONS  (PRN):  ondansetron Injectable 4 milliGRAM(s) IV Push every 6 hours PRN Nausea      FAMILY HISTORY:  No pertinent family history in first degree relatives      SOCIAL HISTORY: nonsmoker    CIGARETTES:        Vital Signs Last 24 Hrs  T(C): 36.5 (19 Aug 2020 21:18), Max: 37.1 (19 Aug 2020 12:27)  T(F): 97.7 (19 Aug 2020 21:18), Max: 98.7 (19 Aug 2020 12:27)  HR: 76 (19 Aug 2020 21:18) (67 - 82)  BP: 111/63 (19 Aug 2020 21:18) (111/63 - 134/78)  BP(mean): 91 (19 Aug 2020 12:27) (91 - 91)  RR: 18 (19 Aug 2020 21:18) (18 - 18)  SpO2: 97% (19 Aug 2020 21:18) (96% - 100%)            INTERPRETATION OF TELEMETRY:  SR 60 no tachy or jacqui arrhytmias  ECG:  SR with RBBB and LAD   I&O's Detail      LABS:                        12.8   12.20 )-----------( 279      ( 19 Aug 2020 13:03 )             41.1     08-19    137  |  100  |  13  ----------------------------<  122<H>  3.7   |  30  |  0.85    Ca    9.0      19 Aug 2020 13:03    TPro  7.2  /  Alb  3.3  /  TBili  0.2  /  DBili  x   /  AST  15  /  ALT  26  /  AlkPhos  91  08-19    CARDIAC MARKERS ( 19 Aug 2020 13:03 )  <0.015 ng/mL / x     / x     / x     / x            Urinalysis Basic - ( 19 Aug 2020 15:29 )    Color: Yellow / Appearance: Clear / S.010 / pH: x  Gluc: x / Ketone: Negative  / Bili: Negative / Urobili: Negative mg/dL   Blood: x / Protein: Negative mg/dL / Nitrite: Negative   Leuk Esterase: Negative / RBC: x / WBC x   Sq Epi: x / Non Sq Epi: x / Bacteria: x      I&O's Summary    BNP  RADIOLOGY & ADDITIONAL STUDIES:

## 2020-08-20 NOTE — CONSULT NOTE ADULT - ASSESSMENT
64 year old man hx of L-CVA, aphasic, right hemiparesis. presents with two episodes of LOC, CT shows no acute changes, EEG c./w prior CVA, no seizure activity. possible vasovagal, unlikely seizure, new cerebral ischemia. r/o cardiac arrythmia.  suggest: cardiology eval

## 2020-08-20 NOTE — CONSULT NOTE ADULT - ASSESSMENT
ASSESSMENT & PLAN: 64 year old male with past CVA who was admitted after two witnesses events of syncope on the same day.  He has bifascial block and noted to have 3.5 second pause.    Plan discussed with dr. Hebert to arrange for ILR  Continue to monitor this pt   Avoid AV blocking agents  Maintain K.>4 and Mg > 2. ASSESSMENT & PLAN: 64 year old male with past CVA who was admitted after two witnesses events of syncope on the same day.  He has bifascial block and noted to have 3.5 second pause.  He has a history of CVA and HTN.  Due to the CVA he has very limited speaking but does comprehend based on my interactions with him. He had negative orthostatic VS.    Per Dr. Hebert to arrange for ILR will be made in the am  Continue to monitor this pt   Avoid AV blocking agents  Maintain K.>4 and Mg > 2. ASSESSMENT & PLAN: 64 year old male with past CVA who was admitted after two witnesses events of syncope on the same day.  He has bifascial block and noted to have 3.5 second pause.  He has a history of CVA (Left frontal stroke w/ Left ICA occlusion, s/p Rt sided residual weakness, s/p tPA), but has no acute process presently and HTN.  Due to the CVA he has very limited speaking but does comprehend based on my interactions with him. He had negative orthostatic VS.     Per Dr. Hebert to arrange for ILR will be made in the am  Continue to monitor this pt   Avoid AV blocking agents  Maintain K.>4 and Mg > 2. ASSESSMENT & PLAN: 64 year old male with past CVA who was admitted after two witnesses events of syncope on the same day.  He has bifascial block and noted to have 3.5 second pause.  He has a history of CVA (Left frontal stroke w/ Left ICA occlusion, s/p Rt sided residual weakness, s/p tPA), but has no acute process presently and HTN.  Due to the CVA he has very limited speaking but does comprehend based on my interactions with him. He had negative orthostatic VS.     Syncope unclear origin. Will place Implantable Cardiac Monitor for long term rhythm monitoring. 	  Continue to monitor this pt   Avoid AV blocking agents  Maintain K.>4 and Mg > 2.

## 2020-08-21 ENCOUNTER — TRANSCRIPTION ENCOUNTER (OUTPATIENT)
Age: 65
End: 2020-08-21

## 2020-08-21 VITALS
TEMPERATURE: 98 F | HEART RATE: 76 BPM | OXYGEN SATURATION: 96 % | DIASTOLIC BLOOD PRESSURE: 69 MMHG | RESPIRATION RATE: 17 BRPM | SYSTOLIC BLOOD PRESSURE: 133 MMHG

## 2020-08-21 LAB
ANION GAP SERPL CALC-SCNC: 4 MMOL/L — LOW (ref 5–17)
BUN SERPL-MCNC: 10 MG/DL — SIGNIFICANT CHANGE UP (ref 7–23)
CALCIUM SERPL-MCNC: 8.7 MG/DL — SIGNIFICANT CHANGE UP (ref 8.5–10.1)
CHLORIDE SERPL-SCNC: 108 MMOL/L — SIGNIFICANT CHANGE UP (ref 96–108)
CO2 SERPL-SCNC: 30 MMOL/L — SIGNIFICANT CHANGE UP (ref 22–31)
CREAT SERPL-MCNC: 0.75 MG/DL — SIGNIFICANT CHANGE UP (ref 0.5–1.3)
GLUCOSE SERPL-MCNC: 92 MG/DL — SIGNIFICANT CHANGE UP (ref 70–99)
HCT VFR BLD CALC: 40.7 % — SIGNIFICANT CHANGE UP (ref 39–50)
HGB BLD-MCNC: 12.8 G/DL — LOW (ref 13–17)
MAGNESIUM SERPL-MCNC: 1.9 MG/DL — SIGNIFICANT CHANGE UP (ref 1.6–2.6)
MCHC RBC-ENTMCNC: 28.1 PG — SIGNIFICANT CHANGE UP (ref 27–34)
MCHC RBC-ENTMCNC: 31.4 GM/DL — LOW (ref 32–36)
MCV RBC AUTO: 89.3 FL — SIGNIFICANT CHANGE UP (ref 80–100)
PLATELET # BLD AUTO: 272 K/UL — SIGNIFICANT CHANGE UP (ref 150–400)
POTASSIUM SERPL-MCNC: 4.3 MMOL/L — SIGNIFICANT CHANGE UP (ref 3.5–5.3)
POTASSIUM SERPL-SCNC: 4.3 MMOL/L — SIGNIFICANT CHANGE UP (ref 3.5–5.3)
RBC # BLD: 4.56 M/UL — SIGNIFICANT CHANGE UP (ref 4.2–5.8)
RBC # FLD: 13 % — SIGNIFICANT CHANGE UP (ref 10.3–14.5)
SODIUM SERPL-SCNC: 142 MMOL/L — SIGNIFICANT CHANGE UP (ref 135–145)
WBC # BLD: 6.17 K/UL — SIGNIFICANT CHANGE UP (ref 3.8–10.5)
WBC # FLD AUTO: 6.17 K/UL — SIGNIFICANT CHANGE UP (ref 3.8–10.5)

## 2020-08-21 PROCEDURE — 33285 INSJ SUBQ CAR RHYTHM MNTR: CPT

## 2020-08-21 RX ADMIN — LOSARTAN POTASSIUM 50 MILLIGRAM(S): 100 TABLET, FILM COATED ORAL at 09:41

## 2020-08-21 RX ADMIN — AMLODIPINE BESYLATE 5 MILLIGRAM(S): 2.5 TABLET ORAL at 09:41

## 2020-08-21 RX ADMIN — Medication 81 MILLIGRAM(S): at 15:30

## 2020-08-21 RX ADMIN — SODIUM CHLORIDE 125 MILLILITER(S): 9 INJECTION INTRAMUSCULAR; INTRAVENOUS; SUBCUTANEOUS at 09:46

## 2020-08-21 RX ADMIN — SODIUM CHLORIDE 125 MILLILITER(S): 9 INJECTION INTRAMUSCULAR; INTRAVENOUS; SUBCUTANEOUS at 01:31

## 2020-08-21 RX ADMIN — Medication 20 MILLIGRAM(S): at 15:30

## 2020-08-21 NOTE — DISCHARGE NOTE PROVIDER - NSDCMRMEDTOKEN_GEN_ALL_CORE_FT
amLODIPine 5 mg oral tablet: 1 tab(s) orally once a day  aspirin 81 mg oral tablet, chewable: 1 tab(s) orally   atorvastatin 80 mg oral tablet: 1 tab(s) orally once a day (at bedtime)  FLUoxetine 20 mg oral capsule: 1 cap(s) orally 3 times a day  losartan 50 mg oral tablet: 1 tab(s) orally once a day  tiZANidine 4 mg oral tablet: 1-2 tab(s) orally every 8 hours as needed

## 2020-08-21 NOTE — PROGRESS NOTE ADULT - ASSESSMENT
A/P: 64M w/PMH Left frontal stroke w/ Left ICA occlusion, s/p Rt sided residual weakness, s/p tPA in Feb/'20, presents today c/o syncope x2.      1. Syncope. Most likely related to orthostatic intolerance or vasovagal syncope.  Repeat orthostatics today. If positive will try Midodrine.  Pt also with bifascicular block on EKG. EP following. Pt now awaiting LINQ for today.   Cont tele monitoring.  2Decho with normal LV fxn, trace TR.  Avoid AV forrest agents.    2. HTN. Stable, cont current regimen.    3. Diminished RLE pulse. RLE arterial doppler c/w mild plaque. Cont asa/statin.     4. S/p CVA. Cont medical mgmt.     5. DVT proph.
64M w/PMH Left frontal stroke w/ Left ICA occlusion, s/p Rt sided residual weakness, s/p tPA in Feb/'20, w/ 3sec pauses (no PPM), presents today c/o syncope x2.   In ED, EKG NSR, cxr neg, CT head - old stroke, UA neg, WBC 12.2, trop neg, VSS.      # Syncope - 2/2 bifascicular block, noted to have 3.5 second pause  negative orthostatics  EEG - no epileptiform wave   - cardiac monitor   no need for TTE, had one in 2/2020 that showed normal LV function and no valve issues   - check Mag in AM   - cont. antihypertensives for now   - avoid Av blocking agents   has baseline conduction Dz on EKG with RBBB and LAHB , he may need EP /conduction and possible LINQ prior to DC  EP consult appreciated: Dr. Hebert will arrange for ILR   - Cardiology consult appreciated - Dr. Mi  - Neuro consult appreciated - Dr. Christine      #Leukocytosis - now resolved  - monitor WBC, CBC in AM      # Cold extremities  RLE>LLE w/ poor pulses   Arterial doppler - mild plaque, no stenosis - see results above     # CVA   w/ Rt side (arm and leg) residual weakness, can feed himself, but requires assistance to transfer and ambulates w/ walker.  supportive care  fall risk- place on precautions.     # HTN - controlled   cont current regimen

## 2020-08-21 NOTE — DISCHARGE NOTE PROVIDER - NSDCCPCAREPLAN_GEN_ALL_CORE_FT
PRINCIPAL DISCHARGE DIAGNOSIS  Diagnosis: Syncope  Assessment and Plan of Treatment: 2/2 bifascicular block, noted to have 3.5 second pause,  s/p ILR implantatiobn tolerated procedue well, no complications, will have device teaching and follow up outpatient with EP cardiology - Dr. Hebert, will resume his home meds and will follow up with cardiology Dr. Mi.

## 2020-08-21 NOTE — DISCHARGE NOTE NURSING/CASE MANAGEMENT/SOCIAL WORK - PATIENT PORTAL LINK FT
You can access the FollowMyHealth Patient Portal offered by [facility] by registering at the following website: [email or web address]. By joining [employer]’s FollowMyHealth portal, you will also be able to view your health information using other applications (apps) compatible with our system.

## 2020-08-21 NOTE — PACU DISCHARGE NOTE - COMMENTS
Report given to Georgie verified on tele Monitor paired with device by rep.  Education provided regarding home use.  Pt and/or family verbalizes understanding.  Device and ID card given to pt/family.

## 2020-08-21 NOTE — PROGRESS NOTE ADULT - SUBJECTIVE AND OBJECTIVE BOX
Cardiology Consultation    HPI: 64M w/PMH Left frontal stroke w/ Left ICA occlusion, s/p Rt sided residual weakness, s/p tPA in Feb/'20, presents today c/o syncope x2.  Pt has 24/7 aide, who is now at bedside, Edyta, and was present during both episodes.  Pt refers to her for history although he is AAOX3.  1st episode, 2 days ago, she helped pt out of his WC and onto the shower chair.  As she was about to bathe him, he LOC for a few seconds.  She lifted him up and put him back in his WC.  This AM, pt was on the toilet, when he told her does not feel right and again, syncopized for a few seconds.  Pt endorses he knew he was going to pass out and recalls waking up again.  Aid denies pt had any n/v/d, no cp, sob, abd pain, HA, seizure activity.  Pt endorses briefly dizzy prior to syncope and when he wakes up he feels very thirsty.  Back in 2/12/20, prior to d/c to rehab, pt found w/ 3-3.5 sec pause.  He was evaluated by cardio and determined episode was d/t PA prolongation c/w increased vagal tone d/t cerebral disease and PPM would not be beneficial.    In ED, EKG NSR, cxr neg, CT head - old stroke, UA neg, WBC 12.2, trop neg, VSS.      8/21. Chart reviewed. No events last pm. SR on tele.   Awaiting LINQ placement this AM.   No CP/SOB.     PAST MEDICAL & SURGICAL HISTORY:  CVA (cerebral vascular accident)  HTN  No significant past surgical history    Allergies  No Known Allergies    Social History:   lives w/ wife/sig other  has 24/7 aide  denies smoking  requires full assist    FAMILY HISTORY:  unknown to this elderly gentleman     Home Medications:  amLODIPine 5 mg oral tablet: 1 tab(s) orally once a day (19 Aug 2020 14:30)  aspirin 81 mg oral tablet, chewable: 1 tab(s) orally  (19 Aug 2020 13:38)  atorvastatin 80 mg oral tablet: 1 tab(s) orally once a day (at bedtime) (19 Aug 2020 13:38)  FLUoxetine 20 mg oral capsule: 1 cap(s) orally 3 times a day (19 Aug 2020 14:30)  losartan 50 mg oral tablet: 1 tab(s) orally once a day (19 Aug 2020 14:30)  tiZANidine 4 mg oral tablet: 1-2 tab(s) orally every 8 hours as needed (19 Aug 2020 14:30)      MEDICATIONS  (STANDING):  amLODIPine   Tablet 5 milliGRAM(s) Oral daily  aspirin  chewable 81 milliGRAM(s) Oral daily  atorvastatin 80 milliGRAM(s) Oral at bedtime  enoxaparin Injectable 40 milliGRAM(s) SubCutaneous daily  FLUoxetine 20 milliGRAM(s) Oral <User Schedule>  losartan 50 milliGRAM(s) Oral daily    MEDICATIONS  (PRN):  ondansetron Injectable 4 milliGRAM(s) IV Push every 6 hours PRN Nausea      PHYSICAL EXAM:  Vital Signs Last 24 Hrs  T(C): 36.7 (19 Aug 2020 15:36), Max: 37.1 (19 Aug 2020 12:27)  T(F): 98 (19 Aug 2020 15:36), Max: 98.7 (19 Aug 2020 12:27)  HR: 73 (19 Aug 2020 15:36) (73 - 82)  BP: 117/72 (19 Aug 2020 15:36) (117/72 - 134/78)  BP(mean): 91 (19 Aug 2020 12:27) (91 - 91)  RR: 18 (19 Aug 2020 15:36) (18 - 18)  SpO2: 98% (19 Aug 2020 15:36) (98% - 100%)  GENERAL: NAD, well-developed, follows commands, but doesn't answer all questions, refers to aid  HEAD:  Atraumatic, Normocephalic  EYES: EOMI, PERRLA, conjunctiva and sclera clear  ENT: normal hearing, no nasal discharge, throat clear, dentition normal  NECK: Supple, No JVD, no LAD, no thyromegaly   CHEST/LUNG: Clear to auscultation bilaterally; No wheeze, respirations unlabored  HEART: Regular rate and rhythm; No murmurs, rubs, or gallops  ABDOMEN: Soft, Nontender, Nondistended; Bowel sounds present, no HSM  EXTREMITIES:  PT/DP unpalpable Pulses, No clubbing, cyanosis, or edema, very cold LE extremities, R>>L  PSYCH: AAOx3, normal behavior  NEUROLOGY: weakness in RU/RLE,  sensory and cn 2-12 intact grossly intact,  speech/language intact, but slowed   SKIN: No visible rashes or lesions    LABS:                        12.8   12.20 )-----------( 279      ( 19 Aug 2020 13:03 )             41.1     08-19    137  |  100  |  13  ----------------------------<  122<H>  3.7   |  30  |  0.85    Ca    9.0      19 Aug 2020 13:03    TPro  7.2  /  Alb  3.3  /  TBili  0.2  /  DBili  x   /  AST  15  /  ALT  26  /  AlkPhos  91  08-19      CARDIAC MARKERS ( 19 Aug 2020 13:03 )  <0.015 ng/mL / x     / x     / x     / x        REVIEW OF SYSTEMS:    CONSTITUTIONAL:  As per HPI.  HEENT:  Eyes:  No diplopia or blurred vision. ENT:  No earache, sore throat or runny nose.  CARDIOVASCULAR:  No pressure, squeezing, strangling, tightness, heaviness or aching about the chest, neck, axilla or epigastrium.  RESPIRATORY:  No cough, shortness of breath, PND or orthopnea.  GASTROINTESTINAL:  No nausea, vomiting or diarrhea.  GENITOURINARY:  No dysuria, frequency or urgency.  MUSCULOSKELETAL:  As per HPI.  SKIN:  No change in skin, hair or nails.  NEUROLOGIC:  No paresthesias, fasciculations, seizures or weakness.    EKG: < from: 12 Lead ECG (08.19.20 @ 12:23) >  Normal sinus rhythm  Left axis deviation  Right bundle branch block  Abnormal ECG    TELEMETRY: SR 60-90.    CARDIAC TESTS: < from: TTE Echo Complete w/o Contrast w/ Doppler (08.20.20 @ 09:23) >   The left ventricle is normal in size, wall thickness, wall motion and   contractility.   Estimated left ventricular ejection fraction is 60-65 %.  Normal appearing right ventricle structure and function.  Trace TR.    RADIOLOGY & ADDITIONAL STUDIES: < from: US Duplex Arterial Lower Ext Compl, Bilateral (08.20.20 @ 09:12) >  Mild plaque in the common and superficial femoral arteries with no evidence of a hemodynamically significant stenosis in the visualized lower extremity arteries.    CXR- [facility]    ASSESSMENT & PLAN:
64M w/PMH Left frontal stroke w/ Left ICA occlusion, s/p Rt sided residual weakness,aphasia,  presents s/p syncope x2.    1st episode, 2 days ago, she helped pt out of his WC and onto the shower chair.  As she was about to bathe him, he LOC for a few seconds.  She lifted him up and put him back in his WC.  This AM, pt was on the toilet, when he told her does not feel right and again, synopsized for a few seconds.  Pt endorses he knew he was going to pass out and recalls waking up again.  Aid denies pt had any n/v/d, no cp, sob, abd pain, HA, seizure activity.  Pt endorses briefly dizzy prior to syncope and when he wakes up he feels very thirsty.  Back in 20, prior to d/c to rehab, pt found w/ 3-3.5 sec pause.        REVIEW OF SYSTEMS:    All other review of systems is negative unless indicated above    Vital Signs Last 24 Hrs  T(C): 36.5 (19 Aug 2020 21:18), Max: 36.7 (19 Aug 2020 15:36)  T(F): 97.7 (19 Aug 2020 21:18), Max: 98.1 (19 Aug 2020 16:35)  HR: 76 (19 Aug 2020 21:18) (67 - 76)  BP: 111/63 (19 Aug 2020 21:18) (111/63 - 129/78)  BP(mean): --  RR: 18 (19 Aug 2020 21:18) (18 - 18)  SpO2: 97% (19 Aug 2020 21:18) (96% - 98%)    I&O's Summary    20 Aug 2020 07:01  -  20 Aug 2020 14:39  --------------------------------------------------------  IN: 0 mL / OUT: 900 mL / NET: -900 mL    PHYSICAL EXAM:  GENERAL: NAD, well-developed, follows commands  HEAD:  Atraumatic, Normocephalic  EYES: EOMI, PERRLA, conjunctiva and sclera clear  ENT: normal hearing, no nasal discharge, throat clear, dentition normal  NECK: Supple, No JVD, no LAD, no thyromegaly   CHEST/LUNG: Clear to auscultation bilaterally; No wheeze, respirations unlabored  HEART: Regular rate and rhythm; No murmurs, rubs, or gallops  ABDOMEN: Soft, Nontender, Nondistended; Bowel sounds present, no HSM  EXTREMITIES:  PT/DP unpalpable Pulses, No clubbing, cyanosis, or edema, very cold LE extremities, R>>L  PSYCH: AAOx3, normal behavior  NEUROLOGY: right hemiparesis, dysfluent speech   SKIN: No visible rashes or lesions      Medications:  MEDICATIONS  (STANDING):  amLODIPine   Tablet 5 milliGRAM(s) Oral daily  aspirin  chewable 81 milliGRAM(s) Oral daily  atorvastatin 80 milliGRAM(s) Oral at bedtime  enoxaparin Injectable 40 milliGRAM(s) SubCutaneous daily  FLUoxetine 20 milliGRAM(s) Oral <User Schedule>  losartan 50 milliGRAM(s) Oral daily  sodium chloride 0.9%. 1000 milliLiter(s) (125 mL/Hr) IV Continuous <Continuous>      Labs: All Labs Reviewed:                        12.8   6.80  )-----------( 279      ( 20 Aug 2020 11:11 )             41.4     08-20    142  |  106  |  12  ----------------------------<  146<H>  4.3   |  31  |  0.89    Ca    8.7      20 Aug 2020 11:11    TPro  7.2  /  Alb  3.3  /  TBili  0.2  /  DBili  x   /  AST  15  /  ALT  26  /  AlkPhos  91  08-19      Urinalysis Basic - ( 19 Aug 2020 15:29 )    Color: Yellow / Appearance: Clear / S.010 / pH: x  Gluc: x / Ketone: Negative  / Bili: Negative / Urobili: Negative mg/dL   Blood: x / Protein: Negative mg/dL / Nitrite: Negative   Leuk Esterase: Negative / RBC: x / WBC x   Sq Epi: x / Non Sq Epi: x / Bacteria: x        CARDIAC MARKERS ( 19 Aug 2020 13:03 )  <0.015 ng/mL / x     / x     / x     / x        RADIOLOGY/EKG:  < from: US Duplex Arterial Lower Ext Compl, Bilateral (20 @ 09:12) >  IMPRESSION:    Mild plaque in the common and superficial femoral arteries with no evidence of a hemodynamically significant stenosis in the visualized lower extremity arteries.    < end of copied text >    < from: CT Head No Cont (20 @ 13:56) >    EXAM:  CT BRAIN                            PROCEDURE DATE:  2020          INTERPRETATION:  CLINICAL INDICATION: syncope vs seizure. .    TECHNIQUE: CT of the head was performed without the administration of intravenous contrast.    COMPARISON: CT head 2020.    FINDINGS:  Encephalomalacia and gliosis is noted involving the medial superior left frontal lobe and the left sensorimotor cortex. Additional smaller area of encephalomalacia/gliosis noted involving the anteromedial right frontal lobe.    No acute transcortical infarction or intracranial hemorrhage is identified.    White matter hypoattenuating foci are noted, compatible with age-indeterminate small vessel disease.    No hydrocephalus. No extra-axial fluid collections.    The visualized intraorbital contents are normal. The imaged portions of the paranasal sinuses are clear. The mastoid air cells are clear. The visualized soft tissues and osseous structures appear normal.    IMPRESSION:    -Chronic infarctions involving the left frontal lobe, left sensorimotor cortex, and a small focus involving the right anteromedial frontal lobe.    -No acute transcortical infarction or intracranial hemorrhage is identified.    < end of copied text >    < from: EEG Awake or Drowsy (20 @ 10:30) >     EXAM:  EEG AWAKE AND DROWSY        PROCEDURE DATE:  2020        INTERPRETATION:  16-channel EEG recording for this 64-year-old man admitted for recurrent syncope. Rule out seizures.    The background activity consist of bilateral symmetricaloccipital predominant, low amplitude 8-9 Hz activity which attenuates with eye opening. Bilateral diffuse, predominately frontal, low amplitude, and 15-20, activity.  Drowsiness characterized by symmetrical attenuation of background activity.  Noted over the left hemisphere especially the mid temporal region, low amplitude 5-6 Hz activity.    No epileptiform discharges noted.    Impression: Abnormal EEG because of theta range activity noted over the left hemisphere, consistent with an underlying structural abnormality. Clinical correlation recommended.    < end of copied text >      DVT PPX: lovenox     Advance Directive: full code     Disposition: [facility]

## 2020-08-21 NOTE — DISCHARGE NOTE PROVIDER - HOSPITAL COURSE
64M w/PMH Left frontal stroke w/ Left ICA occlusion, s/p Rt sided residual weakness, s/p tPA in Feb/'20, w/ 3sec pauses (no PPM), presents today c/o syncope x2.     In ED, EKG NSR, cxr neg, CT head - old stroke, UA neg, WBC 12.2, trop neg, VSS.    Pt. admitted for syncope,  2/2 bifascicular block, noted to have 3.5 second pause, negative orthostatics,     has baseline conduction Dz on EKG with RBBB and LAHB and was assessed by cardiology - dr. Mi and EP - Dr. Hebert.    Pt. s/p Insertion of loop recorder. tolerated procedure well, no complications, will have device teaching and wound checking outpatient.     Pt. is stable for discharge, will follow up with cardiology and EP.         PHYSICAL EXAM:    Vital Signs Last 24 Hrs    T(C): 36.6 (21 Aug 2020 12:22), Max: 36.8 (20 Aug 2020 16:24)    T(F): 97.8 (21 Aug 2020 12:22), Max: 98.2 (20 Aug 2020 16:24)    HR: 70 (21 Aug 2020 12:40) (65 - 79)    BP: 149/82 (21 Aug 2020 12:40) (123/68 - 157/99)    BP(mean): --    RR: 17 (21 Aug 2020 12:40) (17 - 18)    SpO2: 100% (21 Aug 2020 12:40) (93% - 100%)    GENERAL: NAD, well-developed, follows commands    HEAD:  Atraumatic, Normocephalic    EYES: EOMI, PERRLA, conjunctiva and sclera clear    ENT: normal hearing, no nasal discharge, throat clear, dentition normal    NECK: Supple, No JVD, no LAD, no thyromegaly     CHEST/LUNG: Clear to auscultation bilaterally; No wheeze, respirations unlabored    HEART: Regular rate and rhythm; No murmurs, rubs, or gallops    ABDOMEN: Soft, Nontender, Nondistended; Bowel sounds present, no HSM    EXTREMITIES:  PT/DP unpalpable Pulses, No clubbing, cyanosis, or edema, very cold LE extremities, R>>L    PSYCH: AAOx3, normal behavior    NEUROLOGY: right hemiparesis, dysfluent speech     SKIN: No visible rashes or lesions

## 2020-08-21 NOTE — DISCHARGE NOTE PROVIDER - CARE PROVIDER_API CALL
Cristian Mi)  Cardiovascular Disease; Nuclear Cardiology  [address] [place] [address]  Phone: [phone]  Fax: [phone]  Follow Up Time:     Ten Hebert  CARDIAC ELECTROPHYSIOLOGY  [address] [place] [address]  Phone: [phone]  Fax: [phone]  Follow Up Time:

## 2020-08-21 NOTE — DISCHARGE NOTE PROVIDER - NSDCCPTREATMENT_GEN_ALL_CORE_FT
PRINCIPAL PROCEDURE  Procedure: CT head wo con  Findings and Treatment:   EXAM:  CT BRAIN                        PROCEDURE DATE:  08/19/2020    INTERPRETATION:  CLINICAL INDICATION: syncope vs seizure. .  TECHNIQUE: CT of the head was performed without the administration of intravenous contrast.  COMPARISON: CT head 2/12/2020.  FINDINGS:  Encephalomalacia and gliosis is noted involving the medial superior left frontal lobe and the left sensorimotor cortex. Additional smaller area of encephalomalacia/gliosis noted involving the anteromedial right frontal lobe.  No acute transcortical infarction or intracranial hemorrhage is identified.  White matter hypoattenuating foci are noted, compatible with age-indeterminate small vessel disease.  No hydrocephalus. No extra-axial fluid collections.  The visualized intraorbital contents are normal. The imaged portions of the paranasal sinuses are clear. The mastoid air cells are clear. The visualized soft tissues and osseous structures appear normal.  IMPRESSION:  -Chronic infarctions involvingthe left frontal lobe, left sensorimotor cortex, and a small focus involving the right anteromedial frontal lobe.  -No acute transcortical infarction or intracranial hemorrhage is identified.        SECONDARY PROCEDURE  Procedure: LE arterial dopplers  Findings and Treatment:   IMPRESSION:  Mild plaque in the common and superficial femoral arteries with no evidence of a hemodynamically significant stenosis in the visualized lower extremity arteries.      Procedure: Transthoracic echocardiography (TTE)  Findings and Treatment:    Summary   The left ventricle is normal in size, wall thickness, wall motion and   contractility.   Estimated left ventricular ejection fraction is 60-65 %.  Normal appearing right ventricle structure and function.

## 2020-08-21 NOTE — DISCHARGE NOTE PROVIDER - NSDCFUSCHEDAPPT_GEN_ALL_CORE_FT
CLAYTON COX ; 09/10/2020 ; [facility] [address] ; 10/13/2020 ; [facility] [address]

## 2020-08-21 NOTE — DISCHARGE NOTE PROVIDER - CARE PROVIDERS DIRECT ADDRESSES
[email or web address]

## 2020-08-28 DIAGNOSIS — I69.959 HEMIPLEGIA AND HEMIPARESIS FOLLOWING UNSPECIFIED CEREBROVASCULAR DISEASE AFFECTING UNSPECIFIED SIDE: ICD-10-CM

## 2020-08-28 DIAGNOSIS — I45.2 BIFASCICULAR BLOCK: ICD-10-CM

## 2020-08-28 DIAGNOSIS — D72.829 ELEVATED WHITE BLOOD CELL COUNT, UNSPECIFIED: ICD-10-CM

## 2020-08-28 DIAGNOSIS — R47.01 APHASIA: ICD-10-CM

## 2020-08-28 DIAGNOSIS — R55 SYNCOPE AND COLLAPSE: ICD-10-CM

## 2020-08-28 DIAGNOSIS — G93.89 OTHER SPECIFIED DISORDERS OF BRAIN: ICD-10-CM

## 2020-08-28 DIAGNOSIS — I10 ESSENTIAL (PRIMARY) HYPERTENSION: ICD-10-CM

## 2020-09-10 ENCOUNTER — APPOINTMENT (OUTPATIENT)
Dept: ELECTROPHYSIOLOGY | Facility: CLINIC | Age: 65
End: 2020-09-10
Payer: MEDICARE

## 2020-09-10 VITALS
SYSTOLIC BLOOD PRESSURE: 120 MMHG | OXYGEN SATURATION: 97 % | DIASTOLIC BLOOD PRESSURE: 85 MMHG | HEIGHT: 71 IN | HEART RATE: 91 BPM

## 2020-09-10 PROCEDURE — 93285 PRGRMG DEV EVAL SCRMS IP: CPT

## 2020-09-10 PROCEDURE — 99212 OFFICE O/P EST SF 10 MIN: CPT

## 2020-09-10 RX ORDER — MEMANTINE HYDROCHLORIDE 10 MG/1
10 TABLET, FILM COATED ORAL
Qty: 180 | Refills: 2 | Status: DISCONTINUED | COMMUNITY
End: 2020-09-10

## 2020-10-13 ENCOUNTER — APPOINTMENT (OUTPATIENT)
Dept: NEUROLOGY | Facility: CLINIC | Age: 65
End: 2020-10-13

## 2020-11-09 ENCOUNTER — APPOINTMENT (OUTPATIENT)
Dept: NEUROLOGY | Facility: CLINIC | Age: 65
End: 2020-11-09
Payer: MEDICARE

## 2020-11-09 PROCEDURE — 64644 CHEMODENERV 1 EXTREM 5/> MUS: CPT

## 2020-11-09 NOTE — REVIEW OF SYSTEMS
[As Noted in HPI] : as noted in HPI [Arm Weakness] : arm weakness [Hand Weakness] :  hand weakness [Leg Weakness] : leg weakness [Poor Coordination] : poor coordination [Difficulty Walking] : difficulty walking [Negative] : Heme/Lymph

## 2020-11-09 NOTE — HISTORY OF PRESENT ILLNESS
[FreeTextEntry1] : 65-year-old man history of hypertension, status post left CVA, with right spastic hemiparesis, therefore schedule Botox appointment. Mila to treat the right upper extremity, improve movement of the elbow, and hand.

## 2020-11-09 NOTE — PROCEDURE
[FreeTextEntry1] : fter obtainingconsent Botox 200 and admitted with 3 cc sterile saline. He was an eccentric technique with alcohol, 33 units injected into the right biceps, brachial radialis, pronator teres and quad ruptures, right flexor pollicis longus and palmaris.\par patient tolerated procedure well. Bleeding controlled with local pressure.

## 2020-11-09 NOTE — PHYSICAL EXAM
[General Appearance - Alert] : alert [General Appearance - In No Acute Distress] : in no acute distress [Person] : oriented to person [Place] : oriented to place [Comprehension] : comprehension intact [Cranial Nerves Optic (II)] : visual acuity intact bilaterally,  visual fields full to confrontation, pupils equal round and reactive to light [Cranial Nerves Oculomotor (III)] : extraocular motion intact [Cranial Nerves Trigeminal (V)] : facial sensation intact symmetrically [Cranial Nerves Facial (VII)] : face symmetrical [Cranial Nerves Vestibulocochlear (VIII)] : hearing was intact bilaterally [Cranial Nerves Glossopharyngeal (IX)] : tongue and palate midline [Cranial Nerves Accessory (XI - Cranial And Spinal)] : head turning and shoulder shrug symmetric [Sensation Tactile Decrease] : light touch was intact [3+] : Patella right 3+ [2+] : Patella left 2+ [FreeTextEntry6] : Increased tone noted of the upper and right lower extremities with bduction of the upper arm to the body, flexion of the elbow and wrist, maintain pronation of the forearm, and finger flexion.\par The fingers and extend it was fully, there is some range of motion about the wrist and elbows, no pain noticeable.Minimal movement noted of the shoulder, flexion of the elbow and wrist,2-3/5 [FreeTextEntry8] : walk with a cane, hemiparetic gait

## 2020-11-09 NOTE — DISCUSSION/SUMMARY
[FreeTextEntry1] : 65-year-old man, with spastic right melvin-paresis, tolerated Botox treatment well, of the right upper extremity.\par Return to office, 3 months for next treatment

## 2020-11-13 ENCOUNTER — RX RENEWAL (OUTPATIENT)
Age: 65
End: 2020-11-13

## 2020-11-19 RX ORDER — LOSARTAN POTASSIUM 50 MG/1
50 TABLET, FILM COATED ORAL
Qty: 90 | Refills: 2 | Status: DISCONTINUED | COMMUNITY
End: 2020-11-19

## 2020-11-19 NOTE — PHYSICAL EXAM
[No Acute Distress] : no acute distress [Well-Appearing] : well-appearing [Well Nourished] : well nourished [Well Developed] : well developed [Normal Sclera/Conjunctiva] : normal sclera/conjunctiva [PERRL] : pupils equal round and reactive to light [Normal Outer Ear/Nose] : the outer ears and nose were normal in appearance [EOMI] : extraocular movements intact [No JVD] : no jugular venous distention [No Lymphadenopathy] : no lymphadenopathy [Normal Oropharynx] : the oropharynx was normal [Thyroid Normal, No Nodules] : the thyroid was normal and there were no nodules present [Supple] : supple [No Accessory Muscle Use] : no accessory muscle use [No Respiratory Distress] : no respiratory distress  [Clear to Auscultation] : lungs were clear to auscultation bilaterally [Normal Rate] : normal rate  [Regular Rhythm] : with a regular rhythm [Normal S1, S2] : normal S1 and S2 [No Murmur] : no murmur heard [No Abdominal Bruit] : a ~M bruit was not heard ~T in the abdomen [No Carotid Bruits] : no carotid bruits [No Varicosities] : no varicosities [No Palpable Aorta] : no palpable aorta [Pedal Pulses Present] : the pedal pulses are present [No Edema] : there was no peripheral edema [Soft] : abdomen soft [No Extremity Clubbing/Cyanosis] : no extremity clubbing/cyanosis [Non-distended] : non-distended [Non Tender] : non-tender [No Masses] : no abdominal mass palpated [No HSM] : no HSM [Normal Bowel Sounds] : normal bowel sounds [Normal Anterior Cervical Nodes] : no anterior cervical lymphadenopathy [No CVA Tenderness] : no CVA  tenderness [Normal Posterior Cervical Nodes] : no posterior cervical lymphadenopathy [Grossly Normal Strength/Tone] : grossly normal strength/tone [No Spinal Tenderness] : no spinal tenderness [No Joint Swelling] : no joint swelling [No Rash] : no rash [Normal Affect] : the affect was normal [Normal Insight/Judgement] : insight and judgment were intact [de-identified] : Right hemiplegia

## 2020-11-19 NOTE — HISTORY OF PRESENT ILLNESS
[FreeTextEntry1] : initial evaluation [de-identified] : Mr. Briggs presents for initial physical evaluation. He is accompanied by his daughter, Dr. Jayde Dawn. This also had no significant past medical history until February when he developed right melvin-paresis. He was found to have a left internal carotid artery dissection and occlusion. He had multiple CT scans as well as MRI of her to the hospital. He was seen in vascular consultation by Dr. Mistry. No surgical intervention was indicated. Mr. Briggs was discharged to acute rehabilitation and then transferred to a subacute rehabilitation. He is currently undergoing physical therapy and occupational therapy. He did have cervical spine surgery in January. Prior to this the patient had no significant past medical history. There was no history of hypertension or diabetes. He was on no medications. Currently he is awake and alert sitting in a wheelchair. He is able to verbalize.\par

## 2020-11-19 NOTE — HEALTH RISK ASSESSMENT
[No] : No [Retired] : retired [Carbon Monoxide Detector] : carbon monoxide detector [Smoke Detector] : smoke detector [Safety elements used in home] : safety elements used in home [Seat Belt] :  uses seat belt [Sunscreen] : uses sunscreen [Good] : ~his/her~ current health as good [1] : 2) Feeling down, depressed, or hopeless for several days (1) [] : No [de-identified] : former  [YearQuit] : 2020 [Guns at Home] : no guns at home

## 2020-11-19 NOTE — PLAN
[FreeTextEntry1] : Mr. Briggs is a 64-year-old male who had an embolic left cerebral infarct secondary to left internal carotid artery occlusion. He has significant right hemiplegia. He is currently undergoing physical therapy and occupational therapy. Patient will continue on aspirin daily. No vascular surgery intervention is required. He will followup with  on yearly basis. Patient will have comprehensive blood profile drawn at home.

## 2021-01-20 ENCOUNTER — APPOINTMENT (OUTPATIENT)
Dept: INTERNAL MEDICINE | Facility: CLINIC | Age: 66
End: 2021-01-20
Payer: MEDICARE

## 2021-01-20 VITALS
HEIGHT: 71 IN | WEIGHT: 190 LBS | TEMPERATURE: 98.6 F | HEART RATE: 76 BPM | BODY MASS INDEX: 26.6 KG/M2 | DIASTOLIC BLOOD PRESSURE: 68 MMHG | SYSTOLIC BLOOD PRESSURE: 114 MMHG | RESPIRATION RATE: 18 BRPM | OXYGEN SATURATION: 97 %

## 2021-01-20 PROCEDURE — 99214 OFFICE O/P EST MOD 30 MIN: CPT

## 2021-01-20 NOTE — PHYSICAL EXAM
[No Acute Distress] : no acute distress [Well Nourished] : well nourished [Well-Appearing] : well-appearing [Well Developed] : well developed [Normal Sclera/Conjunctiva] : normal sclera/conjunctiva [PERRL] : pupils equal round and reactive to light [EOMI] : extraocular movements intact [Normal Oropharynx] : the oropharynx was normal [Normal Outer Ear/Nose] : the outer ears and nose were normal in appearance [No JVD] : no jugular venous distention [No Lymphadenopathy] : no lymphadenopathy [Supple] : supple [Thyroid Normal, No Nodules] : the thyroid was normal and there were no nodules present [No Respiratory Distress] : no respiratory distress  [No Accessory Muscle Use] : no accessory muscle use [Clear to Auscultation] : lungs were clear to auscultation bilaterally [Normal Rate] : normal rate  [Regular Rhythm] : with a regular rhythm [Normal S1, S2] : normal S1 and S2 [No Murmur] : no murmur heard [No Carotid Bruits] : no carotid bruits [No Abdominal Bruit] : a ~M bruit was not heard ~T in the abdomen [No Varicosities] : no varicosities [Pedal Pulses Present] : the pedal pulses are present [No Edema] : there was no peripheral edema [No Palpable Aorta] : no palpable aorta [No Extremity Clubbing/Cyanosis] : no extremity clubbing/cyanosis [Soft] : abdomen soft [Non Tender] : non-tender [Non-distended] : non-distended [No Masses] : no abdominal mass palpated [No HSM] : no HSM [Normal Bowel Sounds] : normal bowel sounds [Normal Posterior Cervical Nodes] : no posterior cervical lymphadenopathy [Normal Anterior Cervical Nodes] : no anterior cervical lymphadenopathy [No CVA Tenderness] : no CVA  tenderness [No Spinal Tenderness] : no spinal tenderness [No Joint Swelling] : no joint swelling [Grossly Normal Strength/Tone] : grossly normal strength/tone [No Rash] : no rash [Normal Affect] : the affect was normal [Normal Insight/Judgement] : insight and judgment were intact [de-identified] : Right hemiplegia; shuffling gait; diminished reflexes right side; Expressive dysphasia

## 2021-01-20 NOTE — HISTORY OF PRESENT ILLNESS
[FreeTextEntry1] : followup evaluation [de-identified] : Mr. Briggs presents for a followup evaluation accompanied by his wife. He is a 65-year-old male with a history of a CVA. He has expressive dysphasia as well as right-sided hemiplegia.

## 2021-01-20 NOTE — PLAN
[FreeTextEntry1] : Mr. Briggs presents for followup evaluation. He will continue on current medication regimen which has been reviewed. Patient has been given a prescription for physical therapy for right-sided weakness as well as for unsteady gait. He is also be given a prescription for speech therapy. Has been some improvement in speech compared to previous visit. Mr. Briggs has also been given a prescription for CBC, complete metabolic profile, lipid profile, iron studies, thyroid function and hemoglobin A1c level. He and his wife were stating that his right foot seemed to be somewhat cooler than the left foot, however, patient had palpable dorsalis pedis and posterior tibial pulses with good capillary refill of the toes. Followup in 6 months.

## 2021-03-09 ENCOUNTER — RX RENEWAL (OUTPATIENT)
Age: 66
End: 2021-03-09

## 2021-04-02 ENCOUNTER — APPOINTMENT (OUTPATIENT)
Dept: NEUROLOGY | Facility: CLINIC | Age: 66
End: 2021-04-02
Payer: MEDICARE

## 2021-04-02 PROCEDURE — 64644 CHEMODENERV 1 EXTREM 5/> MUS: CPT

## 2021-04-02 NOTE — REVIEW OF SYSTEMS
[As Noted in HPI] : as noted in HPI [Difficulty with Language] : ~M difficulty with language [Arm Weakness] : arm weakness [Hand Weakness] :  hand weakness [Leg Weakness] : leg weakness [Difficulties in Speech] : speech difficulties [Difficulty Walking] : difficulty walking [Negative] : Heme/Lymph

## 2021-04-02 NOTE — PHYSICAL EXAM
[General Appearance - Alert] : alert [General Appearance - In No Acute Distress] : in no acute distress [Person] : oriented to person [Place] : oriented to place [Comprehension] : comprehension intact [Cranial Nerves Optic (II)] : visual acuity intact bilaterally,  visual fields full to confrontation, pupils equal round and reactive to light [Cranial Nerves Oculomotor (III)] : extraocular motion intact [Cranial Nerves Trigeminal (V)] : facial sensation intact symmetrically [Cranial Nerves Facial (VII)] : face symmetrical [Cranial Nerves Glossopharyngeal (IX)] : tongue and palate midline [Cranial Nerves Vestibulocochlear (VIII)] : hearing was intact bilaterally [Cranial Nerves Accessory (XI - Cranial And Spinal)] : head turning and shoulder shrug symmetric [Sensation Tactile Decrease] : light touch was intact [3+] : Patella right 3+ [2+] : Patella left 2+ [FreeTextEntry6] : Increased tone noted of the upper and right lower extremities with bduction of the upper arm to the body, flexion of the elbow and wrist, maintain pronation of the forearm, and finger flexion.\par The fingers and extend it was fully, there is some range of motion about the wrist and elbows, no pain noticeable.Minimal movement noted of the shoulder, flexion of the elbow and wrist,2-3/5 [FreeTextEntry8] : walk with a cane, hemiparetic gait

## 2021-04-02 NOTE — DISCUSSION/SUMMARY
[FreeTextEntry1] : 65 year old man hx of right spatic hemiparesis, s/p Botox, tolerated well. Responding with improved ROM RUE.\par Plan: schedule next treatment in 3 months.

## 2021-04-02 NOTE — PROCEDURE
[FreeTextEntry1] : After obtaining consent Botox 200 units mixed with 3 cc sterile saline. using aseptic technique injected 40 units into the right biceps, brachial radialis, pronator teres and quadratus, flexor pollicis longus. Tolerated well. Bleeding controlled with local pressure.

## 2021-04-02 NOTE — HISTORY OF PRESENT ILLNESS
[FreeTextEntry1] : 65 year old man here for Botox treatment of RUE spasticity, hx of CVA with left over aphasia, right hemiparesis.\par

## 2021-04-06 ENCOUNTER — RX RENEWAL (OUTPATIENT)
Age: 66
End: 2021-04-06

## 2021-05-10 ENCOUNTER — RX RENEWAL (OUTPATIENT)
Age: 66
End: 2021-05-10

## 2021-06-05 ENCOUNTER — INPATIENT (INPATIENT)
Facility: HOSPITAL | Age: 66
LOS: 1 days | Discharge: PSYCHIATRIC OR SUBSTANCE USE FACILITY | DRG: 918 | End: 2021-06-07
Attending: INTERNAL MEDICINE | Admitting: INTERNAL MEDICINE
Payer: MEDICARE

## 2021-06-05 VITALS
SYSTOLIC BLOOD PRESSURE: 148 MMHG | WEIGHT: 220.02 LBS | DIASTOLIC BLOOD PRESSURE: 77 MMHG | TEMPERATURE: 98 F | HEIGHT: 78 IN | RESPIRATION RATE: 18 BRPM | OXYGEN SATURATION: 98 % | HEART RATE: 74 BPM

## 2021-06-05 DIAGNOSIS — F43.21 ADJUSTMENT DISORDER WITH DEPRESSED MOOD: ICD-10-CM

## 2021-06-05 DIAGNOSIS — F32.2 MAJOR DEPRESSIVE DISORDER, SINGLE EPISODE, SEVERE WITHOUT PSYCHOTIC FEATURES: ICD-10-CM

## 2021-06-05 DIAGNOSIS — T39.1X2A POISONING BY 4-AMINOPHENOL DERIVATIVES, INTENTIONAL SELF-HARM, INITIAL ENCOUNTER: ICD-10-CM

## 2021-06-05 LAB
ALBUMIN SERPL ELPH-MCNC: 3.6 G/DL — SIGNIFICANT CHANGE UP (ref 3.3–5)
ALP SERPL-CCNC: 92 U/L — SIGNIFICANT CHANGE UP (ref 40–120)
ALT FLD-CCNC: 27 U/L — SIGNIFICANT CHANGE UP (ref 12–78)
ANION GAP SERPL CALC-SCNC: 11 MMOL/L — SIGNIFICANT CHANGE UP (ref 5–17)
APAP SERPL-MCNC: 160 UG/ML — CRITICAL HIGH (ref 10–30)
AST SERPL-CCNC: 21 U/L — SIGNIFICANT CHANGE UP (ref 15–37)
BASE EXCESS BLDV CALC-SCNC: -1.6 MMOL/L — SIGNIFICANT CHANGE UP (ref -2–2)
BASOPHILS # BLD AUTO: 0.05 K/UL — SIGNIFICANT CHANGE UP (ref 0–0.2)
BASOPHILS NFR BLD AUTO: 0.4 % — SIGNIFICANT CHANGE UP (ref 0–2)
BILIRUB SERPL-MCNC: 0.3 MG/DL — SIGNIFICANT CHANGE UP (ref 0.2–1.2)
BUN SERPL-MCNC: 16 MG/DL — SIGNIFICANT CHANGE UP (ref 7–23)
CALCIUM SERPL-MCNC: 8.9 MG/DL — SIGNIFICANT CHANGE UP (ref 8.5–10.1)
CHLORIDE SERPL-SCNC: 105 MMOL/L — SIGNIFICANT CHANGE UP (ref 96–108)
CO2 SERPL-SCNC: 23 MMOL/L — SIGNIFICANT CHANGE UP (ref 22–31)
COVID-19 SPIKE DOMAIN AB INTERP: POSITIVE
COVID-19 SPIKE DOMAIN ANTIBODY RESULT: >250 U/ML — HIGH
CREAT SERPL-MCNC: 1.08 MG/DL — SIGNIFICANT CHANGE UP (ref 0.5–1.3)
EOSINOPHIL # BLD AUTO: 0 K/UL — SIGNIFICANT CHANGE UP (ref 0–0.5)
EOSINOPHIL NFR BLD AUTO: 0 % — SIGNIFICANT CHANGE UP (ref 0–6)
ETHANOL SERPL-MCNC: <10 MG/DL — SIGNIFICANT CHANGE UP (ref 0–10)
GLUCOSE SERPL-MCNC: 151 MG/DL — HIGH (ref 70–99)
HCO3 BLDV-SCNC: 24 MMOL/L — SIGNIFICANT CHANGE UP (ref 21–29)
HCT VFR BLD CALC: 40.1 % — SIGNIFICANT CHANGE UP (ref 39–50)
HGB BLD-MCNC: 13.1 G/DL — SIGNIFICANT CHANGE UP (ref 13–17)
IMM GRANULOCYTES NFR BLD AUTO: 0.5 % — SIGNIFICANT CHANGE UP (ref 0–1.5)
LYMPHOCYTES # BLD AUTO: 0.59 K/UL — LOW (ref 1–3.3)
LYMPHOCYTES # BLD AUTO: 5.2 % — LOW (ref 13–44)
MCHC RBC-ENTMCNC: 29.2 PG — SIGNIFICANT CHANGE UP (ref 27–34)
MCHC RBC-ENTMCNC: 32.7 GM/DL — SIGNIFICANT CHANGE UP (ref 32–36)
MCV RBC AUTO: 89.5 FL — SIGNIFICANT CHANGE UP (ref 80–100)
MONOCYTES # BLD AUTO: 0.54 K/UL — SIGNIFICANT CHANGE UP (ref 0–0.9)
MONOCYTES NFR BLD AUTO: 4.7 % — SIGNIFICANT CHANGE UP (ref 2–14)
NEUTROPHILS # BLD AUTO: 10.19 K/UL — HIGH (ref 1.8–7.4)
NEUTROPHILS NFR BLD AUTO: 89.2 % — HIGH (ref 43–77)
PCO2 BLDV: 48 MMHG — SIGNIFICANT CHANGE UP (ref 35–50)
PCP SPEC-MCNC: SIGNIFICANT CHANGE UP
PH BLDV: 7.32 — LOW (ref 7.35–7.45)
PLATELET # BLD AUTO: 261 K/UL — SIGNIFICANT CHANGE UP (ref 150–400)
PO2 BLDV: 56 MMHG — HIGH (ref 25–45)
POTASSIUM SERPL-MCNC: 3.9 MMOL/L — SIGNIFICANT CHANGE UP (ref 3.5–5.3)
POTASSIUM SERPL-SCNC: 3.9 MMOL/L — SIGNIFICANT CHANGE UP (ref 3.5–5.3)
PROT SERPL-MCNC: 6.8 GM/DL — SIGNIFICANT CHANGE UP (ref 6–8.3)
RBC # BLD: 4.48 M/UL — SIGNIFICANT CHANGE UP (ref 4.2–5.8)
RBC # FLD: 13.1 % — SIGNIFICANT CHANGE UP (ref 10.3–14.5)
SALICYLATES SERPL-MCNC: <1.7 MG/DL — LOW (ref 2.8–20)
SAO2 % BLDV: 85 % — SIGNIFICANT CHANGE UP (ref 67–88)
SARS-COV-2 IGG+IGM SERPL QL IA: >250 U/ML — HIGH
SARS-COV-2 IGG+IGM SERPL QL IA: POSITIVE
SARS-COV-2 RNA SPEC QL NAA+PROBE: SIGNIFICANT CHANGE UP
SODIUM SERPL-SCNC: 139 MMOL/L — SIGNIFICANT CHANGE UP (ref 135–145)
WBC # BLD: 11.43 K/UL — HIGH (ref 3.8–10.5)
WBC # FLD AUTO: 11.43 K/UL — HIGH (ref 3.8–10.5)

## 2021-06-05 PROCEDURE — 99223 1ST HOSP IP/OBS HIGH 75: CPT

## 2021-06-05 PROCEDURE — 93010 ELECTROCARDIOGRAM REPORT: CPT

## 2021-06-05 PROCEDURE — 71045 X-RAY EXAM CHEST 1 VIEW: CPT | Mod: 26

## 2021-06-05 PROCEDURE — 85027 COMPLETE CBC AUTOMATED: CPT

## 2021-06-05 PROCEDURE — 80076 HEPATIC FUNCTION PANEL: CPT

## 2021-06-05 PROCEDURE — 36415 COLL VENOUS BLD VENIPUNCTURE: CPT

## 2021-06-05 PROCEDURE — 80307 DRUG TEST PRSMV CHEM ANLYZR: CPT

## 2021-06-05 PROCEDURE — 80053 COMPREHEN METABOLIC PANEL: CPT

## 2021-06-05 PROCEDURE — 99291 CRITICAL CARE FIRST HOUR: CPT

## 2021-06-05 RX ORDER — LOSARTAN POTASSIUM 100 MG/1
50 TABLET, FILM COATED ORAL DAILY
Refills: 0 | Status: DISCONTINUED | OUTPATIENT
Start: 2021-06-05 | End: 2021-06-07

## 2021-06-05 RX ORDER — FLUOXETINE HCL 10 MG
20 CAPSULE ORAL DAILY
Refills: 0 | Status: DISCONTINUED | OUTPATIENT
Start: 2021-06-05 | End: 2021-06-07

## 2021-06-05 RX ORDER — ONDANSETRON 8 MG/1
4 TABLET, FILM COATED ORAL ONCE
Refills: 0 | Status: COMPLETED | OUTPATIENT
Start: 2021-06-05 | End: 2021-06-05

## 2021-06-05 RX ORDER — HEPARIN SODIUM 5000 [USP'U]/ML
5000 INJECTION INTRAVENOUS; SUBCUTANEOUS EVERY 8 HOURS
Refills: 0 | Status: DISCONTINUED | OUTPATIENT
Start: 2021-06-05 | End: 2021-06-07

## 2021-06-05 RX ORDER — CYCLOBENZAPRINE HYDROCHLORIDE 10 MG/1
5 TABLET, FILM COATED ORAL THREE TIMES A DAY
Refills: 0 | Status: DISCONTINUED | OUTPATIENT
Start: 2021-06-05 | End: 2021-06-07

## 2021-06-05 RX ORDER — HYDRALAZINE HCL 50 MG
10 TABLET ORAL EVERY 6 HOURS
Refills: 0 | Status: DISCONTINUED | OUTPATIENT
Start: 2021-06-05 | End: 2021-06-07

## 2021-06-05 RX ORDER — ATORVASTATIN CALCIUM 80 MG/1
80 TABLET, FILM COATED ORAL AT BEDTIME
Refills: 0 | Status: DISCONTINUED | OUTPATIENT
Start: 2021-06-05 | End: 2021-06-07

## 2021-06-05 RX ORDER — ACETYLCYSTEINE 200 MG/ML
14 VIAL (ML) MISCELLANEOUS ONCE
Refills: 0 | Status: COMPLETED | OUTPATIENT
Start: 2021-06-05 | End: 2021-06-05

## 2021-06-05 RX ORDER — ACETYLCYSTEINE 200 MG/ML
9 VIAL (ML) MISCELLANEOUS ONCE
Refills: 0 | Status: COMPLETED | OUTPATIENT
Start: 2021-06-05 | End: 2021-06-05

## 2021-06-05 RX ORDER — AMLODIPINE BESYLATE 2.5 MG/1
5 TABLET ORAL DAILY
Refills: 0 | Status: DISCONTINUED | OUTPATIENT
Start: 2021-06-05 | End: 2021-06-07

## 2021-06-05 RX ORDER — ACETYLCYSTEINE 200 MG/ML
15 VIAL (ML) MISCELLANEOUS ONCE
Refills: 0 | Status: DISCONTINUED | OUTPATIENT
Start: 2021-06-05 | End: 2021-06-05

## 2021-06-05 RX ORDER — ACETYLCYSTEINE 200 MG/ML
4.6 VIAL (ML) MISCELLANEOUS ONCE
Refills: 0 | Status: COMPLETED | OUTPATIENT
Start: 2021-06-05 | End: 2021-06-05

## 2021-06-05 RX ORDER — ASPIRIN/CALCIUM CARB/MAGNESIUM 324 MG
81 TABLET ORAL DAILY
Refills: 0 | Status: DISCONTINUED | OUTPATIENT
Start: 2021-06-05 | End: 2021-06-07

## 2021-06-05 RX ORDER — ACETYLCYSTEINE 200 MG/ML
5 VIAL (ML) MISCELLANEOUS ONCE
Refills: 0 | Status: DISCONTINUED | OUTPATIENT
Start: 2021-06-05 | End: 2021-06-05

## 2021-06-05 RX ORDER — ONDANSETRON 8 MG/1
4 TABLET, FILM COATED ORAL EVERY 6 HOURS
Refills: 0 | Status: DISCONTINUED | OUTPATIENT
Start: 2021-06-05 | End: 2021-06-07

## 2021-06-05 RX ADMIN — HEPARIN SODIUM 5000 UNIT(S): 5000 INJECTION INTRAVENOUS; SUBCUTANEOUS at 21:03

## 2021-06-05 RX ADMIN — Medication 125 GRAM(S): at 10:26

## 2021-06-05 RX ADMIN — ATORVASTATIN CALCIUM 80 MILLIGRAM(S): 80 TABLET, FILM COATED ORAL at 21:03

## 2021-06-05 RX ADMIN — ONDANSETRON 4 MILLIGRAM(S): 8 TABLET, FILM COATED ORAL at 08:47

## 2021-06-05 RX ADMIN — Medication 10 MILLIGRAM(S): at 18:16

## 2021-06-05 RX ADMIN — AMLODIPINE BESYLATE 5 MILLIGRAM(S): 2.5 TABLET ORAL at 16:14

## 2021-06-05 RX ADMIN — HEPARIN SODIUM 5000 UNIT(S): 5000 INJECTION INTRAVENOUS; SUBCUTANEOUS at 16:14

## 2021-06-05 RX ADMIN — Medication 81 MILLIGRAM(S): at 18:15

## 2021-06-05 RX ADMIN — Medication 62.5 GRAM(S): at 14:35

## 2021-06-05 RX ADMIN — Medication 200 GRAM(S): at 09:05

## 2021-06-05 RX ADMIN — LOSARTAN POTASSIUM 50 MILLIGRAM(S): 100 TABLET, FILM COATED ORAL at 16:14

## 2021-06-05 RX ADMIN — Medication 20 MILLIGRAM(S): at 16:14

## 2021-06-05 NOTE — ED BEHAVIORAL HEALTH ASSESSMENT NOTE - DESCRIPTION
65 year-old,  WM, who was active and employed as a  up to 2/2021, lives alone in pvt house with 24/7 [facility] service on same property as daughter. H/o opiate abuse and being on Suboxone or equivalent up to 2/2021 Medical assessment/treatment  Call to poison control HTN, HLD, CVA 2/2021

## 2021-06-05 NOTE — ED BEHAVIORAL HEALTH ASSESSMENT NOTE - SUMMARY
65W/M hx HTN HLD & depression since CVA 2/2021, presents to the ED for tylenol overdose. Aide reported that she puts pt to bed at 11pm and checked on him at 6am. When she walked into the room at 6am she noticed vomit over patient with spilled 500mg tylenol pills on the ground. Pt admitted to attempting to kill himself and stated he took about 40 tablets. Patient with no formal psychiatry treatment but was started on Fluoxetine 20mg po TID after CVA in 2/2021, as per daughter. Patient denied h/o depression, suicide ideation or attempt, drug/alcohol use, or psychotic symptoms.    Collateral - Spoke with daughter Jayde Dawn via phone on second attempt who stated patient was relatively healthy and active prior to CVA 2/2021. He worked as a  up to then. He is  from her mother several years now but both remain friends and ex-wife also assists in his care currently. Patient resides in his own home on the same property as his daughter and her family but has 24/7 [facility] service since CVA. Reports pt has a brother with h/o anxiety and possibly depression who has been in treatment but no family h/o suicide. Daughter reports that a few weeks ago patient told his ex-wife that he was ready to die and had given up, pt reportedly looked much better yesterday so the family was surprised at suicide attempt. Patient has another child, adult male, who lives in [place]. Daughter also reports that pt has h/o opiate abuse/dependence over the years and was on Suboxone or equivalent up to 2/2021, no h/o abusing alcohol or other substances.  MSE - 65 year-old W/M looks stated age, dressed in hospital gown, superficially cooperative. Slow to respond to questions and not able to move right arm. Eye contact fair. Speech soft and sparse, mostly in monosyllables. Denies current suicide ideation, plan, or intent but admits to "depressed" mood. Affect congruent and constricted. Admits to feeling hopeless. Denies A/V hallucinations, paranoia or delusions. Insight fair judgment poor.    Case discussed with Dr. Whaley and due to continued feelings of depression and hopelessness patient meets criteria and would benefit from in-patient psychiatric treatment.  A/P Major Depressive Mood, single episode, severe   II - Defered  III - HTN, HLD, CVA  IV - Medical & physical changes, Recent disability  V - 30    Patient will be admitted to medicine at this time. Plan to admit to psychiatry in-patient when medically cleared.  Continue CO 1:1 monitoring on the medical unit.  Hold all medications currently and Do NOT re-start psychotropic meds until psychiatric re-assessment tomorrow.  C/L will continue to re-assess daily 65W/M hx HTN HLD & depression since CVA 2/2021, presents to the ED for tylenol overdose. Aide reported that she puts pt to bed at 11pm and checked on him at 6am. When she walked into the room at 6am she noticed vomit over patient with spilled 500mg tylenol pills on the ground. Pt admitted to attempting to kill himself and stated he took about 40 tablets. Tylenol level elevated in ED over 160. Patient with no formal psychiatry treatment but was started on Fluoxetine 20mg po TID after CVA in 2/2021, as per daughter. Patient denied h/o depression, suicide ideation or attempt, drug/alcohol use, or psychotic symptoms.    Collateral - Spoke with daughter Jayde Dawn via phone on second attempt who stated patient was relatively healthy and active prior to CVA 2/2021. He worked as a  up to then. He is  from her mother several years now but both remain friends and ex-wife also assists in his care currently. Patient resides in his own home on the same property as his daughter and her family but has 24/7 [facility] service since CVA. Reports pt has a brother with h/o anxiety and possibly depression who has been in treatment but no family h/o suicide. Daughter reports that a few weeks ago patient told his ex-wife that he was ready to die and had given up, pt reportedly looked much better yesterday so the family was surprised at suicide attempt. Patient has another child, adult male, who lives in [place]. Daughter also reports that pt has h/o opiate abuse/dependence over the years and was on Suboxone or equivalent up to 2/2021, no h/o abusing alcohol or other substances.  MSE - 65 year-old W/M looks stated age, dressed in hospital gown, superficially cooperative. Slow to respond to questions and not able to move right arm. Eye contact fair. Speech soft and sparse, mostly in monosyllables. Denies current suicide ideation, plan, or intent but admits to "depressed" mood. Affect congruent and constricted. Admits to feeling hopeless. Denies A/V hallucinations, paranoia or delusions. Insight fair judgment poor.    Case discussed with Dr. Whaley and due to continued feelings of depression and hopelessness patient meets criteria and would benefit from in-patient psychiatric treatment.  A/P Major Depressive Mood, single episode, severe   II - Defered  III - HTN, HLD, CVA  IV - Medical & physical changes, Recent disability  V - 30    Patient will be admitted to medicine at this time. Plan to admit to psychiatry in-patient when medically cleared.  Continue CO 1:1 monitoring on the medical unit.  Hold all medications currently and Do NOT re-start psychotropic meds until psychiatric re-assessment tomorrow.  C/L will continue to re-assess daily

## 2021-06-05 NOTE — H&P ADULT - NSHPPHYSICALEXAM_GEN_ALL_CORE
GENERAL: NAD, well-developed, follows commands, but doesn't answer all questions, refers to aid  HEAD:  Atraumatic, Normocephalic  EYES: EOMI, PERRLA, conjunctiva and sclera clear  ENT: normal hearing, no nasal discharge, throat clear, dentition normal  NECK: Supple, No JVD, no LAD, no thyromegaly   CHEST/LUNG: Clear to auscultation bilaterally; No wheeze, respirations unlabored  HEART: Regular rate and rhythm; No murmurs, rubs, or gallops  ABDOMEN: Soft, Nontender, Nondistended; Bowel sounds present, no HSM  EXTREMITIES:  PT/DP Pulses, No clubbing, cyanosis, or edema, very cold LE extremities, R>>L  PSYCH: AAOx3, normal behavior  NEUROLOGY: weakness in RU/RLE,  sensory and cn 2-12 intact grossly intact,  speech/language intact, but slowed GENERAL: NAD, well-developed, follows commands, but doesn't answer all questions, refers to aid  HEAD:  Atraumatic, Normocephalic  EYES: EOMI, PERRLA, conjunctiva and sclera clear  ENT: normal hearing, no nasal discharge, throat clear, dentition normal  NECK: Supple, No JVD, no LAD, no thyromegaly   CHEST/LUNG: Clear to auscultation bilaterally; No wheeze, respirations unlabored  HEART: Regular rate and rhythm; No murmurs, rubs, or gallops  ABDOMEN: Soft, Nontender, Nondistended; Bowel sounds present, no HSM  EXTREMITIES:  PT/DP Pulses, No clubbing, cyanosis, or edema, very cold LE extremities, R>>L  PSYCH: AAOx3, normal behavior  NEUROLOGY: contractures right hand and old right hp,   speech/language intact, but slowed

## 2021-06-05 NOTE — H&P ADULT - ASSESSMENT
64M w/PMH Left frontal stroke w/ Left ICA occlusion, s/p Rt sided residual weakness, s/p tPA in Feb/'20, w/ 3sec pauses (no PPM), presents today c/o syncope x2.   In ED, EKG NSR, cxr neg, CT head - old stroke, UA neg, WBC 12.2, trop neg, VSS.   * Suicidal attempt Tylenol OD  NAC per protocol  check LFT ( ordered) after third infusion ( 6am) if LFT normal will not need more NAC see Toxicology consult in the chart  Psych eval  1:1    *  h/o CVA   * Suicidal attempt Tylenol OD  NAC per protocol  check LFT ( ordered) after third infusion ( 6am) if LFT normal will not need more NAC see Toxicology consult in the chart  Psych eval  1:1    *  h/o CVAASA    * HTN  Norvasc 5mg QD   * Suicidal attempt Tylenol OD  NAC per protocol  check LFT ( ordered) after third infusion ( 6am) if LFT normal will not need more NAC see Toxicology consult in the chart  Psych eval  1:1    *  h/o CVA with chronic residual  ASA    * HTN  Norvasc 5mg QD

## 2021-06-05 NOTE — ED BEHAVIORAL HEALTH ASSESSMENT NOTE - NSACTIVEVENT_PSY_ALL_CORE
All subsequent to significant physical changes and disability in 2/2021/Perceived burden on family or others/Recent onset of psychiatric illness

## 2021-06-05 NOTE — ED PROVIDER NOTE - CLINICAL SUMMARY MEDICAL DECISION MAKING FREE TEXT BOX
65M hx HTN HLD depression CVA presents to the ED for tylenol overdose. Aid at bedside states that she is there 24 hours/day. puts pt to bed at 11pm and checks on him at 6am. Walked in to room at 6am and noticed vomit over patient with spilled tylenol pills on the ground. 500mg tylenol. pt states he was attempting to kill himself. states he cannot tell exactly when he took it but it was around midnight. states he took about 40 tablets. didn't take other medications as per patient. currently is asymptomatic. exam with CN2-12 intact + clonus + b/l LE hyperreflexia, mild rigidity . toxicology paged. will check labs ekg start nac admit. psych. Nishant Ayala M.D., Attending Physician

## 2021-06-05 NOTE — ED ADULT NURSE NOTE - OBJECTIVE STATEMENT
Pt BIBA with report of tylenol overdose.  Per report, pt found by aide in am with multiple pills partially dissolved having apparently been vomited by pt.  Pt had extra strength tylenol with red coating at bedside.  Red vomit noted on pt by aid. Pt reports that he intended to take too many tylenol in order to harm himself. Pt alert and able to give simple responses, but does not appear to be able to have lengthy conversation with hx of CVA.  Pt denies pain at this time. Constant observation initiated for safety with clothing to security. Pt valuables (cell phone, watch) taken by live in aid with pt permission and placed in her car to be taken back to house.  Pt currently calm, but reserved and verbalizes understanding of POC at this time.  Pt awaiting further med orders and psych eval.

## 2021-06-05 NOTE — ED PROVIDER NOTE - OBJECTIVE STATEMENT
65M hx HTN HLD depression CVA presents to the ED for tylenol overdose. Aid at bedside states that she is there 24 hours/day. puts pt to bed at 11pm and checks on him at 6am. Walked in to room at 6am and noticed vomit over patient with spilled tylenol pills on the ground. 500mg tylenol. pt states he was attempting to kill himself. states he cannot tell exactly when he took it but it was around midnight. states he took about 40 tablets. didn't take other medications as per patient. currently is asymptomatic.

## 2021-06-05 NOTE — ED ADULT NURSE NOTE - NSIMPLEMENTINTERV_GEN_ALL_ED
Implemented All Fall with Harm Risk Interventions:  [place] to call system. Call bell, personal items and telephone within reach. Instruct patient to call for assistance. Room bathroom lighting operational. Non-slip footwear when patient is off stretcher. Physically safe environment: no spills, clutter or unnecessary equipment. Stretcher in lowest position, wheels locked, appropriate side rails in place. Provide visual cue, wrist band, yellow gown, etc. Monitor gait and stability. Monitor for mental status changes and reorient to person, place, and time. Review medications for side effects contributing to fall risk. Reinforce activity limits and safety measures with patient and family. Provide visual clues: red socks.
not assessed

## 2021-06-05 NOTE — ED BEHAVIORAL HEALTH ASSESSMENT NOTE - NS ED BHA AXIS I SECONDARY1 CODE FT
Requesting Levothyroxine   LOV: 1/11/18  RTC: 6 months   Last Relevant Labs: pp  Filled: 3/20/18 #90 with 1 refills    Future Appointments   Date Time Provider Bry Knapp   9/28/2018 10:30 AM Reina Tipton MD [facility] SUB GI     Med refilled
F43.21

## 2021-06-05 NOTE — CONSULT NOTE ADULT - SUBJECTIVE AND OBJECTIVE BOX
MEDICAL TOXICOLOGY CONSULT    HPI: 65 Yr old male CVA HTN HLD Depression with intentional OD on Unknown amount of Tylenol.  As per aide, she put the patient to his bed at 11 PM, later in AM found vomiting over the patient and 40 spilled pills of Tylenol 500 mg on the floor.   The patient has complains of nausea and vomiting. No co-ingestions.     ONSET / TIME of exposure(s): Unknown. As per aide, patient was put to bed at 11 PM.     QUANTITY of exposure(s): Unknown    ROUTE of exposure: Ingestion    CONTEXT of exposure: Home    ASSOCIATED symptoms: Nausea, Vomiting    PAST MEDICAL & SURGICAL HISTORY:  CVA (cerebral vascular accident)  No significant past surgical history    REVIEW OF SYSTEMS:    Negative except for HPI above    Vital Signs Last 24 Hrs  T(C): 36.4 (05 Jun 2021 07:35), Max: 36.4 (05 Jun 2021 07:35)  T(F): 97.6 (05 Jun 2021 07:35), Max: 97.6 (05 Jun 2021 07:35)  HR: 73 (05 Jun 2021 09:07) (70 - 74)  BP: 139/68 (05 Jun 2021 09:07) (139/68 - 153/73)  BP(mean): 86 (05 Jun 2021 09:07) (86 - 90)  RR: 18 (05 Jun 2021 09:07) (18 - 18)  SpO2: 97% (05 Jun 2021 09:07) (97% - 98%)    SIGNIFICANT LABORATORY STUDIES:                        13.1   11.43 )-----------( 261      ( 05 Jun 2021 07:57 )             40.1   06-05    139  |  105  |  16  ----------------------------<  151<H>  3.9   |  23  |  1.08    Ca    8.9      05 Jun 2021 07:57    TPro  6.8  /  Alb  3.6  /  TBili  0.3  /  DBili  x   /  AST  21  /  ALT  27  /  AlkPhos  92  06-05    Anion Gap: 11 06-05 @ 07:57  Aspirin Level: <1.7<L>  06-05 @ 07:57  Acetaminophen Level:  160<HH>  06-05 @ 07:57

## 2021-06-05 NOTE — ED ADULT NURSE NOTE - SUICIDE SCREENING QUESTION 3A
Within the past 24 hours (including today) Pt with very mild angioedema likely from ACE inhibitor, observed in ED, to stop lisinopril, start norvasc. Patient counseled regarding conditions which should prompt return.

## 2021-06-05 NOTE — ED BEHAVIORAL HEALTH ASSESSMENT NOTE - DETAILS
1 brother with h/o Anxiety & possibly depression Not able C/L team will continue to re-assess on medical unit ED attending and collateral contacted and above plan discussed Vomiting since overdose Right CVA with aphasia Collateral reports patient told ex-wife he wanted to "end his life" a few weeks ago but thought patient looked better yesterday which is synonymous with depression and suicide

## 2021-06-05 NOTE — H&P ADULT - NSHPLABSRESULTS_GEN_ALL_CORE
13.1   11.43 )-----------( 261      ( 05 Jun 2021 07:57 )             40.1   06-05    139  |  105  |  16  ----------------------------<  151<H>  3.9   |  23  |  1.08    Ca    8.9      05 Jun 2021 07:57    TPro  6.8  /  Alb  3.6  /  TBili  0.3  /  DBili  x   /  AST  21  /  ALT  27  /  AlkPhos  92  06-05

## 2021-06-05 NOTE — ED BEHAVIORAL HEALTH ASSESSMENT NOTE - HPI (INCLUDE ILLNESS QUALITY, SEVERITY, DURATION, TIMING, CONTEXT, MODIFYING FACTORS, ASSOCIATED SIGNS AND SYMPTOMS)
65W/M hx HTN HLD & depression since CVA 2/2021, presents to the ED for tylenol overdose. Aide reported that she puts pt to bed at 11pm and checked on him at 6am. When she walked into the room at 6am she noticed vomit over patient with spilled 500mg tylenol pills on the ground. Pt admitted to attempting to kill himself and stated he took about 40 tablets. Patient with no formal psychiatry treatment but was started on Fluoxetine 20mg po TID after CVA in 2/2021, as per daughter. Patient denied h/o depression, suicide ideation or attempt, drug/alcohol use, or psychotic symptoms.    Collateral - Spoke with daughter Jayde Dawn via phone on second attempt who stated patient was relatively healthy and active prior to CVA 2/2021. He worked as a  up to then. He is  from her mother several years now but both remain friends and ex-wife also assists in his care currently. Patient resides in his own home on the same property as his daughter and her family but has 24/7 [facility] service since CVA. Reports pt has a brother with h/o anxiety and possibly depression who has been in treatment but no family h/o suicide. Daughter reports that a few weeks ago patient told his ex-wife that he was ready to die and had given up, pt reportedly looked much better yesterday so the family was surprised at suicide attempt. Patient has another child, adult male, who lives in [place]. Daughter also reports that pt has h/o opiate abuse/dependence over the years and was on Suboxone or equivalent up to 2/2021, no h/o abusing alcohol or other substances. 65W/M hx HTN HLD & depression since CVA 2/2021, presents to the ED for tylenol overdose. Aide reported that she puts pt to bed at 11pm and checked on him at 6am. When she walked into the room at 6am she noticed vomit over patient with spilled 500mg tylenol pills on the ground. Pt admitted to attempting to kill himself and stated he took about 40 tablets. Tylenol level elevated in ED over 160. Patient with no formal psychiatry treatment but was started on Fluoxetine 20mg po TID after CVA in 2/2021, as per daughter. Patient denied h/o depression, suicide ideation or attempt, drug/alcohol use, or psychotic symptoms.    Collateral - Spoke with daughter Jayde Dawn via phone on second attempt who stated patient was relatively healthy and active prior to CVA 2/2021. He worked as a  up to then. He is  from her mother several years now but both remain friends and ex-wife also assists in his care currently. Patient resides in his own home on the same property as his daughter and her family but has 24/7 [facility] service since CVA. Reports pt has a brother with h/o anxiety and possibly depression who has been in treatment but no family h/o suicide. Daughter reports that a few weeks ago patient told his ex-wife that he was ready to die and had given up, pt reportedly looked much better yesterday so the family was surprised at suicide attempt. Patient has another child, adult male, who lives in [place]. Daughter also reports that pt has h/o opiate abuse/dependence over the years and was on Suboxone or equivalent up to 2/2021, no h/o abusing alcohol or other substances.

## 2021-06-05 NOTE — ED BEHAVIORAL HEALTH ASSESSMENT NOTE - OTHER
Deferred- pt in bed and wretching Lives alone with 24 hr [facility] service. KIEL Not able to assess - impacted by aphasia Not able to assess

## 2021-06-05 NOTE — ED PROVIDER NOTE - PHYSICAL EXAMINATION
Constitutional: NAD AAOx3  Eyes: PERRLA EOMI  Head: Normocephalic atraumatic  Mouth: MMM  Cardiac: regular rate   Resp: Lungs CTAB  GI: Abd s/nt/nd  Neuro: CN2-12 intact + clonus + b/l LE hyperreflexia, mild rigidity   Skin: No rashes

## 2021-06-05 NOTE — ED ADULT TRIAGE NOTE - CHIEF COMPLAINT QUOTE
Pt arrives to ED s/p overdose. pt intentionally took "about 40" tylenol pills. Admits to suicide attempt. pt states he feels depressed. pt awake, alert, and oriented x 4 in triage. pt vomited x1.

## 2021-06-05 NOTE — ED BEHAVIORAL HEALTH ASSESSMENT NOTE - NSBHMSESPABN_PSY_A_CORE
----- Message from HEMA Franco sent at 11/24/2020  8:16 AM CST -----  Results reviewed. Benign mammogram, repeat in 1 year. Soft volume/Slowed rate/Decreased productivity/Impaired articulation

## 2021-06-05 NOTE — ED BEHAVIORAL HEALTH ASSESSMENT NOTE - NSBHSAOPI_PSY_A_CORE FT
Patient denied but daughter reports significant opiate addiction and most recently on Suboxone or equivalent up to 2/2021, time of CVA

## 2021-06-05 NOTE — H&P ADULT - HISTORY OF PRESENT ILLNESS
65M w/PMH Left frontal stroke w/ Left ICA occlusion, s/p Rt sided residual weakness, s/p tPA in Feb/'20,  Depression with intentional OD on Unknown amount of Tylenol.  As per aide, she put the patient to his bed at 11 PM, later in AM found vomiting over the patient and 40 spilled pills of Tylenol 500 mg on the floor.   The patient has complains of nausea and vomiting. No co-ingestions. He was started  on NAC infusion in ED

## 2021-06-05 NOTE — ED PROVIDER NOTE - PROGRESS NOTE DETAILS
toxicology paged. Nishant Ayala M.D., Attending Physician spoke with toxicology -states wait for tylenol level before NAC but if concerned about unclear timing can give NAC. Nishant Ayala M.D., Attending Physician tylenol level 160 - plotted and above threshold. NAC started. Nishant Ayala M.D., Attending Physician pt endorsed to DR. Woodson accepts. DNM. Nishant Ayala M.D., Attending Physician

## 2021-06-05 NOTE — ED BEHAVIORAL HEALTH ASSESSMENT NOTE - RISK ASSESSMENT
High Risk - 65 year-old WM, recent significant life changes and disability, perceived hopelessness and burden to family, depressed mood since CVA but no engagement with psychiatry.  Protective factor - No prior h/o mental illness or suicide; supportive and caring family High Acute Suicide Risk

## 2021-06-05 NOTE — CONSULT NOTE ADULT - ASSESSMENT
·	Patients Serum Tylenol is at 160 which is above the treatment line, therefore would recommend to initiate 21 hour N-Acetylcystiene protocol.  ·	Loading dose 150 mg/kg in 60 mins, followed by 50 mg/Kg in 4 hours and then 100 mg/Kg in 16 hours.   ·	Please send out repeat LFTs, and Serum Tylenol at 2 hours prior to completion of the 3rd infusion bag.   ·	Patient will also require psychiatry input as intent was suicidal.     Thank you for the consult.  ·	Patients Serum Tylenol is at 160 which is above the treatment line, therefore would recommend to initiate 21 hour N-Acetylcystiene protocol.  ·	Loading dose 150 mg/kg in 60 mins, followed by 50 mg/Kg in 4 hours and then 100 mg/Kg in 16 hours.   ·	Please send out repeat LFTs, and Serum Tylenol at 2 hours prior to completion of the 3rd infusion bag at which point if the labs are reported within normal limits then further NAC therapy can be discontinued.   ·	Patient will also require psychiatry input as intent was suicidal.   ·	Will continue to follow.     Thank you for the consult.  ·	Please initiate 21 hour N-Acetylcystiene protocol; Loading dose 150 mg/kg in 60 mins, followed by 50 mg/Kg in 4 hours and then 100 mg/Kg in 16 hours.   ·	Send out repeat LFTs, and Serum Tylenol at 2 hours prior to completion of the 3rd infusion bag at which point if the labs are reported within normal limits then further NAC therapy can be discontinued.   ·	Patient will also require psychiatry input as intent was suicidal.   ·	Will continue to follow.     Thank you for the consult.

## 2021-06-06 LAB
ALBUMIN SERPL ELPH-MCNC: 3 G/DL — LOW (ref 3.3–5)
ALP SERPL-CCNC: 68 U/L — SIGNIFICANT CHANGE UP (ref 40–120)
ALT FLD-CCNC: 28 U/L — SIGNIFICANT CHANGE UP (ref 12–78)
AST SERPL-CCNC: 19 U/L — SIGNIFICANT CHANGE UP (ref 15–37)
BILIRUB DIRECT SERPL-MCNC: <0.1 MG/DL — SIGNIFICANT CHANGE UP (ref 0–0.2)
BILIRUB INDIRECT FLD-MCNC: >0.2 MG/DL — SIGNIFICANT CHANGE UP (ref 0.2–1)
BILIRUB SERPL-MCNC: 0.3 MG/DL — SIGNIFICANT CHANGE UP (ref 0.2–1.2)
PROT SERPL-MCNC: 6.1 GM/DL — SIGNIFICANT CHANGE UP (ref 6–8.3)

## 2021-06-06 PROCEDURE — 99232 SBSQ HOSP IP/OBS MODERATE 35: CPT

## 2021-06-06 RX ADMIN — HEPARIN SODIUM 5000 UNIT(S): 5000 INJECTION INTRAVENOUS; SUBCUTANEOUS at 05:06

## 2021-06-06 RX ADMIN — AMLODIPINE BESYLATE 5 MILLIGRAM(S): 2.5 TABLET ORAL at 10:45

## 2021-06-06 RX ADMIN — HEPARIN SODIUM 5000 UNIT(S): 5000 INJECTION INTRAVENOUS; SUBCUTANEOUS at 16:22

## 2021-06-06 RX ADMIN — LOSARTAN POTASSIUM 50 MILLIGRAM(S): 100 TABLET, FILM COATED ORAL at 10:45

## 2021-06-06 RX ADMIN — Medication 81 MILLIGRAM(S): at 10:45

## 2021-06-06 RX ADMIN — HEPARIN SODIUM 5000 UNIT(S): 5000 INJECTION INTRAVENOUS; SUBCUTANEOUS at 21:14

## 2021-06-06 RX ADMIN — Medication 20 MILLIGRAM(S): at 10:45

## 2021-06-06 RX ADMIN — ATORVASTATIN CALCIUM 80 MILLIGRAM(S): 80 TABLET, FILM COATED ORAL at 21:14

## 2021-06-06 NOTE — PROGRESS NOTE BEHAVIORAL HEALTH - NSBHFUPINTERVALHXFT_PSY_A_CORE
Patient is alert and oriented to person, time, place and situation. Family visiting and thus not as engaged with psychiatric evaluation. Patient confirms reasons for admission: suicide attempt via overdose with Tylenol, and pending psychiatric admission once cleared by medicine. Family expressing concerns for suicidality supporting admission.    OF note, patient uses iPAD for word-finding secondary to CVA effects.

## 2021-06-06 NOTE — PROVIDER CONTACT NOTE (OTHER) - ACTION/TREATMENT ORDERED:
NP Goba states pt allowed to have ipad at bedside and cane, continues on constant observation. NP Goba states pt allowed to have ipad at bedside, velcro sneakers (no laces), and cane, continues on constant observation.

## 2021-06-06 NOTE — PROVIDER CONTACT NOTE (OTHER) - SITUATION
Pt aphasic, requesting ipad which has program to aid communicating. Kaley NP states pt allowed to have ipad at bedside, continues on constant observation. Pt with h/o CVA, aphasic, requesting ipad which has program to aid communicating and quad cane to assist ambulation. Pt with h/o CVA, aphasic, requesting ipad which has program to aid communicating, velcro sneakers, and quad cane to assist ambulation.

## 2021-06-06 NOTE — PROGRESS NOTE BEHAVIORAL HEALTH - SUMMARY
65W/M hx HTN HLD & depression since CVA 2/2021, presents to the ED for tylenol overdose. Aide reported that she puts pt to bed at 11pm and checked on him at 6am. When she walked into the room at 6am she noticed vomit over patient with spilled 500mg tylenol pills on the ground. Pt admitted to attempting to kill himself and stated he took about 40 tablets. Tylenol level elevated in ED over 160. Patient with no formal psychiatry treatment but was started on Fluoxetine 20mg po TID after CVA in 2/2021, as per daughter. Patient denied h/o depression, suicide ideation or attempt, drug/alcohol use, or psychotic symptoms.    Collateral - Spoke with daughter Jayde Dawn via phone on second attempt who stated patient was relatively healthy and active prior to CVA 2/2021. He worked as a  up to then. He is  from her mother several years now but both remain friends and ex-wife also assists in his care currently. Patient resides in his own home on the same property as his daughter and her family but has 24/7 [facility] service since CVA. Reports pt has a brother with h/o anxiety and possibly depression who has been in treatment but no family h/o suicide. Daughter reports that a few weeks ago patient told his ex-wife that he was ready to die and had given up, pt reportedly looked much better yesterday so the family was surprised at suicide attempt. Patient has another child, adult male, who lives in [place]. Daughter also reports that pt has h/o opiate abuse/dependence over the years and was on Suboxone or equivalent up to 2/2021, no h/o abusing alcohol or other substances.  MSE - 65 year-old W/M looks stated age, dressed in hospital gown, superficially cooperative. Slow to respond to questions and not able to move right arm. Eye contact fair. Speech soft and sparse, mostly in monosyllables. Denies current suicide ideation, plan, or intent but admits to "depressed" mood. Affect congruent and constricted. Admits to feeling hopeless. Denies A/V hallucinations, paranoia or delusions. Insight fair judgment poor.    Case discussed with Dr. Whaley and due to continued feelings of depression and hopelessness patient meets criteria and would benefit from in-patient psychiatric treatment.  A/P Major Depressive Mood, single episode, severe   II - Defered  III - HTN, HLD, CVA  IV - Medical & physical changes, Recent disability  V - 30    Patient will be admitted to medicine at this time. Plan to admit to psychiatry in-patient when medically cleared.  Continue CO 1:1 monitoring on the medical unit.  Hold all medications currently and Do NOT re-start psychotropic meds until psychiatric re-assessment tomorrow.  C/L will continue to re-assess daily

## 2021-06-06 NOTE — PROGRESS NOTE BEHAVIORAL HEALTH - NSBHCHARTREVIEWVS_PSY_A_CORE FT
Vital Signs Last 24 Hrs  T(C): 37.2 (06 Jun 2021 20:39), Max: 37.2 (06 Jun 2021 20:39)  T(F): 98.9 (06 Jun 2021 20:39), Max: 98.9 (06 Jun 2021 20:39)  HR: 89 (06 Jun 2021 20:39) (80 - 89)  BP: 128/79 (06 Jun 2021 20:39) (103/64 - 146/82)  BP(mean): --  RR: 18 (06 Jun 2021 20:39) (17 - 18)  SpO2: 96% (06 Jun 2021 20:39) (96% - 97%)

## 2021-06-07 ENCOUNTER — INPATIENT (INPATIENT)
Facility: HOSPITAL | Age: 66
LOS: 10 days | Discharge: HOME HEALTH | DRG: 881 | End: 2021-06-18
Attending: PSYCHIATRY & NEUROLOGY | Admitting: PSYCHIATRY & NEUROLOGY
Payer: MEDICARE

## 2021-06-07 ENCOUNTER — TRANSCRIPTION ENCOUNTER (OUTPATIENT)
Age: 66
End: 2021-06-07

## 2021-06-07 VITALS
TEMPERATURE: 98 F | RESPIRATION RATE: 17 BRPM | HEART RATE: 88 BPM | SYSTOLIC BLOOD PRESSURE: 153 MMHG | DIASTOLIC BLOOD PRESSURE: 83 MMHG | OXYGEN SATURATION: 96 %

## 2021-06-07 VITALS — TEMPERATURE: 98 F | HEIGHT: 71 IN | OXYGEN SATURATION: 97 % | WEIGHT: 190.26 LBS | RESPIRATION RATE: 18 BRPM

## 2021-06-07 DIAGNOSIS — F33.9 MAJOR DEPRESSIVE DISORDER, RECURRENT, UNSPECIFIED: ICD-10-CM

## 2021-06-07 LAB
ALBUMIN SERPL ELPH-MCNC: 3.2 G/DL — LOW (ref 3.3–5)
ALP SERPL-CCNC: 72 U/L — SIGNIFICANT CHANGE UP (ref 40–120)
ALT FLD-CCNC: 30 U/L — SIGNIFICANT CHANGE UP (ref 12–78)
ANION GAP SERPL CALC-SCNC: 6 MMOL/L — SIGNIFICANT CHANGE UP (ref 5–17)
APAP SERPL-MCNC: <2 UG/ML — LOW (ref 10–30)
AST SERPL-CCNC: 24 U/L — SIGNIFICANT CHANGE UP (ref 15–37)
BILIRUB SERPL-MCNC: 0.4 MG/DL — SIGNIFICANT CHANGE UP (ref 0.2–1.2)
BUN SERPL-MCNC: 9 MG/DL — SIGNIFICANT CHANGE UP (ref 7–23)
CALCIUM SERPL-MCNC: 8.7 MG/DL — SIGNIFICANT CHANGE UP (ref 8.5–10.1)
CHLORIDE SERPL-SCNC: 107 MMOL/L — SIGNIFICANT CHANGE UP (ref 96–108)
CO2 SERPL-SCNC: 28 MMOL/L — SIGNIFICANT CHANGE UP (ref 22–31)
CREAT SERPL-MCNC: 0.78 MG/DL — SIGNIFICANT CHANGE UP (ref 0.5–1.3)
GLUCOSE SERPL-MCNC: 127 MG/DL — HIGH (ref 70–99)
HCT VFR BLD CALC: 38.8 % — LOW (ref 39–50)
HGB BLD-MCNC: 12.7 G/DL — LOW (ref 13–17)
MCHC RBC-ENTMCNC: 29.2 PG — SIGNIFICANT CHANGE UP (ref 27–34)
MCHC RBC-ENTMCNC: 32.7 GM/DL — SIGNIFICANT CHANGE UP (ref 32–36)
MCV RBC AUTO: 89.2 FL — SIGNIFICANT CHANGE UP (ref 80–100)
PLATELET # BLD AUTO: 250 K/UL — SIGNIFICANT CHANGE UP (ref 150–400)
POTASSIUM SERPL-MCNC: 3.2 MMOL/L — LOW (ref 3.5–5.3)
POTASSIUM SERPL-SCNC: 3.2 MMOL/L — LOW (ref 3.5–5.3)
PROT SERPL-MCNC: 6.3 GM/DL — SIGNIFICANT CHANGE UP (ref 6–8.3)
RBC # BLD: 4.35 M/UL — SIGNIFICANT CHANGE UP (ref 4.2–5.8)
RBC # FLD: 13.4 % — SIGNIFICANT CHANGE UP (ref 10.3–14.5)
SODIUM SERPL-SCNC: 141 MMOL/L — SIGNIFICANT CHANGE UP (ref 135–145)
WBC # BLD: 7.67 K/UL — SIGNIFICANT CHANGE UP (ref 3.8–10.5)
WBC # FLD AUTO: 7.67 K/UL — SIGNIFICANT CHANGE UP (ref 3.8–10.5)

## 2021-06-07 PROCEDURE — 97162 PT EVAL MOD COMPLEX 30 MIN: CPT | Mod: GP

## 2021-06-07 PROCEDURE — 97163 PT EVAL HIGH COMPLEX 45 MIN: CPT | Mod: GP

## 2021-06-07 PROCEDURE — 97530 THERAPEUTIC ACTIVITIES: CPT | Mod: GP

## 2021-06-07 PROCEDURE — 84132 ASSAY OF SERUM POTASSIUM: CPT

## 2021-06-07 PROCEDURE — 99239 HOSP IP/OBS DSCHRG MGMT >30: CPT

## 2021-06-07 PROCEDURE — 97116 GAIT TRAINING THERAPY: CPT | Mod: GP

## 2021-06-07 PROCEDURE — 36415 COLL VENOUS BLD VENIPUNCTURE: CPT

## 2021-06-07 RX ORDER — AMLODIPINE BESYLATE 2.5 MG/1
1 TABLET ORAL
Qty: 0 | Refills: 0 | DISCHARGE

## 2021-06-07 RX ORDER — LOSARTAN POTASSIUM 100 MG/1
50 TABLET, FILM COATED ORAL DAILY
Refills: 0 | Status: DISCONTINUED | OUTPATIENT
Start: 2021-06-08 | End: 2021-06-18

## 2021-06-07 RX ORDER — ASPIRIN/CALCIUM CARB/MAGNESIUM 324 MG
81 TABLET ORAL DAILY
Refills: 0 | Status: DISCONTINUED | OUTPATIENT
Start: 2021-06-08 | End: 2021-06-18

## 2021-06-07 RX ORDER — AMLODIPINE BESYLATE 2.5 MG/1
5 TABLET ORAL DAILY
Refills: 0 | Status: DISCONTINUED | OUTPATIENT
Start: 2021-06-08 | End: 2021-06-18

## 2021-06-07 RX ORDER — TIZANIDINE 4 MG/1
2 TABLET ORAL
Qty: 0 | Refills: 0 | DISCHARGE

## 2021-06-07 RX ORDER — ATORVASTATIN CALCIUM 80 MG/1
80 TABLET, FILM COATED ORAL AT BEDTIME
Refills: 0 | Status: DISCONTINUED | OUTPATIENT
Start: 2021-06-07 | End: 2021-06-18

## 2021-06-07 RX ORDER — POTASSIUM CHLORIDE 20 MEQ
40 PACKET (EA) ORAL ONCE
Refills: 0 | Status: COMPLETED | OUTPATIENT
Start: 2021-06-07 | End: 2021-06-07

## 2021-06-07 RX ORDER — CYCLOBENZAPRINE HYDROCHLORIDE 10 MG/1
5 TABLET, FILM COATED ORAL THREE TIMES A DAY
Refills: 0 | Status: DISCONTINUED | OUTPATIENT
Start: 2021-06-07 | End: 2021-06-18

## 2021-06-07 RX ORDER — AMLODIPINE BESYLATE 2.5 MG/1
1 TABLET ORAL
Qty: 0 | Refills: 0 | DISCHARGE
Start: 2021-06-07

## 2021-06-07 RX ADMIN — LOSARTAN POTASSIUM 50 MILLIGRAM(S): 100 TABLET, FILM COATED ORAL at 10:52

## 2021-06-07 RX ADMIN — Medication 81 MILLIGRAM(S): at 10:52

## 2021-06-07 RX ADMIN — Medication 20 MILLIGRAM(S): at 10:52

## 2021-06-07 RX ADMIN — Medication 40 MILLIEQUIVALENT(S): at 10:54

## 2021-06-07 RX ADMIN — HEPARIN SODIUM 5000 UNIT(S): 5000 INJECTION INTRAVENOUS; SUBCUTANEOUS at 14:15

## 2021-06-07 RX ADMIN — AMLODIPINE BESYLATE 5 MILLIGRAM(S): 2.5 TABLET ORAL at 10:52

## 2021-06-07 RX ADMIN — HEPARIN SODIUM 5000 UNIT(S): 5000 INJECTION INTRAVENOUS; SUBCUTANEOUS at 05:11

## 2021-06-07 NOTE — DISCHARGE NOTE PROVIDER - NSDCMRMEDTOKEN_GEN_ALL_CORE_FT
amLODIPine 5 mg oral tablet: 1 tab(s) orally once a day  aspirin 81 mg oral tablet, chewable: 1 tab(s) orally   atorvastatin 80 mg oral tablet: 1 tab(s) orally once a day (at bedtime)  FLUoxetine 20 mg oral capsule: 1 cap(s) orally 3 times a day  losartan 50 mg oral tablet: 1 tab(s) orally once a day

## 2021-06-07 NOTE — PATIENT PROFILE BEHAVIORAL HEALTH - VISION (WITH CORRECTIVE LENSES IF THE PATIENT USUALLY WEARS THEM):
Normal vision: sees adequately in most situations; can see medication labels, newsprint wears glasses for distance./Normal vision: sees adequately in most situations; can see medication labels, newsprint

## 2021-06-07 NOTE — PHYSICAL THERAPY INITIAL EVALUATION ADULT - PATIENT PROFILE REVIEW, REHAB EVAL
yes pt with a h/ opiate abuse/dependence in past on Suboxone prior to his L CVA Feb 2020 ,pt reports he was receiving BOTOX injections RUE [id number] for spasticity management/yes

## 2021-06-07 NOTE — DISCHARGE NOTE NURSING/CASE MANAGEMENT/SOCIAL WORK - PATIENT PORTAL LINK FT
You can access the FollowMyHealth Patient Portal offered by [facility] by registering at the following website: [email or web address]. By joining [facility]’s FollowMyHealth portal, you will also be able to view your health information using other applications (apps) compatible with our system.

## 2021-06-07 NOTE — PHYSICAL THERAPY INITIAL EVALUATION ADULT - MARITAL STATUS
nearest relative is daughter Dr Jayde Dawn ,hand surgeon on staff ( [facility]); has an adult son living in [place] and ex-wife involved in care nearest relative is daughter Dr Jayde Dawn ,hand surgeon on staff ( [facility]); has an adult son living in [place] and ex-wife involved in care/

## 2021-06-07 NOTE — PHYSICAL THERAPY INITIAL EVALUATION ADULT - RANGE OF MOTION EXAMINATION, REHAB EVAL
+spastic plegia RUE ,achieves trace motion R thumb only with effort FLEXING IP joint ; RLE with spastic paresis which is WFL ,dominant extensor tone

## 2021-06-07 NOTE — PHYSICAL THERAPY INITIAL EVALUATION ADULT - PASSIVE RANGE OF MOTION EXAMINATION, REHAB EVAL
R shoulder elevation limited to 90 degrees PROM; R ankle DF to neutral with + non-sustained clonus to PF stretch/Right UE Passive ROM was WFL (within functional limits)/Right LE Passive ROM was WFL (within functional limits)/deficits as listed below

## 2021-06-07 NOTE — PHYSICAL THERAPY INITIAL EVALUATION ADULT - LEVEL OF INDEPENDENCE: SIT/SUPINE, REHAB EVAL
----- Message from Dennise Lock MD sent at 1/15/2018  4:37 PM CST -----  She has a large heel spur. Referral was placed to podiatry and I would encourage her to schedule this appt as she may benefit from custom orthotics or injection.  Continue supportive shoes with good arch support and could try a heel cup or heel spur cushion as well.  
Pt was called and informed of MD note below. Pt verbalized understanding and has no other questions or concerns at this time.   
out of bed to chair after encounter at bedside postured upright wearing sneakers with elastic shoelaces ,CO in attendance

## 2021-06-07 NOTE — DISCHARGE NOTE PROVIDER - NSDCFUSCHEDAPPT_GEN_ALL_CORE_FT
CLAYTON COX ; 06/22/2021 ; [facility] Derm [address] [facility]  CLAYTON COX ; 07/09/2021 ; N[facility] Neurology [address]

## 2021-06-07 NOTE — PHYSICAL THERAPY INITIAL EVALUATION ADULT - BALANCE DISTURBANCE, IDENTIFIED IMPAIRMENT CONTRIBUTE, REHAB EVAL
chronic R HP/impaired coordination/impaired motor control/abnormal muscle tone/decreased sensation/decreased strength

## 2021-06-07 NOTE — PHYSICAL THERAPY INITIAL EVALUATION ADULT - DIAGNOSIS, PT EVAL
suicide attempt, intentional overdose on Acetaminophen 500mg tabs (~40tabs) h/o L CVA with residual R hemiplegia & expressive speech defect, depression/hopelessness

## 2021-06-07 NOTE — DISCHARGE NOTE PROVIDER - NSDCCPCAREPLAN_GEN_ALL_CORE_FT
PRINCIPAL DISCHARGE DIAGNOSIS  Diagnosis: Intentional acetaminophen overdose, initial encounter  Assessment and Plan of Treatment: stable  discharge to psychiatry inpatient.

## 2021-06-07 NOTE — DISCHARGE NOTE PROVIDER - HOSPITAL COURSE
CC: Tylenol OD  History of Present Illness:     65M w/PMH Left frontal stroke w/ Left ICA occlusion, s/p Rt sided residual weakness, s/p tPA in Feb/'20,  Depression with intentional OD on Unknown amount of Tylenol.  As per aide, she put the patient to his bed at 11 PM, later in AM found vomiting over the patient and 40 spilled pills of Tylenol 500 mg on the floor.   The patient has complains of nausea and vomiting. No co-ingestions. He was started  on NAC infusion in ED    Hospital course:  Pt admitted for tylenol OD.  s/p NAC protocol.  LFTs wnl and tylenol level now normal.  Pt feeling well, offers no complaints.  Pt is HD stable.  He is on constant obs for suicidal attempt.  Pt to be discharged to  inpatient psychiatry for further evaluation and management.     REVIEW OF SYSTEMS: All other review of systems is negative unless indicated above.    Vital Signs Last 24 Hrs  T(C): 36.6 (07 Jun 2021 07:56), Max: 37.2 (06 Jun 2021 20:39)  T(F): 97.9 (07 Jun 2021 07:56), Max: 98.9 (06 Jun 2021 20:39)  HR: 88 (07 Jun 2021 07:56) (80 - 89)  BP: 153/83 (07 Jun 2021 07:56) (103/64 - 153/83)  BP(mean): --  RR: 17 (07 Jun 2021 07:56) (17 - 18)  SpO2: 96% (07 Jun 2021 07:56) (96% - 97%)        PHYSICAL EXAM:    Constitutional: NAD, awake and alert, well-developed  HEENT: PERR, EOMI, Normal Hearing, MMM  Neck: Soft and supple  Respiratory: Breath sounds are clear bilaterally, No wheezing, rales or rhonchi  Cardiovascular: S1 and S2, regular rate and rhythm, no Murmurs, gallops or rubs  Gastrointestinal: Bowel Sounds present, soft, nontender, nondistended, no guarding, no rebound  Extremities: No peripheral edema  Neurological: A/O x 3, no focal deficits in my limited exam        med/labs: Reviewed and interpreted         Assessment and Plan:       * Suicidal attempt with Tylenol OD  -s/p NAC per protocol  -LFTs remain wnl  -psych eval pending  -avoid tylenol use  -constant obs    *  h/o CVA with chronic residual  -ASA    * HTN  -Norvasc 5mg QD  -losartan  -hydralazine prn     dvt ppx:  -heparin subc        Attending Statement: 40 minutes spent on total encounter and discharge planning.

## 2021-06-07 NOTE — PHYSICAL THERAPY INITIAL EVALUATION ADULT - GAIT DEVIATIONS NOTED, PT EVAL
R armswing diminished,R arm postured in shoulder ADD,elbow FLEX ,r 1st/2nd fingers maintaining pincer

## 2021-06-07 NOTE — PHYSICAL THERAPY INITIAL EVALUATION ADULT - LIVES WITH, PROFILE
pt resides with 24/7 HHA in private home on family property where daughter & her family also reside in [place]

## 2021-06-07 NOTE — PHYSICAL THERAPY INITIAL EVALUATION ADULT - SHORT TERM MEMORY, REHAB EVAL
(2) spontaneous and intermittent (24 hrs old)
(2) spontaneous and intermittent (24 hrs old)
except not clear surrounding events the morning of admission due to AMS after OD/intact

## 2021-06-08 DIAGNOSIS — F32.9 MAJOR DEPRESSIVE DISORDER, SINGLE EPISODE, UNSPECIFIED: ICD-10-CM

## 2021-06-08 PROCEDURE — 99223 1ST HOSP IP/OBS HIGH 75: CPT

## 2021-06-08 RX ORDER — SERTRALINE 25 MG/1
12.5 TABLET, FILM COATED ORAL DAILY
Refills: 0 | Status: DISCONTINUED | OUTPATIENT
Start: 2021-06-09 | End: 2021-06-10

## 2021-06-08 RX ADMIN — AMLODIPINE BESYLATE 5 MILLIGRAM(S): 2.5 TABLET ORAL at 09:48

## 2021-06-08 RX ADMIN — Medication 81 MILLIGRAM(S): at 09:48

## 2021-06-08 RX ADMIN — ATORVASTATIN CALCIUM 80 MILLIGRAM(S): 80 TABLET, FILM COATED ORAL at 20:21

## 2021-06-08 RX ADMIN — LOSARTAN POTASSIUM 50 MILLIGRAM(S): 100 TABLET, FILM COATED ORAL at 09:48

## 2021-06-08 NOTE — PROGRESS NOTE BEHAVIORAL HEALTH - NSBHFUPSTRENGTHS_PSY_A_CORE
Motivated and ready for change/Has supportive interpersonal relationships with family, friends or peers/Cooperative with treatment

## 2021-06-09 LAB
POTASSIUM SERPL-MCNC: 4 MMOL/L — SIGNIFICANT CHANGE UP (ref 3.5–5.3)
POTASSIUM SERPL-SCNC: 4 MMOL/L — SIGNIFICANT CHANGE UP (ref 3.5–5.3)

## 2021-06-09 PROCEDURE — 99232 SBSQ HOSP IP/OBS MODERATE 35: CPT

## 2021-06-09 RX ADMIN — LOSARTAN POTASSIUM 50 MILLIGRAM(S): 100 TABLET, FILM COATED ORAL at 09:13

## 2021-06-09 RX ADMIN — ATORVASTATIN CALCIUM 80 MILLIGRAM(S): 80 TABLET, FILM COATED ORAL at 21:18

## 2021-06-09 RX ADMIN — SERTRALINE 12.5 MILLIGRAM(S): 25 TABLET, FILM COATED ORAL at 09:13

## 2021-06-09 RX ADMIN — Medication 81 MILLIGRAM(S): at 09:13

## 2021-06-09 RX ADMIN — AMLODIPINE BESYLATE 5 MILLIGRAM(S): 2.5 TABLET ORAL at 09:13

## 2021-06-09 NOTE — PHYSICAL THERAPY INITIAL EVALUATION ADULT - ACTIVE RANGE OF MOTION EXAMINATION, REHAB EVAL
Residual RLE Hemiparesis and RUE Paralysis from CVA in February 2021/Left UE Active ROM was WFL (within functional limits)/Left LE Active ROM was WFL (within functional limits)

## 2021-06-09 NOTE — PHYSICAL THERAPY INITIAL EVALUATION ADULT - GAIT DEVIATIONS NOTED, PT EVAL
decreased christofer/decreased step length/decreased stride length/decreased weight-shifting ability

## 2021-06-10 PROCEDURE — 99231 SBSQ HOSP IP/OBS SF/LOW 25: CPT

## 2021-06-10 RX ORDER — SERTRALINE 25 MG/1
25 TABLET, FILM COATED ORAL DAILY
Refills: 0 | Status: DISCONTINUED | OUTPATIENT
Start: 2021-06-11 | End: 2021-06-12

## 2021-06-10 RX ADMIN — LOSARTAN POTASSIUM 50 MILLIGRAM(S): 100 TABLET, FILM COATED ORAL at 09:25

## 2021-06-10 RX ADMIN — Medication 81 MILLIGRAM(S): at 09:25

## 2021-06-10 RX ADMIN — ATORVASTATIN CALCIUM 80 MILLIGRAM(S): 80 TABLET, FILM COATED ORAL at 21:30

## 2021-06-10 RX ADMIN — AMLODIPINE BESYLATE 5 MILLIGRAM(S): 2.5 TABLET ORAL at 09:25

## 2021-06-10 RX ADMIN — SERTRALINE 12.5 MILLIGRAM(S): 25 TABLET, FILM COATED ORAL at 09:25

## 2021-06-11 PROCEDURE — 99231 SBSQ HOSP IP/OBS SF/LOW 25: CPT

## 2021-06-11 RX ADMIN — LOSARTAN POTASSIUM 50 MILLIGRAM(S): 100 TABLET, FILM COATED ORAL at 09:03

## 2021-06-11 RX ADMIN — Medication 81 MILLIGRAM(S): at 09:03

## 2021-06-11 RX ADMIN — AMLODIPINE BESYLATE 5 MILLIGRAM(S): 2.5 TABLET ORAL at 09:03

## 2021-06-11 RX ADMIN — ATORVASTATIN CALCIUM 80 MILLIGRAM(S): 80 TABLET, FILM COATED ORAL at 21:25

## 2021-06-11 RX ADMIN — SERTRALINE 25 MILLIGRAM(S): 25 TABLET, FILM COATED ORAL at 09:03

## 2021-06-11 NOTE — PROGRESS NOTE BEHAVIORAL HEALTH - NSBHCHARTREVIEWLAB_PSY_A_CORE FT
12.7   7.67  )-----------( 250      ( 07 Jun 2021 08:11 )             38.8   06-07    141  |  107  |  9   ----------------------------<  127<H>  3.2<L>   |  28  |  0.78    Ca    8.7      07 Jun 2021 08:11    TPro  6.3  /  Alb  3.2<L>  /  TBili  0.4  /  DBili  x   /  AST  24  /  ALT  30  /  AlkPhos  72  06-07

## 2021-06-12 PROCEDURE — 99232 SBSQ HOSP IP/OBS MODERATE 35: CPT

## 2021-06-12 RX ORDER — LANOLIN ALCOHOL/MO/W.PET/CERES
5 CREAM (GRAM) TOPICAL AT BEDTIME
Refills: 0 | Status: DISCONTINUED | OUTPATIENT
Start: 2021-06-12 | End: 2021-06-18

## 2021-06-12 RX ORDER — SERTRALINE 25 MG/1
50 TABLET, FILM COATED ORAL DAILY
Refills: 0 | Status: DISCONTINUED | OUTPATIENT
Start: 2021-06-12 | End: 2021-06-18

## 2021-06-12 RX ADMIN — SERTRALINE 25 MILLIGRAM(S): 25 TABLET, FILM COATED ORAL at 10:14

## 2021-06-12 RX ADMIN — LOSARTAN POTASSIUM 50 MILLIGRAM(S): 100 TABLET, FILM COATED ORAL at 10:14

## 2021-06-12 RX ADMIN — AMLODIPINE BESYLATE 5 MILLIGRAM(S): 2.5 TABLET ORAL at 10:13

## 2021-06-12 RX ADMIN — ATORVASTATIN CALCIUM 80 MILLIGRAM(S): 80 TABLET, FILM COATED ORAL at 20:46

## 2021-06-12 RX ADMIN — Medication 81 MILLIGRAM(S): at 10:14

## 2021-06-13 RX ADMIN — SERTRALINE 50 MILLIGRAM(S): 25 TABLET, FILM COATED ORAL at 09:53

## 2021-06-13 RX ADMIN — LOSARTAN POTASSIUM 50 MILLIGRAM(S): 100 TABLET, FILM COATED ORAL at 09:52

## 2021-06-13 RX ADMIN — ATORVASTATIN CALCIUM 80 MILLIGRAM(S): 80 TABLET, FILM COATED ORAL at 20:39

## 2021-06-13 RX ADMIN — Medication 5 MILLIGRAM(S): at 20:40

## 2021-06-13 RX ADMIN — AMLODIPINE BESYLATE 5 MILLIGRAM(S): 2.5 TABLET ORAL at 09:52

## 2021-06-13 RX ADMIN — Medication 81 MILLIGRAM(S): at 09:52

## 2021-06-14 PROCEDURE — 99231 SBSQ HOSP IP/OBS SF/LOW 25: CPT

## 2021-06-14 RX ADMIN — Medication 81 MILLIGRAM(S): at 09:09

## 2021-06-14 RX ADMIN — SERTRALINE 50 MILLIGRAM(S): 25 TABLET, FILM COATED ORAL at 09:10

## 2021-06-14 RX ADMIN — AMLODIPINE BESYLATE 5 MILLIGRAM(S): 2.5 TABLET ORAL at 09:10

## 2021-06-14 RX ADMIN — ATORVASTATIN CALCIUM 80 MILLIGRAM(S): 80 TABLET, FILM COATED ORAL at 20:07

## 2021-06-14 RX ADMIN — LOSARTAN POTASSIUM 50 MILLIGRAM(S): 100 TABLET, FILM COATED ORAL at 09:10

## 2021-06-14 RX ADMIN — Medication 5 MILLIGRAM(S): at 20:07

## 2021-06-15 DIAGNOSIS — Z20.822 CONTACT WITH AND (SUSPECTED) EXPOSURE TO COVID-19: ICD-10-CM

## 2021-06-15 DIAGNOSIS — F32.2 MAJOR DEPRESSIVE DISORDER, SINGLE EPISODE, SEVERE WITHOUT PSYCHOTIC FEATURES: ICD-10-CM

## 2021-06-15 DIAGNOSIS — E78.5 HYPERLIPIDEMIA, UNSPECIFIED: ICD-10-CM

## 2021-06-15 DIAGNOSIS — Z79.82 LONG TERM (CURRENT) USE OF ASPIRIN: ICD-10-CM

## 2021-06-15 DIAGNOSIS — F43.21 ADJUSTMENT DISORDER WITH DEPRESSED MOOD: ICD-10-CM

## 2021-06-15 DIAGNOSIS — I10 ESSENTIAL (PRIMARY) HYPERTENSION: ICD-10-CM

## 2021-06-15 DIAGNOSIS — I69.351 HEMIPLEGIA AND HEMIPARESIS FOLLOWING CEREBRAL INFARCTION AFFECTING RIGHT DOMINANT SIDE: ICD-10-CM
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
## 2021-06-15 DIAGNOSIS — I69.320 APHASIA FOLLOWING CEREBRAL INFARCTION: ICD-10-CM

## 2021-06-15 DIAGNOSIS — T39.1X2A POISONING BY 4-AMINOPHENOL DERIVATIVES, INTENTIONAL SELF-HARM, INITIAL ENCOUNTER: ICD-10-CM

## 2021-06-15 PROCEDURE — 99231 SBSQ HOSP IP/OBS SF/LOW 25: CPT

## 2021-06-15 RX ADMIN — LOSARTAN POTASSIUM 50 MILLIGRAM(S): 100 TABLET, FILM COATED ORAL at 09:27

## 2021-06-15 RX ADMIN — AMLODIPINE BESYLATE 5 MILLIGRAM(S): 2.5 TABLET ORAL at 09:27

## 2021-06-15 RX ADMIN — Medication 81 MILLIGRAM(S): at 09:27

## 2021-06-15 RX ADMIN — SERTRALINE 50 MILLIGRAM(S): 25 TABLET, FILM COATED ORAL at 09:27

## 2021-06-15 RX ADMIN — ATORVASTATIN CALCIUM 80 MILLIGRAM(S): 80 TABLET, FILM COATED ORAL at 20:12

## 2021-06-16 PROCEDURE — 99231 SBSQ HOSP IP/OBS SF/LOW 25: CPT

## 2021-06-16 RX ADMIN — ATORVASTATIN CALCIUM 80 MILLIGRAM(S): 80 TABLET, FILM COATED ORAL at 21:34

## 2021-06-16 RX ADMIN — Medication 81 MILLIGRAM(S): at 09:24

## 2021-06-16 RX ADMIN — AMLODIPINE BESYLATE 5 MILLIGRAM(S): 2.5 TABLET ORAL at 09:25

## 2021-06-16 RX ADMIN — CYCLOBENZAPRINE HYDROCHLORIDE 5 MILLIGRAM(S): 10 TABLET, FILM COATED ORAL at 09:25

## 2021-06-16 RX ADMIN — LOSARTAN POTASSIUM 50 MILLIGRAM(S): 100 TABLET, FILM COATED ORAL at 09:25

## 2021-06-16 RX ADMIN — SERTRALINE 50 MILLIGRAM(S): 25 TABLET, FILM COATED ORAL at 09:25

## 2021-06-17 PROCEDURE — 99231 SBSQ HOSP IP/OBS SF/LOW 25: CPT

## 2021-06-17 RX ADMIN — AMLODIPINE BESYLATE 5 MILLIGRAM(S): 2.5 TABLET ORAL at 09:17

## 2021-06-17 RX ADMIN — Medication 81 MILLIGRAM(S): at 09:17

## 2021-06-17 RX ADMIN — LOSARTAN POTASSIUM 50 MILLIGRAM(S): 100 TABLET, FILM COATED ORAL at 09:17

## 2021-06-17 RX ADMIN — ATORVASTATIN CALCIUM 80 MILLIGRAM(S): 80 TABLET, FILM COATED ORAL at 21:37

## 2021-06-17 RX ADMIN — SERTRALINE 50 MILLIGRAM(S): 25 TABLET, FILM COATED ORAL at 09:17

## 2021-06-17 NOTE — DISCHARGE NOTE BEHAVIORAL HEALTH - NSBHDCRESOURCESOTHERFT_PSY_A_CORE
[facility]- for psychiatric crises (available AFTER HOURS)  24/7 hotline: [phone]  Address:  [address], [place] [address]

## 2021-06-17 NOTE — DISCHARGE NOTE BEHAVIORAL HEALTH - NSBHDCCONDITIONFT_PSY_A_CORE
Patient is visible in unit, goes to limited groups, was also seen in day room watching TV with others. overall seems improved with no S/H/I/P, smiles on approach and to continue Zoloft 50 mg daily for stability.

## 2021-06-17 NOTE — DISCHARGE NOTE BEHAVIORAL HEALTH - NSBHDCCRISISPLAN2FT_PSY_A_CORE
Call [facility] 5N: [phone]  : Mary Melissa LMSW  Psychiatrists- Dr. Alfredo Whaley

## 2021-06-17 NOTE — DISCHARGE NOTE BEHAVIORAL HEALTH - MEDICATION SUMMARY - MEDICATIONS TO TAKE
I will START or STAY ON the medications listed below when I get home from the hospital:    aspirin 81 mg oral tablet, chewable  -- 1 tab(s) by mouth once a day x 30 days   -- Indication: For Cardio protector    losartan 50 mg oral tablet  -- 1 tab(s) by mouth once a day x 30 days   -- Indication: For HTN    aluminum hydroxide-magnesium hydroxide 200 mg-200 mg/5 mL oral suspension  -- 30 milliliter(s) by mouth every 4 hours x 30 days, As Needed -Dyspepsia   -- Indication: For Dyspepsia    sertraline 50 mg oral tablet  -- 1 tab(s) by mouth once a day x 30 days   -- Indication: For Mood    atorvastatin 80 mg oral tablet  -- 1 tab(s) by mouth once a day (at bedtime) x 30 days   -- Indication: For HLD    amLODIPine 5 mg oral tablet  -- 1 tab(s) by mouth once a day x 30 days   -- Indication: For HTN    cyclobenzaprine 5 mg oral tablet  -- 1 tab(s) by mouth 3 times a day x 30 days, As Needed -Muscle Spasm   -- Indication: For Muscle Spasm    melatonin 5 mg oral tablet  -- 1 tab(s) by mouth once a day (at bedtime) x 30 days, As Needed -Insomnia   -- Indication: For Sleep

## 2021-06-17 NOTE — DISCHARGE NOTE BEHAVIORAL HEALTH - NSBHDCCRISISPLAN1FT_PSY_A_CORE
Tell a trusted friend/family member, tell housing staff, tell clinic staff, call a crisis line ( Crisis Center ph. [phone], [facility] [phone], [facility] (peer support) ph. [phone]), return to the nearest emergency room. You may call 9-1-1 for assistance.

## 2021-06-17 NOTE — DISCHARGE NOTE BEHAVIORAL HEALTH - PAST PSYCHIATRIC HISTORY
Per documentation, no formal psych treatment, but was started on fluoxetine 20 mg PO TID after CVA in 2/2021. Pt denied hx of depression per documentation.

## 2021-06-17 NOTE — DISCHARGE NOTE BEHAVIORAL HEALTH - NSBHDCDXVALIDYESFT_PSY_A_CORE
Primary Dx Current episode of major depressive disorder without prior episode, unspecified depression episode severity F32.9.

## 2021-06-17 NOTE — DISCHARGE NOTE BEHAVIORAL HEALTH - NSBHDCREFEROTHERFT_PSY_A_CORE
A home care referral was for you to [facility], and they accepted your case. Your start of care will be 6/19/21. If you have any questions [facility] can be reached at [phone].     [facility]- for psychiatric crises (available AFTER HOURS)  24/7 hotline: [phone]  Address:  [place] [address]

## 2021-06-17 NOTE — DISCHARGE NOTE BEHAVIORAL HEALTH - NSBHDCSUICFCTRSFT_PSY_A_CORE
He was hospitalized since the incident, family is aware including the aides and to have an eye all the time on discharge. He no longer expresses SI and is aware of his current status. He is taking anti=depressants which helped elevating his mood with good sleep/appetite. No drug abuse hx.

## 2021-06-17 NOTE — DISCHARGE NOTE BEHAVIORAL HEALTH - NSBHDCSUBSTHXFT_PSY_A_CORE
Per documentation, dtr indicates that pt has a hx of opiate abuse/dependence over the years, and was on Suboxone or equivalent up to 2/2021. Per documentation, no hx of abusing alcohol or other substances.

## 2021-06-17 NOTE — DISCHARGE NOTE BEHAVIORAL HEALTH - NSBHDCSUICSAFETYFT_PSY_A_CORE
Advised to return to hospital or go to nearest ED or call 911 or [phone] [phone] or [phone] hotlines for any severe, worsening or persistent symptoms including suicidal/homicidal ideations, intent or plans. Patient verbalized understanding of instructions.

## 2021-06-17 NOTE — DISCHARGE NOTE BEHAVIORAL HEALTH - NSBHDCSUICFCTRMIT_PSY_A_CORE
Hospitalized since the incident, and has not shown any self mutilative behaviors during his stay here. To have a HHA 24/7 and to have [facility] for his ongoing services as needed to review and assist as needed for proper care.

## 2021-06-17 NOTE — DISCHARGE NOTE BEHAVIORAL HEALTH - HPI (INCLUDE ILLNESS QUALITY, SEVERITY, DURATION, TIMING, CONTEXT, MODIFYING FACTORS, ASSOCIATED SIGNS AND SYMPTOMS)
Per pt's ED BH Assessment completed 6/6/21 by NP Nicole Bowles: "65W/M hx HTN HLD & depression since CVA 2/2021, presents to the ED for tylenol overdose. Aide reported that she puts pt to bed at 11pm and checked on him at 6am. When she walked into the room at 6am she noticed vomit over patient with spilled 500mg tylenol pills on the ground. Pt admitted to attempting to kill himself and stated he took about 40 tablets. Tylenol level elevated in ED over 160. Patient with no formal psychiatry treatment but was started on Fluoxetine 20mg po TID after CVA in 2/2021, as per daughter. Patient denied h/o depression, suicide ideation or attempt, drug/alcohol use, or psychotic symptoms.    Collateral - Spoke with daughter Jayde Dawn via phone on second attempt who stated patient was relatively healthy and active prior to CVA 2/2021. He worked as a  up to then. He is  from her mother several years now but both remain friends and ex-wife also assists in his care currently. Patient resides in his own home on the same property as his daughter and her family but has 24/7 [facility] service since CVA. Reports pt has a brother with h/o anxiety and possibly depression who has been in treatment but no family h/o suicide. Daughter reports that a few weeks ago patient told his ex-wife that he was ready to die and had given up, pt reportedly looked much better yesterday so the family was surprised at suicide attempt. Patient has another child, adult male, who lives in [place]. Daughter also reports that pt has h/o opiate abuse/dependence over the years and was on Suboxone or equivalent up to 2/2021, no h/o abusing alcohol or other substances" Per pt's ED BH Assessment completed 6/6/21 by NP Nicole Bowles: "65W/M hx HTN HLD & depression since CVA 2/2021, presents to the ED for tylenol overdose. Aide reported that she puts pt to bed at 11pm and checked on him at 6am. When she walked into the room at 6am she noticed vomit over patient with spilled 500mg tylenol pills on the ground. Pt admitted to attempting to kill himself and stated he took about 40 tablets. Tylenol level elevated in ED over 160. Patient with no formal psychiatry treatment but was started on Fluoxetine 20mg po TID after CVA in 2/2021, as per daughter. Patient denied h/o depression, suicide ideation or attempt, drug/alcohol use, or psychotic symptoms.    Collateral - Spoke with daughter Jaydenadeem Dawn via phone on second attempt who stated patient was relatively healthy and active prior to CVA 2/2021. He worked as a  up to then. He is  from her mother several years now but both remain friends and ex-wife also assists in his care currently. Patient resides in his own home on the same property as his daughter and her family but has 24/7 [facility] service since CVA. Reports pt has a brother with h/o anxiety and possibly depression who has been in treatment but no family h/o suicide. Daughter reports that a few weeks ago patient told his ex-wife that he was ready to die and had given up, pt reportedly looked much better yesterday so the family was surprised at suicide attempt. Patient has another child, adult male, who lives in [place]. Daughter also reports that pt has h/o opiate abuse/dependence over the years and was on Suboxone or equivalent up to 2/2021, no h/o abusing alcohol or other substances"    Interval Hx: Patient was admitted from the medical side involuntarily. Writer spoke with his daughter Dr. Dawn Orthopedic surgeon at . She endorses no prior Psychiatric hx, had a stroke in 4420-5604 and since the stroke he has Right sided weakness and was not able to speak or walk. He was in rehab both acute/sub-acute and since the rehab he is able to talk, which seems improving day-to-day and able to communicate to some extent and also uses I-Pad for communication. he walks with a walker, his daughter further mentioned that he was a very successful  and because of which he was frustrated leading to OD on Tylenol. He has a 24 hour HHA, looking after him and also his EX-Wife helps him and is involved with him. we started him on Zoloft 12.5 mg initially, later was increased to Zoloft 25 mg daily with further titration to Zoloft 50 mg daily. He was meds compliant since day-1 of admission and there were no meds induced s/e till now. He was also on melatonin 5 mg HS to help with sleep. He endorses that he is not Suicidal, he smiles on approach, writer spoke with him daily, he was visible in unit, day room and was trying hard to get involved in unit activities as much as he can and his daughter was informed that to be careful including the HHA, as well as he is a high risk  due to prior social standing which is not feasible now.     PT consult was done and recommended ambulation with walker  with home PT or [facility]. As per his daughter DR. Dawn, she feels that he does not have to go to rehab at this time. As per  referral he will be visited by [facility] for assessment for PT/OT/Speech and later for Skilled Nursing as well.    Medically has HTN; HLD with CVA, and for which he is on Cozaar 50 mg daily  with Norvasc 5 mg daily and Lipitor 80 mg with Aspirin 81 mg daily. he also received Flexeril 5 mg TID PRN for hand Spasm on the right side. Per pt's ED BH Assessment completed 6/6/21 by NP Nicole Bowles: "65W/M hx HTN HLD & depression since CVA 2/2021, presents to the ED for tylenol overdose. Aide reported that she puts pt to bed at 11pm and checked on him at 6am. When she walked into the room at 6am she noticed vomit over patient with spilled 500mg tylenol pills on the ground. Pt admitted to attempting to kill himself and stated he took about 40 tablets. Tylenol level elevated in ED over 160. Patient with no formal psychiatry treatment but was started on Fluoxetine 20mg po TID after CVA in 2/2021, as per daughter. Patient denied h/o depression, suicide ideation or attempt, drug/alcohol use, or psychotic symptoms.    Collateral - Spoke with daughter Jaydenadeem Dawn via phone on second attempt who stated patient was relatively healthy and active prior to CVA 2/2021. He worked as a  up to then. He is  from her mother several years now but both remain friends and ex-wife also assists in his care currently. Patient resides in his own home on the same property as his daughter and her family but has 24/7 [facility] service since CVA. Reports pt has a brother with h/o anxiety and possibly depression who has been in treatment but no family h/o suicide. Daughter reports that a few weeks ago patient told his ex-wife that he was ready to die and had given up, pt reportedly looked much better yesterday so the family was surprised at suicide attempt. Patient has another child, adult male, who lives in [place]. Daughter also reports that pt has h/o opiate abuse/dependence over the years and was on Suboxone or equivalent up to 2/2021, no h/o abusing alcohol or other substances"    Interval Hx: Patient was admitted from the medical side involuntarily. Writer spoke with his daughter Dr. Dawn Orthopedic surgeon at . She endorses no prior Psychiatric hx, had a stroke in [id number] and since the stroke he has Right sided weakness and was not able to speak or walk. He was in rehab both acute/sub-acute and since the rehab he is able to talk, which seems improving day-to-day and able to communicate to some extent and also uses I-Pad for communication. He walks with a walker, his daughter further mentioned that he was a very successful  and because of which he was frustrated leading to OD on Tylenol. He has a 24 hour HHA, looking after him and also his EX-Wife helps him and is involved with him. We started him on Zoloft 12.5 mg initially, later was increased to Zoloft 25 mg daily with further titration to Zoloft 50 mg daily. He was meds compliant since day-1 of admission and there were no meds induced s/e till now. He received Melatonin 5 mg HS to help with sleep. He endorses that he is not Suicidal, smiles on approach always, writer spoke with him daily, he was visible in unit, day room and was trying hard to get involved in unit activities as much as he can and his daughter was informed that to be careful including the HHA, as well as he is a high risk due to prior social standing which is not feasible now.     PT consult was done and recommended ambulation with walker  with home PT or [facility]. As per his daughter DR. Dawn, she feels that he does not have to go to rehab at this time. As per  referral he will be visited by [facility] for assessment for PT/OT/Speech and later for Skilled Nursing as well.    Medically has HTN; HLD with CVA, and for which he is on Cozaar 50 mg daily, Norvasc 5 mg daily and Lipitor 80 mg HS with Aspirin 81 mg daily. He also received Flexeril 5 mg TID PRN for hand Spasm on the right side.

## 2021-06-17 NOTE — DISCHARGE NOTE BEHAVIORAL HEALTH - NSBHDCSUICFCTROTHERFT_PSY_A_CORE
To continue Monitor to prevent further damage, and to be open to Therapist/Care providers for stability or may come to ED if bad thoughts are evident.

## 2021-06-17 NOTE — DISCHARGE NOTE BEHAVIORAL HEALTH - NSBHDCTESTSFT_PSY_A_CORE
Lipid Profile (02.06.20 @ 07:40)   Total Cholesterol/HDL Ratio Measurement: 2.0 Ratio   Cholesterol, Serum: 187 mg/dL   Triglycerides, Serum: 42 mg/dL   HDL Cholesterol, Serum: 75: HDL Levels >/= 60 mg/dL are considered beneficial and a "negative" risk   Hemoglobin A1C, Whole Blood: 6.0 % (02.06.20 @ 07:40)

## 2021-06-17 NOTE — DISCHARGE NOTE BEHAVIORAL HEALTH - CARE PROVIDER_API CALL
[facility],   See  Note for ongoign care after discharge.  Phone: (   )    -  Fax: (   )    -  Follow Up Time:

## 2021-06-17 NOTE — DISCHARGE NOTE BEHAVIORAL HEALTH - NSBHDCSUICPROTECTFT_PSY_A_CORE
Stable housing  Supportive Daughter who is a physician and has knowledge of patient's illness  intelligent  Compliant with meds and treatment Stable housing  Supportive Daughter who is a physician and has knowledge of patient's illness  Intelligent  Compliant with meds and treatment

## 2021-06-17 NOTE — DISCHARGE NOTE BEHAVIORAL HEALTH - PROVIDER TOKENS
FREE:[LAST:[[facility]],PHONE:[(   )    -],FAX:[(   )    -],ADDRESS:[See SW Note for ongoign care after discharge.]]

## 2021-06-17 NOTE — DISCHARGE NOTE BEHAVIORAL HEALTH - MEDICATION SUMMARY - MEDICATIONS TO STOP TAKING
I will STOP taking the medications listed below when I get home from the hospital:    FLUoxetine 20 mg oral capsule  -- 1 cap(s) by mouth 3 times a day

## 2021-06-18 VITALS — OXYGEN SATURATION: 100 % | TEMPERATURE: 97 F | RESPIRATION RATE: 14 BRPM

## 2021-06-18 PROCEDURE — 99239 HOSP IP/OBS DSCHRG MGMT >30: CPT

## 2021-06-18 RX ORDER — LOSARTAN POTASSIUM 100 MG/1
1 TABLET, FILM COATED ORAL
Qty: 30 | Refills: 0
Start: 2021-06-18 | End: 2021-07-17

## 2021-06-18 RX ORDER — AMLODIPINE BESYLATE 2.5 MG/1
1 TABLET ORAL
Qty: 30 | Refills: 0
Start: 2021-06-18 | End: 2021-07-17

## 2021-06-18 RX ORDER — ATORVASTATIN CALCIUM 80 MG/1
1 TABLET, FILM COATED ORAL
Qty: 30 | Refills: 0
Start: 2021-06-18 | End: 2021-07-17

## 2021-06-18 RX ORDER — SERTRALINE 25 MG/1
1 TABLET, FILM COATED ORAL
Qty: 30 | Refills: 0
Start: 2021-06-18 | End: 2021-07-17

## 2021-06-18 RX ORDER — LANOLIN ALCOHOL/MO/W.PET/CERES
1 CREAM (GRAM) TOPICAL
Qty: 30 | Refills: 0
Start: 2021-06-18 | End: 2021-07-17

## 2021-06-18 RX ORDER — CYCLOBENZAPRINE HYDROCHLORIDE 10 MG/1
1 TABLET, FILM COATED ORAL
Qty: 90 | Refills: 0
Start: 2021-06-18 | End: 2021-07-17

## 2021-06-18 RX ORDER — ASPIRIN/CALCIUM CARB/MAGNESIUM 324 MG
1 TABLET ORAL
Qty: 30 | Refills: 0
Start: 2021-06-18 | End: 2021-07-17

## 2021-06-18 RX ADMIN — AMLODIPINE BESYLATE 5 MILLIGRAM(S): 2.5 TABLET ORAL at 09:36

## 2021-06-18 RX ADMIN — CYCLOBENZAPRINE HYDROCHLORIDE 5 MILLIGRAM(S): 10 TABLET, FILM COATED ORAL at 09:36

## 2021-06-18 RX ADMIN — LOSARTAN POTASSIUM 50 MILLIGRAM(S): 100 TABLET, FILM COATED ORAL at 09:36

## 2021-06-18 RX ADMIN — Medication 81 MILLIGRAM(S): at 09:37

## 2021-06-18 RX ADMIN — SERTRALINE 50 MILLIGRAM(S): 25 TABLET, FILM COATED ORAL at 09:36

## 2021-06-18 NOTE — PROGRESS NOTE BEHAVIORAL HEALTH - NS ED BHA REVIEW OF ED CHART AVAILABLE LABS REVIEWED
None available
Yes
None available
Yes
Yes
None available
None available
Yes
None available
None available

## 2021-06-18 NOTE — PROGRESS NOTE BEHAVIORAL HEALTH - NSBHFUPTYPE_PSY_A_CORE
Inpatient
Inpatient-On Service Note
Inpatient

## 2021-06-18 NOTE — PROGRESS NOTE BEHAVIORAL HEALTH - RISK ASSESSMENT
High Risk-White male, living alone, with medical issues due to S/P stroke with Rt. sided weakness. Family Supportive, domiciled with daughter. Cooperative with treatment , Highly Educated; No drugs involved, meds compliant    Protective Factors--Domiciled with Daughter who is an orthopedic surgeon. Family very supportive. Meds compliant.    Mitigating Factors--Hospitalized and to have After care F/U on discharge; Family (Daughter) in touch with treatment team, safety plan.
High Risk-White male, living alone, with medical issues due to S/P stroke with Rt. sided weakness. Family Supportive, domiciled with daughter. Cooperative with treatment , Highly Educated; No drugs involved, meds compliant    Protective Factors--Domiciled with Daughter who is an orthopedic surgeon. Family very supportive. Meds compliant.    Mitigating Factors--Hospitalized and to have After care F/U on discharge; Family (Daughter) in touch with treatment team, safety plan.
High Risk-White male, living alone, with medical issues due to S/P stroke with Rt. sided weakness. Family Supportive, domiciled with daughter. Cooperative with treatment , Highly Educated; No drugs involved, meds compliant    Protective Factors--Domiciled with Daughter who is a orthopedic surgeon. Family very supportive. Meds compliant.    Mitigating Factors--Hospitalized and to have After care F/U on discharge; family (Daughter) in touch with treatment team, safety plan.
High Risk-White male, living alone, with medical issues due to S/P stroke with Rt. sided weakness. Family Supportive, domiciled with daughter. Cooperative with treatment , Highly Educated; No drugs involved, meds compliant    Protective Factors--Domiciled with Daughter who is an orthopedic surgeon. Family very supportive. Meds compliant.    Mitigating Factors--Hospitalized and to have After care F/U on discharge; Family (Daughter) in touch with treatment team, safety plan.
High Risk-White male, living alone, with medical issues due to S/P stroke with Rt. sided weakness. Family Supportive, domiciled with daughter. Cooperative with treatment , Highly Educated; No drugs involved, meds compliant    Protective Factors--Domiciled with Daughter who is a orthopedic surgeon. Family very supportive. Meds compliant.    Mitigating Factors--Hospitalized and to have After care F/U on discharge; family (Daughter) in touch with treatment team.
High Risk-White male, living alone, with medical issues due to S/P stroke with Rt. sided weakness. Family Supportive, domiciled with daughter. Cooperative with treatment , Highly Educated; No drugs involved, meds compliant    Protective Factors--Domiciled with Daughter who is an orthopedic surgeon. Family very supportive. Meds compliant.    Mitigating Factors--Hospitalized and to have After care F/U on discharge; Family (Daughter) in touch with treatment team, safety plan.
High Risk-White male, living alone, with medical issues due to S/P stroke with Rt. sided weakness. Family Supportive, domiciled with daughter. Cooperative with treatment , Highly Educated; No drugs involved, meds compliant    Protective Factors--Domiciled with Daughter who is a orthopedic surgeon. Family very supportive. Meds compliant.    Mitigating Factors--Hospitalized and to have After care F/U on discharge; family (Daughter) in touch with treatment team.
High Risk-White male, living alone, with medical issues due to S/P stroke with Rt. sided weakness. Family Supportive, domiciled with daughter. Cooperative with treatment , Highly Educated; No drugs involved, meds compliant    Protective Factors--Domiciled with Daughter who is an orthopedic surgeon. Family very supportive. Meds compliant.    Mitigating Factors--Hospitalized and to have After care F/U on discharge; Family (Daughter) in touch with treatment team, safety plan.
High Risk-White male, living alone, with medical issues due to S/P stroke with Rt. sided weakness. Family Supportive, domiciled with daughter. Cooperative with treatment , Highly Educated;
High Risk-White male, living alone, with medical issues due to S/P stroke with Rt. sided weakness. Family Supportive, domiciled with daughter. Cooperative with treatment , Highly Educated;

## 2021-06-18 NOTE — PROGRESS NOTE BEHAVIORAL HEALTH - GAIT / STATION
Abnormal gait / station

## 2021-06-18 NOTE — PROGRESS NOTE BEHAVIORAL HEALTH - NSBHFUPINTERVALCCFT_PSY_A_CORE
I am better
" SA  by OD "
" SA  by OD "
I am better
" SA  by OD "
I am better
" SA  by OD "
I am better

## 2021-06-18 NOTE — PROGRESS NOTE BEHAVIORAL HEALTH - MOOD
Normal/Depressed
Depressed

## 2021-06-18 NOTE — PROGRESS NOTE BEHAVIORAL HEALTH - PROBLEM SELECTOR PROBLEM 1
Current episode of major depressive disorder without prior episode, unspecified depression episode severity

## 2021-06-18 NOTE — PROGRESS NOTE BEHAVIORAL HEALTH - ESTIMATED INTELLIGENCE
Above average

## 2021-06-18 NOTE — PROGRESS NOTE BEHAVIORAL HEALTH - NSBHFUPINTERVALHXFT_PSY_A_CORE
Pt is a 65W/M hx HTN HLD & depression since CVA 2/2021, admitted after  Tylenol overdose.   PT is isolative, prefers  being alone in his room. He reports that he is feeling better, smiling, still difficulties in word finding and expressing himself. Smiling today, looks   brighter, Denies SI, denies HI,  AH, ANAM PEARSON, PI.    06/14/2021: Patient was seen today AM, chart reviewed  and discussed in team. he is meds compliant, and has been improving since he came here. He is also visible in the community with limited interaction with staffs/peers due to expressive difficulties. Even though he is smiling on approach, still a high risk as he now lives with daughter who provides social support. His wife also supports him even though they are . To speak with his daughter for discharge planning this week. No meds changes for now.    06/15/2021: Patient was seen today AM, chart reviewed and discussed in team. He was advised to stay on his current meds, Zoloft dose now is 50 mg daily. No further changes now. His daughter is aware of the discharge day on 06/18/2021. fair to good sleep/appetite. was advised to go a d join limited group so at least he is able to focus and concentrate and he agreed.    06/16/2021: Patient was seen today AM, chart reviewed and discussed in team. Patient is visible in unit, goes to limited groups, was also seen in day room watching TV with others. overall seems improved with no S/H/I/P, smiles on approach and to continue Zoloft 50 mg daily for stability. Discharge planning on 06/18/2021. GREG made daughter aware of discharge on 06/18
Pt is a 65W/M hx HTN HLD & depression since CVA 2/2021, admitted after  Tylenol overdose.   PT is isolative, prefers  being alone in his room. He reports that he is feeling better, smiling, still difficulties in word finding and expressing himself. Smiling today, looks   brighter, Denies SI, denies HI,  AH, V H, PI.
· HPI: 65W/M hx HTN HLD & depression since CVA 2/2021, presents to the ED for Tylenol overdose. Aide reported that she puts pt to bed at 11pm and checked on him at 6am. When she walked into the room at 6am she noticed vomit over patient with spilled 500mg Tylenol pills on the ground. Pt admitted to attempting to kill himself and stated he took about 40 tablets. Tylenol level elevated in ED over 160. Patient with no formal psychiatry treatment but was started on Fluoxetine 20 mg po TID after CVA in 2/2021, as per daughter. Patient denied h/o depression, suicide ideation or attempt, drug/alcohol use, or psychotic symptoms.    Collateral - Spoke with daughter Jayde Dawn via phone on second attempt who stated patient was relatively healthy and active prior to CVA 2/2021. He worked as a  up to then. He is  from her mother several years now but both remain friends and ex-wife also assists in his care currently. Patient resides in his own home on the same property as his daughter and her family but has 24/7 [facility] service since CVA. Reports pt has a brother with h/o anxiety and possibly depression who has been in treatment but no family h/o suicide. Daughter reports that a few weeks ago patient told his ex-wife that he was ready to die and had given up, pt reportedly looked much better yesterday so the family was surprised at suicide attempt. Patient has another child, adult male, who lives in [place]. Daughter also reports that pt has h/o opiate abuse/dependence over the years and was on Suboxone or equivalent up to 2/2021, no h/o abusing alcohol or other substances.    06/09/2021: Patient was seen today AM, chart reviewed and discussed in team today. He was smiling today, up in a chair and was trying to do something with I-Pad. He was aware the he was started on Zoloft 12.5 mg daily. He was told the ways and means of administration in AM and also discussed meds s/e. Writer also spoke with his daughter DR. Dawn at  and got info about the patient and his current Mental/Physical condition. Daughter is aware of the Zoloft 12.5 mg prescribed today AM. As per the daughter he was also in good spirits the day before he OD on pills, so it s difficult to assume his mental status. Overall, had a good sleep last night, and to continue with Zoloft 12.5 mg PO for now. PT to evaluate and as per daughter he may not need PT as he went through them and was assigned to sub-acute category which he finished.     06/10/2021: Patient was seen today AM, chart reviewed and discussed in team. Patient was sitting in the day room, smiling on approach, mood seems uplifted and has no nausea or stomach pain from Zoloft, seems to be tolerating well on the Zoloft. Patient was informed of increasing the Zoloft to 25 mg daily starting tomorrow. Not S/h and was seen by PT, and as per SW who spoke with his daughter that he does not need rehab at this time. SW trying to arrange some therapy/counselling by visiting home, which seems feasible. To continue Zoloft 25 mg daily from 06/11/2021.
· HPI: 65W/M hx HTN HLD & depression since CVA 2/2021, presents to the ED for Tylenol overdose. Aide reported that she puts pt to bed at 11pm and checked on him at 6am. When she walked into the room at 6am she noticed vomit over patient with spilled 500mg Tylenol pills on the ground. Pt admitted to attempting to kill himself and stated he took about 40 tablets. Tylenol level elevated in ED over 160. Patient with no formal psychiatry treatment but was started on Fluoxetine 20 mg po TID after CVA in 2/2021, as per daughter. Patient denied h/o depression, suicide ideation or attempt, drug/alcohol use, or psychotic symptoms.    Collateral - Spoke with daughter Jayde Dawn via phone on second attempt who stated patient was relatively healthy and active prior to CVA 2/2021. He worked as a  up to then. He is  from her mother several years now but both remain friends and ex-wife also assists in his care currently. Patient resides in his own home on the same property as his daughter and her family but has 24/7 [facility] service since CVA. Reports pt has a brother with h/o anxiety and possibly depression who has been in treatment but no family h/o suicide. Daughter reports that a few weeks ago patient told his ex-wife that he was ready to die and had given up, pt reportedly looked much better yesterday so the family was surprised at suicide attempt. Patient has another child, adult male, who lives in [place]. Daughter also reports that pt has h/o opiate abuse/dependence over the years and was on Suboxone or equivalent up to 2/2021, no h/o abusing alcohol or other substances.    06/09/2021: Patient was seen today AM, chart reviewed and discussed in team today. He was smiling today, up in a chair and was trying to do something with I-Pad. He was aware the he was started on Zoloft 12.5 mg daily. He was told the ways and means of administration in AM and also discussed meds s/e. Writer also spoke with his daughter DR. Dawn at  and got info about the patient and his current Mental/Physical condition. Daughter is aware of the Zoloft 12.5 mg prescribed today AM. As per the daughter he was also in good spirits the day before he OD on pills, so it s difficult to assume his mental status. Overall, had a good sleep last night, and to continue with Zoloft 12.5 mg PO for now. PT to evaluate and as per daughter he may not need PT as he went through them and was assigned to sub-acute category which he finished.     Plan: To continue with Zoloft 12.5 mg for now          F/U in AM
Pt is a 65W/M hx HTN HLD & depression since CVA 2/2021, admitted after  Tylenol overdose.   PT is isolative, prefers  being alone in his room. He reports that he is feeling better, smiling, still difficulties in word finding and expressing himself. Smiling today, looks   brighter, Denies SI, denies HI,  DELIO, ANAM PEARSON, PI.    06/14/2021: Patient was seen today AM, chart reviewed  and discussed in team. he is meds compliant, and has been improving since he came here. He is also visible in the community with limited interaction with staffs/peers due to expressive difficulties. Even though he is smiling on approach, still a high risk as he now lives with daughter who provides social support. His wife also supports him even though they are . To speak with his daughter for discharge planning this week. No meds changes for now.    06/15/2021: Patient was seen today AM, chart reviewed and discussed in team. He was advised to stay on his current meds, Zoloft dose now is 50 mg daily. No further changes now. His daughter is aware of the discharge day on 06/18/2021. fair to good sleep/appetite. was advised to go a d join limited group so at least he is able to focus and concentrate and he agreed.
Pt is a 65W/M hx HTN HLD & depression since CVA 2/2021, admitted after  Tylenol overdose.   PT is isolative, prefers  being alone in his room. He reports that he is feeling better, smiling, still difficulties in word finding and expressing himself. Smiling today, looks   brighter, Denies SI, denies HI,  DELIO, ANAM PEARSON, PI.    06/14/2021: Patient was seen today AM, chart reviewed  and discussed in team. he is meds compliant, and has been improving since he came here. He is also visible in the community with limited interaction with staffs/peers due to expressive difficulties. Even though he is smiling on approach, still a high risk as he now lives with daughter who provides social support. His wife also supports him even though they are . To speak with his daughter for discharge planning this week. No meds changes for now.
· HPI: 65W/M hx HTN HLD & depression since CVA 2/2021, presents to the ED for Tylenol overdose. Aide reported that she puts pt to bed at 11pm and checked on him at 6am. When she walked into the room at 6am she noticed vomit over patient with spilled 500 mg Tylenol pills on the ground. Pt admitted to attempting to kill himself and stated he took about 40 tablets. Tylenol level elevated in ED over 160. Patient with no formal psychiatry treatment but was started on Fluoxetine 20 mg po TID after CVA in 2/2021, as per daughter. Patient denied h/o depression, suicide ideation or attempt, drug/alcohol use, or psychotic symptoms.    Collateral - Spoke with daughter Jayde Dawn via phone on second attempt who stated patient was relatively healthy and active prior to CVA 2/2021. He worked as a  up to then. He is  from her mother several years now but both remain friends and ex-wife also assists in his care currently. Patient resides in his own home on the same property as his daughter and her family but has 24/7 [facility] service since CVA. Reports pt has a brother with h/o anxiety and possibly depression who has been in treatment but no family h/o suicide. Daughter reports that a few weeks ago patient told his ex-wife that he was ready to die and had given up, pt reportedly looked much better yesterday so the family was surprised at suicide attempt. Patient has another child, adult male, who lives in [place]. Daughter also reports that pt has h/o opiate abuse/dependence over the years and was on Suboxone or equivalent up to 2/2021, no h/o abusing alcohol or other substances.    06/09/2021: Patient was seen today AM, chart reviewed and discussed in team today. He was smiling today, up in a chair and was trying to do something with I-Pad. He was aware the he was started on Zoloft 12.5 mg daily. He was told the ways and means of administration in AM and also discussed meds s/e. Writer also spoke with his daughter DR. Dawn at  and got info about the patient and his current Mental/Physical condition. Daughter is aware of the Zoloft 12.5 mg prescribed today AM. As per the daughter he was also in good spirits the day before he OD on pills, so it s difficult to assume his mental status. Overall, had a good sleep last night, and to continue with Zoloft 12.5 mg PO for now. PT to evaluate and as per daughter he may not need PT as he went through them and was assigned to sub-acute category which he finished.     06/10/2021: Patient was seen today AM, chart reviewed and discussed in team. Patient was sitting in the day room, smiling on approach, mood seems uplifted and has no nausea or stomach pain from Zoloft, seems to be tolerating well on the Zoloft. Patient was informed of increasing the Zoloft to 25 mg daily starting tomorrow. Not S/h and was seen by PT, and as per SW who spoke with his daughter that he does not need rehab at this time. SW trying to arrange some therapy/counselling by visiting home, which seems feasible. To continue Zoloft 25 mg daily from 06/11/2021.    06/11/2021: Patient was seen today AM, chart reviewed and discussed with team. Overall improved, smiles on approach, no complaints till now, to continue with the same Zoloft 25 mg daily with plans to increase to 50 mg daily if needed. Not S/H now. Somewhat eager to be dishcharged.
Patient was seen today AM, chart reviewed and discussed in team today. He is looking forward to go home, no acute issues, good sleep/appetite. Meds were sent to pharmacy for continued help as needed. SW has arranged [facility] services for continued care. His daughter aware of discharge. Not S/h and to be discharged today with appropriate F/U
Pt is a 65W/M hx HTN HLD & depression since CVA 2/2021, admitted after  Tylenol overdose.   PT is isolative, prefers  being alone in his room. He reports that he is feeling better, smiling, still difficulties in word finding and expressing himself. Smiling today, looks   brighter, Denies SI, denies HI,  AH, ANAM PEARSON, PI.    06/14/2021: Patient was seen today AM, chart reviewed  and discussed in team. he is meds compliant, and has been improving since he came here. He is also visible in the community with limited interaction with staffs/peers due to expressive difficulties. Even though he is smiling on approach, still a high risk as he now lives with daughter who provides social support. His wife also supports him even though they are . To speak with his daughter for discharge planning this week. No meds changes for now.    06/15/2021: Patient was seen today AM, chart reviewed and discussed in team. He was advised to stay on his current meds, Zoloft dose now is 50 mg daily. No further changes now. His daughter is aware of the discharge day on 06/18/2021. fair to good sleep/appetite. was advised to go a d join limited group so at least he is able to focus and concentrate and he agreed.    06/16/2021: Patient was seen today AM, chart reviewed and discussed in team. Patient is visible in unit, goes to limited groups, was also seen in day room watching TV with others. overall seems improved with no S/H/I/P, smiles on approach and to continue Zoloft 50 mg daily for stability. Discharge planning on 06/18/2021. GREG made daughter aware of discharge on 06/18 06/17/2021: patient was seen today AM,. chart reviewed and discussed in team. Overall significant improvement, smiles on approach, speech seems to improve as well, eager to go home tomorrow. He has good sleep/appetite. Not S/h and daughter aware of the discharge. Meds to be sent to pharmacy as planned.
· HPI: 65W/M hx HTN HLD & depression since CVA 2/2021, presents to the ED for Tylenol overdose. Aide reported that she puts pt to bed at 11pm and checked on him at 6am. When she walked into the room at 6am she noticed vomit over patient with spilled 500mg tylenol pills on the ground. Pt admitted to attempting to kill himself and stated he took about 40 tablets. Tylenol level elevated in ED over 160. Patient with no formal psychiatry treatment but was started on Fluoxetine 20mg po TID after CVA in 2/2021, as per daughter. Patient denied h/o depression, suicide ideation or attempt, drug/alcohol use, or psychotic symptoms.    Collateral - Spoke with daughter Jayde Dawn via phone on second attempt who stated patient was relatively healthy and active prior to CVA 2/2021. He worked as a  up to then. He is  from her mother several years now but both remain friends and ex-wife also assists in his care currently. Patient resides in his own home on the same property as his daughter and her family but has 24/7 [facility] service since CVA. Reports pt has a brother with h/o anxiety and possibly depression who has been in treatment but no family h/o suicide. Daughter reports that a few weeks ago patient told his ex-wife that he was ready to die and had given up, pt reportedly looked much better yesterday so the family was surprised at suicide attempt. Patient has another child, adult male, who lives in [place]. Daughter also reports that pt has h/o opiate abuse/dependence over the years and was on Suboxone or equivalent up to 2/2021, no h/o abusing alcohol or other substances.

## 2021-06-18 NOTE — PROGRESS NOTE BEHAVIORAL HEALTH - PROBLEM SELECTOR PLAN 1
1. Start Zoloft 12.5 mg daily and titrate as needed for stability, current dosage is Zoloft 50 mg  2. Group Participation
1. Start Zoloft 12.5 mg daily and titrate as needed for stability, current dosage is Zoloft 50 mg  2. Group Participation
1. Start Zoloft 12.5 mg daily and titrate as needed for stabiliy, current dosage is Zoloft 50 mg  2. Group Participation

## 2021-06-18 NOTE — PROGRESS NOTE BEHAVIORAL HEALTH - AXIS III
HTN  S/P CVA with Right Sided weakness

## 2021-06-18 NOTE — PROGRESS NOTE BEHAVIORAL HEALTH - NSBHCHARTREVIEWVS_PSY_A_CORE FT
Vital Signs Last 24 Hrs  T(C): 36.8 (12 Jun 2021 08:21), Max: 36.8 (12 Jun 2021 08:21)  T(F): 98.3 (12 Jun 2021 08:21), Max: 98.3 (12 Jun 2021 08:21)    RR: 18 (12 Jun 2021 08:21) (18 - 18)  SpO2: 99% (12 Jun 2021 08:21) (99% - 99%)
Vital Signs Last 24 Hrs  T(C): 36.6 (17 Jun 2021 07:53), Max: 36.6 (17 Jun 2021 07:53)  T(F): 97.8 (17 Jun 2021 07:53), Max: 97.8 (17 Jun 2021 07:53)  HR: --  BP: --  BP(mean): --  RR: 12 (17 Jun 2021 07:53) (12 - 12)  SpO2: 97% (17 Jun 2021 07:53) (97% - 97%)
Vital Signs Last 24 Hrs  T(C): 36.6 (10 Marc 2021 08:11), Max: 36.6 (10 Marc 2021 08:11)  T(F): 97.8 (10 Marc 2021 08:11), Max: 97.8 (10 Marc 2021 08:11)  HR: --  BP: --  BP(mean): --  RR: 16 (10 Marc 2021 08:11) (16 - 16)  SpO2: 98% (10 Marc 2021 08:11) (98% - 98%)
Vital Signs Last 24 Hrs  T(C): 36.6 (16 Jun 2021 08:09), Max: 36.6 (16 Jun 2021 08:09)  T(F): 97.8 (16 Jun 2021 08:09), Max: 97.8 (16 Jun 2021 08:09)  HR: --  BP: --  BP(mean): --  RR: 12 (16 Jun 2021 08:09) (12 - 12)  SpO2: 100% (16 Jun 2021 08:09) (100% - 100%)
Vital Signs Last 24 Hrs  T(C): 36.6 (14 Jun 2021 07:43), Max: 36.6 (14 Jun 2021 07:43)  T(F): 97.8 (14 Jun 2021 07:43), Max: 97.8 (14 Jun 2021 07:43)  HR: --  BP: --  BP(mean): --  RR: 14 (14 Jun 2021 07:43) (14 - 14)  SpO2: 98% (14 Jun 2021 07:43) (98% - 98%)
Vital Signs Last 24 Hrs  T(C): 36.4 (11 Jun 2021 08:15), Max: 36.4 (11 Jun 2021 08:15)  T(F): 97.5 (11 Jun 2021 08:15), Max: 97.5 (11 Jun 2021 08:15)  HR: --  BP: --  BP(mean): --  RR: 14 (11 Jun 2021 08:15) (14 - 14)  SpO2: 100% (11 Jun 2021 08:15) (100% - 100%)
Vital Signs Last 24 Hrs  T(C): 36.1 (18 Jun 2021 07:49), Max: 36.1 (18 Jun 2021 07:49)  T(F): 97 (18 Jun 2021 07:49), Max: 97 (18 Jun 2021 07:49)  HR: --  BP: --  BP(mean): --  RR: 14 (18 Jun 2021 07:49) (14 - 14)  SpO2: 100% (18 Jun 2021 07:49) (100% - 100%)
Vital Signs Last 24 Hrs  T(C): 36.8 (08 Jun 2021 08:22), Max: 36.8 (08 Jun 2021 08:22)  T(F): 98.2 (08 Jun 2021 08:22), Max: 98.2 (08 Jun 2021 08:22)  HR: --  BP: --  BP(mean): --  RR: 16 (08 Jun 2021 08:22) (16 - 18)  SpO2: 98% (08 Jun 2021 08:22) (97% - 98%)
Vital Signs Last 24 Hrs  T(C): 36.6 (09 Jun 2021 08:46), Max: 36.6 (09 Jun 2021 08:46)  T(F): 97.8 (09 Jun 2021 08:46), Max: 97.8 (09 Jun 2021 08:46)  HR: --  BP: --  BP(mean): --  RR: 16 (09 Jun 2021 08:46) (16 - 16)  SpO2: 98% (09 Jun 2021 08:46) (98% - 98%)
Vital Signs Last 24 Hrs  T(C): 36.6 (15 Marc 2021 08:26), Max: 36.6 (15 Marc 2021 08:26)  T(F): 97.8 (15 Marc 2021 08:26), Max: 97.8 (15 Marc 2021 08:26)  HR: --  BP: --  BP(mean): --  RR: 14 (15 Marc 2021 08:26) (14 - 14)  SpO2: 100% (15 Marc 2021 08:26) (100% - 100%)

## 2021-06-18 NOTE — PROGRESS NOTE BEHAVIORAL HEALTH - NSBHCHARTREVIEWINVESTIGATE_PSY_A_CORE FT
< from: 12 Lead ECG (06.05.21 @ 08:12) >    Ventricular Rate 69 BPM    Atrial Rate 69 BPM    P-R Interval 182 ms    QRS Duration 170 ms    Q-T Interval 446 ms    QTC Calculation(Bazett) 477 ms    P Axis 61 degrees    R Axis -60 degrees    T Axis -3 degrees    Diagnosis Line Normal sinus rhythm  Right bundle branch block  Left anterior fascicular block  *** Bifascicular block ***  Minimal voltage criteria for LVH, may be normal variant  Septal infarct , age undetermined  Abnormal ECG  Confirmed by LIZETH PARSONS, CRYS ([id number]) on 6/5/2021 9:36:55 AM    < end of copied text >

## 2021-06-18 NOTE — PROGRESS NOTE BEHAVIORAL HEALTH - NSBHADMITIPOBSFT_PSY_A_CORE
Not Suicidal now
Not Suicidal now on inpatient unit
Not Suicidal now
Not Suicidal now on inpatient unit
Not Suicidal now
Not Suicidal now

## 2021-06-18 NOTE — PROGRESS NOTE BEHAVIORAL HEALTH - SUMMARY
65W/M hx HTN HLD & depression since CVA 2/2021, presents to the ED for Tylenol overdose. Aide reported that she puts pt to bed at 11pm and checked on him at 6am. When she walked into the room at 6am she noticed vomit over patient with spilled 500mg tylenol pills on the ground. Pt admitted to attempting to kill himself and stated he took about 40 tablets. Tylenol level elevated in ED over 160. Patient with no formal psychiatry treatment but was started on Fluoxetine 20mg po TID after CVA in 2/2021, as per daughter. Patient denied h/o depression, suicide ideation or attempt, drug/alcohol use, or psychotic symptoms.    Collateral - Spoke with daughter Jayde Dawn via phone on second attempt who stated patient was relatively healthy and active prior to CVA 2/2021. He worked as a  up to then. He is  from her mother several years now but both remain friends and ex-wife also assists in his care currently. Patient resides in his own home on the same property as his daughter and her family but has 24/7 [facility] service since CVA. Reports pt has a brother with h/o anxiety and possibly depression who has been in treatment but no family h/o suicide. Daughter reports that a few weeks ago patient told his ex-wife that he was ready to die and had given up, pt reportedly looked much better yesterday so the family was surprised at suicide attempt. Patient has another child, adult male, who lives in [place]. Daughter also reports that pt has h/o opiate abuse/dependence over the years and was on Suboxone or equivalent up to 2/2021, no h/o abusing alcohol or other substances.    Depressed with SA buy OD needs in-patient admission for stability/safety
65W/M hx HTN HLD & depression since CVA 2/2021, presents to the ED for Tylenol overdose. Aide reported that she puts pt to bed at 11pm and checked on him at 6am. When she walked into the room at 6am she noticed vomit over patient with spilled 500mg tylenol pills on the ground. Pt admitted to attempting to kill himself and stated he took about 40 tablets. Tylenol level elevated in ED over 160. Patient with no formal psychiatry treatment but was started on Fluoxetine 20mg po TID after CVA in 2/2021, as per daughter. Patient denied h/o depression, suicide ideation or attempt, drug/alcohol use, or psychotic symptoms.  Depressed with S/P SA buy OD needs in-patient admission for stability/safety.    MEDICATIONS  (STANDING):  amLODIPine   Tablet 5 milliGRAM(s) Oral daily  aspirin  chewable 81 milliGRAM(s) Oral daily  atorvastatin 80 milliGRAM(s) Oral at bedtime  losartan 50 milliGRAM(s) Oral daily  Increase sertraline to 50 milliGRAM(s) Oral daily
65W/M hx HTN HLD & depression since CVA 2/2021, presents to the ED for Tylenol overdose. Aide reported that she puts pt to bed at 11pm and checked on him at 6am. When she walked into the room at 6am she noticed vomit over patient with spilled 500mg tylenol pills on the ground. Pt admitted to attempting to kill himself and stated he took about 40 tablets. Tylenol level elevated in ED over 160. Patient with no formal psychiatry treatment but was started on Fluoxetine 20mg po TID after CVA in 2/2021, as per daughter. Patient denied h/o depression, suicide ideation or attempt, drug/alcohol use, or psychotic symptoms.    Collateral - Spoke with daughter Jayde Dawn via phone on second attempt who stated patient was relatively healthy and active prior to CVA 2/2021. He worked as a  up to then. He is  from her mother several years now but both remain friends and ex-wife also assists in his care currently. Patient resides in his own home on the same property as his daughter and her family but has 24/7 [facility] service since CVA. Reports pt has a brother with h/o anxiety and possibly depression who has been in treatment but no family h/o suicide. Daughter reports that a few weeks ago patient told his ex-wife that he was ready to die and had given up, pt reportedly looked much better yesterday so the family was surprised at suicide attempt. Patient has another child, adult male, who lives in [place]. Daughter also reports that pt has h/o opiate abuse/dependence over the years and was on Suboxone or equivalent up to 2/2021, no h/o abusing alcohol or other substances.    Depressed with SA buy OD needs in-patient admission for stability/safety
65W/M hx HTN HLD & depression since CVA 2/2021, presents to the ED for Tylenol overdose. Aide reported that she puts pt to bed at 11pm and checked on him at 6am. When she walked into the room at 6am she noticed vomit over patient with spilled 500mg tylenol pills on the ground. Pt admitted to attempting to kill himself and stated he took about 40 tablets. Tylenol level elevated in ED over 160. Patient with no formal psychiatry treatment but was started on Fluoxetine 20mg po TID after CVA in 2/2021, as per daughter. Patient denied h/o depression, suicide ideation or attempt, drug/alcohol use, or psychotic symptoms.    Collateral - Spoke with daughter Jayde Dawn via phone on second attempt who stated patient was relatively healthy and active prior to CVA 2/2021. He worked as a  up to then. He is  from her mother several years now but both remain friends and ex-wife also assists in his care currently. Patient resides in his own home on the same property as his daughter and her family but has 24/7 [facility] service since CVA. Reports pt has a brother with h/o anxiety and possibly depression who has been in treatment but no family h/o suicide. Daughter reports that a few weeks ago patient told his ex-wife that he was ready to die and had given up, pt reportedly looked much better yesterday so the family was surprised at suicide attempt. Patient has another child, adult male, who lives in [place]. Daughter also reports that pt has h/o opiate abuse/dependence over the years and was on Suboxone or equivalent up to 2/2021, no h/o abusing alcohol or other substances.    Depressed with SA buy OD needs in-patient admission for stability/safety
65W/M hx HTN HLD & depression since CVA 2/2021, presents to the ED for Tylenol overdose. Aide reported that she puts pt to bed at 11pm and checked on him at 6am. When she walked into the room at 6am she noticed vomit over patient with spilled 500mg tylenol pills on the ground. Pt admitted to attempting to kill himself and stated he took about 40 tablets. Tylenol level elevated in ED over 160. Patient with no formal psychiatry treatment but was started on Fluoxetine 20mg po TID after CVA in 2/2021, as per daughter. Patient denied h/o depression, suicide ideation or attempt, drug/alcohol use, or psychotic symptoms.  Depressed with S/P SA buy OD needs in-patient admission for stability/safety.    MEDICATIONS  (STANDING):  amLODIPine   Tablet 5 milliGRAM(s) Oral daily  aspirin  chewable 81 milliGRAM(s) Oral daily  atorvastatin 80 milliGRAM(s) Oral at bedtime  losartan 50 milliGRAM(s) Oral daily  Increase sertraline to 50 milliGRAM(s) Oral daily
65W/M hx HTN HLD & depression since CVA 2/2021, presents to the ED for Tylenol overdose. Aide reported that she puts pt to bed at 11pm and checked on him at 6am. When she walked into the room at 6am she noticed vomit over patient with spilled 500mg tylenol pills on the ground. Pt admitted to attempting to kill himself and stated he took about 40 tablets. Tylenol level elevated in ED over 160. Patient with no formal psychiatry treatment but was started on Fluoxetine 20mg po TID after CVA in 2/2021, as per daughter. Patient denied h/o depression, suicide ideation or attempt, drug/alcohol use, or psychotic symptoms.    Collateral - Spoke with daughter Jayde Dawn via phone on second attempt who stated patient was relatively healthy and active prior to CVA 2/2021. He worked as a  up to then. He is  from her mother several years now but both remain friends and ex-wife also assists in his care currently. Patient resides in his own home on the same property as his daughter and her family but has 24/7 [facility] service since CVA. Reports pt has a brother with h/o anxiety and possibly depression who has been in treatment but no family h/o suicide. Daughter reports that a few weeks ago patient told his ex-wife that he was ready to die and had given up, pt reportedly looked much better yesterday so the family was surprised at suicide attempt. Patient has another child, adult male, who lives in [place]. Daughter also reports that pt has h/o opiate abuse/dependence over the years and was on Suboxone or equivalent up to 2/2021, no h/o abusing alcohol or other substances.    Depressed with SA buy OD needs in-patient admission for stability/safety
65W/M hx HTN HLD & depression since CVA 2/2021, presents to the ED for Tylenol overdose. Aide reported that she puts pt to bed at 11pm and checked on him at 6am. When she walked into the room at 6am she noticed vomit over patient with spilled 500mg tylenol pills on the ground. Pt admitted to attempting to kill himself and stated he took about 40 tablets. Tylenol level elevated in ED over 160. Patient with no formal psychiatry treatment but was started on Fluoxetine 20mg po TID after CVA in 2/2021, as per daughter. Patient denied h/o depression, suicide ideation or attempt, drug/alcohol use, or psychotic symptoms.  Depressed with S/P SA buy OD needs in-patient admission for stability/safety.    MEDICATIONS  (STANDING):  amLODIPine   Tablet 5 milliGRAM(s) Oral daily  aspirin  chewable 81 milliGRAM(s) Oral daily  atorvastatin 80 milliGRAM(s) Oral at bedtime  losartan 50 milliGRAM(s) Oral daily  Increase sertraline to 50 milliGRAM(s) Oral daily
65W/M hx HTN HLD & depression since CVA 2/2021, presents to the ED for Tylenol overdose. Aide reported that she puts pt to bed at 11pm and checked on him at 6am. When she walked into the room at 6am she noticed vomit over patient with spilled 500mg tylenol pills on the ground. Pt admitted to attempting to kill himself and stated he took about 40 tablets. Tylenol level elevated in ED over 160. Patient with no formal psychiatry treatment but was started on Fluoxetine 20mg po TID after CVA in 2/2021, as per daughter. Patient denied h/o depression, suicide ideation or attempt, drug/alcohol use, or psychotic symptoms.  Depressed with S/P SA buy OD needs in-patient admission for stability/safety.    MEDICATIONS  (STANDING):  amLODIPine   Tablet 5 milliGRAM(s) Oral daily  aspirin  chewable 81 milliGRAM(s) Oral daily  atorvastatin 80 milliGRAM(s) Oral at bedtime  losartan 50 milliGRAM(s) Oral daily  Increase sertraline to 50 milliGRAM(s) Oral daily

## 2021-06-18 NOTE — PROGRESS NOTE BEHAVIORAL HEALTH - NSBHROSSYSTEMS_PSY_ALL_CORE
Cardiovascular.../Neurological...

## 2021-06-18 NOTE — PROGRESS NOTE BEHAVIORAL HEALTH - AFFECT RANGE
Constricted
Full
Constricted
Constricted

## 2021-06-18 NOTE — PROGRESS NOTE BEHAVIORAL HEALTH - NSBHPTASSESSDT_PSY_A_CORE
11-Jun-2021 14:27
12-Jun-2021 10:50
14-Jun-2021 15:12
17-Jun-2021 12:58
15-Marc-2021 16:07
18-Jun-2021 09:48
16-Jun-2021 14:47
10-Marc-2021 10:08
08-Jun-2021 14:01
09-Jun-2021 11:53

## 2021-06-18 NOTE — PROGRESS NOTE BEHAVIORAL HEALTH - NS ED BHA MSE GENERAL APPEARANCE
Deformities present

## 2021-06-19 NOTE — CHART NOTE - NSCHARTNOTEFT_GEN_A_CORE
Patient made it home safe, stated he is happy to be home. Reported feeling well and spending time with family. Has plans to  his medications today. All needed therapy appointments have been scheduled.

## 2021-06-22 ENCOUNTER — NON-APPOINTMENT (OUTPATIENT)
Age: 66
End: 2021-06-22

## 2021-06-22 ENCOUNTER — APPOINTMENT (OUTPATIENT)
Dept: DERMATOLOGY | Facility: CLINIC | Age: 66
End: 2021-06-22
Payer: MEDICARE

## 2021-06-22 DIAGNOSIS — L57.0 ACTINIC KERATOSIS: ICD-10-CM

## 2021-06-22 DIAGNOSIS — D22.9 MELANOCYTIC NEVI, UNSPECIFIED: ICD-10-CM

## 2021-06-22 DIAGNOSIS — L81.4 OTHER MELANIN HYPERPIGMENTATION: ICD-10-CM

## 2021-06-22 PROCEDURE — 99204 OFFICE O/P NEW MOD 45 MIN: CPT

## 2021-06-22 NOTE — PHYSICAL EXAM
[FreeTextEntry3] : AAOx3, pleasant, NAD, no visual lymphadenopathy\par hair, scalp, face, nose, eyelids, ears, lips, oropharynx, neck, chest, abdomen, back, right arm, left arm, nails, and hands examined with all normal findings,\par pertinent findings include:\par \par multiple benign nevi and lentigines\par scaling erythematous papules numerous on face and chest

## 2021-06-22 NOTE — ASSESSMENT
[FreeTextEntry1] : 1) benign findings as above- education\par \par 2) AKs\par education\par discussed field tx\par mometasone ointment BID x1 week to help with inflammation; SED\par start 5FU 1-2x a day for 2-4 weeks; SED\par can use mometasone PRN irritation; SED

## 2021-06-22 NOTE — HISTORY OF PRESENT ILLNESS
[FreeTextEntry1] : blotches on face [de-identified] : 65 year old male with spots on face. moles.

## 2021-06-24 DIAGNOSIS — I10 ESSENTIAL (PRIMARY) HYPERTENSION: ICD-10-CM

## 2021-06-24 DIAGNOSIS — F32.9 MAJOR DEPRESSIVE DISORDER, SINGLE EPISODE, UNSPECIFIED: ICD-10-CM

## 2021-06-24 DIAGNOSIS — I69.951 HEMIPLEGIA AND HEMIPARESIS FOLLOWING UNSPECIFIED CEREBROVASCULAR DISEASE AFFECTING RIGHT DOMINANT SIDE: ICD-10-CM

## 2021-06-24 DIAGNOSIS — Z79.1 LONG TERM (CURRENT) USE OF NON-STEROIDAL ANTI-INFLAMMATORIES (NSAID): ICD-10-CM

## 2021-06-24 DIAGNOSIS — Z91.5 PERSONAL HISTORY OF SELF-HARM: ICD-10-CM

## 2021-06-24 DIAGNOSIS — Z81.8 FAMILY HISTORY OF OTHER MENTAL AND BEHAVIORAL DISORDERS: ICD-10-CM

## 2021-06-24 DIAGNOSIS — F11.21 OPIOID DEPENDENCE, IN REMISSION: ICD-10-CM

## 2021-06-24 DIAGNOSIS — E78.5 HYPERLIPIDEMIA, UNSPECIFIED: ICD-10-CM

## 2021-06-24 DIAGNOSIS — Z79.82 LONG TERM (CURRENT) USE OF ASPIRIN: ICD-10-CM

## 2021-06-30 ENCOUNTER — APPOINTMENT (OUTPATIENT)
Dept: INTERNAL MEDICINE | Facility: CLINIC | Age: 66
End: 2021-06-30
Payer: MEDICARE

## 2021-06-30 VITALS
SYSTOLIC BLOOD PRESSURE: 130 MMHG | HEART RATE: 82 BPM | RESPIRATION RATE: 16 BRPM | OXYGEN SATURATION: 97 % | DIASTOLIC BLOOD PRESSURE: 62 MMHG | TEMPERATURE: 96.7 F | BODY MASS INDEX: 26.6 KG/M2 | WEIGHT: 190 LBS | HEIGHT: 71 IN

## 2021-06-30 DIAGNOSIS — R21 RASH AND OTHER NONSPECIFIC SKIN ERUPTION: ICD-10-CM

## 2021-06-30 DIAGNOSIS — H90.42 SENSORINEURAL HEARING LOSS, UNILATERAL, LEFT EAR, WITH UNRESTRICTED HEARING ON THE CONTRALATERAL SIDE: ICD-10-CM

## 2021-06-30 DIAGNOSIS — Z91.5 PERSONAL HISTORY OF SELF-HARM: ICD-10-CM

## 2021-06-30 DIAGNOSIS — Z86.59 PERSONAL HISTORY OF OTHER MENTAL AND BEHAVIORAL DISORDERS: ICD-10-CM

## 2021-06-30 DIAGNOSIS — H93.12 TINNITUS, LEFT EAR: ICD-10-CM

## 2021-06-30 PROCEDURE — 99215 OFFICE O/P EST HI 40 MIN: CPT

## 2021-06-30 RX ORDER — CYCLOBENZAPRINE HYDROCHLORIDE 5 MG/1
5 TABLET, FILM COATED ORAL
Refills: 0 | Status: ACTIVE | COMMUNITY

## 2021-06-30 RX ORDER — FLUOXETINE HYDROCHLORIDE 20 MG/1
20 CAPSULE ORAL
Qty: 240 | Refills: 1 | Status: DISCONTINUED | COMMUNITY
Start: 2020-11-13 | End: 2021-06-30

## 2021-07-01 ENCOUNTER — NON-APPOINTMENT (OUTPATIENT)
Age: 66
End: 2021-07-01

## 2021-07-09 ENCOUNTER — APPOINTMENT (OUTPATIENT)
Dept: NEUROLOGY | Facility: CLINIC | Age: 66
End: 2021-07-09
Payer: MEDICARE

## 2021-07-09 PROCEDURE — 64644 CHEMODENERV 1 EXTREM 5/> MUS: CPT

## 2021-07-09 NOTE — HISTORY OF PRESENT ILLNESS
[FreeTextEntry1] : 65-year-old man with a history of stroke with a resultant right spastic hemiparesis, here for Botox treatment of the spasticity of the right upper extremity.since starting Botox,has noted some improvement in the ability to move, position and take care of the right upper extremity.

## 2021-07-09 NOTE — PROCEDURE
[FreeTextEntry1] : fter obtaining consent. Botox 200 unit mixed with 3 cc sterile saline. Using a septic technique with alcohol, injected 33 units into the right biceps, brachioradialis, pronator teres and quadratus muscles.Flexor pollicis longus, and flexor carpi ulnaris.Patient tolerated procedure well. bleeding controlled with pressure.

## 2021-07-09 NOTE — PHYSICAL EXAM
[General Appearance - Alert] : alert [General Appearance - In No Acute Distress] : in no acute distress [Person] : oriented to person [Place] : oriented to place [Fluency] : fluency not intact [Comprehension] : comprehension intact [Cranial Nerves Optic (II)] : visual acuity intact bilaterally,  visual fields full to confrontation, pupils equal round and reactive to light [Cranial Nerves Oculomotor (III)] : extraocular motion intact [Cranial Nerves Trigeminal (V)] : facial sensation intact symmetrically [Cranial Nerves Facial (VII)] : face symmetrical [Cranial Nerves Vestibulocochlear (VIII)] : hearing was intact bilaterally [Cranial Nerves Glossopharyngeal (IX)] : tongue and palate midline [Cranial Nerves Accessory (XI - Cranial And Spinal)] : head turning and shoulder shrug symmetric [Sensation Tactile Decrease] : light touch was intact [3+] : Patella right 3+ [2+] : Patella left 2+ [FreeTextEntry6] : Increased tone noted of the upper and right lower extremities with bduction of the upper arm to the body, flexion of the elbow and wrist, maintain pronation of the forearm, and finger flexion.\par The fingers and extend it was fully, there is some range of motion about the wrist and elbows, no pain noticeable.Minimal movement noted of the shoulder, flexion of the elbow and wrist,2-3/5 [FreeTextEntry8] : walk with a cane, hemiparetic gait

## 2021-07-09 NOTE — ASSESSMENT
[FreeTextEntry1] : 65-year-old man status post Botox treatment of a spasticity of the right upper extremity,has noted some improvement.\par Schedule next Botox treatment in 3 months

## 2021-09-01 ENCOUNTER — RX RENEWAL (OUTPATIENT)
Age: 66
End: 2021-09-01

## 2021-10-11 ENCOUNTER — APPOINTMENT (OUTPATIENT)
Dept: NEUROLOGY | Facility: CLINIC | Age: 66
End: 2021-10-11
Payer: MEDICARE

## 2021-10-11 PROCEDURE — 64644 CHEMODENERV 1 EXTREM 5/> MUS: CPT

## 2021-10-11 NOTE — ASSESSMENT
[FreeTextEntry1] : 66-year-old man with a right spastic any paresthesias, status post Botox,stable. Doing better.\par Plan: Schedule next Botox treatment in 2 months

## 2021-10-11 NOTE — HISTORY OF PRESENT ILLNESS
[FreeTextEntry1] : 66-year-old man with past history of a stroke with right-sided spastic hemiparesis here for Botox therapy, over the right upper extremity.House more admitted to extend the warm, supinate available. Continues to have difficulty with the finger especially the thumb.

## 2021-10-11 NOTE — PROCEDURE
[FreeTextEntry1] : Botox 20 units with 4 cc sterile saline, using aseptic technique injected 33 units into the right biceps, right brachioradialis, pronator teres and quadratus, right Flexor pollicis longus and flexor carpi ulnaris.\par Any bleeding controlled with local pressure. Patient tolerated procedure well. [Consent Signed] : consent signed [Adverse Effects] : no adverse effects [BOTOX 200 Units] : BOTOX 200 units; 5 units per muscle site.  procerus x 1, corragator x 2, frontalis x 4, temporalis x 8, occipitalis x 6, cervical  paraspinal x 4 and trapezius x 6

## 2021-10-26 ENCOUNTER — APPOINTMENT (OUTPATIENT)
Dept: INTERNAL MEDICINE | Facility: CLINIC | Age: 66
End: 2021-10-26

## 2021-11-03 ENCOUNTER — APPOINTMENT (OUTPATIENT)
Dept: INTERNAL MEDICINE | Facility: CLINIC | Age: 66
End: 2021-11-03
Payer: MEDICARE

## 2021-11-03 VITALS
DIASTOLIC BLOOD PRESSURE: 90 MMHG | SYSTOLIC BLOOD PRESSURE: 130 MMHG | TEMPERATURE: 97.7 F | WEIGHT: 190 LBS | BODY MASS INDEX: 26.6 KG/M2 | HEART RATE: 72 BPM | RESPIRATION RATE: 16 BRPM | OXYGEN SATURATION: 97 % | HEIGHT: 71 IN

## 2021-11-03 PROCEDURE — G0008: CPT

## 2021-11-03 PROCEDURE — 90662 IIV NO PRSV INCREASED AG IM: CPT

## 2021-11-03 PROCEDURE — 99214 OFFICE O/P EST MOD 30 MIN: CPT | Mod: 25

## 2021-11-03 NOTE — PLAN
[FreeTextEntry1] : \par \par Continue Zoloft 50 mg qd and psychotherapy\par \par Consider  trial off Cyclobenzaprine to see if fatigue improves and no change in spasticity.  \par \par Will arrange for FBW through [facility].  \par \par Flu vax today.\par \par Maintain good hydration. Pt with h/o orthostatic hypotension.  Likely with vasovagal episode.   Pt must notify immediately any  further syncopal events.  \par \par Covid booster recommended.  \par \par Case and plan reviewed with .  \par \par

## 2021-11-03 NOTE — PHYSICAL EXAM
[No Acute Distress] : no acute distress [Well Nourished] : well nourished [PERRL] : pupils equal round and reactive to light [Normal Oropharynx] : the oropharynx was normal [Supple] : supple [No Respiratory Distress] : no respiratory distress  [No Accessory Muscle Use] : no accessory muscle use [Clear to Auscultation] : lungs were clear to auscultation bilaterally [Regular Rhythm] : with a regular rhythm [Normal S1, S2] : normal S1 and S2 [No Edema] : there was no peripheral edema [No Extremity Clubbing/Cyanosis] : no extremity clubbing/cyanosis [de-identified] : +expressive aphasia.   [de-identified] : + R hemiplegia

## 2021-11-03 NOTE — HISTORY OF PRESENT ILLNESS
[FreeTextEntry1] : FU [de-identified] : PResents for FU HTN/CVA.  \par Hospitalized in June for Tylenol OD. He does have  a  who comes to his home twice a week. He has not had any further suicidal ideations. His mood overall per his wife has improved significantly.  Feeling more fatigued.\par Had episode of syncope one week ago.  He was  walking with aide down driveway.  Returned into house and began to feel faint while aide was transferring him to couch. Had brief LOC, quickly recovered.   BP  per aide 90/60. Denies palpitations, chest pain, SOB.    He was hydrated with water and symptoms resolved.  NO further episodes.  H/O orthostatic hypotension,on Florinef.

## 2021-11-08 ENCOUNTER — NON-APPOINTMENT (OUTPATIENT)
Age: 66
End: 2021-11-08

## 2021-12-21 LAB — SARS-COV-2 N GENE NPH QL NAA+PROBE: NOT DETECTED

## 2022-01-01 NOTE — ED PROVIDER NOTE - NSTIMEPROVIDERCAREINITIATE_GEN_ER
SE RECIBE PTE PEDIATRICO ALERTA EN COMPANIA DE PADRE EL CUAL REFIERE HACE 3
NEVES DANNY PRESENTA FIEBRE, RASH EN NADIA Y DIARREA. AL MOMENTO DEL TRIAGE PTE NO
PRESENTA FIEBRE. SE MONITOREAN S/V Y SE UBICA EN SP. 19-Aug-2020 12:21

## 2022-02-02 NOTE — PHYSICAL EXAM
[No Acute Distress] : no acute distress [Well Nourished] : well nourished [Well Developed] : well developed [Well-Appearing] : well-appearing [Normal Sclera/Conjunctiva] : normal sclera/conjunctiva [PERRL] : pupils equal round and reactive to light [EOMI] : extraocular movements intact [Normal Outer Ear/Nose] : the outer ears and nose were normal in appearance [Normal Oropharynx] : the oropharynx was normal [No JVD] : no jugular venous distention [No Lymphadenopathy] : no lymphadenopathy [Supple] : supple [Thyroid Normal, No Nodules] : the thyroid was normal and there were no nodules present [No Respiratory Distress] : no respiratory distress  [No Accessory Muscle Use] : no accessory muscle use [Clear to Auscultation] : lungs were clear to auscultation bilaterally [Normal Rate] : normal rate  [Regular Rhythm] : with a regular rhythm [Normal S1, S2] : normal S1 and S2 [No Murmur] : no murmur heard [No Carotid Bruits] : no carotid bruits [No Abdominal Bruit] : a ~M bruit was not heard ~T in the abdomen [No Varicosities] : no varicosities [Pedal Pulses Present] : the pedal pulses are present [No Edema] : there was no peripheral edema [No Palpable Aorta] : no palpable aorta [No Extremity Clubbing/Cyanosis] : no extremity clubbing/cyanosis [Soft] : abdomen soft [Non Tender] : non-tender [Non-distended] : non-distended [No Masses] : no abdominal mass palpated [No HSM] : no HSM [Normal Bowel Sounds] : normal bowel sounds [Normal Posterior Cervical Nodes] : no posterior cervical lymphadenopathy [Normal Anterior Cervical Nodes] : no anterior cervical lymphadenopathy [No CVA Tenderness] : no CVA  tenderness [No Spinal Tenderness] : no spinal tenderness [No Joint Swelling] : no joint swelling [Grossly Normal Strength/Tone] : grossly normal strength/tone [No Rash] : no rash [Normal Affect] : the affect was normal [Alert and Oriented x3] : oriented to person, place, and time [Normal Insight/Judgement] : insight and judgment were intact [de-identified] : right-sided hemiplegia [de-identified] : expressive dysphasia

## 2022-02-02 NOTE — PLAN
[FreeTextEntry1] : Mr. Briggs presents for a followup evaluation accompanied by his ex-wife.\par \par #1. Patient was admitted onto the hospital with a Tylenol overdose in suicide attempt. He was then transferred from medical floor to the psychiatric unit on [address]. He is now on Zoloft 50 mg daily which she will continue. He was also started on fludrocortisone 0.1 mg daily.\par \par #2. All lab work and consultant followup notes were reviewed from his recent admission. Medication list was reviewed and revised. Patient will continue on current list.\par \par #4. Patient followup in 4 months with repeat laboratory examination.

## 2022-02-02 NOTE — HISTORY OF PRESENT ILLNESS
[FreeTextEntry1] : hospital followup [de-identified] : Mr. Briggs presents for followup evaluation accompanied by his ex-wife. He was admitted to [facility] on 6/7/21 with Tylenol overdose and suicide attempt. Initial lab work showed an elevated Tylenol level with normal liver function tests. He did have a respiratory acidosis arterial blood gas. Repeat lab work 40 hours later showed a normal tylenol level with normal liver function tests. The patient was discharged from the medical service and admitted to psychiatric service on [address] where he stayed until June 18. Mr. Briggs has a history of CVA with right-sided hemiparesis. He took a Tylenol overdose because he was feeling depressed over his physical limitations.

## 2022-02-04 ENCOUNTER — APPOINTMENT (OUTPATIENT)
Dept: INTERNAL MEDICINE | Facility: CLINIC | Age: 67
End: 2022-02-04
Payer: MEDICARE

## 2022-02-04 VITALS
SYSTOLIC BLOOD PRESSURE: 130 MMHG | WEIGHT: 185 LBS | HEIGHT: 71 IN | TEMPERATURE: 98.2 F | HEART RATE: 81 BPM | BODY MASS INDEX: 25.9 KG/M2 | OXYGEN SATURATION: 96 % | RESPIRATION RATE: 16 BRPM | DIASTOLIC BLOOD PRESSURE: 70 MMHG

## 2022-02-04 PROCEDURE — 99214 OFFICE O/P EST MOD 30 MIN: CPT

## 2022-02-04 NOTE — HISTORY OF PRESENT ILLNESS
[FreeTextEntry1] : Followup evaluation [de-identified] : Mr. Briggs presents for a followup evaluation accompanied by his wife. He has significant expressive dysphasia. Patient has a history of left-sided CVA with right-sided hemiplegia. He denies any chest pains or palpitations. The patient does have some shortness of breath with exertion, however, symptoms resolve with rest. He needs to ambulate with a cane.

## 2022-02-04 NOTE — PLAN
[FreeTextEntry1] : Mr. Briggs presents for a followup evaluation. He has a history of CVA with right-sided hemiplegia.\par \par Number one. Patient has been referred to physical therapy for range of motion and muscle strengthening. Also for gait training\par \par #2. Patient given prescription for CBC, CMP lipid hemoglobin A1c, iron level, lipid profile, PSA level, TSH and urinalysis.\par \par #3. Mr. Briggs will continue to followup with neurology, Dr. Christine. He has received Botox injections to the right arm and hand.\par \par #4. Patient will continue on current medication regimen which has been reviewed and revised.

## 2022-02-04 NOTE — REVIEW OF SYSTEMS
[Muscle Weakness] : muscle weakness [Unsteady Walk] : ataxia [Depression] : depression [Negative] : Heme/Lymph

## 2022-02-04 NOTE — PHYSICAL EXAM
[No Acute Distress] : no acute distress [Well Nourished] : well nourished [Well Developed] : well developed [Well-Appearing] : well-appearing [Normal Sclera/Conjunctiva] : normal sclera/conjunctiva [PERRL] : pupils equal round and reactive to light [EOMI] : extraocular movements intact [Normal Outer Ear/Nose] : the outer ears and nose were normal in appearance [Normal Oropharynx] : the oropharynx was normal [No JVD] : no jugular venous distention [No Lymphadenopathy] : no lymphadenopathy [Supple] : supple [Thyroid Normal, No Nodules] : the thyroid was normal and there were no nodules present [No Respiratory Distress] : no respiratory distress  [No Accessory Muscle Use] : no accessory muscle use [Clear to Auscultation] : lungs were clear to auscultation bilaterally [Normal Rate] : normal rate  [Regular Rhythm] : with a regular rhythm [Normal S1, S2] : normal S1 and S2 [No Murmur] : no murmur heard [No Carotid Bruits] : no carotid bruits [No Abdominal Bruit] : a ~M bruit was not heard ~T in the abdomen [No Varicosities] : no varicosities [Pedal Pulses Present] : the pedal pulses are present [No Edema] : there was no peripheral edema [No Palpable Aorta] : no palpable aorta [No Extremity Clubbing/Cyanosis] : no extremity clubbing/cyanosis [Soft] : abdomen soft [Non Tender] : non-tender [Non-distended] : non-distended [No Masses] : no abdominal mass palpated [No HSM] : no HSM [Normal Bowel Sounds] : normal bowel sounds [Normal Posterior Cervical Nodes] : no posterior cervical lymphadenopathy [Normal Anterior Cervical Nodes] : no anterior cervical lymphadenopathy [No CVA Tenderness] : no CVA  tenderness [No Spinal Tenderness] : no spinal tenderness [No Joint Swelling] : no joint swelling [No Rash] : no rash [Normal Insight/Judgement] : insight and judgment were intact [de-identified] : right-sided plegia [de-identified] : right-sided hemiplegia [de-identified] : Expressive dysphasia

## 2022-03-10 ENCOUNTER — APPOINTMENT (OUTPATIENT)
Dept: NEUROLOGY | Facility: CLINIC | Age: 67
End: 2022-03-10
Payer: MEDICARE

## 2022-03-10 PROCEDURE — 64644 CHEMODENERV 1 EXTREM 5/> MUS: CPT

## 2022-03-10 NOTE — ASSESSMENT
[FreeTextEntry1] : 66-year-old man history of a left CVA, with aphasia, right spastic hemiparesis, status post Botox treatment, for spasticity of the right upper extremity, tolerated procedure well.\par referred to physical therapy.\par Return to office, for Botox procedure, in 3 months

## 2022-03-10 NOTE — HISTORY OF PRESENT ILLNESS
[FreeTextEntry1] : 66-year-old man with history of previous stroke, resulted in aphasia, right spastic hemiparesis, here for schedule Botox appointment.

## 2022-03-10 NOTE — PROCEDURE
[FreeTextEntry1] : after obtaining consent. Using aseptic technique with alcohol, Botox 200 units mixed with 4 cc of sterile saline.injected 40 units into the right biceps,30 units into the right brachioradialis. 25 units into the flexor carpi ulnaris, 25 units into the right flexor pollicis longus.35 units into the right pronator teres, 5 units each into the right interossei muscles.\par Any bleeding treated with local pressure.Patient tolerated procedure.

## 2022-03-10 NOTE — PHYSICAL EXAM
[General Appearance - Alert] : alert [General Appearance - In No Acute Distress] : in no acute distress [Person] : oriented to person [Fluency] : fluency not intact [Place] : oriented to place [Comprehension] : comprehension intact [Cranial Nerves Optic (II)] : visual acuity intact bilaterally,  visual fields full to confrontation, pupils equal round and reactive to light [Cranial Nerves Oculomotor (III)] : extraocular motion intact [Cranial Nerves Trigeminal (V)] : facial sensation intact symmetrically [Cranial Nerves Facial (VII)] : face symmetrical [Cranial Nerves Vestibulocochlear (VIII)] : hearing was intact bilaterally [Cranial Nerves Glossopharyngeal (IX)] : tongue and palate midline [Cranial Nerves Accessory (XI - Cranial And Spinal)] : head turning and shoulder shrug symmetric [3+] : Patella right 3+ [2+] : Patella left 2+ [FreeTextEntry6] : Increased tone noted of the upper and right lower extremities with bduction of the upper arm to the body, flexion of the elbow and wrist, maintain pronation of the forearm, and finger flexion.\par The fingers and extend it was fully, there is some range of motion about the wrist and elbows, no pain noticeable.Minimal movement noted of the shoulder, flexion of the elbow and wrist,2-3/5 [FreeTextEntry8] : walk with a cane, hemiparetic gait

## 2022-04-11 NOTE — PROGRESS NOTE ADULT - PROVIDER SPECIALTY LIST ADULT
Rehab Medicine 5-Fu Counseling: 5-Fluorouracil Counseling:  I discussed with the patient the risks of 5-fluorouracil including but not limited to erythema, scaling, itching, weeping, crusting, and pain.

## 2022-05-26 NOTE — ED ADULT NURSE NOTE - RESPIRATORY WDL
verbal cues
Breathing spontaneous and unlabored. Breath sounds clear and equal bilaterally with regular rhythm.

## 2022-06-02 NOTE — PROGRESS NOTE ADULT - REASON FOR ADMISSION
Group Topic: BH Check-in/Symptom Rating    Date: 2022  Start Time:  9:00 AM  End Time:  9:50 AM  Facilitators: Deborah Hanks LCPC; eGrson Pelaez LCSW; Gopal Gooden LCPC    Focus: Morning Review Virtual Group Secondary to Covid19   Number in attendance: 9    Method: Group  Attendance: Present  Participation: Active  Patient Response: Attentive  Mood: Depressed  Affect: Type: Depressed   Range: Blunted/flat   Congruency: Congruent   Stability: Stable  Behavior/Socialization: Cooperative  Thought Process: Focused  Task Performance: Follows directions  Patient Evaluation: Independent - full participation    - JUSTIFICATION OF SERVICE: Identification of problem areas while not attending treatment setting.  Establishment of goals for today.  Evaluation of self-administration of medications: bipolar and suicidal ideation   SLEEP - # of hours: 10 normal   APPETITE -   # of meals:2 increase  THOUGHTS of KILLING or HURTING SELF or OTHERS?    Notes: pt denied   MEDICATION:  Are you taking medications as prescribed?  Notes: yes   Are you having any side effects?     Notes: pt denied   DRUG/ALCOHOL USE:    Notes: 1 drink - decrease- Pt expressed feeling proud of self for having one drink instead of usual 4 to 5.   HALLUCINATIONS:  Notes: pt denied   DELUSIONS  Notes: pt denied   Rate your mood 1-10 (1 = low, 10 = high).  Ratin/10   Any problems that occurred during the evening or morning that affected mood? Pt reported went on a bike ride, distracting self, went out with a couple friends for a birthday party   Treatment related goal: self compassion, self-care, positive self-talk   Helpful coping skill(s) used in the last 24 hours: bike ride, went out with friends, self-care, positive self-talk   Location: basement   
Group Topic: BH Education    Date: 6/1/2022  Start Time: 12:30 PM  End Time:  1:20 PM  Facilitators: Gopal Gooden LCPC    Focus: Perfectionism  Number in attendance: 4  This group was done via virtual therapy on zoom due to covid 19.    Method: Group  Attendance: Present  Participation: Moderate  Patient Response: Appropriate feedback, Asked questions, Attentive and Good eye contact  Mood: Anxious  Affect: Type: Anxious   Range: Blunted/flat   Congruency: Congruent   Stability: Labile  Behavior/Socialization: Appropriate to group and Cooperative  Thought Process: Focused  Task Performance: Follows directions  Patient Evaluation: Encouragement - needs prompts      
Group Topic: BH Medication Education    Date: 6/1/2022  Start Time: 10:00 AM  End Time: 10:50 AM  Facilitators: Bryson Doyle LCSW; Teresa Anna RN; Gopal Gooden LCPC    Focus: Medication education - Virtual group secondary to COVID-19  Number in attendance: 12    Method: Group  Attendance: Present  Participation: Moderate  Patient Response: Attentive  Mood: Depressed  Affect: Type: Anxious   Range: Full (normal)   Congruency: Congruent   Stability: Stable  Behavior/Socialization: Cooperative  Thought Process: Focused  Task Performance: Follows directions  Patient Evaluation: Independent - full participation      
Group Topic: BH Process Group     Date: 6/1/2022  Start Time: 11:00 AM  End Time: 11:50 AM  Facilitators: Deborah Hanks LCPC    Focus: Group Therapy Virtual group secondary to covid19   Number in attendance: 4      Method: Group  Attendance: Present  Participation: Active  Patient Response: Attentive  Mood: Depressed  Affect: Type: Depressed   Range: Blunted/flat   Congruency: Congruent   Stability: Stable  Behavior/Socialization: Cooperative  Thought Process: Focused  Task Performance: Follows directions  Patient Evaluation: Independent - full participation     Pt was alert and attentive. Pt reported Dr. Teran encouraged him to process alcohol usage. Pt reported he had one drink because he was feeling bored and wanting something to do. This writer encouraged pt to further explore \"bored\". Pt identified challenges to sit with feelings and tolerate thoughts. Discussed ways to ride the wave and increase emotional tolerance and coping strategies versus avoidance coping. Pt identified that alcohol is a way to numb and \"I tell myself that it gives me something to do but it really doesn't\". Pt was receptive to group feedback. Pt reported plans to look into AA meetings and [facility]. Discussed alternative activities and ways to build hobbies.   Plan: PT will continue PHP.   This writer spoke with pt after group regarding step down to IOP starting 6/2/22. Pt was in agreement.          
syncope x2
syncope x2

## 2022-06-16 ENCOUNTER — EMERGENCY (EMERGENCY)
Facility: HOSPITAL | Age: 67
LOS: 0 days | Discharge: HOME | End: 2022-06-16
Attending: EMERGENCY MEDICINE
Payer: MEDICARE

## 2022-06-16 VITALS
SYSTOLIC BLOOD PRESSURE: 124 MMHG | RESPIRATION RATE: 18 BRPM | OXYGEN SATURATION: 97 % | HEIGHT: 71 IN | WEIGHT: 179.9 LBS | HEART RATE: 64 BPM | TEMPERATURE: 98 F | DIASTOLIC BLOOD PRESSURE: 74 MMHG

## 2022-06-16 VITALS
RESPIRATION RATE: 15 BRPM | HEART RATE: 67 BPM | TEMPERATURE: 99 F | DIASTOLIC BLOOD PRESSURE: 73 MMHG | OXYGEN SATURATION: 98 % | SYSTOLIC BLOOD PRESSURE: 127 MMHG

## 2022-06-16 DIAGNOSIS — R55 SYNCOPE AND COLLAPSE: ICD-10-CM

## 2022-06-16 DIAGNOSIS — I69.851 HEMIPLEGIA AND HEMIPARESIS FOLLOWING OTHER CEREBROVASCULAR DISEASE AFFECTING RIGHT DOMINANT SIDE: ICD-10-CM

## 2022-06-16 DIAGNOSIS — Z79.82 LONG TERM (CURRENT) USE OF ASPIRIN: ICD-10-CM

## 2022-06-16 DIAGNOSIS — Z20.822 CONTACT WITH AND (SUSPECTED) EXPOSURE TO COVID-19: ICD-10-CM

## 2022-06-16 DIAGNOSIS — R40.4 TRANSIENT ALTERATION OF AWARENESS: ICD-10-CM

## 2022-06-16 DIAGNOSIS — I45.10 UNSPECIFIED RIGHT BUNDLE-BRANCH BLOCK: ICD-10-CM

## 2022-06-16 LAB
ALBUMIN SERPL ELPH-MCNC: 3.4 G/DL — SIGNIFICANT CHANGE UP (ref 3.3–5)
ALP SERPL-CCNC: 90 U/L — SIGNIFICANT CHANGE UP (ref 40–120)
ALT FLD-CCNC: 27 U/L — SIGNIFICANT CHANGE UP (ref 12–78)
ANION GAP SERPL CALC-SCNC: 5 MMOL/L — SIGNIFICANT CHANGE UP (ref 5–17)
APTT BLD: 32.8 SEC — SIGNIFICANT CHANGE UP (ref 27.5–35.5)
AST SERPL-CCNC: 24 U/L — SIGNIFICANT CHANGE UP (ref 15–37)
BASOPHILS # BLD AUTO: 0.08 K/UL — SIGNIFICANT CHANGE UP (ref 0–0.2)
BASOPHILS NFR BLD AUTO: 0.7 % — SIGNIFICANT CHANGE UP (ref 0–2)
BILIRUB SERPL-MCNC: 0.2 MG/DL — SIGNIFICANT CHANGE UP (ref 0.2–1.2)
BUN SERPL-MCNC: 10 MG/DL — SIGNIFICANT CHANGE UP (ref 7–23)
CALCIUM SERPL-MCNC: 8.6 MG/DL — SIGNIFICANT CHANGE UP (ref 8.5–10.1)
CHLORIDE SERPL-SCNC: 104 MMOL/L — SIGNIFICANT CHANGE UP (ref 96–108)
CO2 SERPL-SCNC: 29 MMOL/L — SIGNIFICANT CHANGE UP (ref 22–31)
CREAT SERPL-MCNC: 0.74 MG/DL — SIGNIFICANT CHANGE UP (ref 0.5–1.3)
EGFR: 99 ML/MIN/1.73M2 — SIGNIFICANT CHANGE UP
EOSINOPHIL # BLD AUTO: 0.06 K/UL — SIGNIFICANT CHANGE UP (ref 0–0.5)
EOSINOPHIL NFR BLD AUTO: 0.5 % — SIGNIFICANT CHANGE UP (ref 0–6)
GLUCOSE SERPL-MCNC: 117 MG/DL — HIGH (ref 70–99)
HCT VFR BLD CALC: 38.1 % — LOW (ref 39–50)
HGB BLD-MCNC: 11.9 G/DL — LOW (ref 13–17)
IMM GRANULOCYTES NFR BLD AUTO: 0.2 % — SIGNIFICANT CHANGE UP (ref 0–1.5)
INR BLD: 1.06 RATIO — SIGNIFICANT CHANGE UP (ref 0.88–1.16)
LYMPHOCYTES # BLD AUTO: 1 K/UL — SIGNIFICANT CHANGE UP (ref 1–3.3)
LYMPHOCYTES # BLD AUTO: 8.1 % — LOW (ref 13–44)
MCHC RBC-ENTMCNC: 27.5 PG — SIGNIFICANT CHANGE UP (ref 27–34)
MCHC RBC-ENTMCNC: 31.2 GM/DL — LOW (ref 32–36)
MCV RBC AUTO: 88 FL — SIGNIFICANT CHANGE UP (ref 80–100)
MONOCYTES # BLD AUTO: 0.6 K/UL — SIGNIFICANT CHANGE UP (ref 0–0.9)
MONOCYTES NFR BLD AUTO: 4.9 % — SIGNIFICANT CHANGE UP (ref 2–14)
NEUTROPHILS # BLD AUTO: 10.5 K/UL — HIGH (ref 1.8–7.4)
NEUTROPHILS NFR BLD AUTO: 85.6 % — HIGH (ref 43–77)
PLATELET # BLD AUTO: 256 K/UL — SIGNIFICANT CHANGE UP (ref 150–400)
POTASSIUM SERPL-MCNC: 3.9 MMOL/L — SIGNIFICANT CHANGE UP (ref 3.5–5.3)
POTASSIUM SERPL-SCNC: 3.9 MMOL/L — SIGNIFICANT CHANGE UP (ref 3.5–5.3)
PROT SERPL-MCNC: 6.9 GM/DL — SIGNIFICANT CHANGE UP (ref 6–8.3)
PROTHROM AB SERPL-ACNC: 12.3 SEC — SIGNIFICANT CHANGE UP (ref 10.5–13.4)
RBC # BLD: 4.33 M/UL — SIGNIFICANT CHANGE UP (ref 4.2–5.8)
RBC # FLD: 14.2 % — SIGNIFICANT CHANGE UP (ref 10.3–14.5)
SODIUM SERPL-SCNC: 138 MMOL/L — SIGNIFICANT CHANGE UP (ref 135–145)
TROPONIN I, HIGH SENSITIVITY RESULT: 3.67 NG/L — SIGNIFICANT CHANGE UP
WBC # BLD: 12.27 K/UL — HIGH (ref 3.8–10.5)
WBC # FLD AUTO: 12.27 K/UL — HIGH (ref 3.8–10.5)

## 2022-06-16 PROCEDURE — 71045 X-RAY EXAM CHEST 1 VIEW: CPT

## 2022-06-16 PROCEDURE — 93010 ELECTROCARDIOGRAM REPORT: CPT

## 2022-06-16 PROCEDURE — 84484 ASSAY OF TROPONIN QUANT: CPT

## 2022-06-16 PROCEDURE — 36415 COLL VENOUS BLD VENIPUNCTURE: CPT

## 2022-06-16 PROCEDURE — 85610 PROTHROMBIN TIME: CPT

## 2022-06-16 PROCEDURE — 85025 COMPLETE CBC W/AUTO DIFF WBC: CPT

## 2022-06-16 PROCEDURE — U0005: CPT

## 2022-06-16 PROCEDURE — U0003: CPT

## 2022-06-16 PROCEDURE — 99285 EMERGENCY DEPT VISIT HI MDM: CPT | Mod: 25

## 2022-06-16 PROCEDURE — 93005 ELECTROCARDIOGRAM TRACING: CPT

## 2022-06-16 PROCEDURE — 70450 CT HEAD/BRAIN W/O DYE: CPT | Mod: MA

## 2022-06-16 PROCEDURE — 80053 COMPREHEN METABOLIC PANEL: CPT

## 2022-06-16 PROCEDURE — 71045 X-RAY EXAM CHEST 1 VIEW: CPT | Mod: 26

## 2022-06-16 PROCEDURE — 70450 CT HEAD/BRAIN W/O DYE: CPT | Mod: 26,MA

## 2022-06-16 PROCEDURE — 85730 THROMBOPLASTIN TIME PARTIAL: CPT

## 2022-06-16 PROCEDURE — 99285 EMERGENCY DEPT VISIT HI MDM: CPT

## 2022-06-16 RX ORDER — SODIUM CHLORIDE 9 MG/ML
1000 INJECTION INTRAMUSCULAR; INTRAVENOUS; SUBCUTANEOUS ONCE
Refills: 0 | Status: COMPLETED | OUTPATIENT
Start: 2022-06-16 | End: 2022-06-16

## 2022-06-16 RX ADMIN — SODIUM CHLORIDE 1000 MILLILITER(S): 9 INJECTION INTRAMUSCULAR; INTRAVENOUS; SUBCUTANEOUS at 18:26

## 2022-06-16 NOTE — ED ADULT NURSE NOTE - OBJECTIVE STATEMENT
pt BIB ems synopsized at PT. pt states " he felt dehydrated".  denies cp/sob/n/v/d/f. HX x CVA 2 years ago with residual right sided weakness and slurred speech. NO changes. No code stroke as per MD Ayala.

## 2022-06-16 NOTE — ED PROVIDER NOTE - PATIENT PORTAL LINK FT
You can access the FollowMyHealth Patient Portal offered by [facility] by registering at the following website: [email or web address]. By joining [employer]’s FollowMyHealth portal, you will also be able to view your health information using other applications (apps) compatible with our system.

## 2022-06-16 NOTE — ED PROVIDER NOTE - OBJECTIVE STATEMENT
65 y/o male with a PMHx of CVA presents to the ED BIBA s/p syncope. Pt c/o R UE and R LE weakness that is chronic since stroke. Pt's wife states he was on all fours on the ground and got up to go to the stretcher. When he got to the stretcher, the pt synopsized. Pt was unconscious for a short period of time. States he currently feels fine. Denies HA, vision changes, SOB, and palpitation. Pt is compliant with all medications and is taking fludrocortisone. Pt COVID vaccinated x3.

## 2022-06-16 NOTE — ED PROVIDER NOTE - CLINICAL SUMMARY MEDICAL DECISION MAKING FREE TEXT BOX
Patient with syncope, likely orthostatic.  EKG nonischemic, no arrhythmia.  Labs WNL, imaging negative.  Patient appears well, no complaints.  D/c home in good condition, f/u with cardiology, PCP.

## 2022-06-16 NOTE — ED ADULT TRIAGE NOTE - CHIEF COMPLAINT QUOTE
Pt brought in by ambulance s/p syncopal episode while at PT. Pt with hx CVA 2 years ago with residual right sided weakness and slurred speech. NO changes. No code stroke as per MD Ayala.

## 2022-06-17 ENCOUNTER — APPOINTMENT (OUTPATIENT)
Dept: INTERNAL MEDICINE | Facility: CLINIC | Age: 67
End: 2022-06-17
Payer: MEDICARE

## 2022-06-17 VITALS
TEMPERATURE: 98.7 F | DIASTOLIC BLOOD PRESSURE: 80 MMHG | HEART RATE: 88 BPM | SYSTOLIC BLOOD PRESSURE: 120 MMHG | RESPIRATION RATE: 16 BRPM | HEIGHT: 71 IN | BODY MASS INDEX: 25.9 KG/M2 | OXYGEN SATURATION: 97 % | WEIGHT: 185 LBS

## 2022-06-17 PROCEDURE — 99214 OFFICE O/P EST MOD 30 MIN: CPT

## 2022-06-17 NOTE — PHYSICAL EXAM
[No Acute Distress] : no acute distress [No Respiratory Distress] : no respiratory distress  [No Accessory Muscle Use] : no accessory muscle use [Clear to Auscultation] : lungs were clear to auscultation bilaterally [Normal Rate] : normal rate  [Regular Rhythm] : with a regular rhythm [Normal S1, S2] : normal S1 and S2 [No Edema] : there was no peripheral edema [Soft] : abdomen soft [Non Tender] : non-tender [Non-distended] : non-distended [Normal Bowel Sounds] : normal bowel sounds [Normal Affect] : the affect was normal [Alert and Oriented x3] : oriented to person, place, and time [de-identified] : Ambulates with cane

## 2022-06-17 NOTE — PLAN
[FreeTextEntry1] : 1. Will repeat CBC in 1 month to reassess elevated WBC. Urine done today in office to r/o infection\par \par 2. Patient will make effort to stay hydrated. To change positions slowly \par \par 3. All forms brought by patient filled out, returned to patient, and scanned into EMR\par \par 3. To rto in 6 months or sooner if needed

## 2022-06-17 NOTE — HISTORY OF PRESENT ILLNESS
[FreeTextEntry1] : F/U visit [de-identified] : Patient is a 66- year -old male who presents to office today with spouse for f/u visit. 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 Patient reports he was going from sitting to standing position just prior to passing out. He did not hit his head.  Patient was rushed to ER and was found to be dehydrated. Patient was given 1L NS and discharged home. Of note, review of ED labs revealed elevated WBC at 12.4. He denies recent illness or recent sick contacts. \par \par Patient feels well today, no acute complaints .

## 2022-06-20 ENCOUNTER — NON-APPOINTMENT (OUTPATIENT)
Age: 67
End: 2022-06-20

## 2022-06-20 LAB
APPEARANCE: CLEAR
BILIRUBIN URINE: NEGATIVE
BLOOD URINE: NEGATIVE
COLOR: COLORLESS
GLUCOSE QUALITATIVE U: NEGATIVE
KETONES URINE: NEGATIVE
LEUKOCYTE ESTERASE URINE: NEGATIVE
NITRITE URINE: NEGATIVE
PH URINE: 6.5
PROTEIN URINE: NEGATIVE
SPECIFIC GRAVITY URINE: 1.01
UROBILINOGEN URINE: NORMAL

## 2022-06-30 ENCOUNTER — APPOINTMENT (OUTPATIENT)
Dept: INTERNAL MEDICINE | Facility: CLINIC | Age: 67
End: 2022-06-30

## 2022-07-08 ENCOUNTER — APPOINTMENT (OUTPATIENT)
Dept: NEUROLOGY | Facility: CLINIC | Age: 67
End: 2022-07-08

## 2022-07-08 PROCEDURE — 99213 OFFICE O/P EST LOW 20 MIN: CPT | Mod: 25

## 2022-07-08 PROCEDURE — 64644 CHEMODENERV 1 EXTREM 5/> MUS: CPT

## 2022-07-08 NOTE — PROCEDURE
[FreeTextEntry1] : After obtaining consent.  Using aseptic technique with alcohol.  2 vials of Botox 200 units, will mix with 2 cc of sterile saline each.\par Using 27-gauge needles, injected 75 units into the right biceps, 50 units into the brachioradialis, 50 units each into the pronator teres muscle and quadratus, 75 units into the palmaris longus, and 50 units in the flexor carpi ulnaris.\par Patient tolerated procedure well.  Any bleeding controlled with local pressure.

## 2022-07-08 NOTE — PHYSICAL EXAM
[General Appearance - Alert] : alert [General Appearance - In No Acute Distress] : in no acute distress [Oriented To Time, Place, And Person] : oriented to person, place, and time [Impaired Insight] : insight and judgment were intact [Affect] : the affect was normal [Person] : oriented to person [Place] : oriented to place [Naming Objects] : difficulty naming common objects [Repeating Phrases] : difficulty repeating a phrase [Fluency] : fluency not intact [Comprehension] : comprehension intact [Cranial Nerves Optic (II)] : visual acuity intact bilaterally,  visual fields full to confrontation, pupils equal round and reactive to light [Cranial Nerves Oculomotor (III)] : extraocular motion intact [Cranial Nerves Trigeminal (V)] : facial sensation intact symmetrically [Cranial Nerves Facial (VII)] : face symmetrical [Cranial Nerves Vestibulocochlear (VIII)] : hearing was intact bilaterally [Cranial Nerves Glossopharyngeal (IX)] : tongue and palate midline [Cranial Nerves Accessory (XI - Cranial And Spinal)] : head turning and shoulder shrug symmetric [Motor Strength Upper Extremities Right] : there was weakness of the right upper extremity [Motor Strength Lower Extremities Right] : there was weakness of the right lower extremity [Sensation Tactile Decrease] : light touch was intact [3+] : Patella right 3+ [2+] : Patella left 2+ [FreeTextEntry6] : Increased tone noted of the upper and right lower extremities with bduction of the upper arm to the body, flexion of the elbow and wrist, maintain pronation of the forearm, and finger flexion.\par The fingers and extend it was fully, there is some range of motion about the wrist and elbows, no pain noticeable.Minimal movement noted of the shoulder, flexion of the elbow and wrist,2-3/5 [FreeTextEntry8] : Spastic gait, walk with a cane

## 2022-07-08 NOTE — ASSESSMENT
[FreeTextEntry1] : 56-year-old man with history 2 years ago of a left MCA CVA, aphasic, right spastic hemiparesis.  Botox seem to improve some mobility of the right upper extremity.  Tolerated procedure well.\par Reviewed treatment, no changes.\par Discussed exercise movement activity.\par Return to office, 3 months.

## 2022-07-08 NOTE — HISTORY OF PRESENT ILLNESS
[FreeTextEntry1] : 56-year-old man right-handed with a history of hypertension, hyperlipidemia, in February 2020, presented with a acute left MCA CVA, presented then with aphasia, right hemiplegia.  Comes in with his wife for follow-up, with complaints of weakness, walking, history of aphasia.   Difficulty extending, supinating and forearm and arm, and extending fingers.  Has been undergoing Botox therapy, continues to improve with physical therapy.\par No recent falls, no worsening symptoms.

## 2022-07-21 ENCOUNTER — NON-APPOINTMENT (OUTPATIENT)
Age: 67
End: 2022-07-21

## 2022-08-26 ENCOUNTER — RX RENEWAL (OUTPATIENT)
Age: 67
End: 2022-08-26

## 2022-09-28 ENCOUNTER — APPOINTMENT (OUTPATIENT)
Dept: INTERNAL MEDICINE | Facility: CLINIC | Age: 67
End: 2022-09-28

## 2022-09-28 VITALS
WEIGHT: 187 LBS | TEMPERATURE: 98.3 F | RESPIRATION RATE: 16 BRPM | DIASTOLIC BLOOD PRESSURE: 70 MMHG | HEART RATE: 70 BPM | HEIGHT: 70 IN | BODY MASS INDEX: 26.77 KG/M2 | SYSTOLIC BLOOD PRESSURE: 126 MMHG | OXYGEN SATURATION: 97 %

## 2022-09-28 DIAGNOSIS — Z23 ENCOUNTER FOR IMMUNIZATION: ICD-10-CM

## 2022-09-28 DIAGNOSIS — R47.01 APHASIA: ICD-10-CM

## 2022-09-28 PROCEDURE — G0009: CPT

## 2022-09-28 PROCEDURE — 99214 OFFICE O/P EST MOD 30 MIN: CPT | Mod: 25

## 2022-09-28 PROCEDURE — 90662 IIV NO PRSV INCREASED AG IM: CPT

## 2022-09-28 PROCEDURE — G0008: CPT

## 2022-09-28 PROCEDURE — 90677 PCV20 VACCINE IM: CPT

## 2022-09-28 RX ORDER — FLUOROURACIL 50 MG/G
5 CREAM TOPICAL
Qty: 1 | Refills: 1 | Status: DISCONTINUED | COMMUNITY
Start: 2021-06-22 | End: 2022-09-28

## 2022-09-28 RX ORDER — MEMANTINE HYDROCHLORIDE 10 MG/1
10 TABLET ORAL
Refills: 0 | Status: DISCONTINUED | COMMUNITY
End: 2022-09-28

## 2022-09-28 RX ORDER — MOMETASONE FUROATE 1 MG/G
0.1 OINTMENT TOPICAL TWICE DAILY
Qty: 1 | Refills: 2 | Status: DISCONTINUED | COMMUNITY
Start: 2021-06-22 | End: 2022-09-28

## 2022-09-28 RX ORDER — TIZANIDINE 4 MG/1
4 TABLET ORAL EVERY 8 HOURS
Qty: 270 | Refills: 1 | Status: DISCONTINUED | COMMUNITY
End: 2022-09-28

## 2022-09-28 NOTE — PHYSICAL EXAM
[No Acute Distress] : no acute distress [Well Nourished] : well nourished [Well Developed] : well developed [Well-Appearing] : well-appearing [Normal Sclera/Conjunctiva] : normal sclera/conjunctiva [PERRL] : pupils equal round and reactive to light [EOMI] : extraocular movements intact [Normal Outer Ear/Nose] : the outer ears and nose were normal in appearance [Normal Oropharynx] : the oropharynx was normal [No JVD] : no jugular venous distention [No Lymphadenopathy] : no lymphadenopathy [Supple] : supple [Thyroid Normal, No Nodules] : the thyroid was normal and there were no nodules present [No Respiratory Distress] : no respiratory distress  [No Accessory Muscle Use] : no accessory muscle use [Clear to Auscultation] : lungs were clear to auscultation bilaterally [Normal Rate] : normal rate  [Regular Rhythm] : with a regular rhythm [Normal S1, S2] : normal S1 and S2 [No Murmur] : no murmur heard [No Carotid Bruits] : no carotid bruits [No Abdominal Bruit] : a ~M bruit was not heard ~T in the abdomen [No Varicosities] : no varicosities [Pedal Pulses Present] : the pedal pulses are present [No Edema] : there was no peripheral edema [No Palpable Aorta] : no palpable aorta [No Extremity Clubbing/Cyanosis] : no extremity clubbing/cyanosis [Soft] : abdomen soft [Non Tender] : non-tender [Non-distended] : non-distended [No Masses] : no abdominal mass palpated [No HSM] : no HSM [Normal Bowel Sounds] : normal bowel sounds [Normal Posterior Cervical Nodes] : no posterior cervical lymphadenopathy [Normal Anterior Cervical Nodes] : no anterior cervical lymphadenopathy [No CVA Tenderness] : no CVA  tenderness [No Spinal Tenderness] : no spinal tenderness [No Joint Swelling] : no joint swelling [Grossly Normal Strength/Tone] : grossly normal strength/tone [No Rash] : no rash [Normal Affect] : the affect was normal [Alert and Oriented x3] : oriented to person, place, and time [Normal Mood] : the mood was normal [Normal Insight/Judgement] : insight and judgment were intact [de-identified] : Right leg brace with dropfoot; right lower extremity weakness; right upper extremity paralysis [de-identified] : Expressive dysphasia

## 2022-09-28 NOTE — HISTORY OF PRESENT ILLNESS
[FreeTextEntry1] : Follow-up [de-identified] : Mr. Ledezma presents for a follow-up evaluation.  He is accompanied by his physical therapist.  He is doing well.  Patient has a history of left-sided CVA with right-sided hemiplegia.He had a carotid artery dissection with occlusion of the left internal carotid artery.  Patient has a history of expressive dysphasia.  His speech has improved significantly since his last visit.  He has a brace for his right foreleg.  He is ambulating with a cane.  He has limited use of the right upper extremity.

## 2022-09-28 NOTE — PLAN
[FreeTextEntry1] : Mr. Bauman presents for a follow-up evaluation.\par \par 1.  Patient will continue current physical therapy.  He has a history of left carotid dissection with occlusion of the left carotid artery.  He has right-sided hemiplegia.  There is significant improvement in the right lower extremity.  His dysphasia has also improved.\par \par 2.  Mr. Bauman does have a history of depression.  His affect is significantly improved compared to his previous visit.\par \par 3.  Patient will continue on current medication regimen which has been reviewed and revised.\par \par 4.  Prescription for CBC, CMP, hemoglobin A1c, iron level, lipid profile, PSA level, TSH and urinalysis.\par \par 5.  Patient will receive the high-dose flu vaccine as well as the Prevnar 20 vaccine.\par \par 6.  Follow-up in 6 months.

## 2022-09-28 NOTE — REVIEW OF SYSTEMS
[Hearing Loss] : hearing loss [Joint Stiffness] : joint stiffness [Muscle Weakness] : muscle weakness [Unsteady Walk] : ataxia [Depression] : depression [Negative] : Heme/Lymph

## 2022-10-04 ENCOUNTER — RX RENEWAL (OUTPATIENT)
Age: 67
End: 2022-10-04

## 2022-10-12 ENCOUNTER — APPOINTMENT (OUTPATIENT)
Dept: NEUROLOGY | Facility: CLINIC | Age: 67
End: 2022-10-12

## 2022-10-20 ENCOUNTER — APPOINTMENT (OUTPATIENT)
Dept: NEUROLOGY | Facility: CLINIC | Age: 67
End: 2022-10-20

## 2022-10-20 PROCEDURE — 99213 OFFICE O/P EST LOW 20 MIN: CPT | Mod: 25

## 2022-10-20 PROCEDURE — 64644 CHEMODENERV 1 EXTREM 5/> MUS: CPT

## 2022-10-20 NOTE — PHYSICAL EXAM
[General Appearance - Alert] : alert [General Appearance - In No Acute Distress] : in no acute distress [Oriented To Time, Place, And Person] : oriented to person, place, and time [Impaired Insight] : insight and judgment were intact [Affect] : the affect was normal [Person] : oriented to person [Place] : oriented to place [Naming Objects] : difficulty naming common objects [Repeating Phrases] : difficulty repeating a phrase [Fluency] : fluency not intact [Comprehension] : comprehension intact [Cranial Nerves Optic (II)] : visual acuity intact bilaterally,  visual fields full to confrontation, pupils equal round and reactive to light [Cranial Nerves Oculomotor (III)] : extraocular motion intact [Cranial Nerves Trigeminal (V)] : facial sensation intact symmetrically [Cranial Nerves Facial (VII)] : face symmetrical [Cranial Nerves Vestibulocochlear (VIII)] : hearing was intact bilaterally [Cranial Nerves Accessory (XI - Cranial And Spinal)] : head turning and shoulder shrug symmetric [Motor Strength Upper Extremities Right] : there was weakness of the right upper extremity [Motor Strength Lower Extremities Right] : there was weakness of the right lower extremity [Sensation Tactile Decrease] : light touch was intact [Dysdiadochokinesia On The Left] : not present on the left side [3+] : Patella right 3+ [2+] : Patella left 2+ [FreeTextEntry6] : Spastic right upper and lower extremities [FreeTextEntry8] : Spastic gait, walk with a cane

## 2022-10-20 NOTE — PROCEDURE
[FreeTextEntry1] : Botox 400 units, and 2 vials of 200 each.  Mixed with 2 cc of sterile saline each.  Using aseptic mix with alcohol.\par Using 37-gauge needles, injected 75 units into the right biceps, 50 units in the brachioradialis, 50 units into the pronator teres and quadratus muscles.  75 units in the palmaris longus and 50 units in the flexor carpi ulnaris.\par Patient tolerated procedure well.  Any bleeding controlled with local pressure. [Consent Signed] : consent signed [Adverse Effects] : no adverse effects

## 2022-10-20 NOTE — ASSESSMENT
[FreeTextEntry1] : 67-year-old man history of left CVA, aphasic, right spastic hemiparesis, stable.  Ongoing OT and physical therapy.  Tolerated Botox well, to improve extension of the shoulder, pronation of the elbow and extension of the fingers.\par Patient scheduled Botox therapy 3 months.\par

## 2022-10-20 NOTE — HISTORY OF PRESENT ILLNESS
[FreeTextEntry1] : 67-year-old man with a history of left MCA CVA, left with a right spastic hemiparesis, aphasia.  Accompanied by his wife.  No new changes, no recent hospitalization, no illness.  Continuing with occupational therapy, trying to improve mobility of the right upper extremity especially.  Able to walk with the help of a cane and walker.\par No recent falls or injuries.  No trouble with daytime fatigue or tiredness.  No sleep apnea.\par No headache, no new weakness or numbness of the face or extremities.

## 2022-11-23 ENCOUNTER — RX RENEWAL (OUTPATIENT)
Age: 67
End: 2022-11-23

## 2022-12-08 ENCOUNTER — EMERGENCY (EMERGENCY)
Facility: HOSPITAL | Age: 67
LOS: 1 days | Discharge: HOME | End: 2022-12-08
Attending: STUDENT IN AN ORGANIZED HEALTH CARE EDUCATION/TRAINING PROGRAM
Payer: MEDICARE

## 2022-12-08 VITALS
OXYGEN SATURATION: 96 % | DIASTOLIC BLOOD PRESSURE: 68 MMHG | SYSTOLIC BLOOD PRESSURE: 125 MMHG | RESPIRATION RATE: 18 BRPM | TEMPERATURE: 99 F | HEART RATE: 61 BPM

## 2022-12-08 VITALS — SYSTOLIC BLOOD PRESSURE: 143 MMHG | DIASTOLIC BLOOD PRESSURE: 80 MMHG | HEART RATE: 75 BPM

## 2022-12-08 LAB
ANION GAP SERPL CALC-SCNC: 15 MMOL/L — SIGNIFICANT CHANGE UP (ref 5–17)
BUN SERPL-MCNC: 14.4 MG/DL — SIGNIFICANT CHANGE UP (ref 8–20)
CALCIUM SERPL-MCNC: 8.5 MG/DL — SIGNIFICANT CHANGE UP (ref 8.4–10.5)
CHLORIDE SERPL-SCNC: 102 MMOL/L — SIGNIFICANT CHANGE UP (ref 96–108)
CO2 SERPL-SCNC: 23 MMOL/L — SIGNIFICANT CHANGE UP (ref 22–29)
CREAT SERPL-MCNC: 0.78 MG/DL — SIGNIFICANT CHANGE UP (ref 0.5–1.3)
EGFR: 97 ML/MIN/1.73M2 — SIGNIFICANT CHANGE UP
GLUCOSE SERPL-MCNC: 113 MG/DL — HIGH (ref 70–99)
HCT VFR BLD CALC: 36.4 % — LOW (ref 39–50)
HGB BLD-MCNC: 11.6 G/DL — LOW (ref 13–17)
MCHC RBC-ENTMCNC: 27.4 PG — SIGNIFICANT CHANGE UP (ref 27–34)
MCHC RBC-ENTMCNC: 31.9 GM/DL — LOW (ref 32–36)
MCV RBC AUTO: 86.1 FL — SIGNIFICANT CHANGE UP (ref 80–100)
PLATELET # BLD AUTO: 239 K/UL — SIGNIFICANT CHANGE UP (ref 150–400)
POTASSIUM SERPL-MCNC: 3.9 MMOL/L — SIGNIFICANT CHANGE UP (ref 3.5–5.3)
POTASSIUM SERPL-SCNC: 3.9 MMOL/L — SIGNIFICANT CHANGE UP (ref 3.5–5.3)
RBC # BLD: 4.23 M/UL — SIGNIFICANT CHANGE UP (ref 4.2–5.8)
RBC # FLD: 14.6 % — HIGH (ref 10.3–14.5)
SODIUM SERPL-SCNC: 140 MMOL/L — SIGNIFICANT CHANGE UP (ref 135–145)
TROPONIN T SERPL-MCNC: <0.01 NG/ML — SIGNIFICANT CHANGE UP (ref 0–0.06)
TROPONIN T SERPL-MCNC: <0.01 NG/ML — SIGNIFICANT CHANGE UP (ref 0–0.06)
WBC # BLD: 6.76 K/UL — SIGNIFICANT CHANGE UP (ref 3.8–10.5)
WBC # FLD AUTO: 6.76 K/UL — SIGNIFICANT CHANGE UP (ref 3.8–10.5)

## 2022-12-08 PROCEDURE — 71045 X-RAY EXAM CHEST 1 VIEW: CPT | Mod: 26

## 2022-12-08 PROCEDURE — 70450 CT HEAD/BRAIN W/O DYE: CPT | Mod: MA

## 2022-12-08 PROCEDURE — 99285 EMERGENCY DEPT VISIT HI MDM: CPT | Mod: 25

## 2022-12-08 PROCEDURE — 84484 ASSAY OF TROPONIN QUANT: CPT

## 2022-12-08 PROCEDURE — 85027 COMPLETE CBC AUTOMATED: CPT

## 2022-12-08 PROCEDURE — 93010 ELECTROCARDIOGRAM REPORT: CPT | Mod: 76

## 2022-12-08 PROCEDURE — 36415 COLL VENOUS BLD VENIPUNCTURE: CPT

## 2022-12-08 PROCEDURE — 93005 ELECTROCARDIOGRAM TRACING: CPT

## 2022-12-08 PROCEDURE — 72125 CT NECK SPINE W/O DYE: CPT | Mod: 26,MA

## 2022-12-08 PROCEDURE — 70450 CT HEAD/BRAIN W/O DYE: CPT | Mod: 26,MA

## 2022-12-08 PROCEDURE — 82962 GLUCOSE BLOOD TEST: CPT

## 2022-12-08 PROCEDURE — 71045 X-RAY EXAM CHEST 1 VIEW: CPT

## 2022-12-08 PROCEDURE — 80048 BASIC METABOLIC PNL TOTAL CA: CPT

## 2022-12-08 PROCEDURE — 99285 EMERGENCY DEPT VISIT HI MDM: CPT | Mod: GC

## 2022-12-08 PROCEDURE — 96360 HYDRATION IV INFUSION INIT: CPT

## 2022-12-08 PROCEDURE — 72125 CT NECK SPINE W/O DYE: CPT | Mod: MA

## 2022-12-08 RX ORDER — SODIUM CHLORIDE 9 MG/ML
1000 INJECTION INTRAMUSCULAR; INTRAVENOUS; SUBCUTANEOUS ONCE
Refills: 0 | Status: COMPLETED | OUTPATIENT
Start: 2022-12-08 | End: 2022-12-08

## 2022-12-08 RX ADMIN — SODIUM CHLORIDE 1000 MILLILITER(S): 9 INJECTION INTRAMUSCULAR; INTRAVENOUS; SUBCUTANEOUS at 17:41

## 2022-12-08 NOTE — ED PROVIDER NOTE - OBJECTIVE STATEMENT
HUGO COX is a 66yo M with PMH HTN, stroke w/ residual defects who presents after syncopal episode 1 hour pta. Pt states he had a busy day w/ pt & ot and then went to a [employer] party where he passed out. He is not sure if he fell or not. He does not remember the event but remembers waking up with his friends around him. He denies any pain, injury due to the fall. He denies any preceding prodrome but states he has been thirsty all day. He denies any chest pain, sob, abdominal pain, n/v. He denies fevers. Pt w/ residual dysarthria and right foot drop due to stroke ~2 years prior. HUGO COX is a 66yo M with PMH HTN, stroke w/ residual defects who presents after syncopal episode 1 hour pta. Pt states he had a busy day w/ pt & ot and then went to a [employer] party where he passed out. He is not sure if he fell or not. He does not remember the event but remembers waking up with his friends around him. He denies any pain, injury due to the fall. He denies any preceding prodrome but states he has been thirsty all day. Has not eaten or drank any fluids all day, had 2 glasses of champagne and smoked marijuana prior to the event. He denies any chest pain, sob, abdominal pain, n/v. He denies fevers. Pt w/ residual dysarthria and right foot drop due to stroke ~2 years prior.

## 2022-12-08 NOTE — ED PROVIDER NOTE - NS ED ROS FT
Review of Systems  CONSTITUTIONAL: afebrile w/no diaphoresis or weight changes +SYNCOPE x 1.   SKIN: warm, dry w/ no rash or bleeding  EYES: no changes to vision  ENT: no changes in hearing, no sore throat  RESPIRATORY: no cough or SOB  CARDIAC: no chest pain & no palpitations  GI: no abd pain, nausea, vomiting, diarrhea, constipation, or blood in stool/evie blood  GENITO-URINARY: no discharge, dysuria or hematuria,   MUSCULOSKELETAL:  no joint pain, swelling or redness  NEUROLOGIC: no weakness, headache, anesthesia or paresthesias  PSYCH: no anxiety, non suicidal, non homicidal, without hallucinations or depression

## 2022-12-08 NOTE — ED ADULT NURSE NOTE - OBJECTIVE STATEMENT
pt BIBEMS from [facility] [place] s/p syncopal event. pt reports he had PT/OT this morning, didn't eat anything except breakfast and passed out at [employer]. pt denies any alcohol use. unknown fall/ head strike. pt with hx of stroke with residual R side weakness and dysarthria. labs sent, orthostatics obtained. pending imaging. pt offering no complaints at this time.

## 2022-12-08 NOTE — ED PROVIDER NOTE - OTHER FINDINGS
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 help:   -Ga Hernandez    [facility] has a mental health crisis team you can call 24/7: Phone: [phone] - 24 hours a day/7 days a week    Other things that are important when I'm in crisis:   [place] crisis line @ **CRISIS (**431590) or by texting  [place]  to 959834.      Crisis Intervention: 596.428.8859 or 808-586-1952 (TTY: 369.951.6834). Call anytime for help.     National [place] on Mental Illness (www.[place].silvana.org): 946.542.5054 or 671-745-5120.      Appointment information and/or additional resources available to me:     -Follow up with established provider, Kevin Friday on 5/12/22. Try to see if a sooner available is open.   -Follow up with Primary Care Provider  -Look into family therapy or in-home services (this can be done through [facility])  -Discuss obtaining a  through your local county to assist with concerns.   -Look into Dual Diagnosis Programming to address MI/CD concerns (see resources below)    [facility] Mental Health Services:  Children's Mental Health  Case Management is designed to serve families with children who have severe emotional and behavioral disturbances.     In a culturally sensitive manner,  help families:    Access respite care and other supports  Assist parents in advocating for their child s mental health needs  Create a supportive team of family,  "professionals, and community members  Develop a treatment plan and a crisis plan  Provide information about and referral to community resources    Children's Mental Health Crisis Services  Children's Mental Health Crisis Services are for children ages 0 to 17 years old.    These services include:    Assistance Obtaining Services  Community Outreach  Mobile Rapid Crisis Assessment  Stabilization and Intervention Services  Phone: [phone] - 24 hours a day/7 days a week      Dual Diagnosis Day Treatment Programming:    [place]     Call [phone]   Intensive Outpatient Treatment includes four hours of group therapy and two hours of academic instruction every week day. Therapy includes learning coping strategies, communication skills, cognitive behavioral strategies, dialectical behavioral skills, relapse prevention, psychoeducation, and other individualized care as needed. Patients also have individual counseling, and they and their loved ones participate in weekly family therapy sessions. Patients see our onsite psychiatric provider for medication management.         Other Facilities with Day Treatment Services:     [facility]   Call [phone]     [facility]    Call [phone]     [facility]STAT: Adolescent Intensive Outpatient Treatment  [phone]            Crisis Lines  Crisis Text Line  Text 328576  You will be connected with a trained live crisis counselor to provide support.    Por espanol, texto  MICHELLE a 582027 o texto a 442-AYUDAME en WhatsApp    [employer] [place] (LGBTQ Youth Crisis Line)  4.837.196.7060  text START to 588-257      Community Resources  Fast Tracker  Linking people to mental health and substance use disorder resources  fasttrackermn.org     [facility] Warm Line  Peer to peer support  Monday thru Saturday, 12 pm to 10 pm  251.588.8309 or 6.626.925.8545  Text \"Support\" to 79003    National [place] on Mental Illness (KADEN)  " 946.336.4162 or 1.888.KADEN.HELPS      Mental Health Apps  My3  https://Veducapp.org/    VirtualHopeBox  https://SocialSamba/apps/virtual-hope-box/      Additional information  Today you were seen by a licensed mental health professional through Triage and Transition services, [facility] (P)  for a crisis assessment in the Emergency Department at [facility].  It is recommended that you follow up with your established providers (psychiatrist, mental health therapist, and/or primary care doctor - as relevant) as soon as possible. Coordinators from [facility] will be calling you in the next 24-48 hours to ensure that you have the resources you need.  You can also contact [facility] coordinators directly at [phone]. You may have been scheduled for or offered an appointment with a mental health provider. [facility] maintains an extensive network of licensed behavioral health providers to connect patients with the services they need.  We do not charge providers a fee to participate in our referral network.  We match patients with providers based on a patient's specific needs, insurance coverage, and location.  Our first effort will be to refer you to a provider within your care system, and will utilize providers outside your care system as needed.         rbbb

## 2022-12-08 NOTE — ED PROVIDER NOTE - PHYSICAL EXAMINATION
VITAL SIGNS: I have reviewed vital signs  CONSTITUTIONAL:  in no acute distress.  SKIN: Warm, dry, no rash.  HEAD: Normocephalic, atraumatic.  EYES: PERRL, conjunctiva and sclera clear. +EOMI.   ENT: pink & moist mucosa, no pharyngeal erythema  NECK: Supple, non tender. No cervical lymphadenopathy.  CARD: Regular rate and rhythm. No murmurs.   RESP: No wheezes, rales or rhonchi.   ABD:  soft, non-distended, non-tender.   MSK:  +right sided weakness, Lower>upper. Right foot brace.   NEURO: Alert, oriented. +dysarthria.   PSYCH: Cooperative, alert & oriented x3 VITAL SIGNS: I have reviewed vital signs  CONSTITUTIONAL:  in no acute distress.  SKIN: Warm, dry, no rash.  HEAD: Normocephalic, atraumatic.  EYES: PERRL, conjunctiva and sclera clear. +EOMI.   ENT: pink & moist mucosa, no pharyngeal erythema  NECK: Supple, non tender. No cervical lymphadenopathy.  CARD: Regular rate and rhythm. No murmurs.   RESP: No wheezes, rales or rhonchi.   ABD:  soft, non-distended, non-tender.   MSK:  +right sided weakness, Lower>upper. Right foot brace.   NEURO: Alert, oriented. +aphasia  PSYCH: Cooperative, alert & oriented x3

## 2022-12-08 NOTE — ED PROVIDER NOTE - PATIENT PORTAL LINK FT
You can access the FollowMyHealth Patient Portal offered by [facility] by registering at the following website: [email or web address]. By joining [employer]’s FollowMyHealth portal, you will also be able to view your health information using other applications (apps) compatible with our system.

## 2022-12-08 NOTE — ED PROVIDER NOTE - ATTENDING CONTRIBUTION TO CARE
I, Louise Barron, have personally seen and examined this patient. I have fully participated in the care of this patient. I have reviewed all pertinent clinical information, including history, physical exam, plan and the Resident's note and agree except as noted below.     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 next thing he knows he was on the floor, doesn't fully remember the event. denies any chest pain or shortness of breath. baseline neurological deficits. 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 labs without any significant findings. CXR interpretted by this physician without any acute findings. D/T age and sudden onset of symptoms will monitor on tele and trop x2, CT head pending due to possible fall. signed out to incoming physician to Follow up results and disposition at that time

## 2022-12-08 NOTE — ED PROVIDER NOTE - CLINICAL SUMMARY MEDICAL DECISION MAKING FREE TEXT BOX
ASSESSMENT:   CLAYTON COX is a 66yo M w/ residual sx from cva presenting w/ syncope. EKG w/ RBB, unchanged from prior. Pe w/ residual weakness. No new findings.    PLAN: Ct head, check electrolytes. Orthostatics. Cxr.

## 2022-12-08 NOTE — ED ADULT TRIAGE NOTE - CHIEF COMPLAINT QUOTE
from home syncopal episode, found on floor unresponsive, pt a&ox4 , residual weakness to R arm and leg from prior stroke.  ekg obtained. a &ox4.

## 2022-12-21 ENCOUNTER — EMERGENCY (EMERGENCY)
Facility: HOSPITAL | Age: 67
LOS: 0 days | Discharge: HOME | End: 2022-12-21
Attending: STUDENT IN AN ORGANIZED HEALTH CARE EDUCATION/TRAINING PROGRAM
Payer: MEDICARE

## 2022-12-21 VITALS
OXYGEN SATURATION: 98 % | HEART RATE: 66 BPM | DIASTOLIC BLOOD PRESSURE: 63 MMHG | RESPIRATION RATE: 17 BRPM | TEMPERATURE: 99 F | SYSTOLIC BLOOD PRESSURE: 111 MMHG

## 2022-12-21 VITALS
TEMPERATURE: 99 F | RESPIRATION RATE: 17 BRPM | HEART RATE: 65 BPM | WEIGHT: 184.97 LBS | OXYGEN SATURATION: 96 % | HEIGHT: 71 IN | SYSTOLIC BLOOD PRESSURE: 117 MMHG | DIASTOLIC BLOOD PRESSURE: 65 MMHG

## 2022-12-21 DIAGNOSIS — I69.351 HEMIPLEGIA AND HEMIPARESIS FOLLOWING CEREBRAL INFARCTION AFFECTING RIGHT DOMINANT SIDE: ICD-10-CM

## 2022-12-21 DIAGNOSIS — R19.7 DIARRHEA, UNSPECIFIED: ICD-10-CM

## 2022-12-21 DIAGNOSIS — R55 SYNCOPE AND COLLAPSE: ICD-10-CM

## 2022-12-21 DIAGNOSIS — R11.2 NAUSEA WITH VOMITING, UNSPECIFIED: ICD-10-CM

## 2022-12-21 DIAGNOSIS — Z20.822 CONTACT WITH AND (SUSPECTED) EXPOSURE TO COVID-19: ICD-10-CM

## 2022-12-21 DIAGNOSIS — Z79.82 LONG TERM (CURRENT) USE OF ASPIRIN: ICD-10-CM

## 2022-12-21 LAB
ALBUMIN SERPL ELPH-MCNC: 3.2 G/DL — LOW (ref 3.3–5)
ALP SERPL-CCNC: 84 U/L — SIGNIFICANT CHANGE UP (ref 40–120)
ALT FLD-CCNC: 30 U/L — SIGNIFICANT CHANGE UP (ref 12–78)
ANION GAP SERPL CALC-SCNC: 7 MMOL/L — SIGNIFICANT CHANGE UP (ref 5–17)
AST SERPL-CCNC: 31 U/L — SIGNIFICANT CHANGE UP (ref 15–37)
BASE EXCESS BLDV CALC-SCNC: 1.7 MMOL/L — SIGNIFICANT CHANGE UP
BASOPHILS # BLD AUTO: 0.02 K/UL — SIGNIFICANT CHANGE UP (ref 0–0.2)
BASOPHILS NFR BLD AUTO: 0.3 % — SIGNIFICANT CHANGE UP (ref 0–2)
BILIRUB SERPL-MCNC: 0.4 MG/DL — SIGNIFICANT CHANGE UP (ref 0.2–1.2)
BUN SERPL-MCNC: 11 MG/DL — SIGNIFICANT CHANGE UP (ref 7–23)
CALCIUM SERPL-MCNC: 8.4 MG/DL — LOW (ref 8.5–10.1)
CHLORIDE SERPL-SCNC: 103 MMOL/L — SIGNIFICANT CHANGE UP (ref 96–108)
CO2 BLDV-SCNC: 29 MMOL/L — HIGH (ref 22–26)
CO2 SERPL-SCNC: 25 MMOL/L — SIGNIFICANT CHANGE UP (ref 22–31)
CREAT SERPL-MCNC: 0.82 MG/DL — SIGNIFICANT CHANGE UP (ref 0.5–1.3)
EGFR: 96 ML/MIN/1.73M2 — SIGNIFICANT CHANGE UP
EOSINOPHIL # BLD AUTO: 0.02 K/UL — SIGNIFICANT CHANGE UP (ref 0–0.5)
EOSINOPHIL NFR BLD AUTO: 0.3 % — SIGNIFICANT CHANGE UP (ref 0–6)
GLUCOSE SERPL-MCNC: 108 MG/DL — HIGH (ref 70–99)
HCO3 BLDV-SCNC: 28 MMOL/L — SIGNIFICANT CHANGE UP (ref 22–29)
HCT VFR BLD CALC: 36.5 % — LOW (ref 39–50)
HGB BLD-MCNC: 11.8 G/DL — LOW (ref 13–17)
IMM GRANULOCYTES NFR BLD AUTO: 0.2 % — SIGNIFICANT CHANGE UP (ref 0–0.9)
LYMPHOCYTES # BLD AUTO: 0.6 K/UL — LOW (ref 1–3.3)
LYMPHOCYTES # BLD AUTO: 10.4 % — LOW (ref 13–44)
MAGNESIUM SERPL-MCNC: 2 MG/DL — SIGNIFICANT CHANGE UP (ref 1.6–2.6)
MCHC RBC-ENTMCNC: 27.8 PG — SIGNIFICANT CHANGE UP (ref 27–34)
MCHC RBC-ENTMCNC: 32.3 GM/DL — SIGNIFICANT CHANGE UP (ref 32–36)
MCV RBC AUTO: 86.1 FL — SIGNIFICANT CHANGE UP (ref 80–100)
MONOCYTES # BLD AUTO: 0.55 K/UL — SIGNIFICANT CHANGE UP (ref 0–0.9)
MONOCYTES NFR BLD AUTO: 9.5 % — SIGNIFICANT CHANGE UP (ref 2–14)
NEUTROPHILS # BLD AUTO: 4.56 K/UL — SIGNIFICANT CHANGE UP (ref 1.8–7.4)
NEUTROPHILS NFR BLD AUTO: 79.3 % — HIGH (ref 43–77)
NT-PROBNP SERPL-SCNC: 28 PG/ML — SIGNIFICANT CHANGE UP (ref 0–125)
PCO2 BLDV: 48 MMHG — SIGNIFICANT CHANGE UP (ref 42–55)
PH BLDV: 7.37 — SIGNIFICANT CHANGE UP (ref 7.32–7.43)
PLATELET # BLD AUTO: 210 K/UL — SIGNIFICANT CHANGE UP (ref 150–400)
PO2 BLDV: 75 MMHG — SIGNIFICANT CHANGE UP
POTASSIUM SERPL-MCNC: 3.7 MMOL/L — SIGNIFICANT CHANGE UP (ref 3.5–5.3)
POTASSIUM SERPL-SCNC: 3.7 MMOL/L — SIGNIFICANT CHANGE UP (ref 3.5–5.3)
PROT SERPL-MCNC: 7.2 GM/DL — SIGNIFICANT CHANGE UP (ref 6–8.3)
RAPID RVP RESULT: SIGNIFICANT CHANGE UP
RBC # BLD: 4.24 M/UL — SIGNIFICANT CHANGE UP (ref 4.2–5.8)
RBC # FLD: 14 % — SIGNIFICANT CHANGE UP (ref 10.3–14.5)
SAO2 % BLDV: 96 % — SIGNIFICANT CHANGE UP
SARS-COV-2 RNA SPEC QL NAA+PROBE: SIGNIFICANT CHANGE UP
SODIUM SERPL-SCNC: 135 MMOL/L — SIGNIFICANT CHANGE UP (ref 135–145)
TROPONIN I, HIGH SENSITIVITY RESULT: 7.91 NG/L — SIGNIFICANT CHANGE UP
WBC # BLD: 5.76 K/UL — SIGNIFICANT CHANGE UP (ref 3.8–10.5)
WBC # FLD AUTO: 5.76 K/UL — SIGNIFICANT CHANGE UP (ref 3.8–10.5)

## 2022-12-21 PROCEDURE — 85025 COMPLETE CBC W/AUTO DIFF WBC: CPT

## 2022-12-21 PROCEDURE — 83880 ASSAY OF NATRIURETIC PEPTIDE: CPT

## 2022-12-21 PROCEDURE — 71045 X-RAY EXAM CHEST 1 VIEW: CPT

## 2022-12-21 PROCEDURE — 93010 ELECTROCARDIOGRAM REPORT: CPT

## 2022-12-21 PROCEDURE — 36415 COLL VENOUS BLD VENIPUNCTURE: CPT

## 2022-12-21 PROCEDURE — 82803 BLOOD GASES ANY COMBINATION: CPT

## 2022-12-21 PROCEDURE — 99285 EMERGENCY DEPT VISIT HI MDM: CPT | Mod: 25

## 2022-12-21 PROCEDURE — 83735 ASSAY OF MAGNESIUM: CPT

## 2022-12-21 PROCEDURE — 93005 ELECTROCARDIOGRAM TRACING: CPT

## 2022-12-21 PROCEDURE — 80053 COMPREHEN METABOLIC PANEL: CPT

## 2022-12-21 PROCEDURE — 0225U NFCT DS DNA&RNA 21 SARSCOV2: CPT

## 2022-12-21 PROCEDURE — 71045 X-RAY EXAM CHEST 1 VIEW: CPT | Mod: 26

## 2022-12-21 PROCEDURE — 84484 ASSAY OF TROPONIN QUANT: CPT

## 2022-12-21 PROCEDURE — 99284 EMERGENCY DEPT VISIT MOD MDM: CPT

## 2022-12-21 PROCEDURE — 96374 THER/PROPH/DIAG INJ IV PUSH: CPT

## 2022-12-21 RX ORDER — ONDANSETRON 8 MG/1
4 TABLET, FILM COATED ORAL ONCE
Refills: 0 | Status: COMPLETED | OUTPATIENT
Start: 2022-12-21 | End: 2022-12-21

## 2022-12-21 RX ORDER — SODIUM CHLORIDE 9 MG/ML
2000 INJECTION INTRAMUSCULAR; INTRAVENOUS; SUBCUTANEOUS ONCE
Refills: 0 | Status: COMPLETED | OUTPATIENT
Start: 2022-12-21 | End: 2022-12-21

## 2022-12-21 RX ADMIN — SODIUM CHLORIDE 2000 MILLILITER(S): 9 INJECTION INTRAMUSCULAR; INTRAVENOUS; SUBCUTANEOUS at 12:11

## 2022-12-21 RX ADMIN — ONDANSETRON 4 MILLIGRAM(S): 8 TABLET, FILM COATED ORAL at 12:03

## 2022-12-21 NOTE — ED PROVIDER NOTE - CLINICAL SUMMARY MEDICAL DECISION MAKING FREE TEXT BOX
Elderly male history of syncopal episodes presents with a syncopal episode in the setting of 2 days of nausea vomiting diarrhea.  Normal vital signs.  Pending twelve-lead.  Planning pending blood work.  If work-up is negative patient and patient tolerates p.o. can consider discharge given there is a reason for syncope being dehydration.
Statement Selected

## 2022-12-21 NOTE — ED ADULT TRIAGE NOTE - CHIEF COMPLAINT QUOTE
syncopal episode on toilet, - head strike. not on anticoagulants. at baseline mental status, hx CVA residual right sided weakness. per EMS, pt reports having a stomach virus yesterday + V/D. appreciates no c/o pain at this time.

## 2022-12-21 NOTE — ED PROVIDER NOTE - OBJECTIVE STATEMENT
67-year-old male past medical history of stroke with residual right-sided weakness, walks with a walker at baseline, coming in from home with witnessed syncopal episode by home health aide.  Patient has been having multiple episodes of nausea vomiting and diarrhea x2 days.  No appetite.  Aide says that patient was on the toilet bowl when he leaned over to the left side and went unresponsive.  His eyes were closed but he was not responding to questions for approximately 4 minutes.  Patient woke up and returned to baseline.  No seizure-like activity.  No foaming at the mouth.  No urinary incontinence.  Patient has history of low blood pressure and fainting spells in the past.  Denying chest pain shortness of breath headache fevers.

## 2022-12-21 NOTE — ED PROVIDER NOTE - PROGRESS NOTE DETAILS
An extensive discussion was had with the patient and his daughter (physician)  regarding labs/imaging and tests prior to discharge. We discussed in depth the importance of follow up for continuing medical care as an outpatient. The patient was advised to return the Emergency Department for worsening or persistent symptoms, and/or any new or concerning symptoms. pt had a roast beef snadhwich here without vomiting.

## 2022-12-21 NOTE — ED ADULT TRIAGE NOTE - SPO2 (%)
[facility]   Patient Action Plan    Patient: Jose Velazquez    Date: 11/8/21    Pharmacists:  Harvey Arana, Pharm.D., [facility]     Cristel Toure, Pharm.D.       Pharmacist telephone: [phone]  If we are unavailable, please leave a message.    Medication changes made today:    • Diabetes:   1. Increase Metformin  mg to 2 tabs for a week, 3 tabs for a week, then 4 tabs a day  2. Continue Trulicity 1.5mg weekly  3. Increase Lantus, starting at 28 units, 1 unit daily until 2 days with fasting blood sugar is under 120      Goals you have made today:    1. Increase activity - 30 minutes 5 days per week - can split up     2. Incorporate more vegetables into diet    3. Call clinic for repeat lows    4. Schedule Covid booster ([phone])    5. Consider sandals/slippers for around the house    Other reminders:    • Bring home blood glucose meter to every visit    • Bring medications to every visit          
96

## 2022-12-21 NOTE — ED PROVIDER NOTE - PATIENT PORTAL LINK FT
You can access the FollowMyHealth Patient Portal offered by [facility] by registering at the following website: [email or web address]. By joining [employer]’s FollowMyHealth portal, you will also be able to view your health information using other applications (apps) compatible with our system.

## 2022-12-21 NOTE — ED ADULT NURSE NOTE - OBJECTIVE STATEMENT
Patient is a 67y male BIBEMS s/p syncopal episode. Patient states he has been unwell x1 day with nausea/vomitting. Denies diarrhea, chest pain, fever, chills, cough, general body aches. Patient states that this morning he was sitting down and lost consciousness, denies headstrike/fall.

## 2022-12-21 NOTE — ED PROVIDER NOTE - NSFOLLOWUPINSTRUCTIONS_ED_ALL_ED_FT
Seek immediate medical assistance for any new or worsening symptoms. If you have issues obtaining follow up, please call: [phone] or [phone]  to obtain a doctor or specialist who takes your insurance in your area.     Follow up with PCP this week.     Near Syncope    WHAT YOU NEED TO KNOW:    Near syncope, also called presyncope, is the feeling that you may faint (lose consciousness), but you do not. You can control some health conditions that cause near syncope. Your healthcare providers can help you create a plan to manage near syncope and prevent episodes.    DISCHARGE INSTRUCTIONS:    Seek care immediately if:    You have sudden chest pain.    You have trouble breathing or shortness of breath.    You have vision changes, are sweating, and have nausea while you are sitting or lying down.    You feel dizzy or flushed and your heart is fluttering.    You lose consciousness.    You cannot use your arm, hand, foot, or leg, or it feels weak.    You have trouble speaking or understanding others when they speak.  Contact your healthcare provider if:    You have new or worsening symptoms.    Your heart beats faster or slower than usual.    You have questions or concerns about your condition or care.  Medicines:    Medicines may be needed to help your heart pump strongly and regularly. Your healthcare provider may also make changes to any medicines that are causing syncope.    Take your medicine as directed. Contact your healthcare provider if you think your medicine is not helping or if you have side effects. Tell him or her if you are allergic to any medicine. Keep a list of the medicines, vitamins, and herbs you take. Include the amounts, and when and why you take them. Bring the list or the pill bottles to follow-up visits. Carry your medicine list with you in case of an emergency.  Follow up with your healthcare provider as directed: You may need more tests to help find the cause of your near syncope episodes. The tests will help healthcare providers plan the best treatment for you. Write down your questions so you remember to ask them during your visits.    Manage near syncope:    Sit or lie down when needed. This includes when you feel dizzy, your throat is getting tight, and your vision changes. Raise your legs above the level of your heart.    Take slow, deep breaths if you start to breathe faster with anxiety or fear. This can help decrease dizziness and the feeling that you might faint.    Keep a record of your near syncope episodes. Include your symptoms and your activity before and after the episode. The record can help your healthcare provider find the cause of your near syncope and help you manage episodes.  Prevent a near syncope episode:    Move slowly and let yourself get used to one position before you move to another position. This is very important when you change from a lying or sitting position to a standing position. Take some deep breaths before you stand up from a lying position. Stand up slowly. Sudden movements may cause a fainting spell. Sit on the side of the bed or couch for a few minutes before you stand up. If you are on bedrest, try to be upright for about 2 hours each day, or as directed. Do not lock your legs if you are standing for a long period of time. Move your legs and bend your knees to keep blood flowing.    Follow your healthcare provider's recommendations. Your provider may recommend that you drink more liquids to prevent dehydration. You may also need to have more salt to keep your blood pressure from dropping too low and causing syncope. Your provider will tell you how much liquid and sodium to have each day. He or she will also tell you how much physical activity is safe for you. This will depend on what is causing your near syncope.    Watch for signs of low blood sugar. These include hunger, nervousness, sweating, and fast or fluttery heartbeats. Talk with your healthcare provider about ways to keep your blood sugar level steady.    Check your blood pressure often. This is important if you take medicine to lower your blood pressure. Check your blood pressure when you are lying down and when you are standing. Ask how often to check during the day. Keep a record of your blood pressure numbers. Your healthcare provider may use the record to help plan your treatment.  How to take a Blood Pressure      Do not strain if you are constipated. You may faint if you strain to have a bowel movement. Walking is the best way to get your bowels moving. Eat foods high in fiber to make it easier to have a bowel movement. Good examples are high-fiber cereals, beans, vegetables, and whole-grain breads. Prune juice may help make bowel movements softer.    Presíncope    LO QUE NECESITA SABER:    Un presíncope es la sensación de que usted se puede desmayar (perder el conocimiento), amber no lo hace. Usted puede controlar ciertas afecciones médicas que provocan presíncope. [place] médicos pueden ayudarlo a crear un plan para controlar el presíncope y evitar la ocurrencia de episodios.    INSTRUCCIONES SOBRE EL DANIEL HOSPITALARIA:    Busque atención médica de inmediato si:    Tiene dolor repentino en el pecho.    Usted tiene dificultad para respirar o le falta el aire.    Usted tiene cambios en la visión, está sudando y tiene náuseas mientras está sentado o acostado.    Usted se siente mareado o acalorado y winter corazón le está aleteando jenna si se le fuera a salir.    Usted pierde el conocimiento.    Usted no puede controlar un brazo, chava mano, un pie o chava pierna, o los siente débiles.    Tiene problemas para hablar o entender a otros cuando hablan.  Comuníquese con winter médico si:    Usted tiene nuevos síntomas o allen síntomas empeoran.    Winter corazón late más rápido o más despacio que lo acostumbrado.    Usted tiene preguntas o inquietudes acerca de winter condición o cuidado.  Medicamentos:    Los medicamentospodrían administrarse para ayudar a que winter corazón manas con fuerza y regularidad. Winter médico podría hacer cambios a cualquier medicamento que le esté causando síncope.    [place] allen medicamentos jenna se le haya indicado.Consulte con winter médico si usted branden que winter medicamento no le está ayudando o si presenta efectos secundarios. Infórmele si es alérgico a cualquier medicamento. Mantenga chava lista actualizada de los medicamentos, las vitaminas y los productos herbales que marcos. Incluya los siguientes datos de los medicamentos: cantidad, frecuencia y motivo de administración. Traiga con usted la lista o los envases de las píldoras a allen citas de seguimiento. Lleve la lista de los medicamentos con usted en karla de chava emergencia.  Acuda a allen consultas de control con winter médico según le indicaron.Usted podría necesitar más exámenes para averiguar la causa de allen episodios de presíncope. Los exámenes ayudarán a winter médico a planificar el mejor tratamiento para usted. Anote allen preguntas para que se acuerde de hacerlas bill allen visitas.    Control del presíncope:    Siéntese o acuéstese cuando sea necesario.[place] incluye cuando se sienta mareado, winter garganta se cierre o note cambios en winter visión. Eleve allen piernas por encima del nivel de winter corazón.    Inhale lenta y profundamente si comienza a respirar más rápido a causa de la ansiedad o el temor.[place] puede disminuir el mareo y la sensación de que se va a desmayar.    Mantenga un registro de los episodios de presíncope.Incluya los síntomas y la actividad que realiza antes y después del episodio. El registro puede ayudar a winter médico a determinar la causa de winter presíncope y ayudarlo a controlar los episodios.  Prevención de un episodio de presíncope:    Muévase lentamente y acostúmbrese a chava posición antes de moverse a otra.[place] es muy importante cuando usted se cambie de chava posición acostada o sentada a chava posición de pie. Respire profundo varias veces antes de ponerse de pie después de rickie estado acostado. Póngase de pie lentamente. Los movimientos repentinos podrían causar desmayos. Siéntese en el borde de la cama o del sofá por unos minutos antes de ponerse de pie. En karla que esté guardando cama, debe tratar de estar en chava posición vertical por lo menos por 2 horas todos los días o jenna se lo indicaron. No inmovilice las piernas cuando esté de pie bill un kristine periodo de tiempo. Mueva las piernas y doble las rodillas para mantener el flujo de lit.    Siga las recomendaciones de winter médico.El médico puede recomendarle que ingiera más líquidos para evitar la deshidratación. Es probable que usted también deba aumentar winter consumo de sal para evitar que winter presión arterial baje demasiado y ocurra un síncope. El médico le dirá cuánto líquido y sodio debe consumir cada día. También le dirá cuánta actividad física es chacon para usted. [place] dependerá de la causa de winter presíncope.    Esté atento a los signos de bajo nivel de azúcar en la lit.Los signos incluyen hambre, nerviosismo, sudoración y latidos cardíacos rápidos o palpitantes. Consulte con winter médico sobre métodos para mantener estable winter nivel de azúcar en la lit.    Revise winter presión arterial con frecuencia.[place] es importante si usted marcos medicamentos para bajar la presión arterial. Revise winter presión arterial cuando esté acostado y cuando esté de pie. Pregunte con que frecuencia debe tomarse la presión bill el día. Mantenga un registro de los valores numéricos de winter presión arterial. Winter médico puede usar la información del registro jenna chava base para planificar winter tratamiento.  Cómo jaron la presión arterial      No se esfuerce si está estreñido.Puede desmayarse si usted hace fuerza para realizar chava evacuación intestinal. Caminar es lo mejor que usted puede hacer para que allen intestinos se muevan. Consuma alimentos ricos en fibra para facilitar las evacuaciones intestinales. Los cereales altos en fibra, los frijoles, las verduras y los panes integrales son buenos ejemplos. El jugo de ciruelas pasas también puede suavizar las evacuaciones intestinales.    No kenan ejercicio al aire narda en un día de calor.La combinación de calor y actividad física puede llevar a la deshidratación. [place] podría causar un síncope.    Do not exercise outside on a hot day. The combination of physical activity and heat can lead to dehydration. This can cause syncope.

## 2022-12-21 NOTE — ED PROVIDER NOTE - ENMT, MLM
Airway patent, Nasal mucosa clear. Dry oral mucous membranes Throat has no vesicles, no oropharyngeal exudates and uvula is midline.

## 2022-12-30 ENCOUNTER — APPOINTMENT (OUTPATIENT)
Dept: INTERNAL MEDICINE | Facility: CLINIC | Age: 67
End: 2022-12-30

## 2023-01-06 ENCOUNTER — APPOINTMENT (OUTPATIENT)
Dept: INTERNAL MEDICINE | Facility: CLINIC | Age: 68
End: 2023-01-06
Payer: MEDICARE

## 2023-01-06 VITALS
OXYGEN SATURATION: 97 % | BODY MASS INDEX: 26.48 KG/M2 | HEART RATE: 78 BPM | SYSTOLIC BLOOD PRESSURE: 126 MMHG | HEIGHT: 70 IN | WEIGHT: 185 LBS | DIASTOLIC BLOOD PRESSURE: 82 MMHG | TEMPERATURE: 99.2 F

## 2023-01-06 DIAGNOSIS — Z00.00 ENCOUNTER FOR GENERAL ADULT MEDICAL EXAMINATION W/OUT ABNORMAL FINDINGS: ICD-10-CM

## 2023-01-06 PROCEDURE — 99214 OFFICE O/P EST MOD 30 MIN: CPT

## 2023-01-06 NOTE — PHYSICAL EXAM
[No Acute Distress] : no acute distress [Well Nourished] : well nourished [Well Developed] : well developed [Well-Appearing] : well-appearing [Normal Sclera/Conjunctiva] : normal sclera/conjunctiva [PERRL] : pupils equal round and reactive to light [EOMI] : extraocular movements intact [Normal Outer Ear/Nose] : the outer ears and nose were normal in appearance [Normal Oropharynx] : the oropharynx was normal [No JVD] : no jugular venous distention [No Lymphadenopathy] : no lymphadenopathy [Supple] : supple [Thyroid Normal, No Nodules] : the thyroid was normal and there were no nodules present [No Respiratory Distress] : no respiratory distress  [No Accessory Muscle Use] : no accessory muscle use [Clear to Auscultation] : lungs were clear to auscultation bilaterally [Normal Rate] : normal rate  [Regular Rhythm] : with a regular rhythm [Normal S1, S2] : normal S1 and S2 [No Murmur] : no murmur heard [No Carotid Bruits] : no carotid bruits [No Abdominal Bruit] : a ~M bruit was not heard ~T in the abdomen [No Varicosities] : no varicosities [Pedal Pulses Present] : the pedal pulses are present [No Edema] : there was no peripheral edema [No Palpable Aorta] : no palpable aorta [No Extremity Clubbing/Cyanosis] : no extremity clubbing/cyanosis [Soft] : abdomen soft [Non Tender] : non-tender [Non-distended] : non-distended [No Masses] : no abdominal mass palpated [No HSM] : no HSM [Normal Bowel Sounds] : normal bowel sounds [Normal Posterior Cervical Nodes] : no posterior cervical lymphadenopathy [Normal Anterior Cervical Nodes] : no anterior cervical lymphadenopathy [No CVA Tenderness] : no CVA  tenderness [No Spinal Tenderness] : no spinal tenderness [No Rash] : no rash [Normal Affect] : the affect was normal [Normal Insight/Judgement] : insight and judgment were intact [de-identified] : Right upper extremity paralysis with spasticity; right lower extremity weakness [de-identified] : Expressive dysphagia.  Right upper extremity paralysis.  Right lower extremity weakness. [de-identified] : Expressive dysphasia

## 2023-01-06 NOTE — REVIEW OF SYSTEMS
[Joint Stiffness] : joint stiffness [Muscle Weakness] : muscle weakness [Unsteady Walk] : ataxia [Depression] : depression [Negative] : Heme/Lymph

## 2023-01-06 NOTE — HISTORY OF PRESENT ILLNESS
[Formal Caregiver] : formal caregiver [FreeTextEntry1] : Follow-up evaluation [de-identified] : Mr. Bauman presents for follow-up evaluation accompanied by his caregiver.  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

## 2023-01-06 NOTE — PLAN
[FreeTextEntry1] : Mr. Bauman presents for follow-up evaluation.\par \par 1.  Patient will continue on current medication regimen which has been reviewed and revised.\par \par 2.  Prescription for CBC, CMP, hemoglobin A1c, iron level, lipid profile, PSA level, TSH and urinalysis.  Patient will have home blood drawn by [facility].\par \par 3.  Mr. Garcia would like a lightweight wheelchair.  He would certainly benefit from having 1.  It would make transporting him significantly easier with a lightweight wheelchair.\par \par 4.  Patient will be given Cologuard test as he is unable to have a colonoscopy.\par \par 5.  Prescription for physical therapy has been given.  Patient is receiving Botox injections to the right arm to help improve spasticity.  He will benefit from physical therapy for overall strengthening as well as gait improvement.\par \par 6.  Patient previously received the flu vaccine in the pneumonia vaccine.\par \par 7.  Follow-up in 6 months.

## 2023-01-17 ENCOUNTER — LABORATORY RESULT (OUTPATIENT)
Age: 68
End: 2023-01-17

## 2023-01-19 ENCOUNTER — RX RENEWAL (OUTPATIENT)
Age: 68
End: 2023-01-19

## 2023-01-19 ENCOUNTER — NON-APPOINTMENT (OUTPATIENT)
Age: 68
End: 2023-01-19

## 2023-01-19 DIAGNOSIS — R74.8 ABNORMAL LEVELS OF OTHER SERUM ENZYMES: ICD-10-CM

## 2023-01-19 DIAGNOSIS — R31.9 HEMATURIA, UNSPECIFIED: ICD-10-CM

## 2023-01-27 ENCOUNTER — APPOINTMENT (OUTPATIENT)
Dept: NEUROLOGY | Facility: CLINIC | Age: 68
End: 2023-01-27
Payer: MEDICARE

## 2023-01-27 VITALS
WEIGHT: 180 LBS | HEART RATE: 69 BPM | DIASTOLIC BLOOD PRESSURE: 76 MMHG | SYSTOLIC BLOOD PRESSURE: 114 MMHG | BODY MASS INDEX: 25.77 KG/M2 | HEIGHT: 70 IN | TEMPERATURE: 97.8 F

## 2023-01-27 PROCEDURE — 99213 OFFICE O/P EST LOW 20 MIN: CPT | Mod: 25

## 2023-01-27 PROCEDURE — 64644 CHEMODENERV 1 EXTREM 5/> MUS: CPT

## 2023-01-27 RX ORDER — ONABOTULINUMTOXINA 200 [USP'U]/1
200 INJECTION, POWDER, LYOPHILIZED, FOR SOLUTION INTRADERMAL; INTRAMUSCULAR
Qty: 2 | Refills: 3 | Status: ACTIVE | COMMUNITY
Start: 2022-06-10 | End: 1900-01-01

## 2023-01-27 NOTE — PHYSICAL EXAM
[General Appearance - Alert] : alert [General Appearance - In No Acute Distress] : in no acute distress [Oriented To Time, Place, And Person] : oriented to person, place, and time [Impaired Insight] : insight and judgment were intact [Affect] : the affect was normal [Person] : oriented to person [Place] : oriented to place [Repeating Phrases] : difficulty repeating a phrase [Fluency] : fluency not intact [Cranial Nerves Optic (II)] : visual acuity intact bilaterally,  visual fields full to confrontation, pupils equal round and reactive to light [Cranial Nerves Oculomotor (III)] : extraocular motion intact [Cranial Nerves Trigeminal (V)] : facial sensation intact symmetrically [Cranial Nerves Facial (VII)] : face symmetrical [Cranial Nerves Vestibulocochlear (VIII)] : hearing was intact bilaterally [Cranial Nerves Accessory (XI - Cranial And Spinal)] : head turning and shoulder shrug symmetric [Cranial Nerves Hypoglossal (XII)] : there was no tongue deviation with protrusion [Motor Strength Upper Extremities Right] : there was weakness of the right upper extremity [Motor Strength Lower Extremities Right] : there was weakness of the right lower extremity [Sensation Tactile Decrease] : light touch was intact [Dysdiadochokinesia On The Left] : not present on the left side [3+] : Patella right 3+ [2+] : Patella left 2+ [FreeTextEntry6] : Spastic right upper and lower extremities [FreeTextEntry8] : Spastic gait, walk with a cane

## 2023-01-27 NOTE — REASON FOR VISIT
Attended  delivery for 30.4 week gestation infant. Infant placed in sterile bag by MD and transferred to prewarmed panda bed with chemical mattress.  Infant dried, warmed, and stimulated. Wet linen removed. Dandelion hat placed on head. Pulse ox placed on right hand.  Bulb suction used, PPV given by RT for less than one minute. CPAP initiated at 20/5 at 30%, pressures adjusted to 22/5, FiO2 30-50% utilized.  APGARS 5 and 8 at one and five minutes respectfully. ID bands verified and given to POB. Initial temperature 36.2 at five minutes, temperature improved to 36.5 prior to leaving OR. Infant wrapped in double blankets and briefly shown to POB prior to transport to NICU on BCPAP 5cm H2O @ 25% in prewarmed transport isolette. FOB present at bedside for admit.    [Follow-Up: _____] : a [unfilled] follow-up visit [Spouse] : spouse

## 2023-01-27 NOTE — PROCEDURE
[FreeTextEntry1] : Botox 200 units vials, 2.  Each vial mixed with 2 and half cc of sterile saline.\par Using aseptic technique with alcohol.\par Using 27-gauge needles, injected 75 units into the right biceps, 75 units into the brachioradialis.  50 units in the pronator teres and quadratus.  50 units in the flexor carpi ulnaris and 50 units in the palmaris longus.\par Patient tolerated procedure well.  No any bleeding controlled with local pressure. [Consent Signed] : consent signed [Adverse Effects] : no adverse effects

## 2023-01-27 NOTE — HISTORY OF PRESENT ILLNESS
[FreeTextEntry1] : With 67-year-old man history of left-sided CVA, right hemiparesis spasticity, here for follow-up visit, and scheduled Botox appointment.\par No recent illness, no falls.  No change in medications.\par Patient continues with increasing tone, weakness of the right upper and right lower extremity.  Able to walk with a cane.  Limited use of the right arm, but with Bol[place] has had more mobility especially at the elbow and wrist.  No pain.\par

## 2023-01-27 NOTE — ASSESSMENT
[FreeTextEntry1] : 67-year-old man history of left-sided stroke, MCA.  Dysphasic, with a right spastic hemiparesis.  Stable.\par Tolerated Botox well.\par Review and discuss treatment options.  No change at this time.\par Patient to follow-up with physical therapy next week.\par Return to office in 3 months.

## 2023-02-13 ENCOUNTER — APPOINTMENT (OUTPATIENT)
Dept: INTERNAL MEDICINE | Facility: CLINIC | Age: 68
End: 2023-02-13
Payer: MEDICARE

## 2023-02-13 VITALS
TEMPERATURE: 98.4 F | DIASTOLIC BLOOD PRESSURE: 80 MMHG | SYSTOLIC BLOOD PRESSURE: 120 MMHG | HEIGHT: 70 IN | HEART RATE: 72 BPM | OXYGEN SATURATION: 98 % | BODY MASS INDEX: 25.77 KG/M2 | WEIGHT: 180 LBS | RESPIRATION RATE: 16 BRPM

## 2023-02-13 PROCEDURE — 99213 OFFICE O/P EST LOW 20 MIN: CPT

## 2023-02-16 NOTE — PLAN
[FreeTextEntry1] : 1. It is medically necessary for patient to have a lightweight wheel chair as the patient is no longer able to ambulate with cane and does not have sufficient upper extremity body strength to propel a standard wheelchair due to right-sided hemiplegia and upper extremity spasticity. He would benefit from the use of a lightweight wheelchair to participate in ADL's and for medical transportation.  \par \par 2. F/u with PT/ OT\par \par 3. F/U in 7/2023 or sooner if needed

## 2023-02-16 NOTE — PHYSICAL EXAM
[No Acute Distress] : no acute distress [No Respiratory Distress] : no respiratory distress  [No Accessory Muscle Use] : no accessory muscle use [Clear to Auscultation] : lungs were clear to auscultation bilaterally [Normal Rate] : normal rate  [Regular Rhythm] : with a regular rhythm [Normal S1, S2] : normal S1 and S2 [No Edema] : there was no peripheral edema [Soft] : abdomen soft [Non Tender] : non-tender [Non-distended] : non-distended [Normal Bowel Sounds] : normal bowel sounds [Normal Affect] : the affect was normal [Alert and Oriented x3] : oriented to person, place, and time [de-identified] : Ambulates with cane

## 2023-02-16 NOTE — HISTORY OF PRESENT ILLNESS
[FreeTextEntry1] : F/U [de-identified] : Patient is a 67 -year-old male with PMH of CVA, dissection of cartoid artery, expressive dysphasia, HLD, HTN, right hemiplegia, who presents to office today for f/u visit.\par \par The patient currently ambulates with cane. He has been having increasing difficulty to ambulate with walker as he gets fatigued easily. He currently has an aid who helps him with ambulation and with ADL'S. The patient has a h/o of right hemiplegia due to h/o CVA and upper extremity spasticity. The patient would benefit medically from a light weight wheel chair.\par \par The patient has no acute complaints today.

## 2023-02-20 ENCOUNTER — NON-APPOINTMENT (OUTPATIENT)
Age: 68
End: 2023-02-20

## 2023-03-22 ENCOUNTER — NON-APPOINTMENT (OUTPATIENT)
Age: 68
End: 2023-03-22

## 2023-03-27 ENCOUNTER — APPOINTMENT (OUTPATIENT)
Dept: INTERNAL MEDICINE | Facility: CLINIC | Age: 68
End: 2023-03-27
Payer: MEDICARE

## 2023-03-27 VITALS
HEIGHT: 70 IN | SYSTOLIC BLOOD PRESSURE: 110 MMHG | HEART RATE: 72 BPM | TEMPERATURE: 98.2 F | WEIGHT: 198 LBS | BODY MASS INDEX: 28.35 KG/M2 | DIASTOLIC BLOOD PRESSURE: 72 MMHG | OXYGEN SATURATION: 96 %

## 2023-03-27 PROCEDURE — 99213 OFFICE O/P EST LOW 20 MIN: CPT

## 2023-04-04 NOTE — DISCHARGE NOTE NURSING/CASE MANAGEMENT/SOCIAL WORK - NSDCPEFALRISK_GEN_ALL_CORE

## 2023-04-11 NOTE — PHYSICAL THERAPY INITIAL EVALUATION ADULT - GENERAL OBSERVATIONS, REHAB EVAL
RATNA/Carole Pt rec'd sitting in 5[facility] community lobby in [facility]; Pt denied any pain or discomfort

## 2023-04-17 ENCOUNTER — NON-APPOINTMENT (OUTPATIENT)
Age: 68
End: 2023-04-17

## 2023-04-17 NOTE — ASSESSMENT
[FreeTextEntry1] : 1. It is medically necessary for patient to have a lightweight wheel chair as the patient is no longer able to ambulate with cane and does not have sufficient upper extremity body strength to propel a standard wheelchair due to right-sided hemiplegia and upper extremity spasticity. He would benefit from the use of a lightweight wheelchair to participate in ADL's and for medical transportation. pt is unable to self-propel in a manual wheelchair and requires his caregiver, who is available and willing, to be able to provide assistance with an ultra lightweight transport wheelchair\par \par 2. F/u with PT/ OT\par \par 3. F/U in 7/2023 or sooner if needed. \par \par

## 2023-04-17 NOTE — PHYSICAL EXAM
[No Acute Distress] : no acute distress [No Respiratory Distress] : no respiratory distress  [No Accessory Muscle Use] : no accessory muscle use [Clear to Auscultation] : lungs were clear to auscultation bilaterally [Normal Rate] : normal rate  [Regular Rhythm] : with a regular rhythm [Normal S1, S2] : normal S1 and S2 [No Edema] : there was no peripheral edema [Soft] : abdomen soft [Non Tender] : non-tender [Non-distended] : non-distended [Normal Bowel Sounds] : normal bowel sounds [Normal Affect] : the affect was normal [Alert and Oriented x3] : oriented to person, place, and time [de-identified] : Ambulates with cane

## 2023-04-17 NOTE — HISTORY OF PRESENT ILLNESS
[FreeTextEntry1] : F/U [de-identified] : Patient is a 67 -year-old male with PMH of CVA, dissection of cartoid artery, expressive dysphasia, HLD, HTN, right hemiplegia, who presents to office today for f/u visit.\par \par The patient currently ambulates with cane. He has been having increasing difficulty to ambulate with walker as he gets fatigued easily. He currently has an aid who helps him with ambulation and with ADL'S. The patient has a h/o of right hemiplegia due to h/o CVA and upper extremity spasticity. The patient would benefit medically from a light weight wheel chair.\par \par The patient has no acute complaints today.

## 2023-05-05 ENCOUNTER — APPOINTMENT (OUTPATIENT)
Dept: INTERNAL MEDICINE | Facility: CLINIC | Age: 68
End: 2023-05-05
Payer: MEDICARE

## 2023-05-05 VITALS
SYSTOLIC BLOOD PRESSURE: 112 MMHG | HEIGHT: 70 IN | OXYGEN SATURATION: 97 % | BODY MASS INDEX: 28.63 KG/M2 | TEMPERATURE: 97.9 F | WEIGHT: 200 LBS | HEART RATE: 77 BPM | DIASTOLIC BLOOD PRESSURE: 68 MMHG

## 2023-05-05 DIAGNOSIS — I95.1 ORTHOSTATIC HYPOTENSION: ICD-10-CM

## 2023-05-05 DIAGNOSIS — G81.91 HEMIPLEGIA, UNSPECIFIED AFFECTING RIGHT DOMINANT SIDE: ICD-10-CM

## 2023-05-05 DIAGNOSIS — Z95.818 PRESENCE OF OTHER CARDIAC IMPLANTS AND GRAFTS: ICD-10-CM

## 2023-05-05 PROCEDURE — 99214 OFFICE O/P EST MOD 30 MIN: CPT

## 2023-05-05 NOTE — PHYSICAL EXAM
[No Acute Distress] : no acute distress [Well Nourished] : well nourished [Well Developed] : well developed [Well-Appearing] : well-appearing [Normal Sclera/Conjunctiva] : normal sclera/conjunctiva [PERRL] : pupils equal round and reactive to light [EOMI] : extraocular movements intact [Normal Outer Ear/Nose] : the outer ears and nose were normal in appearance [Normal Oropharynx] : the oropharynx was normal [No JVD] : no jugular venous distention [No Lymphadenopathy] : no lymphadenopathy [Supple] : supple [Thyroid Normal, No Nodules] : the thyroid was normal and there were no nodules present [No Respiratory Distress] : no respiratory distress  [No Accessory Muscle Use] : no accessory muscle use [Clear to Auscultation] : lungs were clear to auscultation bilaterally [Normal Rate] : normal rate  [Regular Rhythm] : with a regular rhythm [Normal S1, S2] : normal S1 and S2 [No Murmur] : no murmur heard [No Carotid Bruits] : no carotid bruits [No Abdominal Bruit] : a ~M bruit was not heard ~T in the abdomen [No Varicosities] : no varicosities [Pedal Pulses Present] : the pedal pulses are present [No Edema] : there was no peripheral edema [No Palpable Aorta] : no palpable aorta [No Extremity Clubbing/Cyanosis] : no extremity clubbing/cyanosis [Soft] : abdomen soft [Non Tender] : non-tender [Non-distended] : non-distended [No Masses] : no abdominal mass palpated [No HSM] : no HSM [Normal Bowel Sounds] : normal bowel sounds [Normal Posterior Cervical Nodes] : no posterior cervical lymphadenopathy [Normal Anterior Cervical Nodes] : no anterior cervical lymphadenopathy [No CVA Tenderness] : no CVA  tenderness [No Spinal Tenderness] : no spinal tenderness [No Joint Swelling] : no joint swelling [Grossly Normal Strength/Tone] : grossly normal strength/tone [No Rash] : no rash [Normal Affect] : the affect was normal [Normal Insight/Judgement] : insight and judgment were intact [de-identified] : Right-sided hemiplegia

## 2023-05-05 NOTE — HISTORY OF PRESENT ILLNESS
[FreeTextEntry1] : Follow-up [de-identified] : Mr. Bauman presents for follow-up evaluation accompanied by his aide.\par He is feeling well.  He is sitting in a chair.\par Denies chest pain, shortness of breath or palpitations.\par He has a history of CVA with right-sided hemiplegia.

## 2023-05-05 NOTE — PLAN
[FreeTextEntry1] : Mr. Bauman presents for a follow-up evaluation.\par \par 1.  He will continue on current medication regimen which has been reviewed and revised.\par \par 2.  Prescription for CBC, CMP, iron level, lipid profileAnd urinalysis.\par \par 3.  Continue with PT/OT.\par \par 4.  Patient will receive the Tdap vaccine.\par \par 5.  Follow-up in 6 months.

## 2023-05-30 NOTE — DIETITIAN INITIAL EVALUATION ADULT. - DOB: +DATEOFBIRTH
[address] INTERNAL MEDICINE AND MEDICATION MANAGEMENT  [address]  [address] [facility] [address]  Dept: [phone]  Dept Fax: [phone]: [phone]     Visit Date:  5/30/2023    Patient:  Garrick Zaldivar. YOB: 2001    HPI:     Chief Complaint   Patient presents with    Drug Problem     Adelaide Sellers presents today for medical evaluation of severe opioid use disorder     I last seen him 4 weeks ago. States that when he was 11, he started using cocaine and heroin. Parents have struggled with addiction as well. Mother is currently in recovery. Has never used IV     From [place] - moved here in 2013. He had 8 months clean until 4 weeks ago- detox, inpatient, sober living- got his life together, car, job. Was on suboxone. They weaned him off, and he relapsed     Possession of fentanyl in [facility] [place]. Currently on probation and receiving treatment in [facility] of conviction. He is active in counseling at [facility] in [place]. Completed program.      Working at [employer] in [place]. Denies any use    Urges and cravings well controlled with suboxone 6 mg BID     Urine positive for buprenorphine only     Hep C not yet obtained      Medications    Current Outpatient Medications:     buprenorphine-naloxone (SUBOXONE) 8-2 MG SUBL SL tablet, Place 1.5 tablets under the tongue daily for 28 days. , Disp: 42 tablet, Rfl: 0    naloxone 4 MG/0.1ML LIQD nasal spray, 1 spray by Nasal route as needed for Opioid Reversal, Disp: 1 each, Rfl: 1    benzonatate (TESSALON PERLES) 100 MG capsule, Take 1 capsule by mouth 3 times daily as needed for Cough, Disp: 30 capsule, Rfl: 1    mirtazapine (REMERON) 45 MG tablet, Take 1 tablet by mouth nightly, Disp: , Rfl:     cloNIDine (CATAPRES) 0.1 MG tablet, Take 1 tablet by mouth nightly, Disp: , Rfl:     The patient is allergic to amoxicillin, dextromethorphan, and amoxicillin-pot Statement Selected

## 2023-06-13 ENCOUNTER — APPOINTMENT (OUTPATIENT)
Dept: DERMATOLOGY | Facility: CLINIC | Age: 68
End: 2023-06-13

## 2023-06-15 ENCOUNTER — RX RENEWAL (OUTPATIENT)
Age: 68
End: 2023-06-15
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
## 2023-07-13 ENCOUNTER — APPOINTMENT (OUTPATIENT)
Dept: INTERNAL MEDICINE | Facility: CLINIC | Age: 68
End: 2023-07-13

## 2023-09-14 ENCOUNTER — RX RENEWAL (OUTPATIENT)
Age: 68
End: 2023-09-14

## 2023-09-28 ENCOUNTER — APPOINTMENT (OUTPATIENT)
Dept: NEUROLOGY | Facility: CLINIC | Age: 68
End: 2023-09-28
Payer: MEDICARE

## 2023-09-28 PROCEDURE — 99213 OFFICE O/P EST LOW 20 MIN: CPT | Mod: 25

## 2023-09-28 PROCEDURE — 64644 CHEMODENERV 1 EXTREM 5/> MUS: CPT

## 2023-10-17 ENCOUNTER — RX RENEWAL (OUTPATIENT)
Age: 68
End: 2023-10-17

## 2024-01-04 ENCOUNTER — APPOINTMENT (OUTPATIENT)
Dept: NEUROLOGY | Facility: CLINIC | Age: 69
End: 2024-01-04
Payer: MEDICARE

## 2024-01-04 DIAGNOSIS — G81.10 SPASTIC HEMIPLEGIA AFFECTING UNSPECIFIED SIDE: ICD-10-CM

## 2024-01-04 PROCEDURE — 99213 OFFICE O/P EST LOW 20 MIN: CPT | Mod: 25

## 2024-01-04 PROCEDURE — 64644 CHEMODENERV 1 EXTREM 5/> MUS: CPT

## 2024-01-04 NOTE — HISTORY OF PRESENT ILLNESS
[FreeTextEntry1] : 68-year-old man right-handed history of hypertension, hyperlipidemia, here for follow-up visit, and scheduled Botox treatment for spasticity of the right upper extremity. Past history of a left MCA stroke, dysarthria, mild dysphagia, persistent right-sided hemiparesis, arm worse than leg.  Spastic. Has benefited from Botox therapy to improve mobility, allow access for dressing, bathing. Is also benefit from physical therapy, strengthening, increase mobility, also speech therapy. Denies any new events, no complaints of headache, no recent falls or injuries, no trouble swallowing.  Sleeping well, mood is stable.  No depressive feelings.

## 2024-01-04 NOTE — PHYSICAL EXAM
[General Appearance - Alert] : alert [General Appearance - In No Acute Distress] : in no acute distress [Person] : oriented to person [Place] : oriented to place [Naming Objects] : difficulty naming common objects [Repeating Phrases] : difficulty repeating a phrase [Fluency] : fluency not intact [Cranial Nerves Optic (II)] : visual acuity intact bilaterally,  visual fields full to confrontation, pupils equal round and reactive to light [Cranial Nerves Oculomotor (III)] : extraocular motion intact [Cranial Nerves Trigeminal (V)] : facial sensation intact symmetrically [Cranial Nerves Facial (VII)] : face symmetrical [Cranial Nerves Vestibulocochlear (VIII)] : hearing was intact bilaterally [Cranial Nerves Accessory (XI - Cranial And Spinal)] : head turning and shoulder shrug symmetric [Cranial Nerves Hypoglossal (XII)] : there was no tongue deviation with protrusion [Motor Strength Lower Extremities Right] : there was weakness of the right lower extremity [Motor Strength Upper Extremities Right] : arm weakness was present [2] : triceps 2/5 [Motor Strength Forearms Right Weakness] : forearm weakness was  present [1] : forearm supination 1/5 [Motor Strength Wrists Right Weakness] : wrist weakness was present [3] : wrist extension 3/5 [Hand Weakness Right] : the hand  was weak [Motor Strength Upper Extremities Left] : normal arm strength [Motor Strength Forearms Left Weakness] : normal forearm strength [Motor Strength Wrists Left Weakness] : normal wrist strength [Hand Weakness Left] : normal hand  [Sensation Tactile Decrease] : light touch was intact [Dysdiadochokinesia On The Left] : not present on the left side [3+] : Ankle jerk right 3+ [2+] : Ankle jerk left 2+ [FreeTextEntry6] : Spastic right upper and lower extremities [FreeTextEntry8] : Spastic gait, walk with a cane

## 2024-01-04 NOTE — ASSESSMENT
[FreeTextEntry1] : 68-year-old man history of a left MCA CVA, with dysphagia, mixed type, spastic right hemiparesis.  Stable. Tolerated Botox well, some mobility, and usefulness of Botox continues.  Advised to follow-up in 3 months. Will refer to speech

## 2024-01-29 RX ORDER — FLUDROCORTISONE ACETATE 0.1 MG/1
0.1 TABLET ORAL
Qty: 90 | Refills: 1 | Status: ACTIVE | COMMUNITY
Start: 2021-06-30 | End: 1900-01-01

## 2024-01-29 RX ORDER — LOSARTAN POTASSIUM 100 MG/1
100 TABLET, FILM COATED ORAL
Qty: 90 | Refills: 1 | Status: ACTIVE | COMMUNITY
Start: 2020-11-19 | End: 1900-01-01

## 2024-01-30 RX ORDER — SERTRALINE HYDROCHLORIDE 50 MG/1
50 TABLET, FILM COATED ORAL
Qty: 90 | Refills: 1 | Status: ACTIVE | COMMUNITY
Start: 2021-06-30 | End: 1900-01-01
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
## 2024-02-07 NOTE — OCCUPATIONAL THERAPY INITIAL EVALUATION ADULT - TOILETING, PREVIOUS LEVEL OF FUNCTION, OT EVAL
Subjective: Patient states he is very tired. Son states he slept a lot last night and today  Falls since last visit No(if yes complete the Fall Tracking Form and include bsrifallreport):   Caregiver involvement changes: None  Home health supplies by type and quantity ordered/delivered this visit include: None    Clinician asked if patient has had any physician contact since last home care visit and patient states: NO  Clinician asked if patient has any new or changed medications and patient states:  NO   If Yes, were medications reconciled? N/A   Was the certifying physician notified of changes in medications? N/A     Clinical assessment (what this visit means for the patient overall and need for ongoing skilled care) and progress or lack of progress towards SPECIFIC goals: Patient was admitted to home health after recent hospitalization and SNF placement for fall resulting in rib fractures. Patient demonstrates impaired strength with MMT of BLE 3+/5, decreased ambulation reporting RPE of 9/10 over 75 feet (with 1 rest break, requiring 4 minute rest break to return to 4/10 RPE), and decreased balance with Tinetti score of 14/28. He benefits from skilled PT to improve overall mobility and safety within home and reduce fall risk.    Written Teaching Material Utilized: HEP    Interdisciplinary communication with: Pura CARLOS and Zofia VELARDE for the purpose of POC and [facility]ASIS collaboration    Discharge planning as follows: Will discharge when the patient has reached their maximum functional potential and maximum safety in their home    Specific plan for next visit: exercise and gait
unknown

## 2024-02-29 NOTE — H&P ADULT - PROBLEM SELECTOR PROBLEM 2
"[facility] 11/29/2023    10:36 AM 12/6/2023     9:29 AM 12/13/2023    10:31 AM 1/11/2024    11:05 AM 2/8/2024    10:50 AM 2/22/2024    11:12 AM 2/29/2024    10:50 AM   PHQ-9 SCORE   PHQ-9 Total Score [facility] 3/31/2022    11:39 AM 4/20/2022     5:45 PM 5/13/2022    11:04 AM 12/6/2022    10:52 AM 5/19/2023     9:32 PM 11/1/2023    10:54 AM 2/22/2024     5:48 PM   [place] Suicide [facility] with Dr. Mulligan.  [facility] ([place]): [phone] - Specialized mental health emergency area designed [facility]  [facility] ([place]): [phone]  [facility] ([place]): [phone]  [facility]): [phone]    Mental Health Crisis Numbers:   National Suicide Hotline: 988  Crisis Text Line: Text [place] to 902691     [facility] Crisis:  [phone]- [phone]    Peer Support (non-crisis)  [place] Warmline: 762.327.9367    Or text  Support  to 49673      JONES Naranjo February 22, 2024       -------------------------------------------------------------------------------------------------          Individual Treatment Plan    Patient's Name: Opal Carlos  YOB: 1995    Date of Creation: 7/15/Opal Carlos  : 1994  MRN: [id number]       during a crisis:     [x] [facility] Daytime Number: [phone]  [[facility] or [email or web address].

## 2024-03-01 ENCOUNTER — RX RENEWAL (OUTPATIENT)
Age: 69
End: 2024-03-01

## 2024-03-01 RX ORDER — ATORVASTATIN CALCIUM 80 MG/1
80 TABLET, FILM COATED ORAL
Qty: 90 | Refills: 1 | Status: ACTIVE | COMMUNITY
Start: 2021-04-06 | End: 1900-01-01

## 2024-03-01 RX ORDER — AMLODIPINE BESYLATE 10 MG/1
10 TABLET ORAL
Qty: 90 | Refills: 1 | Status: ACTIVE | COMMUNITY
Start: 1900-01-01 | End: 1900-01-01

## 2024-04-04 ENCOUNTER — APPOINTMENT (OUTPATIENT)
Dept: INTERNAL MEDICINE | Facility: CLINIC | Age: 69
End: 2024-04-04
Payer: MEDICARE

## 2024-04-04 VITALS
HEIGHT: 70 IN | RESPIRATION RATE: 16 BRPM | BODY MASS INDEX: 30.06 KG/M2 | WEIGHT: 210 LBS | HEART RATE: 69 BPM | OXYGEN SATURATION: 98 % | TEMPERATURE: 99.1 F | DIASTOLIC BLOOD PRESSURE: 76 MMHG | SYSTOLIC BLOOD PRESSURE: 120 MMHG

## 2024-04-04 DIAGNOSIS — I63.9 CEREBRAL INFARCTION, UNSPECIFIED: ICD-10-CM

## 2024-04-04 DIAGNOSIS — F32.A DEPRESSION, UNSPECIFIED: ICD-10-CM

## 2024-04-04 DIAGNOSIS — E78.5 HYPERLIPIDEMIA, UNSPECIFIED: ICD-10-CM

## 2024-04-04 DIAGNOSIS — I10 ESSENTIAL (PRIMARY) HYPERTENSION: ICD-10-CM

## 2024-04-04 DIAGNOSIS — Z86.2 PERSONAL HISTORY OF DISEASES OF THE BLOOD AND BLOOD-FORMING ORGANS AND CERTAIN DISORDERS INVOLVING THE IMMUNE MECHANISM: ICD-10-CM

## 2024-04-04 DIAGNOSIS — I65.22 OCCLUSION AND STENOSIS OF LEFT CAROTID ARTERY: ICD-10-CM

## 2024-04-04 DIAGNOSIS — D50.9 IRON DEFICIENCY ANEMIA, UNSPECIFIED: ICD-10-CM

## 2024-04-04 DIAGNOSIS — R47.02 DYSPHASIA: ICD-10-CM

## 2024-04-04 DIAGNOSIS — Z02.9 ENCOUNTER FOR ADMINISTRATIVE EXAMINATIONS, UNSPECIFIED: ICD-10-CM

## 2024-04-04 DIAGNOSIS — I77.71 DISSECTION OF CAROTID ARTERY: ICD-10-CM

## 2024-04-04 PROCEDURE — 99214 OFFICE O/P EST MOD 30 MIN: CPT

## 2024-04-04 NOTE — HISTORY OF PRESENT ILLNESS
[FreeTextEntry1] : Follow-up [de-identified] : Mr. Bauman (father of Dr. Jayde Dawn) presents for follow-up evaluation accompanied by his aide.  He is feeling well.  Patient denies any chest pain, shortness of breath or palpitations.  He has right-sided weakness from cerebrovascular accident.  His speech has improved significantly.  He does ambulate with a cane.  He denies any difficulty urinating or moving his bowels.

## 2024-04-04 NOTE — PHYSICAL EXAM
[No Acute Distress] : no acute distress [Well Nourished] : well nourished [Well Developed] : well developed [Well-Appearing] : well-appearing [Normal Sclera/Conjunctiva] : normal sclera/conjunctiva [PERRL] : pupils equal round and reactive to light [EOMI] : extraocular movements intact [Normal Outer Ear/Nose] : the outer ears and nose were normal in appearance [Normal Oropharynx] : the oropharynx was normal [No JVD] : no jugular venous distention [No Lymphadenopathy] : no lymphadenopathy [Supple] : supple [Thyroid Normal, No Nodules] : the thyroid was normal and there were no nodules present [No Respiratory Distress] : no respiratory distress  [No Accessory Muscle Use] : no accessory muscle use [Clear to Auscultation] : lungs were clear to auscultation bilaterally [Normal Rate] : normal rate  [Regular Rhythm] : with a regular rhythm [Normal S1, S2] : normal S1 and S2 [No Murmur] : no murmur heard [No Carotid Bruits] : no carotid bruits [No Abdominal Bruit] : a ~M bruit was not heard ~T in the abdomen [No Varicosities] : no varicosities [Pedal Pulses Present] : the pedal pulses are present [No Edema] : there was no peripheral edema [No Palpable Aorta] : no palpable aorta [No Extremity Clubbing/Cyanosis] : no extremity clubbing/cyanosis [Soft] : abdomen soft [Non Tender] : non-tender [Non-distended] : non-distended [No Masses] : no abdominal mass palpated [No HSM] : no HSM [Normal Bowel Sounds] : normal bowel sounds [Normal Posterior Cervical Nodes] : no posterior cervical lymphadenopathy [Normal Anterior Cervical Nodes] : no anterior cervical lymphadenopathy [No CVA Tenderness] : no CVA  tenderness [No Spinal Tenderness] : no spinal tenderness [No Joint Swelling] : no joint swelling [Grossly Normal Strength/Tone] : grossly normal strength/tone [No Rash] : no rash [Deep Tendon Reflexes (DTR)] : deep tendon reflexes were 2+ and symmetric [Normal Affect] : the affect was normal [Normal Insight/Judgement] : insight and judgment were intact [de-identified] : Right-sided weakness: +3/5 right lower extremity; +4/5 right upper extremity

## 2024-04-04 NOTE — REVIEW OF SYSTEMS
[Unsteady Walk] : ataxia [Depression] : depression [Negative] : Heme/Lymph [de-identified] : Right-sided weakness

## 2024-04-04 NOTE — PLAN
[FreeTextEntry1] : Mr. Bauman presents for follow-up evaluation accompanied by his aide.  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  No surgical intervention was indicated.  He did receive PT/OT and speech therapy.  He now can walk with a cane.  His speech has improved.  1.  Patient will continue on current medication regimen which has been reviewed and revised. 2.  Check CBC, CMP, hemoglobin A1c, iron level, lipid profile, TSH, PSA and urinalysis. 3.  Continue physical therapy and Occupational Therapy. 4.  Neurology follow-up with Dr. Gonzalez Christine 5.  Follow-up in 6 months.

## 2024-04-08 LAB
ALBUMIN SERPL ELPH-MCNC: 4.6 G/DL
ALP BLD-CCNC: 92 U/L
ALT SERPL-CCNC: 15 U/L
ANION GAP SERPL CALC-SCNC: 15 MMOL/L
APPEARANCE: CLEAR
AST SERPL-CCNC: 24 U/L
BACTERIA: NEGATIVE /HPF
BILIRUB SERPL-MCNC: 0.3 MG/DL
BILIRUBIN URINE: NEGATIVE
BLOOD URINE: NEGATIVE
BUN SERPL-MCNC: 9 MG/DL
CALCIUM SERPL-MCNC: 9.3 MG/DL
CAST: 0 /LPF
CHLORIDE SERPL-SCNC: 103 MMOL/L
CHOLEST SERPL-MCNC: 130 MG/DL
CO2 SERPL-SCNC: 25 MMOL/L
COLOR: YELLOW
CREAT SERPL-MCNC: 0.81 MG/DL
EGFR: 95 ML/MIN/1.73M2
EPITHELIAL CELLS: 0 /HPF
GLUCOSE QUALITATIVE U: NEGATIVE MG/DL
GLUCOSE SERPL-MCNC: 95 MG/DL
HCT VFR BLD CALC: 38.5 %
HDLC SERPL-MCNC: 55 MG/DL
HGB BLD-MCNC: 11.9 G/DL
IRON SATN MFR SERPL: 8 %
IRON SERPL-MCNC: 36 UG/DL
KETONES URINE: NEGATIVE MG/DL
LDLC SERPL CALC-MCNC: 63 MG/DL
LEUKOCYTE ESTERASE URINE: NEGATIVE
MCHC RBC-ENTMCNC: 24 PG
MCHC RBC-ENTMCNC: 30.9 GM/DL
MCV RBC AUTO: 77.6 FL
MICROSCOPIC-UA: NORMAL
NITRITE URINE: NEGATIVE
NONHDLC SERPL-MCNC: 75 MG/DL
PH URINE: 7.5
PLATELET # BLD AUTO: 378 K/UL
POTASSIUM SERPL-SCNC: 4.6 MMOL/L
PROT SERPL-MCNC: 7.8 G/DL
PROTEIN URINE: NEGATIVE MG/DL
PSA SERPL-MCNC: 3.56 NG/ML
RBC # BLD: 4.96 M/UL
RBC # FLD: 17.2 %
RED BLOOD CELLS URINE: 1 /HPF
SODIUM SERPL-SCNC: 143 MMOL/L
SPECIFIC GRAVITY URINE: 1.01
TIBC SERPL-MCNC: 458 UG/DL
TRIGL SERPL-MCNC: 52 MG/DL
TSH SERPL-ACNC: 1.83 UIU/ML
UIBC SERPL-MCNC: 423 UG/DL
UROBILINOGEN URINE: 0.2 MG/DL
WBC # FLD AUTO: 6.09 K/UL
WHITE BLOOD CELLS URINE: 0 /HPF

## 2024-04-10 ENCOUNTER — APPOINTMENT (OUTPATIENT)
Dept: NEUROLOGY | Facility: CLINIC | Age: 69
End: 2024-04-10
Payer: MEDICARE

## 2024-04-10 PROCEDURE — 99214 OFFICE O/P EST MOD 30 MIN: CPT | Mod: 25

## 2024-04-10 PROCEDURE — 64644 CHEMODENERV 1 EXTREM 5/> MUS: CPT

## 2024-04-10 NOTE — ASSESSMENT
[FreeTextEntry1] : 68-year-old man history of prior left-sided MCA CVA, with spastic hemiparesis, mild aphasia.  Stable.  Spasticity of the right upper extremity controlled with Botox therapy. Reviewed and discussed treatment.  No change at this time. Return for follow-up in 3 months.

## 2024-04-10 NOTE — HISTORY OF PRESENT ILLNESS
[FreeTextEntry1] : 68-year-old man with a prior history of a left sided MCA infarct, with expressive dysphagia, spastic right hemiparesis, here for follow-up visit, scheduled Botox appointment.  Reports doing fairly well, no new medical issues or concerns, recently seen by his primary doctor.  Denies any complaints of headache, no dizziness, no chest pain, no shortness of breath, no change in physical capacity. Having difficulty with word finding, but his speech has improved over time. He tries to exercise regularly, tries to walk, uses a cane for support. Continues on statin atorvastatin 80 mg daily, low-dose aspirin 81 mg daily, amlodipine 10 mg daily.  On losartan 100 mg daily.  Takes sertraline 50 mg for anxiety. Mood has been stable.  No recent falls no injuries.

## 2024-04-10 NOTE — DATA REVIEWED
[FreeTextEntry1] : ACC: [id number] EXAM:  CT CERVICAL SPINE                       ACC: [id number] EXAM:  CT BRAIN                        PROCEDURE DATE:  12/08/2022      INTERPRETATION:  CLINICAL INFORMATION: syncope. XCT. .  TECHNIQUE: CT head: Sequential axial images were obtained from the vertex  to the skull base without intravenous contrast. Coronal and sagittal  reformations were obtained. CT cervical spine: Noncontrast CT scan of the cervical spine was  performed. Thin section axial images with sagittal and coronal  reformations were obtained.  COMPARISON: Prior CT dated 6/16/2022. MRI brain 2/6/2020  FINDINGS:  CT head:  Chronic left JESSE and MCA territory infarcts. There is no acute  intracranial hemorrhage or mass effect. The ventricles and sulci are  normal in size for patient's age.  There is no extraaxial fluid collection.  There is no displaced calvarial fracture. The visualized orbits are  within normal limits. The visualized portions of the paranasal sinuses  are well aerated. The mastoid air cells are well aerated.   CT cervical spine:  Status post ACDF at C5-C6. Straightening of the cervical lordosis. No  acute fracture. Multilevel intervertebral disc height loss. Multilevel  bilateral facet arthrosis.  There is no prevertebral soft tissue swelling. The paraspinal soft  tissues are unremarkable. The lung apices are clear.   IMPRESSION:  CT head: No acute intracranial hemorrhage or mass effect.  CT cervical spine: No evidence for acute displaced fracture or  malalignment.  --- End of Report ---      EXAM:  MR BRAIN IC                        PROCEDURE DATE:  02/06/2020      INTERPRETATION:  CLINICAL STATEMENT: CVA  TECHNIQUE: MRI of the brain was performed with and without gadolinium. 9 cc Gadavist administered  COMPARISON: CT head 2/6/2020  FINDINGS:  There is mild diffuse parenchymal volume loss. There are T2 prolongation signal abnormalities in the periventricular white matter likely related to mild chronic microvascular ischemic changes.  Acute infarct left frontal lobe noted measuring approximately 7.4 x 4.1 cm. Punctate acute infarct also noted left centrum semiovale. Small acute infarct also noted measuring approximately 1.4 x 1.2 cm in the left posterior temporal lobe. No abnormal intraparenchymal or leptomeningeal enhancement.  There is no acute parenchymal hemorrhage, parenchymal mass, or midline shift. There is no extra-axial fluid collection.  There is no hydrocephalus. Partial empty sella  Loss of normal high T2 signal distal left internal carotid artery compatible with occlusion  IMPRESSION: Left-sided acute infarcts as described above.  No acute intracranial hemorrhage.  Occluded distal left internal carotid artery          OSMIN SILVA M.D., ATTENDING RADIOLOGIST This document has been electronically signed. Feb 6 2020 10:32AM      BEA MARTINEZ MD; Attending Radiologist This document has been electronically signed. Dec  8 2022  6:51PM

## 2024-04-10 NOTE — PROCEDURE
[FreeTextEntry1] : Botox 200 units mixed with 4 cc of sterile saline.  Injected using aseptic knee with alcohol.  Injected in the following muscles: 50 units in the right biceps, 50 units in the brachioradialis, 25 units in the pronator teres and 25 units in the pronator quadratus muscle. 50 units in the palmaris longus muscle.  Patient tolerated procedure well.  Any bleeding controlled with local pressure.  [Consent Signed] : consent signed [Adverse Effects] : no adverse effects

## 2024-04-10 NOTE — PHYSICAL EXAM
[General Appearance - Alert] : alert [General Appearance - In No Acute Distress] : in no acute distress [Person] : oriented to person [Place] : oriented to place [Naming Objects] : difficulty naming common objects [Repeating Phrases] : difficulty repeating a phrase [Fluency] : fluency not intact [Cranial Nerves Optic (II)] : visual acuity intact bilaterally,  visual fields full to confrontation, pupils equal round and reactive to light [Cranial Nerves Oculomotor (III)] : extraocular motion intact [Cranial Nerves Trigeminal (V)] : facial sensation intact symmetrically [Cranial Nerves Facial (VII)] : face symmetrical [Cranial Nerves Vestibulocochlear (VIII)] : hearing was intact bilaterally [Cranial Nerves Accessory (XI - Cranial And Spinal)] : head turning and shoulder shrug symmetric [Cranial Nerves Hypoglossal (XII)] : there was no tongue deviation with protrusion [Motor Strength Lower Extremities Right] : there was weakness of the right lower extremity [Motor Strength Upper Extremities Right] : arm weakness was present [2] : triceps 2/5 [Motor Strength Forearms Right Weakness] : forearm weakness was  present [1] : forearm supination 1/5 [Motor Strength Wrists Right Weakness] : wrist weakness was present [3] : wrist extension 3/5 [Hand Weakness Right] : the hand  was weak [Motor Strength Upper Extremities Left] : normal arm strength [Motor Strength Forearms Left Weakness] : normal forearm strength [Motor Strength Wrists Left Weakness] : normal wrist strength [Hand Weakness Left] : normal hand  [Sensation Tactile Decrease] : light touch was intact [Dysdiadochokinesia On The Left] : not present on the left side [3+] : Ankle jerk right 3+ [2+] : Ankle jerk left 2+ [FreeTextEntry6] : Spastic right upper and lower extremities [FreeTextEntry8] : Spastic gait,

## 2024-04-17 ENCOUNTER — LABORATORY RESULT (OUTPATIENT)
Age: 69
End: 2024-04-17

## 2024-04-22 LAB — HEMOCCULT STL QL IA: NEGATIVE

## 2024-04-24 ENCOUNTER — NON-APPOINTMENT (OUTPATIENT)
Age: 69
End: 2024-04-24

## 2024-05-04 ENCOUNTER — EMERGENCY (EMERGENCY)
Facility: HOSPITAL | Age: 69
LOS: 0 days | Discharge: HOME | End: 2024-05-04
Attending: STUDENT IN AN ORGANIZED HEALTH CARE EDUCATION/TRAINING PROGRAM
Payer: MEDICARE

## 2024-05-04 VITALS
TEMPERATURE: 98 F | HEART RATE: 63 BPM | DIASTOLIC BLOOD PRESSURE: 56 MMHG | RESPIRATION RATE: 18 BRPM | SYSTOLIC BLOOD PRESSURE: 88 MMHG

## 2024-05-04 VITALS
HEART RATE: 69 BPM | SYSTOLIC BLOOD PRESSURE: 133 MMHG | DIASTOLIC BLOOD PRESSURE: 73 MMHG | OXYGEN SATURATION: 96 % | TEMPERATURE: 98 F | RESPIRATION RATE: 17 BRPM

## 2024-05-04 DIAGNOSIS — I69.331 MONOPLEGIA OF UPPER LIMB FOLLOWING CEREBRAL INFARCTION AFFECTING RIGHT DOMINANT SIDE: ICD-10-CM

## 2024-05-04 DIAGNOSIS — I45.10 UNSPECIFIED RIGHT BUNDLE-BRANCH BLOCK: ICD-10-CM

## 2024-05-04 DIAGNOSIS — E86.0 DEHYDRATION: ICD-10-CM

## 2024-05-04 DIAGNOSIS — R55 SYNCOPE AND COLLAPSE: ICD-10-CM

## 2024-05-04 LAB
ALBUMIN SERPL ELPH-MCNC: 3.7 G/DL — SIGNIFICANT CHANGE UP (ref 3.3–5)
ALP SERPL-CCNC: 80 U/L — SIGNIFICANT CHANGE UP (ref 40–120)
ALT FLD-CCNC: 23 U/L — SIGNIFICANT CHANGE UP (ref 12–78)
ANION GAP SERPL CALC-SCNC: 6 MMOL/L — SIGNIFICANT CHANGE UP (ref 5–17)
APPEARANCE UR: ABNORMAL
AST SERPL-CCNC: 26 U/L — SIGNIFICANT CHANGE UP (ref 15–37)
BASE EXCESS BLDV CALC-SCNC: -1.1 MMOL/L — SIGNIFICANT CHANGE UP (ref -2–3)
BASOPHILS # BLD AUTO: 0.07 K/UL — SIGNIFICANT CHANGE UP (ref 0–0.2)
BASOPHILS NFR BLD AUTO: 1 % — SIGNIFICANT CHANGE UP (ref 0–2)
BILIRUB SERPL-MCNC: 0.4 MG/DL — SIGNIFICANT CHANGE UP (ref 0.2–1.2)
BILIRUB UR-MCNC: NEGATIVE — SIGNIFICANT CHANGE UP
BUN SERPL-MCNC: 15 MG/DL — SIGNIFICANT CHANGE UP (ref 7–23)
CALCIUM SERPL-MCNC: 8.5 MG/DL — SIGNIFICANT CHANGE UP (ref 8.5–10.1)
CHLORIDE SERPL-SCNC: 103 MMOL/L — SIGNIFICANT CHANGE UP (ref 96–108)
CO2 SERPL-SCNC: 26 MMOL/L — SIGNIFICANT CHANGE UP (ref 22–31)
COLOR SPEC: YELLOW — SIGNIFICANT CHANGE UP
CREAT SERPL-MCNC: 0.94 MG/DL — SIGNIFICANT CHANGE UP (ref 0.5–1.3)
DIFF PNL FLD: NEGATIVE — SIGNIFICANT CHANGE UP
EGFR: 88 ML/MIN/1.73M2 — SIGNIFICANT CHANGE UP
EOSINOPHIL # BLD AUTO: 0.06 K/UL — SIGNIFICANT CHANGE UP (ref 0–0.5)
EOSINOPHIL NFR BLD AUTO: 0.9 % — SIGNIFICANT CHANGE UP (ref 0–6)
GAS PNL BLDV: SIGNIFICANT CHANGE UP
GLUCOSE SERPL-MCNC: 139 MG/DL — HIGH (ref 70–99)
GLUCOSE UR QL: NEGATIVE MG/DL — SIGNIFICANT CHANGE UP
HCO3 BLDV-SCNC: 25 MMOL/L — SIGNIFICANT CHANGE UP (ref 22–29)
HCT VFR BLD CALC: 34.4 % — LOW (ref 39–50)
HGB BLD-MCNC: 10.4 G/DL — LOW (ref 13–17)
IMM GRANULOCYTES NFR BLD AUTO: 0.3 % — SIGNIFICANT CHANGE UP (ref 0–0.9)
KETONES UR-MCNC: NEGATIVE MG/DL — SIGNIFICANT CHANGE UP
LEUKOCYTE ESTERASE UR-ACNC: NEGATIVE — SIGNIFICANT CHANGE UP
LIDOCAIN IGE QN: 28 U/L — SIGNIFICANT CHANGE UP (ref 13–75)
LYMPHOCYTES # BLD AUTO: 1.47 K/UL — SIGNIFICANT CHANGE UP (ref 1–3.3)
LYMPHOCYTES # BLD AUTO: 21.2 % — SIGNIFICANT CHANGE UP (ref 13–44)
MAGNESIUM SERPL-MCNC: 2 MG/DL — SIGNIFICANT CHANGE UP (ref 1.6–2.6)
MCHC RBC-ENTMCNC: 23.6 PG — LOW (ref 27–34)
MCHC RBC-ENTMCNC: 30.2 GM/DL — LOW (ref 32–36)
MCV RBC AUTO: 78.2 FL — LOW (ref 80–100)
MONOCYTES # BLD AUTO: 0.56 K/UL — SIGNIFICANT CHANGE UP (ref 0–0.9)
MONOCYTES NFR BLD AUTO: 8.1 % — SIGNIFICANT CHANGE UP (ref 2–14)
NEUTROPHILS # BLD AUTO: 4.76 K/UL — SIGNIFICANT CHANGE UP (ref 1.8–7.4)
NEUTROPHILS NFR BLD AUTO: 68.5 % — SIGNIFICANT CHANGE UP (ref 43–77)
NITRITE UR-MCNC: NEGATIVE — SIGNIFICANT CHANGE UP
PCO2 BLDV: 48 MMHG — SIGNIFICANT CHANGE UP (ref 42–55)
PH BLDV: 7.33 — SIGNIFICANT CHANGE UP (ref 7.32–7.43)
PH UR: 7 — SIGNIFICANT CHANGE UP (ref 5–8)
PLATELET # BLD AUTO: 284 K/UL — SIGNIFICANT CHANGE UP (ref 150–400)
PO2 BLDV: 46 MMHG — HIGH (ref 25–45)
POTASSIUM SERPL-MCNC: 3.7 MMOL/L — SIGNIFICANT CHANGE UP (ref 3.5–5.3)
POTASSIUM SERPL-SCNC: 3.7 MMOL/L — SIGNIFICANT CHANGE UP (ref 3.5–5.3)
PROT SERPL-MCNC: 7.1 GM/DL — SIGNIFICANT CHANGE UP (ref 6–8.3)
PROT UR-MCNC: NEGATIVE MG/DL — SIGNIFICANT CHANGE UP
RBC # BLD: 4.4 M/UL — SIGNIFICANT CHANGE UP (ref 4.2–5.8)
RBC # FLD: 17.7 % — HIGH (ref 10.3–14.5)
SAO2 % BLDV: 74 % — SIGNIFICANT CHANGE UP (ref 67–88)
SODIUM SERPL-SCNC: 135 MMOL/L — SIGNIFICANT CHANGE UP (ref 135–145)
SP GR SPEC: 1.01 — SIGNIFICANT CHANGE UP (ref 1–1.03)
TROPONIN I, HIGH SENSITIVITY RESULT: 4.69 NG/L — SIGNIFICANT CHANGE UP
UROBILINOGEN FLD QL: 0.2 MG/DL — SIGNIFICANT CHANGE UP (ref 0.2–1)
WBC # BLD: 6.94 K/UL — SIGNIFICANT CHANGE UP (ref 3.8–10.5)
WBC # FLD AUTO: 6.94 K/UL — SIGNIFICANT CHANGE UP (ref 3.8–10.5)

## 2024-05-04 PROCEDURE — 85025 COMPLETE CBC W/AUTO DIFF WBC: CPT

## 2024-05-04 PROCEDURE — 87086 URINE CULTURE/COLONY COUNT: CPT

## 2024-05-04 PROCEDURE — 80053 COMPREHEN METABOLIC PANEL: CPT

## 2024-05-04 PROCEDURE — 99285 EMERGENCY DEPT VISIT HI MDM: CPT

## 2024-05-04 PROCEDURE — 82962 GLUCOSE BLOOD TEST: CPT

## 2024-05-04 PROCEDURE — 82803 BLOOD GASES ANY COMBINATION: CPT

## 2024-05-04 PROCEDURE — 36000 PLACE NEEDLE IN VEIN: CPT

## 2024-05-04 PROCEDURE — 71045 X-RAY EXAM CHEST 1 VIEW: CPT | Mod: 26

## 2024-05-04 PROCEDURE — 71045 X-RAY EXAM CHEST 1 VIEW: CPT

## 2024-05-04 PROCEDURE — 81001 URINALYSIS AUTO W/SCOPE: CPT

## 2024-05-04 PROCEDURE — 36415 COLL VENOUS BLD VENIPUNCTURE: CPT

## 2024-05-04 PROCEDURE — 93005 ELECTROCARDIOGRAM TRACING: CPT

## 2024-05-04 PROCEDURE — 83735 ASSAY OF MAGNESIUM: CPT

## 2024-05-04 PROCEDURE — 84484 ASSAY OF TROPONIN QUANT: CPT

## 2024-05-04 PROCEDURE — 93010 ELECTROCARDIOGRAM REPORT: CPT

## 2024-05-04 PROCEDURE — 99285 EMERGENCY DEPT VISIT HI MDM: CPT | Mod: 25

## 2024-05-04 PROCEDURE — 83690 ASSAY OF LIPASE: CPT

## 2024-05-04 RX ORDER — SODIUM CHLORIDE 9 MG/ML
1000 INJECTION INTRAMUSCULAR; INTRAVENOUS; SUBCUTANEOUS ONCE
Refills: 0 | Status: COMPLETED | OUTPATIENT
Start: 2024-05-04 | End: 2024-05-04

## 2024-05-04 RX ADMIN — SODIUM CHLORIDE 1000 MILLILITER(S): 9 INJECTION INTRAMUSCULAR; INTRAVENOUS; SUBCUTANEOUS at 14:31

## 2024-05-04 NOTE — ED ADULT NURSE NOTE - HISTORY OF COVID-19 VACCINATION
Reports \"bumping head\" on 4/10, 4/12 and 4/16/18.  Hit head on counter in kitchen, refrigerator door, and on a banister and it all occurred in the same location of head.  Has applied ice to head.  C/o pain to site. Unable to sleep or lie on head.  Denies LOC.  Denies dizziness, headaches or vomiting. Denies sob.      PCP--- Dr Pendleton  Medications reviewed and updated.  Denies known Latex allergy or symptoms of Latex sensitivity.       Vaccine status unknown
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
## 2024-05-04 NOTE — ED PROVIDER NOTE - PHYSICAL EXAMINATION
GEN: Pt in NAD, A&O x3. GCS 15  EYES: Sclera white w/o injection, PERRLA, EOMI.  ENT: Head NCAT. Nose without deformity, turbinates without edema or erythema, no DC. No auricular TTP. Mouth and pharynx without erythema or exudates, uvula midline, no tonsillar enlargement. Neck supple FROM.   RESP: No chest wall tenderness, CTA b/l, no wheezes, rales, or rhonchi.   CARDIAC: RRR, clear distinct S1, S2, no murmurs, gallops, or rubs.   ABD: Abdomen non-distended, soft, non-tender, no rebound or guarding. No CVAT b/l.  VASC: 2+ carotid, radial, and dorsalis pedis pulses b/l. No edema or tenderness of the lower extremities.  NEURO: speaking with mild slurring of speech (residual, at baseline); right upper extremity 0/5, other extremities able to move spontaneously.  MSK: No joint erythema or obvious deformities. Spine without obvious deformity. No midline or paraspinal tenderness. FROM w/o pain of upper and lower extremities b/l.  SKIN: No rashes noted.
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
## 2024-05-04 NOTE — ED ADULT NURSE NOTE - NSFALLRISKINTERV_ED_ALL_ED
Assistance OOB with selected safe patient handling equipment if applicable/Communicate fall risk and risk factors to all staff, patient, and family/Monitor gait and stability/Orthostatic vital signs/Provide patient with walking aids/Provide visual cue: yellow wristband, yellow gown, etc/Reinforce activity limits and safety measures with patient and family/Call bell, personal items and telephone in reach/Instruct patient to call for assistance before getting out of bed/chair/stretcher/Non-slip footwear applied when patient is off stretcher/[place] to call system/Physically safe environment - no spills, clutter or unnecessary equipment/Purposeful Proactive Rounding/Room/bathroom lighting operational, light cord in reach

## 2024-05-04 NOTE — ED PROVIDER NOTE - PATIENT PORTAL LINK FT
You can access the FollowMyHealth Patient Portal offered by [facility] by registering at the following website: [email or web address]. By joining [facility]’s FollowMyHealth portal, you will also be able to view your health information using other applications (apps) compatible with our system.

## 2024-05-05 LAB
CULTURE RESULTS: SIGNIFICANT CHANGE UP
SPECIMEN SOURCE: SIGNIFICANT CHANGE UP

## 2024-05-08 ENCOUNTER — APPOINTMENT (OUTPATIENT)
Dept: UROLOGY | Facility: CLINIC | Age: 69
End: 2024-05-08
Payer: MEDICARE

## 2024-05-08 VITALS
HEIGHT: 70 IN | BODY MASS INDEX: 28.63 KG/M2 | WEIGHT: 200 LBS | SYSTOLIC BLOOD PRESSURE: 120 MMHG | OXYGEN SATURATION: 95 % | HEART RATE: 69 BPM | DIASTOLIC BLOOD PRESSURE: 73 MMHG

## 2024-05-08 DIAGNOSIS — Z63.5 DISRUPTION OF FAMILY BY SEPARATION AND DIVORCE: ICD-10-CM

## 2024-05-08 DIAGNOSIS — Z78.9 OTHER SPECIFIED HEALTH STATUS: ICD-10-CM

## 2024-05-08 PROCEDURE — 99204 OFFICE O/P NEW MOD 45 MIN: CPT

## 2024-05-08 SDOH — SOCIAL STABILITY - SOCIAL INSECURITY: DISRUPTION OF FAMILY BY SEPARATION AND DIVORCE: Z63.5

## 2024-05-08 NOTE — END OF VISIT
[FreeTextEntry3] : Recommendations: Patient was prescribed a 10-day course of antibiotics. Repeating PSA and free PSA tests post-antibiotic treatment and conducting a transrectal ultrasound of the prostate gland in 3 weeks to further evaluate the cause of PSA elevation and to determine the next steps in management.

## 2024-05-08 NOTE — HISTORY OF PRESENT ILLNESS
[FreeTextEntry1] : Initial 05/08/2024: 69 y/o M pt PSA levels were at 0.5, but more recently, they have increased to approximately 4. The patient reports some symptoms related to the lower urinary tract, which he describes as stable and relatively minor. He is accompanied by a formal caregiver for this consultation.

## 2024-05-08 NOTE — REVIEW OF SYSTEMS
[Negative] : Heme/Lymph [Wake up at night to urinate  How many times?  ___] : wakes up to urinate [unfilled] times during the night [see HPI] : see HPI [Dizziness] : dizziness [Limb Weakness] : limb weakness [Difficulty Walking] : difficulty walking [FreeTextEntry2] : frequent urination 15 times/day

## 2024-05-08 NOTE — ADDENDUM
[FreeTextEntry1] : I, Aide Dinh, acted as a scribe on behalf of Dr. Sweeney 05/08/2024.   All medical record entries made by the Scribe were at my, Dr.Kip Sweeney, direction and personally dictated by me on 05/08/2024. I have reviewed the chart and agree that the record accurately reflects my personal performance of the history, physical exam, assessment, and plan. I have also personally directed, reviewed, and agreed with the chart.

## 2024-05-09 LAB
APPEARANCE: CLEAR
BACTERIA: NEGATIVE /HPF
BILIRUBIN URINE: NEGATIVE
BLOOD URINE: NEGATIVE
CAST: 0 /LPF
COLOR: YELLOW
EPITHELIAL CELLS: 0 /HPF
GLUCOSE QUALITATIVE U: NEGATIVE MG/DL
KETONES URINE: NEGATIVE MG/DL
LEUKOCYTE ESTERASE URINE: NEGATIVE
MICROSCOPIC-UA: NORMAL
NITRITE URINE: NEGATIVE
PH URINE: 6
PROTEIN URINE: NEGATIVE MG/DL
RED BLOOD CELLS URINE: 1 /HPF
SPECIFIC GRAVITY URINE: 1.01
UROBILINOGEN URINE: 0.2 MG/DL
WHITE BLOOD CELLS URINE: 0 /HPF

## 2024-05-13 LAB — URINE CYTOLOGY: NORMAL

## 2024-05-14 DIAGNOSIS — R97.20 ELEVATED PROSTATE, SPECIFIC ANTIGEN [PSA]: ICD-10-CM

## 2024-05-14 RX ORDER — SULFAMETHOXAZOLE AND TRIMETHOPRIM 800; 160 MG/1; MG/1
800-160 TABLET ORAL TWICE DAILY
Qty: 20 | Refills: 0 | Status: ACTIVE | COMMUNITY
Start: 2024-05-14 | End: 1900-01-01
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
## 2024-07-10 ENCOUNTER — APPOINTMENT (OUTPATIENT)
Dept: INTERNAL MEDICINE | Facility: CLINIC | Age: 69
End: 2024-07-10
Payer: MEDICARE

## 2024-07-10 VITALS
RESPIRATION RATE: 16 BRPM | SYSTOLIC BLOOD PRESSURE: 118 MMHG | BODY MASS INDEX: 29.06 KG/M2 | HEART RATE: 79 BPM | WEIGHT: 203 LBS | HEIGHT: 70 IN | OXYGEN SATURATION: 97 % | DIASTOLIC BLOOD PRESSURE: 76 MMHG | TEMPERATURE: 98.7 F

## 2024-07-10 DIAGNOSIS — D50.9 IRON DEFICIENCY ANEMIA, UNSPECIFIED: ICD-10-CM

## 2024-07-10 DIAGNOSIS — Z63.5 DISRUPTION OF FAMILY BY SEPARATION AND DIVORCE: ICD-10-CM

## 2024-07-10 DIAGNOSIS — Z78.9 OTHER SPECIFIED HEALTH STATUS: ICD-10-CM

## 2024-07-10 DIAGNOSIS — I63.9 CEREBRAL INFARCTION, UNSPECIFIED: ICD-10-CM

## 2024-07-10 DIAGNOSIS — I10 ESSENTIAL (PRIMARY) HYPERTENSION: ICD-10-CM

## 2024-07-10 DIAGNOSIS — E78.5 HYPERLIPIDEMIA, UNSPECIFIED: ICD-10-CM

## 2024-07-10 DIAGNOSIS — R97.20 ELEVATED PROSTATE, SPECIFIC ANTIGEN [PSA]: ICD-10-CM

## 2024-07-10 DIAGNOSIS — G81.10 SPASTIC HEMIPLEGIA AFFECTING UNSPECIFIED SIDE: ICD-10-CM

## 2024-07-10 DIAGNOSIS — I77.71 DISSECTION OF CAROTID ARTERY: ICD-10-CM

## 2024-07-10 PROCEDURE — 99214 OFFICE O/P EST MOD 30 MIN: CPT

## 2024-07-10 SDOH — SOCIAL STABILITY - SOCIAL INSECURITY: DISRUPTION OF FAMILY BY SEPARATION AND DIVORCE: Z63.5

## 2024-07-11 LAB
BASOPHILS # BLD AUTO: 0.07 K/UL
BASOPHILS NFR BLD AUTO: 1.2 %
EOSINOPHIL # BLD AUTO: 0.09 K/UL
FERRITIN SERPL-MCNC: 10 NG/ML
HCT VFR BLD CALC: 36 %
HGB BLD-MCNC: 10.6 G/DL
IMM GRANULOCYTES NFR BLD AUTO: 0.2 %
IRON SATN MFR SERPL: 8 %
IRON SERPL-MCNC: 36 UG/DL
LYMPHOCYTES # BLD AUTO: 1.48 K/UL
LYMPHOCYTES NFR BLD AUTO: 24.6 %
MAN DIFF?: NORMAL
MCHC RBC-ENTMCNC: 23.7 PG
MCHC RBC-ENTMCNC: 29.4 GM/DL
MCV RBC AUTO: 80.5 FL
MONOCYTES # BLD AUTO: 0.37 K/UL
MONOCYTES NFR BLD AUTO: 6.1 %
NEUTROPHILS # BLD AUTO: 4 K/UL
PLATELET # BLD AUTO: 297 K/UL
RBC # FLD: 18.6 %
TIBC SERPL-MCNC: 428 UG/DL
UIBC SERPL-MCNC: 392 UG/DL
WBC # FLD AUTO: 6.02 K/UL

## 2024-07-19 ENCOUNTER — APPOINTMENT (OUTPATIENT)
Dept: NEUROLOGY | Facility: CLINIC | Age: 69
End: 2024-07-19
Payer: MEDICARE

## 2024-07-19 VITALS — TEMPERATURE: 98.2 F | WEIGHT: 200 LBS | HEIGHT: 70 IN | BODY MASS INDEX: 28.63 KG/M2

## 2024-07-19 PROCEDURE — 99214 OFFICE O/P EST MOD 30 MIN: CPT | Mod: 25

## 2024-07-19 PROCEDURE — 64642 CHEMODENERV 1 EXTREMITY 1-4: CPT

## 2024-07-22 ENCOUNTER — RX RENEWAL (OUTPATIENT)
Age: 69
End: 2024-07-22

## 2024-07-28 ENCOUNTER — OUTPATIENT (OUTPATIENT)
Dept: OUTPATIENT SERVICES | Facility: HOSPITAL | Age: 69
LOS: 1 days | Discharge: HOME | End: 2024-07-28

## 2024-07-28 DIAGNOSIS — D58.2 OTHER HEMOGLOBINOPATHIES: ICD-10-CM

## 2024-08-01 DIAGNOSIS — H90.12 CONDUCTIVE HEARING LOSS, UNILATERAL, LEFT EAR, WITH UNRESTRICTED HEARING ON THE CONTRALATERAL SIDE: ICD-10-CM

## 2024-08-01 DIAGNOSIS — Z87.2 PERSONAL HISTORY OF DISEASES OF THE SKIN AND SUBCUTANEOUS TISSUE: ICD-10-CM

## 2024-08-01 DIAGNOSIS — Z86.018 PERSONAL HISTORY OF OTHER BENIGN NEOPLASM: ICD-10-CM

## 2024-08-01 DIAGNOSIS — Z91.51 PERSONAL HISTORY OF SUICIDAL BEHAVIOR: ICD-10-CM

## 2024-08-01 DIAGNOSIS — Z86.59 PERSONAL HISTORY OF OTHER MENTAL AND BEHAVIORAL DISORDERS: ICD-10-CM

## 2024-08-01 DIAGNOSIS — Z86.73 PERSONAL HISTORY OF TRANSIENT ISCHEMIC ATTACK (TIA), AND CEREBRAL INFARCTION W/OUT RESIDUAL DEFICITS: ICD-10-CM

## 2024-08-01 DIAGNOSIS — R21 RASH AND OTHER NONSPECIFIC SKIN ERUPTION: ICD-10-CM

## 2024-08-01 DIAGNOSIS — H91.22 SUDDEN IDIOPATHIC HEARING LOSS, LEFT EAR: ICD-10-CM

## 2024-08-01 DIAGNOSIS — L57.0 ACTINIC KERATOSIS: ICD-10-CM

## 2024-08-01 DIAGNOSIS — H68.022 CHRONIC EUSTACHIAN SALPINGITIS, LEFT EAR: ICD-10-CM

## 2024-08-01 PROBLEM — Z86.79 HISTORY OF CAROTID ARTERY DISSECTION: Status: RESOLVED | Noted: 2020-06-30 | Resolved: 2024-08-01

## 2024-08-02 PROBLEM — D64.89 ANEMIA DUE TO MULTIPLE MECHANISMS: Status: ACTIVE | Noted: 2024-08-02

## 2024-08-02 NOTE — HISTORY OF PRESENT ILLNESS
[de-identified] : CLAYTON COX is a 68 y.o. M with a PMH significant for HTN, HLD, Left carotid artery dissection with resulting CVA s/p t- PA 2/2020 -> residual right upper extremity weakness and expressive aphasia, who has been referred to our office for an iron deficiency anemia.   1/17/23 - Hgb: 12.2, MCV: 84.2, serum iron: 34, TSAT: 8% 2/21/23 - Cologuard - negative 4/05/24 - Hgb: 11.9, MCV: 77.6, serum iron: 36, TSAT: 8%, creat: 0.81  7/10/24 - Hgb: 10.6, MCV: 80.5, serum iron: 36, TSAT: 8%, Ferritin: 10  Vascular surgery recommended anticoagulation for 6 weeks but repeat CT scan showed petechial hemorrhages and patient treated with medical management without anticoagulation. For spastic hemiparesis recommend Botox injections for upper extremity.

## 2024-08-02 NOTE — HISTORY OF PRESENT ILLNESS
[de-identified] : CLAYTON COX is a 68 y.o. M with a PMH significant for HTN, HLD, Left carotid artery dissection with resulting CVA s/p t- PA 2/2020 -> residual right upper extremity weakness and expressive aphasia, who has been referred to our office for an iron deficiency anemia.   1/17/23 - Hgb: 12.2, MCV: 84.2, serum iron: 34, TSAT: 8% 2/21/23 - Cologuard - negative 4/05/24 - Hgb: 11.9, MCV: 77.6, serum iron: 36, TSAT: 8%, creat: 0.81  7/10/24 - Hgb: 10.6, MCV: 80.5, serum iron: 36, TSAT: 8%, Ferritin: 10  Vascular surgery recommended anticoagulation for 6 weeks but repeat CT scan showed petechial hemorrhages and patient treated with medical management without anticoagulation. For spastic hemiparesis recommend Botox injections for upper extremity.

## 2024-08-05 ENCOUNTER — RESULT REVIEW (OUTPATIENT)
Age: 69
End: 2024-08-05

## 2024-08-05 ENCOUNTER — APPOINTMENT (OUTPATIENT)
Dept: HEMATOLOGY ONCOLOGY | Facility: CLINIC | Age: 69
End: 2024-08-05

## 2024-08-05 PROBLEM — I65.22 OCCLUSION OF LEFT INTERNAL CAROTID ARTERY: Status: RESOLVED | Noted: 2020-06-30 | Resolved: 2024-08-05

## 2024-08-05 PROBLEM — R47.01 EXPRESSIVE APHASIA: Status: ACTIVE | Noted: 2022-09-28

## 2024-08-05 PROBLEM — Z86.79 HISTORY OF ORTHOSTATIC HYPOTENSION: Status: RESOLVED | Noted: 2021-06-30 | Resolved: 2024-08-05

## 2024-08-05 LAB
BASOPHILS # BLD AUTO: 0.06 K/UL — SIGNIFICANT CHANGE UP (ref 0–0.2)
BASOPHILS NFR BLD AUTO: 1.3 % — SIGNIFICANT CHANGE UP (ref 0–2)
EOSINOPHIL # BLD AUTO: 0.07 K/UL — SIGNIFICANT CHANGE UP (ref 0–0.5)
EOSINOPHIL NFR BLD AUTO: 1.5 % — SIGNIFICANT CHANGE UP (ref 0–6)
HCT VFR BLD CALC: 35.2 % — LOW (ref 39–50)
HGB BLD-MCNC: 11.1 G/DL — LOW (ref 13–17)
IMM GRANULOCYTES NFR BLD AUTO: 0.2 % — SIGNIFICANT CHANGE UP (ref 0–0.9)
LYMPHOCYTES # BLD AUTO: 1.3 K/UL — SIGNIFICANT CHANGE UP (ref 1–3.3)
LYMPHOCYTES # BLD AUTO: 27.7 % — SIGNIFICANT CHANGE UP (ref 13–44)
MCHC RBC-ENTMCNC: 23.7 PG — LOW (ref 27–34)
MCHC RBC-ENTMCNC: 31.5 GM/DL — LOW (ref 32–36)
MCV RBC AUTO: 75.2 FL — LOW (ref 80–100)
MONOCYTES # BLD AUTO: 0.38 K/UL — SIGNIFICANT CHANGE UP (ref 0–0.9)
MONOCYTES NFR BLD AUTO: 8.1 % — SIGNIFICANT CHANGE UP (ref 2–14)
NEUTROPHILS # BLD AUTO: 2.87 K/UL — SIGNIFICANT CHANGE UP (ref 1.8–7.4)
NEUTROPHILS NFR BLD AUTO: 61.2 % — SIGNIFICANT CHANGE UP (ref 43–77)
NRBC # BLD: 0 /100 WBCS — SIGNIFICANT CHANGE UP (ref 0–0)
NRBC BLD-RTO: 0 /100 WBCS — SIGNIFICANT CHANGE UP (ref 0–0)
PLATELET # BLD AUTO: 286 K/UL — SIGNIFICANT CHANGE UP (ref 150–400)
RBC # BLD: 4.68 M/UL — SIGNIFICANT CHANGE UP (ref 4.2–5.8)
RBC # FLD: 17.3 % — HIGH (ref 10.3–14.5)
WBC # BLD: 4.69 K/UL — SIGNIFICANT CHANGE UP (ref 3.8–10.5)
WBC # FLD AUTO: 4.69 K/UL — SIGNIFICANT CHANGE UP (ref 3.8–10.5)

## 2024-08-05 PROCEDURE — G2211 COMPLEX E/M VISIT ADD ON: CPT

## 2024-08-05 PROCEDURE — 99203 OFFICE O/P NEW LOW 30 MIN: CPT

## 2024-08-06 DIAGNOSIS — D50.9 IRON DEFICIENCY ANEMIA, UNSPECIFIED: ICD-10-CM

## 2024-08-06 LAB
FOLATE SERPL-MCNC: 10.7 NG/ML — SIGNIFICANT CHANGE UP
HAPTOGLOB SERPL-MCNC: 136 MG/DL — SIGNIFICANT CHANGE UP (ref 34–200)
IGA FLD-MCNC: 274 MG/DL — SIGNIFICANT CHANGE UP (ref 84–499)
IGG FLD-MCNC: 1054 MG/DL — SIGNIFICANT CHANGE UP (ref 610–1660)
IGM SERPL-MCNC: 139 MG/DL — SIGNIFICANT CHANGE UP (ref 35–242)
KAPPA LC SER QL IFE: 2.17 MG/DL — HIGH (ref 0.33–1.94)
KAPPA/LAMBDA FREE LIGHT CHAIN RATIO, SERUM: 1.25 RATIO — SIGNIFICANT CHANGE UP (ref 0.26–1.65)
LAMBDA LC SER QL IFE: 1.73 MG/DL — SIGNIFICANT CHANGE UP (ref 0.57–2.63)
PROT SERPL-MCNC: 6.9 G/DL — SIGNIFICANT CHANGE UP (ref 6–8.3)
VIT B12 SERPL-MCNC: 344 PG/ML — SIGNIFICANT CHANGE UP (ref 232–1245)

## 2024-08-07 LAB
% ALBUMIN: 57.1 % — SIGNIFICANT CHANGE UP
% ALPHA 1: 4.2 % — SIGNIFICANT CHANGE UP
% ALPHA 2: 9.2 % — SIGNIFICANT CHANGE UP
% BETA: 14.3 % — SIGNIFICANT CHANGE UP
% GAMMA: 15.2 % — SIGNIFICANT CHANGE UP
ALBUMIN SERPL ELPH-MCNC: 3.9 G/DL — SIGNIFICANT CHANGE UP (ref 3.6–5.5)
ALBUMIN/GLOB SERPL ELPH: 1.3 RATIO — SIGNIFICANT CHANGE UP
ALPHA1 GLOB SERPL ELPH-MCNC: 0.3 G/DL — SIGNIFICANT CHANGE UP (ref 0.1–0.4)
ALPHA2 GLOB SERPL ELPH-MCNC: 0.6 G/DL — SIGNIFICANT CHANGE UP (ref 0.5–1)
B-GLOBULIN SERPL ELPH-MCNC: 1 G/DL — SIGNIFICANT CHANGE UP (ref 0.5–1)
EPO SERPL-MCNC: 39.5 MIU/ML — HIGH (ref 2.6–18.5)
GAMMA GLOBULIN: 1 G/DL — SIGNIFICANT CHANGE UP (ref 0.6–1.6)
INTERPRETATION SERPL IFE-IMP: SIGNIFICANT CHANGE UP
PROT PATTERN SERPL ELPH-IMP: SIGNIFICANT CHANGE UP
PROT SERPL-MCNC: 6.9 G/DL — SIGNIFICANT CHANGE UP (ref 6–8.3)

## 2024-08-22 ENCOUNTER — APPOINTMENT (OUTPATIENT)
Dept: INFUSION THERAPY | Facility: CLINIC | Age: 69
End: 2024-08-22

## 2024-08-29 ENCOUNTER — APPOINTMENT (OUTPATIENT)
Dept: INFUSION THERAPY | Facility: CLINIC | Age: 69
End: 2024-08-29

## 2024-09-06 ENCOUNTER — APPOINTMENT (OUTPATIENT)
Dept: INFUSION THERAPY | Facility: CLINIC | Age: 69
End: 2024-09-06

## 2024-09-06 ENCOUNTER — RX RENEWAL (OUTPATIENT)
Age: 69
End: 2024-09-06

## 2024-09-06 VITALS
WEIGHT: 202 LBS | HEIGHT: 70 IN | OXYGEN SATURATION: 96 % | SYSTOLIC BLOOD PRESSURE: 108 MMHG | BODY MASS INDEX: 28.92 KG/M2 | HEART RATE: 81 BPM | DIASTOLIC BLOOD PRESSURE: 68 MMHG | TEMPERATURE: 98 F

## 2024-10-07 ENCOUNTER — RX RENEWAL (OUTPATIENT)
Age: 69
End: 2024-10-07

## 2024-10-14 ENCOUNTER — APPOINTMENT (OUTPATIENT)
Dept: INTERNAL MEDICINE | Facility: CLINIC | Age: 69
End: 2024-10-14
Payer: MEDICARE

## 2024-10-14 VITALS
TEMPERATURE: 98.3 F | HEART RATE: 69 BPM | HEIGHT: 70 IN | OXYGEN SATURATION: 96 % | WEIGHT: 201 LBS | BODY MASS INDEX: 28.77 KG/M2 | SYSTOLIC BLOOD PRESSURE: 110 MMHG | DIASTOLIC BLOOD PRESSURE: 72 MMHG

## 2024-10-14 DIAGNOSIS — E78.5 HYPERLIPIDEMIA, UNSPECIFIED: ICD-10-CM

## 2024-10-14 DIAGNOSIS — D64.89 OTHER SPECIFIED ANEMIAS: ICD-10-CM

## 2024-10-14 DIAGNOSIS — R97.20 ELEVATED PROSTATE, SPECIFIC ANTIGEN [PSA]: ICD-10-CM

## 2024-10-14 DIAGNOSIS — I10 ESSENTIAL (PRIMARY) HYPERTENSION: ICD-10-CM

## 2024-10-14 DIAGNOSIS — G81.10 SPASTIC HEMIPLEGIA AFFECTING UNSPECIFIED SIDE: ICD-10-CM

## 2024-10-14 DIAGNOSIS — R47.01 APHASIA: ICD-10-CM

## 2024-10-14 DIAGNOSIS — D50.9 IRON DEFICIENCY ANEMIA, UNSPECIFIED: ICD-10-CM

## 2024-10-14 DIAGNOSIS — Z86.79 PERSONAL HISTORY OF OTHER DISEASES OF THE CIRCULATORY SYSTEM: ICD-10-CM

## 2024-10-14 DIAGNOSIS — Z23 ENCOUNTER FOR IMMUNIZATION: ICD-10-CM

## 2024-10-14 PROCEDURE — G2211 COMPLEX E/M VISIT ADD ON: CPT

## 2024-10-14 PROCEDURE — 99215 OFFICE O/P EST HI 40 MIN: CPT

## 2024-10-14 PROCEDURE — 90662 IIV NO PRSV INCREASED AG IM: CPT

## 2024-10-14 PROCEDURE — G0008: CPT

## 2024-10-14 RX ORDER — AMLODIPINE BESYLATE 5 MG/1
5 TABLET ORAL
Qty: 30 | Refills: 5 | Status: ACTIVE | COMMUNITY
Start: 2024-10-14 | End: 1900-01-01

## 2024-10-15 LAB
25(OH)D3 SERPL-MCNC: 59.1 NG/ML
ALBUMIN SERPL ELPH-MCNC: 4.3 G/DL
ALP BLD-CCNC: 96 U/L
ALT SERPL-CCNC: 20 U/L
ANION GAP SERPL CALC-SCNC: 13 MMOL/L
APPEARANCE: CLEAR
AST SERPL-CCNC: 21 U/L
BACTERIA: NEGATIVE /HPF
BILIRUB SERPL-MCNC: 0.3 MG/DL
BILIRUBIN URINE: NEGATIVE
BLOOD URINE: NEGATIVE
BUN SERPL-MCNC: 10 MG/DL
CALCIUM SERPL-MCNC: 9.1 MG/DL
CAST: 0 /LPF
CHLORIDE SERPL-SCNC: 99 MMOL/L
CHOLEST SERPL-MCNC: 141 MG/DL
CO2 SERPL-SCNC: 24 MMOL/L
COLOR: YELLOW
CREAT SERPL-MCNC: 0.62 MG/DL
EGFR: 103 ML/MIN/1.73M2
EPITHELIAL CELLS: 0 /HPF
FOLATE SERPL-MCNC: 10.5 NG/ML
GLUCOSE QUALITATIVE U: NEGATIVE MG/DL
GLUCOSE SERPL-MCNC: 98 MG/DL
HCT VFR BLD CALC: 42.4 %
HDLC SERPL-MCNC: 52 MG/DL
HGB BLD-MCNC: 13.2 G/DL
IRON SATN MFR SERPL: 29 %
IRON SERPL-MCNC: 84 UG/DL
KETONES URINE: NEGATIVE MG/DL
LDLC SERPL CALC-MCNC: 69 MG/DL
LEUKOCYTE ESTERASE URINE: NEGATIVE
MCHC RBC-ENTMCNC: 27.6 PG
MCHC RBC-ENTMCNC: 31.1 GM/DL
MCV RBC AUTO: 88.7 FL
MICROSCOPIC-UA: NORMAL
NITRITE URINE: NEGATIVE
NONHDLC SERPL-MCNC: 88 MG/DL
PH URINE: 7
PLATELET # BLD AUTO: 241 K/UL
POTASSIUM SERPL-SCNC: 4.2 MMOL/L
PROT SERPL-MCNC: 6.9 G/DL
PROTEIN URINE: NEGATIVE MG/DL
PSA SERPL-MCNC: 1.98 NG/ML
RBC # BLD: 4.78 M/UL
RBC # FLD: 22.4 %
RED BLOOD CELLS URINE: 2 /HPF
SODIUM SERPL-SCNC: 137 MMOL/L
SPECIFIC GRAVITY URINE: 1.01
TIBC SERPL-MCNC: 288 UG/DL
TRIGL SERPL-MCNC: 106 MG/DL
TSH SERPL-ACNC: 1.33 UIU/ML
UIBC SERPL-MCNC: 204 UG/DL
UROBILINOGEN URINE: 1 MG/DL
VIT B12 SERPL-MCNC: 390 PG/ML
WBC # FLD AUTO: 6.12 K/UL
WHITE BLOOD CELLS URINE: 0 /HPF

## 2024-10-21 ENCOUNTER — NON-APPOINTMENT (OUTPATIENT)
Age: 69
End: 2024-10-21

## 2024-10-25 ENCOUNTER — APPOINTMENT (OUTPATIENT)
Dept: NEUROLOGY | Facility: CLINIC | Age: 69
End: 2024-10-25
Payer: MEDICARE

## 2024-10-25 ENCOUNTER — NON-APPOINTMENT (OUTPATIENT)
Age: 69
End: 2024-10-25

## 2024-10-25 ENCOUNTER — APPOINTMENT (OUTPATIENT)
Dept: GASTROENTEROLOGY | Facility: CLINIC | Age: 69
End: 2024-10-25

## 2024-10-25 PROCEDURE — 64644 CHEMODENERV 1 EXTREM 5/> MUS: CPT

## 2024-10-25 PROCEDURE — G2211 COMPLEX E/M VISIT ADD ON: CPT

## 2024-10-25 PROCEDURE — 99213 OFFICE O/P EST LOW 20 MIN: CPT | Mod: 25

## 2024-12-09 ENCOUNTER — RX RENEWAL (OUTPATIENT)
Age: 69
End: 2024-12-09

## 2024-12-26 ENCOUNTER — APPOINTMENT (OUTPATIENT)
Dept: GASTROENTEROLOGY | Facility: CLINIC | Age: 69
End: 2024-12-26
Payer: MEDICARE

## 2024-12-26 VITALS
DIASTOLIC BLOOD PRESSURE: 82 MMHG | WEIGHT: 190 LBS | SYSTOLIC BLOOD PRESSURE: 124 MMHG | BODY MASS INDEX: 27.2 KG/M2 | HEIGHT: 70 IN

## 2024-12-26 DIAGNOSIS — Z71.9 COUNSELING, UNSPECIFIED: ICD-10-CM

## 2024-12-26 DIAGNOSIS — D64.89 OTHER SPECIFIED ANEMIAS: ICD-10-CM

## 2024-12-26 DIAGNOSIS — Z86.79 PERSONAL HISTORY OF OTHER DISEASES OF THE CIRCULATORY SYSTEM: ICD-10-CM

## 2024-12-26 PROCEDURE — 99203 OFFICE O/P NEW LOW 30 MIN: CPT

## 2025-01-15 ENCOUNTER — APPOINTMENT (OUTPATIENT)
Dept: INTERNAL MEDICINE | Facility: CLINIC | Age: 70
End: 2025-01-15
Payer: MEDICARE

## 2025-01-15 VITALS
BODY MASS INDEX: 28.63 KG/M2 | SYSTOLIC BLOOD PRESSURE: 128 MMHG | HEART RATE: 69 BPM | HEIGHT: 70 IN | TEMPERATURE: 98.7 F | RESPIRATION RATE: 16 BRPM | OXYGEN SATURATION: 95 % | WEIGHT: 200 LBS | DIASTOLIC BLOOD PRESSURE: 74 MMHG

## 2025-01-15 DIAGNOSIS — Z86.79 PERSONAL HISTORY OF OTHER DISEASES OF THE CIRCULATORY SYSTEM: ICD-10-CM

## 2025-01-15 DIAGNOSIS — E78.5 HYPERLIPIDEMIA, UNSPECIFIED: ICD-10-CM

## 2025-01-15 DIAGNOSIS — Z00.00 ENCOUNTER FOR GENERAL ADULT MEDICAL EXAMINATION W/OUT ABNORMAL FINDINGS: ICD-10-CM

## 2025-01-15 DIAGNOSIS — D64.89 OTHER SPECIFIED ANEMIAS: ICD-10-CM

## 2025-01-15 DIAGNOSIS — I10 ESSENTIAL (PRIMARY) HYPERTENSION: ICD-10-CM

## 2025-01-15 DIAGNOSIS — Z86.73 PERSONAL HISTORY OF TRANSIENT ISCHEMIC ATTACK (TIA), AND CEREBRAL INFARCTION W/OUT RESIDUAL DEFICITS: ICD-10-CM

## 2025-01-15 DIAGNOSIS — G81.10 SPASTIC HEMIPLEGIA AFFECTING UNSPECIFIED SIDE: ICD-10-CM

## 2025-01-15 DIAGNOSIS — G81.91 HEMIPLEGIA, UNSPECIFIED AFFECTING RIGHT DOMINANT SIDE: ICD-10-CM

## 2025-01-15 PROCEDURE — G0439: CPT

## 2025-01-27 ENCOUNTER — APPOINTMENT (OUTPATIENT)
Dept: NEUROLOGY | Facility: CLINIC | Age: 70
End: 2025-01-27
Payer: MEDICARE

## 2025-01-27 PROCEDURE — 99213 OFFICE O/P EST LOW 20 MIN: CPT | Mod: 25

## 2025-01-27 PROCEDURE — 20553 NJX 1/MLT TRIGGER POINTS 3/>: CPT

## 2025-01-28 ENCOUNTER — RX RENEWAL (OUTPATIENT)
Age: 70
End: 2025-01-28

## 2025-03-04 ENCOUNTER — RX RENEWAL (OUTPATIENT)
Age: 70
End: 2025-03-04

## 2025-03-12 ENCOUNTER — RX RENEWAL (OUTPATIENT)
Age: 70
End: 2025-03-12

## 2025-04-03 ENCOUNTER — RX RENEWAL (OUTPATIENT)
Age: 70
End: 2025-04-03

## 2025-04-09 ENCOUNTER — NON-APPOINTMENT (OUTPATIENT)
Age: 70
End: 2025-04-09

## 2025-04-11 ENCOUNTER — APPOINTMENT (OUTPATIENT)
Dept: INTERNAL MEDICINE | Facility: CLINIC | Age: 70
End: 2025-04-11
Payer: MEDICARE

## 2025-04-11 VITALS
TEMPERATURE: 98.7 F | RESPIRATION RATE: 16 BRPM | BODY MASS INDEX: 28.06 KG/M2 | DIASTOLIC BLOOD PRESSURE: 80 MMHG | WEIGHT: 196 LBS | HEART RATE: 74 BPM | OXYGEN SATURATION: 97 % | SYSTOLIC BLOOD PRESSURE: 132 MMHG | HEIGHT: 70 IN

## 2025-04-11 DIAGNOSIS — I10 ESSENTIAL (PRIMARY) HYPERTENSION: ICD-10-CM

## 2025-04-11 DIAGNOSIS — G81.10 SPASTIC HEMIPLEGIA AFFECTING UNSPECIFIED SIDE: ICD-10-CM

## 2025-04-11 DIAGNOSIS — G81.91 HEMIPLEGIA, UNSPECIFIED AFFECTING RIGHT DOMINANT SIDE: ICD-10-CM

## 2025-04-11 PROCEDURE — 99214 OFFICE O/P EST MOD 30 MIN: CPT

## 2025-04-30 ENCOUNTER — APPOINTMENT (OUTPATIENT)
Dept: NEUROLOGY | Facility: CLINIC | Age: 70
End: 2025-04-30
Payer: MEDICARE

## 2025-04-30 PROCEDURE — 99213 OFFICE O/P EST LOW 20 MIN: CPT | Mod: 25

## 2025-04-30 PROCEDURE — 64644 CHEMODENERV 1 EXTREM 5/> MUS: CPT

## 2025-05-08 ENCOUNTER — APPOINTMENT (OUTPATIENT)
Dept: DERMATOLOGY | Facility: CLINIC | Age: 70
End: 2025-05-08

## 2025-05-08 ENCOUNTER — RESULT REVIEW (OUTPATIENT)
Age: 70
End: 2025-05-08

## 2025-05-08 ENCOUNTER — INPATIENT (INPATIENT)
Facility: HOSPITAL | Age: 70
LOS: 0 days | Discharge: HOME | DRG: 312 | End: 2025-05-09
Attending: HOSPITALIST | Admitting: STUDENT IN AN ORGANIZED HEALTH CARE EDUCATION/TRAINING PROGRAM
Payer: MEDICARE

## 2025-05-08 VITALS
RESPIRATION RATE: 20 BRPM | DIASTOLIC BLOOD PRESSURE: 70 MMHG | OXYGEN SATURATION: 94 % | SYSTOLIC BLOOD PRESSURE: 101 MMHG | TEMPERATURE: 97 F | HEART RATE: 60 BPM

## 2025-05-08 DIAGNOSIS — R55 SYNCOPE AND COLLAPSE: ICD-10-CM

## 2025-05-08 LAB
ALBUMIN SERPL ELPH-MCNC: 3.3 G/DL — SIGNIFICANT CHANGE UP (ref 3.3–5)
ALP SERPL-CCNC: 86 U/L — SIGNIFICANT CHANGE UP (ref 40–120)
ALT FLD-CCNC: 25 U/L — SIGNIFICANT CHANGE UP (ref 12–78)
ANION GAP SERPL CALC-SCNC: 4 MMOL/L — LOW (ref 5–17)
AST SERPL-CCNC: 14 U/L — LOW (ref 15–37)
BASOPHILS # BLD AUTO: 0.07 K/UL — SIGNIFICANT CHANGE UP (ref 0–0.2)
BASOPHILS NFR BLD AUTO: 1.2 % — SIGNIFICANT CHANGE UP (ref 0–2)
BILIRUB SERPL-MCNC: 0.3 MG/DL — SIGNIFICANT CHANGE UP (ref 0.2–1.2)
BUN SERPL-MCNC: 12 MG/DL — SIGNIFICANT CHANGE UP (ref 7–23)
CALCIUM SERPL-MCNC: 9.1 MG/DL — SIGNIFICANT CHANGE UP (ref 8.5–10.1)
CHLORIDE SERPL-SCNC: 107 MMOL/L — SIGNIFICANT CHANGE UP (ref 96–108)
CO2 SERPL-SCNC: 26 MMOL/L — SIGNIFICANT CHANGE UP (ref 22–31)
CREAT SERPL-MCNC: 0.75 MG/DL — SIGNIFICANT CHANGE UP (ref 0.5–1.3)
EGFR: 97 ML/MIN/1.73M2 — SIGNIFICANT CHANGE UP
EGFR: 97 ML/MIN/1.73M2 — SIGNIFICANT CHANGE UP
EOSINOPHIL # BLD AUTO: 0.11 K/UL — SIGNIFICANT CHANGE UP (ref 0–0.5)
EOSINOPHIL NFR BLD AUTO: 1.9 % — SIGNIFICANT CHANGE UP (ref 0–6)
GLUCOSE SERPL-MCNC: 86 MG/DL — SIGNIFICANT CHANGE UP (ref 70–99)
HCT VFR BLD CALC: 42 % — SIGNIFICANT CHANGE UP (ref 39–50)
HGB BLD-MCNC: 13.7 G/DL — SIGNIFICANT CHANGE UP (ref 13–17)
IMM GRANULOCYTES # BLD AUTO: 0.02 K/UL — SIGNIFICANT CHANGE UP (ref 0–0.07)
IMM GRANULOCYTES NFR BLD AUTO: 0.3 % — SIGNIFICANT CHANGE UP (ref 0–0.9)
LYMPHOCYTES # BLD AUTO: 1.23 K/UL — SIGNIFICANT CHANGE UP (ref 1–3.3)
LYMPHOCYTES NFR BLD AUTO: 20.9 % — SIGNIFICANT CHANGE UP (ref 13–44)
MCHC RBC-ENTMCNC: 30.4 PG — SIGNIFICANT CHANGE UP (ref 27–34)
MCHC RBC-ENTMCNC: 32.6 G/DL — SIGNIFICANT CHANGE UP (ref 32–36)
MCV RBC AUTO: 93.3 FL — SIGNIFICANT CHANGE UP (ref 80–100)
MONOCYTES # BLD AUTO: 0.51 K/UL — SIGNIFICANT CHANGE UP (ref 0–0.9)
MONOCYTES NFR BLD AUTO: 8.7 % — SIGNIFICANT CHANGE UP (ref 2–14)
NEUTROPHILS # BLD AUTO: 3.95 K/UL — SIGNIFICANT CHANGE UP (ref 1.8–7.4)
NEUTROPHILS NFR BLD AUTO: 67 % — SIGNIFICANT CHANGE UP (ref 43–77)
NRBC # BLD AUTO: 0 K/UL — SIGNIFICANT CHANGE UP (ref 0–0)
NRBC # FLD: 0 K/UL — SIGNIFICANT CHANGE UP (ref 0–0)
NRBC BLD AUTO-RTO: 0 /100 WBCS — SIGNIFICANT CHANGE UP (ref 0–0)
PLATELET # BLD AUTO: 213 K/UL — SIGNIFICANT CHANGE UP (ref 150–400)
PMV BLD: 9.2 FL — SIGNIFICANT CHANGE UP (ref 7–13)
POTASSIUM SERPL-MCNC: 3.7 MMOL/L — SIGNIFICANT CHANGE UP (ref 3.5–5.3)
POTASSIUM SERPL-SCNC: 3.7 MMOL/L — SIGNIFICANT CHANGE UP (ref 3.5–5.3)
PROT SERPL-MCNC: 6.5 GM/DL — SIGNIFICANT CHANGE UP (ref 6–8.3)
RBC # BLD: 4.5 M/UL — SIGNIFICANT CHANGE UP (ref 4.2–5.8)
RBC # FLD: 12.4 % — SIGNIFICANT CHANGE UP (ref 10.3–14.5)
SODIUM SERPL-SCNC: 137 MMOL/L — SIGNIFICANT CHANGE UP (ref 135–145)
TROPONIN I, HIGH SENSITIVITY RESULT: 6.81 NG/L — SIGNIFICANT CHANGE UP
WBC # BLD: 5.89 K/UL — SIGNIFICANT CHANGE UP (ref 3.8–10.5)
WBC # FLD AUTO: 5.89 K/UL — SIGNIFICANT CHANGE UP (ref 3.8–10.5)

## 2025-05-08 PROCEDURE — 99285 EMERGENCY DEPT VISIT HI MDM: CPT

## 2025-05-08 PROCEDURE — 70450 CT HEAD/BRAIN W/O DYE: CPT | Mod: 26

## 2025-05-08 PROCEDURE — 71045 X-RAY EXAM CHEST 1 VIEW: CPT | Mod: 26

## 2025-05-08 PROCEDURE — 99223 1ST HOSP IP/OBS HIGH 75: CPT

## 2025-05-08 PROCEDURE — 97162 PT EVAL MOD COMPLEX 30 MIN: CPT | Mod: GP

## 2025-05-08 PROCEDURE — 93306 TTE W/DOPPLER COMPLETE: CPT | Mod: 26

## 2025-05-08 PROCEDURE — 85027 COMPLETE CBC AUTOMATED: CPT

## 2025-05-08 PROCEDURE — 80048 BASIC METABOLIC PNL TOTAL CA: CPT

## 2025-05-08 PROCEDURE — 36415 COLL VENOUS BLD VENIPUNCTURE: CPT

## 2025-05-08 RX ORDER — ATORVASTATIN CALCIUM 80 MG/1
80 TABLET, FILM COATED ORAL AT BEDTIME
Refills: 0 | Status: DISCONTINUED | OUTPATIENT
Start: 2025-05-08 | End: 2025-05-09

## 2025-05-08 RX ORDER — ACETAMINOPHEN 500 MG/5ML
650 LIQUID (ML) ORAL EVERY 6 HOURS
Refills: 0 | Status: DISCONTINUED | OUTPATIENT
Start: 2025-05-08 | End: 2025-05-09

## 2025-05-08 RX ORDER — ATORVASTATIN CALCIUM 80 MG/1
1 TABLET, FILM COATED ORAL
Refills: 0 | DISCHARGE

## 2025-05-08 RX ORDER — FLUDROCORTISONE ACETATE 0.1 MG
0.1 TABLET ORAL DAILY
Refills: 0 | Status: DISCONTINUED | OUTPATIENT
Start: 2025-05-08 | End: 2025-05-09

## 2025-05-08 RX ORDER — ONDANSETRON HCL/PF 4 MG/2 ML
4 VIAL (ML) INJECTION EVERY 8 HOURS
Refills: 0 | Status: DISCONTINUED | OUTPATIENT
Start: 2025-05-08 | End: 2025-05-09

## 2025-05-08 RX ORDER — HEPARIN SODIUM 1000 [USP'U]/ML
5000 INJECTION INTRAVENOUS; SUBCUTANEOUS EVERY 12 HOURS
Refills: 0 | Status: DISCONTINUED | OUTPATIENT
Start: 2025-05-08 | End: 2025-05-09

## 2025-05-08 RX ORDER — SERTRALINE 100 MG/1
50 TABLET, FILM COATED ORAL DAILY
Refills: 0 | Status: DISCONTINUED | OUTPATIENT
Start: 2025-05-08 | End: 2025-05-09

## 2025-05-08 RX ORDER — MAGNESIUM, ALUMINUM HYDROXIDE 200-200 MG
30 TABLET,CHEWABLE ORAL EVERY 4 HOURS
Refills: 0 | Status: DISCONTINUED | OUTPATIENT
Start: 2025-05-08 | End: 2025-05-09

## 2025-05-08 RX ORDER — FLUDROCORTISONE ACETATE 0.1 MG
1 TABLET ORAL
Refills: 0 | DISCHARGE

## 2025-05-08 RX ORDER — MELATONIN 5 MG
3 TABLET ORAL AT BEDTIME
Refills: 0 | Status: DISCONTINUED | OUTPATIENT
Start: 2025-05-08 | End: 2025-05-09

## 2025-05-08 RX ORDER — SERTRALINE 100 MG/1
1 TABLET, FILM COATED ORAL
Refills: 0 | DISCHARGE

## 2025-05-08 RX ADMIN — ATORVASTATIN CALCIUM 80 MILLIGRAM(S): 80 TABLET, FILM COATED ORAL at 21:16

## 2025-05-08 RX ADMIN — Medication 3 MILLIGRAM(S): at 21:16

## 2025-05-08 RX ADMIN — HEPARIN SODIUM 5000 UNIT(S): 1000 INJECTION INTRAVENOUS; SUBCUTANEOUS at 21:16

## 2025-05-08 NOTE — ED ADULT NURSE NOTE - OBJECTIVE STATEMENT
Pt BIBEMS from home s/p syncope. Pt states he vomitted his breakfast this AM, then was working w/ SLP and he had 2 episodes of LOC. Did not hit his head. Per EMS on arrival pt was bradycardic to the 40s and hypotensive. VSS at this time. Denies chest pain. hx of stroke, w/ residual right sided defecits,.

## 2025-05-08 NOTE — ED ADULT NURSE NOTE - DOES PATIENT HAVE ADVANCE DIRECTIVE
Reason for call:   [x] Refill   [] Prior Auth  [] Other:     Office:   [x] PCP/Provider - Dr Kumar  [] Specialty/Provider -     Medication: tramadol    Dose/Frequency: 50mg take 1 tablet two to three times daily as needed    Quantity: 180    Pharmacy: [facility] [address]    Does the patient have enough for 3 days?   [x] Yes   [] No - Send as HP to POD     No

## 2025-05-08 NOTE — ED PROVIDER NOTE - CLINICAL SUMMARY MEDICAL DECISION MAKING FREE TEXT BOX
Concern for syncope. Will plan for cardio vs. neuro will workup and admission. Concern for syncope. Will plan for cardio vs. neuro will workup and admission.      1330:   pt accepted by Susan 70 y/o male with a PMHx of CVA and a stroke in 2020 with residual speech impediment and right upper extremity weakness presents to the ED s/p syncope, Pt was working  w/ a speech therapist today. As per EMS pt experienced an episode of syncope, became hypotensive and bradycardic,   states pt had episode of emesis with resolution of vital signs. Pt in ED w/ no complaints.   pt with baseline dysarthria and RUE weakness as per pt.   CV RRR.   lungs CTA.   Concern for syncope. Will plan for cardio vs neuro will workup and admission.      1330:   pt accepted by Susan

## 2025-05-08 NOTE — PATIENT PROFILE ADULT - FALL HARM RISK - HARM RISK INTERVENTIONS
Assistance with ambulation/Assistance OOB with selected safe patient handling equipment/Communicate Risk of Fall with Harm to all staff/Discuss with provider need for PT consult/Monitor gait and stability/Provide patient with walking aids - walker, cane, crutches/Reinforce activity limits and safety measures with patient and family/Sit up slowly, dangle for a short time, stand at bedside before walking/Tailored Fall Risk Interventions/Visual Cue: Yellow wristband and red socks/Bed in lowest position, wheels locked, appropriate side rails in place/Call bell, personal items and telephone in reach/Instruct patient to call for assistance before getting out of bed or chair/Non-slip footwear when patient is out of bed/[place] to call system/Physically safe environment - no spills, clutter or unnecessary equipment/Purposeful Proactive Rounding/Room/bathroom lighting operational, light cord in reach

## 2025-05-08 NOTE — ED PROVIDER NOTE - OBJECTIVE STATEMENT
68 y/o male with a PMHx of CVA and a stroke in 2020 with residual speech impediment and right upper extremity weakness presents to the ED s/p syncope, Pt was working  w/ a speech therapist today. As per EMS pt experienced an episode of syncope, became hypotensive and bradycardic, No resolution of vital signs. Pt in ED w/ no complaints. 68 y/o male with a PMHx of CVA and a stroke in 2020 with residual speech impediment and right upper extremity weakness presents to the ED s/p syncope, Pt was working  w/ a speech therapist today. As per EMS pt experienced an episode of syncope, became hypotensive and bradycardic,   states pt had episode of emesis with resolution of vital signs. Pt in ED w/ no complaints.

## 2025-05-08 NOTE — H&P ADULT - ASSESSMENT
68 yo w/PMH Left frontal stroke w/ Left ICA occlusion, s/p Rt sided residual weakness, s/p tPA in Feb/'20 with residual speech impediment and right upper extremity weakness, orthostatic hypotension presents to the ED s/p syncope, Pt was working  w/ a speech therapist today. Pt reports he had episode of emesis and then lost consciousness. Denies any other ROS.       #Syncope  #Orthostatic hypotension   Perhaps had vasovagal episode iso of emesis. Appears to have long hx of syncopal episodes has never had ILR according to pt  - c/w home florinef   - f/u as to why pt is not on aspirin given cva hx   - f/u orthostatics   - f/u tte   - admit to tele   - cards consulted, appreciate recs     #HTN   On amlodipine 5 mg and cozar 100 mg at home   - hold antihypertensives iso of normotenstion     #HLD  - cw statin

## 2025-05-08 NOTE — H&P ADULT - HISTORY OF PRESENT ILLNESS
HPI:70 yo w/PMH Left frontal stroke w/ Left ICA occlusion, s/p Rt sided residual weakness, s/p tPA in Feb/'20 with residual speech impediment and right upper extremity weakness, orthostatic hypotension presents to the ED s/p syncope, Pt was working  w/ a speech therapist today. Pt reports he had episode of emesis and then lost consciousness. Denies any other ROS.     PAST MEDICAL & SURGICAL HISTORY:  CVA (cerebral vascular accident)      No significant past surgical history        FAMILY HISTORY: not pertinent     Social History:  no smoking, alcohol, or illicit drug use     Allergies    No Known Allergies    Intolerances        MEDICATIONS  (STANDING):  atorvastatin 80 milliGRAM(s) Oral at bedtime  fludroCORTISONE 0.1 milliGRAM(s) Oral daily  sertraline 50 milliGRAM(s) Oral daily    MEDICATIONS  (PRN):  acetaminophen     Tablet .. 650 milliGRAM(s) Oral every 6 hours PRN Temp greater or equal to 38C (100.4F), Mild Pain (1 - 3)  aluminum hydroxide/magnesium hydroxide/simethicone Suspension 30 milliLiter(s) Oral every 4 hours PRN Dyspepsia  melatonin 3 milliGRAM(s) Oral at bedtime PRN Insomnia  ondansetron Injectable 4 milliGRAM(s) IV Push every 8 hours PRN Nausea and/or Vomiting      ROS:  General:  No fevers, chills, or unexplained weight loss  Skin: No rash or bothersome skin lesions  Musculoskeletal: No arthalgias, myalgias or joint swelling  Eyes: No visual changes or eye pain  Ears: No hearing loss , otorrhea or ear pain  Nose, Mouth, Throat: No nasal congestion, rhinorrhea, oral lesions, postnasal drip or sore throat  Cardio: + syncope  Respiratory: No cough, shortness of breath or wheezing   GI: No diarrhea, constipation, blood in stools, abdominal pain, vomiting or heartburn  : No urinary frequency, hematuria, incontinence, or dysuria  Neurologic: No headaches, parasthesias, confusion, dysarthria or gait instability  Psychiatric:  No anxiety or depression  Lymphatic:  No easy bruising, easy bleeding or swollen glands  Allergic: No itching, sneezing , watery eyes, clear rhinorrhea or recurrent infections    PEx  T(C): 36.3 (05-08-25 @ 11:48), Max: 36.3 (05-08-25 @ 11:48)  HR: 60 (05-08-25 @ 11:48) (60 - 60)  BP: 101/70 (05-08-25 @ 11:48) (101/70 - 101/70)  RR: 20 (05-08-25 @ 11:48) (20 - 20)  SpO2: 94% (05-08-25 @ 11:48) (94% - 94%)  Wt(kg): --  General:     no acute distress, no identifying marks , scars, or tattoos.  Skin: no rash or prominent lesions  Head: normocephalic, atraumatic     Sinuses: non-tender  Nose: no external lesions, mucosa non-inflamed, septum and turbinates normal  Throat: no erythema, exudates or lesions.  Neck: Supple without lymphadenopathy. Thyroid no thyromegaly, no palpable thyroid nodules, no palpable nodules or masses, carotid arteries no bruits. .  Heart: RRR, no murmur or gallop.  Normal S1, S2.  No S3, S4.   Lungs: CTA bilaterally, no wheezes, rhonchi, rales.  Breathing unlabored.   Chest wall: Normal insp   Abdomen:  Soft, NT/ND, normal bowel sounds, no HSM, no masses.  No peritoneal signs.   Neurologic: AOx3 right sided weakness speech impediment   Psychiatric: Oriented X3, intact recent and remote memory, judgement and insight, normal mood and affect.                          13.7   5.89  )-----------( 213      ( 08 May 2025 12:19 )             42.0     05-08    137  |  107  |  12  ----------------------------<  86  3.7   |  26  |  0.75    Ca    9.1      08 May 2025 12:19    TPro  6.5  /  Alb  3.3  /  TBili  0.3  /  DBili  x   /  AST  14[L]  /  ALT  25  /  AlkPhos  86  05-08    CAPILLARY BLOOD GLUCOSE      POCT Blood Glucose.: 96 mg/dL (08 May 2025 12:06)      Urinalysis Basic - ( 08 May 2025 12:19 )    Color: x / Appearance: x / SG: x / pH: x  Gluc: 86 mg/dL / Ketone: x  / Bili: x / Urobili: x   Blood: x / Protein: x / Nitrite: x   Leuk Esterase: x / RBC: x / WBC x   Sq Epi: x / Non Sq Epi: x / Bacteria: x                 HPI:70 yo w/PMH Left frontal stroke w/ Left ICA occlusion, s/p Rt sided residual weakness, s/p tPA in Feb/'20 with residual speech impediment and right upper extremity weakness, orthostatic hypotension presents to the ED s/p syncope, Pt was working  w/ a speech therapist today. Pt reports he had episode of emesis and then lost consciousness. Denies any other ROS.     PAST MEDICAL & SURGICAL HISTORY:  CVA (cerebral vascular accident)      No significant past surgical history        FAMILY HISTORY: not pertinent     Social History:  no smoking, alcohol, or illicit drug use     Allergies    No Known Allergies    Intolerances        MEDICATIONS  (STANDING):  atorvastatin 80 milliGRAM(s) Oral at bedtime  fludroCORTISONE 0.1 milliGRAM(s) Oral daily  sertraline 50 milliGRAM(s) Oral daily    MEDICATIONS  (PRN):  acetaminophen     Tablet .. 650 milliGRAM(s) Oral every 6 hours PRN Temp greater or equal to 38C (100.4F), Mild Pain (1 - 3)  aluminum hydroxide/magnesium hydroxide/simethicone Suspension 30 milliLiter(s) Oral every 4 hours PRN Dyspepsia  melatonin 3 milliGRAM(s) Oral at bedtime PRN Insomnia  ondansetron Injectable 4 milliGRAM(s) IV Push every 8 hours PRN Nausea and/or Vomiting      ROS:  General:  No fevers, chills, or unexplained weight loss  Skin: No rash or bothersome skin lesions  Musculoskeletal: No arthalgias, myalgias or joint swelling  Eyes: No visual changes or eye pain  Ears: No hearing loss , otorrhea or ear pain  Nose, Mouth, Throat: No nasal congestion, rhinorrhea, oral lesions, postnasal drip or sore throat  Cardio: + syncope  Respiratory: No cough, shortness of breath or wheezing   GI: No diarrhea, constipation, blood in stools, abdominal pain, vomiting or heartburn  : No urinary frequency, hematuria, incontinence, or dysuria  Neurologic: No headaches, parasthesias, confusion, dysarthria or gait instability  Psychiatric:  No anxiety or depression  Lymphatic:  No easy bruising, easy bleeding or swollen glands  Allergic: No itching, sneezing , watery eyes, clear rhinorrhea or recurrent infections    PEx  T(C): 36.3 (05-08-25 @ 11:48), Max: 36.3 (05-08-25 @ 11:48)  HR: 60 (05-08-25 @ 11:48) (60 - 60)  BP: 101/70 (05-08-25 @ 11:48) (101/70 - 101/70)  RR: 20 (05-08-25 @ 11:48) (20 - 20)  SpO2: 94% (05-08-25 @ 11:48) (94% - 94%)  Wt(kg): --  General:     no acute distress, no identifying marks , scars, or tattoos.  Skin: no rash or prominent lesions  Head: normocephalic, atraumatic     Sinuses: non-tender  Nose: no external lesions, mucosa non-inflamed, septum and turbinates normal  Throat: no erythema, exudates or lesions.  Neck: Supple without lymphadenopathy. Thyroid no thyromegaly, no palpable thyroid nodules, no palpable nodules or masses, carotid arteries no bruits. .  Heart: RRR, no murmur or gallop.  Normal S1, S2.  No S3, S4.   Lungs: CTA bilaterally, no wheezes, rhonchi, rales.  Breathing unlabored.   Chest wall: Normal insp   Abdomen:  Soft, NT/ND, normal bowel sounds, no HSM, no masses.  No peritoneal signs.   Neurologic: AOx3 right sided weakness speech impediment   Psychiatric: Oriented X3, intact recent and remote memory, judgement and insight, normal mood and affect.                          13.7   5.89  )-----------( 213      ( 08 May 2025 12:19 )             42.0     05-08    137  |  107  |  12  ----------------------------<  86  3.7   |  26  |  0.75    Ca    9.1      08 May 2025 12:19    TPro  6.5  /  Alb  3.3  /  TBili  0.3  /  DBili  x   /  AST  14[L]  /  ALT  25  /  AlkPhos  86  05-08    CAPILLARY BLOOD GLUCOSE      POCT Blood Glucose.: 96 mg/dL (08 May 2025 12:06)      Urinalysis Basic - ( 08 May 2025 12:19 )    Color: x / Appearance: x / SG: x / pH: x  Gluc: 86 mg/dL / Ketone: x  / Bili: x / Urobili: x   Blood: x / Protein: x / Nitrite: x   Leuk Esterase: x / RBC: x / WBC x   Sq Epi: x / Non Sq Epi: x / Bacteria: x         Radiology imaging reviewed:     CT head   IMPRESSION:  No evidence of an acute intracranial hemorrhage or hydrocephalus. Chronic   infarcts. No significant interval change from prior.

## 2025-05-08 NOTE — ED ADULT NURSE NOTE - NSFALLRISKINTERV_ED_ALL_ED
Assistance with ambulation/Communicate fall risk and risk factors to all staff, patient, and family/Provide patient with walking aids/Provide visual cue: yellow wristband, yellow gown, etc/Reinforce activity limits and safety measures with patient and family/Call bell, personal items and telephone in reach/Instruct patient to call for assistance before getting out of bed/chair/stretcher/Non-slip footwear applied when patient is off stretcher/[place] to call system/Physically safe environment - no spills, clutter or unnecessary equipment/Purposeful Proactive Rounding/Room/bathroom lighting operational, light cord in reach

## 2025-05-08 NOTE — ED PROVIDER NOTE - MDM ORDERS SUBMITTED SELECTION
Pt c/o generalized body pain that feels like his sickle cell pain.  Appears uncomfortable Imaging Studies/Medications

## 2025-05-08 NOTE — ED PROVIDER NOTE - MUSCULOSKELETAL, MLM
diastasuria baseline as per pt, right upper extremity weakness w/ arm splint baseline as per pt. 5/5 strength throughout, sensations intact dysarthria noted however baseline as per pt, right upper extremity weakness w/ arm splint baseline as per pt. 5/5 strength throughout other extremties, sensations intact

## 2025-05-08 NOTE — PHARMACOTHERAPY INTERVENTION NOTE - COMMENTS
Medication history complete, reviewed medication with patient and aide at bedside and confirmed with DrFirst.

## 2025-05-08 NOTE — ED ADULT TRIAGE NOTE - CHIEF COMPLAINT QUOTE
Pt BIBEMS from home, as per EMS pt became unresponsive for 4-5 min while working with the speech therapist. When EMS arrived at scene, pt with +LOC again, pt HR 40s BP 50/30, pt "woke" up and vomited x1. Hx of stroke 5 years ago. EKG in progress.

## 2025-05-09 ENCOUNTER — TRANSCRIPTION ENCOUNTER (OUTPATIENT)
Age: 70
End: 2025-05-09

## 2025-05-09 VITALS
SYSTOLIC BLOOD PRESSURE: 126 MMHG | DIASTOLIC BLOOD PRESSURE: 73 MMHG | TEMPERATURE: 98 F | OXYGEN SATURATION: 96 % | RESPIRATION RATE: 18 BRPM | HEART RATE: 61 BPM

## 2025-05-09 LAB
ANION GAP SERPL CALC-SCNC: 5 MMOL/L — SIGNIFICANT CHANGE UP (ref 5–17)
BUN SERPL-MCNC: 10 MG/DL — SIGNIFICANT CHANGE UP (ref 7–23)
CALCIUM SERPL-MCNC: 9.2 MG/DL — SIGNIFICANT CHANGE UP (ref 8.5–10.1)
CHLORIDE SERPL-SCNC: 107 MMOL/L — SIGNIFICANT CHANGE UP (ref 96–108)
CO2 SERPL-SCNC: 29 MMOL/L — SIGNIFICANT CHANGE UP (ref 22–31)
CREAT SERPL-MCNC: 0.59 MG/DL — SIGNIFICANT CHANGE UP (ref 0.5–1.3)
EGFR: 104 ML/MIN/1.73M2 — SIGNIFICANT CHANGE UP
EGFR: 104 ML/MIN/1.73M2 — SIGNIFICANT CHANGE UP
GLUCOSE SERPL-MCNC: 95 MG/DL — SIGNIFICANT CHANGE UP (ref 70–99)
HCT VFR BLD CALC: 42.7 % — SIGNIFICANT CHANGE UP (ref 39–50)
HGB BLD-MCNC: 14.2 G/DL — SIGNIFICANT CHANGE UP (ref 13–17)
MCHC RBC-ENTMCNC: 31.1 PG — SIGNIFICANT CHANGE UP (ref 27–34)
MCHC RBC-ENTMCNC: 33.3 G/DL — SIGNIFICANT CHANGE UP (ref 32–36)
MCV RBC AUTO: 93.4 FL — SIGNIFICANT CHANGE UP (ref 80–100)
NRBC # BLD AUTO: 0 K/UL — SIGNIFICANT CHANGE UP (ref 0–0)
NRBC # FLD: 0 K/UL — SIGNIFICANT CHANGE UP (ref 0–0)
NRBC BLD AUTO-RTO: 0 /100 WBCS — SIGNIFICANT CHANGE UP (ref 0–0)
PLATELET # BLD AUTO: 223 K/UL — SIGNIFICANT CHANGE UP (ref 150–400)
PMV BLD: 9.5 FL — SIGNIFICANT CHANGE UP (ref 7–13)
POTASSIUM SERPL-MCNC: 3.8 MMOL/L — SIGNIFICANT CHANGE UP (ref 3.5–5.3)
POTASSIUM SERPL-SCNC: 3.8 MMOL/L — SIGNIFICANT CHANGE UP (ref 3.5–5.3)
RBC # BLD: 4.57 M/UL — SIGNIFICANT CHANGE UP (ref 4.2–5.8)
RBC # FLD: 12.7 % — SIGNIFICANT CHANGE UP (ref 10.3–14.5)
SODIUM SERPL-SCNC: 141 MMOL/L — SIGNIFICANT CHANGE UP (ref 135–145)
WBC # BLD: 4.84 K/UL — SIGNIFICANT CHANGE UP (ref 3.8–10.5)
WBC # FLD AUTO: 4.84 K/UL — SIGNIFICANT CHANGE UP (ref 3.8–10.5)

## 2025-05-09 PROCEDURE — 99239 HOSP IP/OBS DSCHRG MGMT >30: CPT

## 2025-05-09 RX ORDER — AMLODIPINE BESYLATE 10 MG/1
1 TABLET ORAL
Refills: 0 | DISCHARGE

## 2025-05-09 RX ORDER — LOSARTAN POTASSIUM 100 MG/1
1 TABLET, FILM COATED ORAL
Refills: 0 | DISCHARGE

## 2025-05-09 RX ADMIN — HEPARIN SODIUM 5000 UNIT(S): 1000 INJECTION INTRAVENOUS; SUBCUTANEOUS at 10:02

## 2025-05-09 RX ADMIN — SERTRALINE 50 MILLIGRAM(S): 100 TABLET, FILM COATED ORAL at 10:02

## 2025-05-09 RX ADMIN — Medication 0.1 MILLIGRAM(S): at 10:02

## 2025-05-09 NOTE — DISCHARGE NOTE PROVIDER - NSDCMRMEDTOKEN_GEN_ALL_CORE_FT
atorvastatin 80 mg oral tablet: 1 tab(s) orally once a day  cholecalciferol 25 mcg (1000 intl units) oral tablet: 1 tab(s) orally once a day  fludrocortisone 0.1 mg oral tablet: 1 tab(s) orally once a day  sertraline 50 mg oral tablet: 1 tab(s) orally once a day

## 2025-05-09 NOTE — DISCHARGE NOTE PROVIDER - NSDCCPCAREPLAN_GEN_ALL_CORE_FT
PRINCIPAL DISCHARGE DIAGNOSIS  Diagnosis: Syncope  Assessment and Plan of Treatment: Syncope is the medical terminology for loss of consciousness. Most common causes are:  -Orthostatic Hypotension (significant drop in blood pressure with change in position from lying to sitting to standing)  -Vasovagal syncope which is complex nerve and blood vessel reaction in the body. Its the result of an abnormal reflex in the body and is often called reflex sycnope or vasovagal syncope. Many nerves connect with your heart and blood vessels. These nerves help control the speed and force of your heartbeat. They also regulate blood pressure. They control whether your blood vessels should be more open or more closed. Usually these nerves work together so you always get enough blood to your brain. In certain cases, these nerves may give a wrong signal or be slow to respond to input received from the body. This may cause your blood vessels to open wide or cause them not to get more narrow when they need to. At the same time, your heartbeat may slow down. Blood can start to pool in your legs, and not enough of it may reach the brain. If that happens, you may briefly lose consciousness. When you lie down or fall down, blood flow to the brain resumes. Triggers including but not limited to:  Standing for prolonged periods of time, too much heat, intense emotion, intense pain, sight of blood/needle, exercising for too long, straining when urinating/defecating, swallowing, coughing.   To help prevent these episodes, caution with change in position, maintain adequate oral hydration, continued outpatient monitoring of blood pressure and medications. Close outpatient follow up with your primary medical doctor and/or cardiologist.   Of note your blood pressure controlled without medications during hospitalization so blood pressure medications previouslyh taking held at the time of discharge .Follow up with your cardiologist to arrange long term cardiac monitor.

## 2025-05-09 NOTE — PHYSICAL THERAPY INITIAL EVALUATION ADULT - MODALITIES TREATMENT COMMENTS
Pt doing well w/ Old CVA deficits w/ Ind w/ QC as demonstrated this session, in transfers, in HW and on stairs, Pt and aide state he is at baseline. No PT needs at this time. Pt will continue with present support. OOB in chair, CBIR, +alarm,, Aide present. NAD, denies pain, +HM, HR 66

## 2025-05-09 NOTE — DISCHARGE NOTE PROVIDER - NSDCFUADDAPPT_GEN_ALL_CORE_FT
CARDIOLOGY FOLLOW UP APPOINTMENT: 5/13/25 @3:15PM AT THE [facility] WITH JACLYN GRANADO

## 2025-05-09 NOTE — PHYSICAL THERAPY INITIAL EVALUATION ADULT - PATIENT/FAMILY/SIGNIFICANT OTHER GOALS STATEMENT, PT EVAL
Pharmacy is requesting a 90 day fill on potassium.  LF: 12/11/24 30/0  LV: 12/30/24  NV: 3/31/25  LL: Has not gotten recent labs.  Order is in system.  Left message on voicemail for patient to get fasting labs drawn so medication can be refilled.   pt would like to recover at home , no PT needs.

## 2025-05-09 NOTE — PHYSICAL THERAPY INITIAL EVALUATION ADULT - PASSIVE RANGE OF MOTION EXAMINATION, REHAB EVAL
except R elbow extension limited at end ROM,/Right UE Passive ROM was WFL (within functional limits)

## 2025-05-09 NOTE — CONSULT NOTE ADULT - SUBJECTIVE AND OBJECTIVE BOX
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 Pt reports he had episode of emesis and then lost consciousness. Denies any other ROS.     5/9.    PAST MEDICAL & SURGICAL HISTORY:  CVA (cerebral vascular accident)  No significant past surgical history    FAMILY HISTORY: not pertinent   Social History:  no smoking, alcohol, or illicit drug use     Allergies  No Known Allergies  Intolerances    MEDICATIONS  (STANDING):  atorvastatin 80 milliGRAM(s) Oral at bedtime  fludroCORTISONE 0.1 milliGRAM(s) Oral daily  sertraline 50 milliGRAM(s) Oral daily    MEDICATIONS  (PRN):  acetaminophen     Tablet .. 650 milliGRAM(s) Oral every 6 hours PRN Temp greater or equal to 38C (100.4F), Mild Pain (1 - 3)  aluminum hydroxide/magnesium hydroxide/simethicone Suspension 30 milliLiter(s) Oral every 4 hours PRN Dyspepsia  melatonin 3 milliGRAM(s) Oral at bedtime PRN Insomnia  ondansetron Injectable 4 milliGRAM(s) IV Push every 8 hours PRN Nausea and/or Vomiting    Home Medications:  amLODIPine 5 mg oral tablet: 1 tab(s) orally once a day (08 May 2025 14:32)  atorvastatin 80 mg oral tablet: 1 tab(s) orally once a day (08 May 2025 14:32)  cholecalciferol 25 mcg (1000 intl units) oral tablet: 1 tab(s) orally once a day (08 May 2025 14:32)  Cozaar 100 mg oral tablet: 1 tab(s) orally once a day (08 May 2025 14:32)  fludrocortisone 0.1 mg oral tablet: 1 tab(s) orally once a day (08 May 2025 14:32)  sertraline 50 mg oral tablet: 1 tab(s) orally once a day (08 May 2025 14:32)    PHYSICAL EXAM:  T(C): 36.1 (09 May 2025 04:35), Max: 36.9 (08 May 2025 17:26)  T(F): 97 (09 May 2025 04:35), Max: 98.5 (08 May 2025 17:26)  HR: 57 (09 May 2025 04:35) (57 - 89)  BP: 126/75 (09 May 2025 04:35) (101/70 - 147/78)  RR: 18 (09 May 2025 04:35) (16 - 20)  SpO2: 96% (09 May 2025 04:35) (94% - 99%)    Parameters below as of 09 May 2025 04:35  Patient On (Oxygen Delivery Method): room air    Constitutional: NAD, awake and alert  HEENT: PERR, EOMI, Normal Hearing, MMM  Neck: Soft and supple, No LAD, No JVD  Respiratory: Breath sounds are clear bilaterally, No wheezing, rales or rhonchi  Cardiovascular: S1 and S2, regular rate and rhythm, no Murmurs, gallops or rubs  Gastrointestinal: Bowel Sounds present, soft, nontender, nondistended, no guarding, no rebound  Extremities: No peripheral edema  Vascular: 2+ peripheral pulses  Neurological: A/O x 3, no focal deficits  Musculoskeletal: 5/5 strength b/l upper and lower extremities  Skin: No rashes    =======================================    INTERPRETATION OF TELEMETRY:    ECG:    ========================================    LABS:                        13.7   5.89  )-----------( 213      ( 08 May 2025 12:19 )             42.0     05-08    137  |  107  |  12  ----------------------------<  86  3.7   |  26  |  0.75    Ca    9.1      08 May 2025 12:19    TPro  6.5  /  Alb  3.3  /  TBili  0.3  /  DBili  x   /  AST  14[L]  /  ALT  25  /  AlkPhos  86  05-08    BNP    CARDIAC TESTING:    < from: TTE W or WO Ultrasound Enhancing Agent (05.08.25 @ 13:57) >  FINDINGS:     Left Ventricle:  The left ventricular cavity is normal in size. Left ventricular wall thickness is normal. Left ventricular systolic function is normal with an ejection fraction visually estimated at 55 to 60%. There is mild (grade 1) left ventricular diastolic dysfunction.     Right Ventricle:  The right ventricular cavity is normal in size and right ventricular systolic function is normal. Tricuspid annular plane systolic excursion (TAPSE) is 2.9 cm (normal >=1.7 cm).     Left Atrium:  The left atrium is mildly dilated with an indexed volume of 36.86 ml/m².     Right Atrium:  The right atrium is normal in size.     Interatrial Septum:  The interatrial septum appears intact.     Aortic Valve:  The aortic valve is tricuspid with normal leaflet excursion. There is mild thickening of the aortic valve leaflets.     Mitral Valve:  Structurally normal mitral valve with normal leaflet excursion. There is mild mitral regurgitation.     Tricuspid Valve:  Structurally normal tricuspid valve with normal leaflet excursion. There is trace tricuspid regurgitation. Estimated pulmonary artery systolic pressure is 11 mmHg.     Pulmonic Valve:  Structurally normal pulmonic valve with normal leaflet excursion. There is mild pulmonic regurgitation.     Aorta:  The aortic root at the sinuses of Valsalva is normal in size, measuring 3.30 cm (indexed 1.58 cm/m²). The ascending aorta is normal in size, measuring 3.50 cm (indexed 1.68cm/m²).     Pericardium:  No pericardial effusion seen.     Systemic Veins:  The inferior vena cava was not well visualized, PASP is at least 11 mmHg.      RADIOLOGY & ADDITIONAL STUDIES:    < from: CT Head No Cont (05.08.25 @ 12:48) >  No evidence of an acute intracranial hemorrhage or hydrocephalus. Chronic   infarcts. No significant interval change from prior.    < end of copied text >  < from: Xray Chest 1 View AP/PA. (05.08.25 @ 12:48) >  No acute finding or change.      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 Pt reports he had episode of emesis and then lost consciousness. Denies any other ROS.     5/9. Hx of Syncope  Has ILR in place - suspect EOL, given hx of syncope and CVA should re-implant ILR.    PAST MEDICAL & SURGICAL HISTORY:  CVA (cerebral vascular accident)  No significant past surgical history    FAMILY HISTORY: not pertinent   Social History:  no smoking, alcohol, or illicit drug use     Allergies  No Known Allergies  Intolerances    MEDICATIONS  (STANDING):  atorvastatin 80 milliGRAM(s) Oral at bedtime  fludroCORTISONE 0.1 milliGRAM(s) Oral daily  sertraline 50 milliGRAM(s) Oral daily    MEDICATIONS  (PRN):  acetaminophen     Tablet .. 650 milliGRAM(s) Oral every 6 hours PRN Temp greater or equal to 38C (100.4F), Mild Pain (1 - 3)  aluminum hydroxide/magnesium hydroxide/simethicone Suspension 30 milliLiter(s) Oral every 4 hours PRN Dyspepsia  melatonin 3 milliGRAM(s) Oral at bedtime PRN Insomnia  ondansetron Injectable 4 milliGRAM(s) IV Push every 8 hours PRN Nausea and/or Vomiting    Home Medications:  amLODIPine 5 mg oral tablet: 1 tab(s) orally once a day (08 May 2025 14:32)  atorvastatin 80 mg oral tablet: 1 tab(s) orally once a day (08 May 2025 14:32)  cholecalciferol 25 mcg (1000 intl units) oral tablet: 1 tab(s) orally once a day (08 May 2025 14:32)  Cozaar 100 mg oral tablet: 1 tab(s) orally once a day (08 May 2025 14:32)  fludrocortisone 0.1 mg oral tablet: 1 tab(s) orally once a day (08 May 2025 14:32)  sertraline 50 mg oral tablet: 1 tab(s) orally once a day (08 May 2025 14:32)    PHYSICAL EXAM:  T(C): 36.1 (09 May 2025 04:35), Max: 36.9 (08 May 2025 17:26)  T(F): 97 (09 May 2025 04:35), Max: 98.5 (08 May 2025 17:26)  HR: 57 (09 May 2025 04:35) (57 - 89)  BP: 126/75 (09 May 2025 04:35) (101/70 - 147/78)  RR: 18 (09 May 2025 04:35) (16 - 20)  SpO2: 96% (09 May 2025 04:35) (94% - 99%)    Parameters below as of 09 May 2025 04:35  Patient On (Oxygen Delivery Method): room air    Constitutional: NAD, awake and alert  HEENT: PERR, EOMI, Normal Hearing, MMM  Neck: Soft and supple, No LAD, No JVD  Respiratory: Breath sounds are clear bilaterally, No wheezing, rales or rhonchi  Cardiovascular: S1 and S2, regular rate and rhythm, no Murmurs, gallops or rubs  Gastrointestinal: Bowel Sounds present, soft, nontender, nondistended, no guarding, no rebound  Extremities: No peripheral edema  Vascular: 2+ peripheral pulses  Neurological: A/O x 3, no focal deficits, pressured speech  Musculoskeletal: 5/5 strength b/l upper and lower extremities  Skin: No rashes    =======================================    INTERPRETATION OF TELEMETRY: SR    ECG:  < from: 12 Lead ECG (05.08.25 @ 12:00) >  Diagnosis Line Sinus bradycardia  Left axis deviation  Right bundle branch block  Septal infarct , age undetermined  Abnormal ECG  When compared with ECG of 04-MAY-2024 14:17,  No significant change was found    ========================================    LABS:                        13.7   5.89  )-----------( 213      ( 08 May 2025 12:19 )             42.0     05-08    137  |  107  |  12  ----------------------------<  86  3.7   |  26  |  0.75    Ca    9.1      08 May 2025 12:19    TPro  6.5  /  Alb  3.3  /  TBili  0.3  /  DBili  x   /  AST  14[L]  /  ALT  25  /  AlkPhos  86  05-08    BNP    CARDIAC TESTING:    < from: TTE W or WO Ultrasound Enhancing Agent (05.08.25 @ 13:57) >  FINDINGS:     Left Ventricle:  The left ventricular cavity is normal in size. Left ventricular wall thickness is normal. Left ventricular systolic function is normal with an ejection fraction visually estimated at 55 to 60%. There is mild (grade 1) left ventricular diastolic dysfunction.     Right Ventricle:  The right ventricular cavity is normal in size and right ventricular systolic function is normal. Tricuspid annular plane systolic excursion (TAPSE) is 2.9 cm (normal >=1.7 cm).     Left Atrium:  The left atrium is mildly dilated with an indexed volume of 36.86 ml/m².     Right Atrium:  The right atrium is normal in size.     Interatrial Septum:  The interatrial septum appears intact.     Aortic Valve:  The aortic valve is tricuspid with normal leaflet excursion. There is mild thickening of the aortic valve leaflets.     Mitral Valve:  Structurally normal mitral valve with normal leaflet excursion. There is mild mitral regurgitation.     Tricuspid Valve:  Structurally normal tricuspid valve with normal leaflet excursion. There is trace tricuspid regurgitation. Estimated pulmonary artery systolic pressure is 11 mmHg.     Pulmonic Valve:  Structurally normal pulmonic valve with normal leaflet excursion. There is mild pulmonic regurgitation.     Aorta:  The aortic root at the sinuses of Valsalva is normal in size, measuring 3.30 cm (indexed 1.58 cm/m²). The ascending aorta is normal in size, measuring 3.50 cm (indexed 1.68cm/m²).     Pericardium:  No pericardial effusion seen.     Systemic Veins:  The inferior vena cava was not well visualized, PASP is at least 11 mmHg.      RADIOLOGY & ADDITIONAL STUDIES:    < from: CT Head No Cont (05.08.25 @ 12:48) >  No evidence of an acute intracranial hemorrhage or hydrocephalus. Chronic   infarcts. No significant interval change from prior.    < from: Xray Chest 1 View AP/PA. (05.08.25 @ 12:48) >  No acute finding or change.

## 2025-05-09 NOTE — DISCHARGE NOTE NURSING/CASE MANAGEMENT/SOCIAL WORK - FINANCIAL ASSISTANCE
[facility] provides services at a reduced cost to those who are determined to be eligible through [facility]’s financial assistance program. Information regarding [facility]’s financial assistance program can be found by going to [email or web address] or by calling [phone].

## 2025-05-09 NOTE — PHYSICAL THERAPY INITIAL EVALUATION ADULT - GENERAL OBSERVATIONS, REHAB EVAL
Pt seen on 3N, NAD, speech delays but intelligible, pt alert and cooperative, PVT aide at bedside. R UE not functional at baseline, R DF deficits

## 2025-05-09 NOTE — PHYSICAL THERAPY INITIAL EVALUATION ADULT - ACTIVE RANGE OF MOTION EXAMINATION, REHAB EVAL
except R DF weakness/Left UE Active ROM was WFL (within functional limits)/bilateral  lower extremity Active ROM was WFL (within functional limits)

## 2025-05-09 NOTE — DISCHARGE NOTE PROVIDER - CARE PROVIDER_API CALL
Kumar Resendiz Hornick  Pulmonary Disease  [address] [facility], [address] [address]  Phone: [phone]  Fax: [phone]  Established Patient  Follow Up Time: 2 weeks

## 2025-05-09 NOTE — DISCHARGE NOTE PROVIDER - NSDCACTIVITY_GEN_ALL_CORE
Return to prior activity as tolerated/No restrictions/Do not drive or operate machinery/Activity as tolerated

## 2025-05-09 NOTE — DISCHARGE NOTE NURSING/CASE MANAGEMENT/SOCIAL WORK - NSTRANSFERBELONGINGSRESP_GEN_A_NUR
Patient was calling the office in regards to her MRI. Stated that insurance denied it and would like to know next steps. Clinical staff unavailable. Please call back and kindly assist.   yes

## 2025-05-09 NOTE — CONSULT NOTE ADULT - ASSESSMENT
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 Pt reports he had episode of emesis and then lost consciousness. Denies any other ROS.   Last seen in our office in 2020    Syncope  hx of syncope in the past with ILR  Orthostatic hypotension   Hypertension  Hyperlipidemia    Monitor on tele  Continue florinef  Per 2020 office note, patient was on aspirin 81mg.  Check orthostatic vitals  Echocardiogram, NLVEF, nos significant valvulopathy  CTH no acute findings, CXR clear             70 yo w/PMH Left frontal stroke w/ Left ICA occlusion, s/p Rt sided residual weakness, s/p tPA in Feb/'20 with residual speech impediment and right upper extremity weakness, orthostatic hypotension presents to the ED s/p syncope, Pt was working  w/ a speech therapist today. Pt reports he had episode of emesis and then lost consciousness. Denies any other ROS.   Last seen in our office in 2020    Syncope  hx of syncope in the past with ILR  Orthostatic hypotension   Hypertension  Hyperlipidemia    Monitor on tele - no events thus far  Continue florinef  Per 2020 office note, patient was on aspirin 81mg.  Orthostatic vitals - neg  Echocardiogram, NLVEF, nos significant valvulopathy  CTH no acute findings, CXR clear   Would keep off Norvasc, BPs appear stable off Cozaar and Norvasc  Recommend ILR explant/re-implant - can do as outpatient  Ischemic workup as outpaient  Will follow

## 2025-05-09 NOTE — PHYSICAL THERAPY INITIAL EVALUATION ADULT - PERTINENT HX OF CURRENT PROBLEM, REHAB EVAL
"70 y/o M, presents to the ED s/p syncope, reports he had episode of emesis and then lost consciousness. CTH no acute findings, CXR clear   PMH Left frontal stroke w/ Left ICA occlusion, s/p Rt sided residual weakness, s/p tPA in Feb/'20 with residual speech impediment and right upper extremity weakness, orthostatic hypotension

## 2025-05-09 NOTE — DISCHARGE NOTE PROVIDER - NSDCFUSCHEDAPPT_GEN_ALL_CORE_FT
João Morocho  [facility] Physician [facility]  DERM [address]  Scheduled Appointment: 05/15/2025    Kumar Resendiz  [facility] Physician [facility]  INTMED [address]  Scheduled Appointment: 07/16/2025    Gonzalez Christine  [facility]  NEUROLOGY [address] [facility]  Scheduled Appointment: 07/31/2025

## 2025-05-09 NOTE — DISCHARGE NOTE PROVIDER - HOSPITAL COURSE
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 Pt reports he had episode of emesis and then lost consciousness. Denies any other ROS. "    Admitting/Discharge Diagnosis:     Vasovagal syncope  Hx of orthostatic hypotension/autonumic dysfunction (on florinef)  Personal of CVA/TIA at baseline on right Residual Deficit    T(C): 36.7 (05-09-25 @ 11:13), Max: 36.9 (05-08-25 @ 17:26)  HR: 61 (05-09-25 @ 11:13) (57 - 89)  BP: 126/73 (05-09-25 @ 11:13) (115/93 - 147/78)  RR: 18 (05-09-25 @ 11:13) (16 - 19)  SpO2: 96% (05-09-25 @ 11:13) (94% - 99%)  AAOx3; NAD  RRR  No JVD  Lung CTA bilaterally; normal respiratory effort  Abdomen benign  AAOx3; Baseline right weakness; no new focal deficit  Discharge Management: 38 minutes   Date of Discharge/Service: 5/9/2025

## 2025-05-09 NOTE — DISCHARGE NOTE NURSING/CASE MANAGEMENT/SOCIAL WORK - PATIENT PORTAL LINK FT
You can access the FollowMyHealth Patient Portal offered by [facility] by registering at the following website: [email or web address]. By joining [employer]’s FollowMyHealth portal, you will also be able to view your health information using other applications (apps) compatible with our system.

## 2025-05-12 ENCOUNTER — LABORATORY RESULT (OUTPATIENT)
Age: 70
End: 2025-05-12

## 2025-05-13 ENCOUNTER — LABORATORY RESULT (OUTPATIENT)
Age: 70
End: 2025-05-13

## 2025-05-15 ENCOUNTER — LABORATORY RESULT (OUTPATIENT)
Age: 70
End: 2025-05-15

## 2025-05-15 ENCOUNTER — APPOINTMENT (OUTPATIENT)
Dept: DERMATOLOGY | Facility: CLINIC | Age: 70
End: 2025-05-15
Payer: MEDICARE

## 2025-05-15 ENCOUNTER — NON-APPOINTMENT (OUTPATIENT)
Age: 70
End: 2025-05-15

## 2025-05-15 DIAGNOSIS — L57.0 ACTINIC KERATOSIS: ICD-10-CM

## 2025-05-15 DIAGNOSIS — L82.1 OTHER SEBORRHEIC KERATOSIS: ICD-10-CM

## 2025-05-15 DIAGNOSIS — D18.01 HEMANGIOMA OF SKIN AND SUBCUTANEOUS TISSUE: ICD-10-CM

## 2025-05-15 PROCEDURE — 17000 DESTRUCT PREMALG LESION: CPT

## 2025-05-15 PROCEDURE — 17003 DESTRUCT PREMALG LES 2-14: CPT

## 2025-05-15 PROCEDURE — 99203 OFFICE O/P NEW LOW 30 MIN: CPT | Mod: 25

## 2025-05-22 DIAGNOSIS — I95.1 ORTHOSTATIC HYPOTENSION: ICD-10-CM

## 2025-05-22 DIAGNOSIS — E78.5 HYPERLIPIDEMIA, UNSPECIFIED: ICD-10-CM

## 2025-05-22 DIAGNOSIS — I69.351 HEMIPLEGIA AND HEMIPARESIS FOLLOWING CEREBRAL INFARCTION AFFECTING RIGHT DOMINANT SIDE: ICD-10-CM

## 2025-05-22 DIAGNOSIS — I10 ESSENTIAL (PRIMARY) HYPERTENSION: ICD-10-CM

## 2025-05-22 DIAGNOSIS — I69.328 OTHER SPEECH AND LANGUAGE DEFICITS FOLLOWING CEREBRAL INFARCTION: ICD-10-CM

## 2025-07-16 ENCOUNTER — APPOINTMENT (OUTPATIENT)
Dept: INTERNAL MEDICINE | Facility: CLINIC | Age: 70
End: 2025-07-16

## 2025-07-16 VITALS
DIASTOLIC BLOOD PRESSURE: 86 MMHG | TEMPERATURE: 98.2 F | OXYGEN SATURATION: 96 % | WEIGHT: 200 LBS | SYSTOLIC BLOOD PRESSURE: 150 MMHG | RESPIRATION RATE: 15 BRPM | BODY MASS INDEX: 28.63 KG/M2 | HEIGHT: 70 IN | HEART RATE: 76 BPM

## 2025-07-16 PROBLEM — Z86.79 HISTORY OF HYPERTENSION: Status: RESOLVED | Noted: 2020-11-19 | Resolved: 2025-07-16

## 2025-07-16 PROBLEM — R55 VASOVAGAL NEAR SYNCOPE: Status: ACTIVE | Noted: 2025-07-16

## 2025-07-16 PROBLEM — I69.322 DYSARTHRIA FOLLOWING CEREBROVASCULAR ACCIDENT: Status: ACTIVE | Noted: 2025-07-16

## 2025-07-16 PROBLEM — Z87.448 HISTORY OF HEMATURIA: Status: RESOLVED | Noted: 2023-01-19 | Resolved: 2025-07-16

## 2025-07-16 PROBLEM — R74.8 ALKALINE PHOSPHATASE ELEVATION: Status: RESOLVED | Noted: 2023-01-19 | Resolved: 2025-07-16

## 2025-07-16 PROCEDURE — 99214 OFFICE O/P EST MOD 30 MIN: CPT

## 2025-07-16 PROCEDURE — G2211 COMPLEX E/M VISIT ADD ON: CPT

## 2025-07-22 NOTE — ED ADULT TRIAGE NOTE - PAIN: PRESENCE, MLM
Advance Care Planning   Healthcare Decision Maker:    Primary Decision Maker: Lennie Boyd [facility] - [phone]    Click here to complete Healthcare Decision Makers including selection of the Healthcare Decision Maker Relationship (ie \"Primary\").             
non-verbal indicators of pain/discomfort absent

## 2025-07-31 ENCOUNTER — APPOINTMENT (OUTPATIENT)
Dept: NEUROLOGY | Facility: CLINIC | Age: 70
End: 2025-07-31

## 2025-08-01 ENCOUNTER — APPOINTMENT (OUTPATIENT)
Dept: INTERNAL MEDICINE | Facility: CLINIC | Age: 70
End: 2025-08-01
Payer: MEDICARE

## 2025-08-01 VITALS — SYSTOLIC BLOOD PRESSURE: 140 MMHG | DIASTOLIC BLOOD PRESSURE: 90 MMHG

## 2025-08-01 VITALS
DIASTOLIC BLOOD PRESSURE: 94 MMHG | TEMPERATURE: 97.9 F | RESPIRATION RATE: 15 BRPM | BODY MASS INDEX: 28.63 KG/M2 | WEIGHT: 200 LBS | HEIGHT: 70 IN | HEART RATE: 67 BPM | OXYGEN SATURATION: 95 % | SYSTOLIC BLOOD PRESSURE: 154 MMHG

## 2025-08-01 DIAGNOSIS — I10 ESSENTIAL (PRIMARY) HYPERTENSION: ICD-10-CM

## 2025-08-01 PROCEDURE — 99214 OFFICE O/P EST MOD 30 MIN: CPT

## 2025-08-01 RX ORDER — AMLODIPINE BESYLATE 5 MG/1
5 TABLET ORAL
Qty: 90 | Refills: 1 | Status: ACTIVE | COMMUNITY
Start: 2025-08-01 | End: 1900-01-01

## 2025-08-12 ENCOUNTER — APPOINTMENT (OUTPATIENT)
Dept: NEUROLOGY | Facility: CLINIC | Age: 70
End: 2025-08-12

## 2025-08-12 PROCEDURE — 64644 CHEMODENERV 1 EXTREM 5/> MUS: CPT

## 2025-08-12 PROCEDURE — 99213 OFFICE O/P EST LOW 20 MIN: CPT | Mod: 25

## 2025-08-25 ENCOUNTER — APPOINTMENT (OUTPATIENT)
Dept: INTERNAL MEDICINE | Facility: CLINIC | Age: 70
End: 2025-08-25
Payer: MEDICARE

## 2025-08-25 VITALS
OXYGEN SATURATION: 97 % | TEMPERATURE: 98.1 F | SYSTOLIC BLOOD PRESSURE: 140 MMHG | HEART RATE: 70 BPM | HEIGHT: 70 IN | BODY MASS INDEX: 28.35 KG/M2 | WEIGHT: 198 LBS | DIASTOLIC BLOOD PRESSURE: 84 MMHG

## 2025-08-25 DIAGNOSIS — I10 ESSENTIAL (PRIMARY) HYPERTENSION: ICD-10-CM

## 2025-08-25 PROCEDURE — 99214 OFFICE O/P EST MOD 30 MIN: CPT

## 2025-08-26 ENCOUNTER — EMERGENCY (EMERGENCY)
Facility: HOSPITAL | Age: 70
LOS: 0 days | Discharge: HOME | End: 2025-08-26
Attending: STUDENT IN AN ORGANIZED HEALTH CARE EDUCATION/TRAINING PROGRAM
Payer: MEDICARE

## 2025-08-26 VITALS
TEMPERATURE: 98 F | SYSTOLIC BLOOD PRESSURE: 141 MMHG | RESPIRATION RATE: 18 BRPM | DIASTOLIC BLOOD PRESSURE: 76 MMHG | HEART RATE: 79 BPM | OXYGEN SATURATION: 99 %

## 2025-08-26 VITALS
RESPIRATION RATE: 16 BRPM | WEIGHT: 199.96 LBS | OXYGEN SATURATION: 100 % | SYSTOLIC BLOOD PRESSURE: 98 MMHG | DIASTOLIC BLOOD PRESSURE: 67 MMHG | HEART RATE: 63 BPM | TEMPERATURE: 98 F | HEIGHT: 71 IN

## 2025-08-26 DIAGNOSIS — R29.810 FACIAL WEAKNESS: ICD-10-CM

## 2025-08-26 DIAGNOSIS — R79.1 ABNORMAL COAGULATION PROFILE: ICD-10-CM

## 2025-08-26 DIAGNOSIS — R42 DIZZINESS AND GIDDINESS: ICD-10-CM

## 2025-08-26 DIAGNOSIS — K92.0 HEMATEMESIS: ICD-10-CM

## 2025-08-26 DIAGNOSIS — R47.81 SLURRED SPEECH: ICD-10-CM

## 2025-08-26 DIAGNOSIS — R55 SYNCOPE AND COLLAPSE: ICD-10-CM

## 2025-08-26 DIAGNOSIS — R53.1 WEAKNESS: ICD-10-CM

## 2025-08-26 DIAGNOSIS — E87.20 ACIDOSIS, UNSPECIFIED: ICD-10-CM

## 2025-08-26 LAB
ALBUMIN SERPL ELPH-MCNC: 3.4 G/DL — SIGNIFICANT CHANGE UP (ref 3.3–5)
ALP SERPL-CCNC: 111 U/L — SIGNIFICANT CHANGE UP (ref 40–120)
ALT FLD-CCNC: 37 U/L — SIGNIFICANT CHANGE UP (ref 12–78)
ANION GAP SERPL CALC-SCNC: 4 MMOL/L — LOW (ref 5–17)
APPEARANCE UR: CLEAR — SIGNIFICANT CHANGE UP
AST SERPL-CCNC: 25 U/L — SIGNIFICANT CHANGE UP (ref 15–37)
BASOPHILS # BLD AUTO: 0.05 K/UL — SIGNIFICANT CHANGE UP (ref 0–0.2)
BASOPHILS NFR BLD AUTO: 0.9 % — SIGNIFICANT CHANGE UP (ref 0–2)
BILIRUB SERPL-MCNC: 0.5 MG/DL — SIGNIFICANT CHANGE UP (ref 0.2–1.2)
BILIRUB UR-MCNC: NEGATIVE — SIGNIFICANT CHANGE UP
BUN SERPL-MCNC: 11 MG/DL — SIGNIFICANT CHANGE UP (ref 7–23)
CALCIUM SERPL-MCNC: 8.8 MG/DL — SIGNIFICANT CHANGE UP (ref 8.5–10.1)
CHLORIDE SERPL-SCNC: 102 MMOL/L — SIGNIFICANT CHANGE UP (ref 96–108)
CO2 SERPL-SCNC: 27 MMOL/L — SIGNIFICANT CHANGE UP (ref 22–31)
COLOR SPEC: YELLOW — SIGNIFICANT CHANGE UP
CREAT SERPL-MCNC: 0.82 MG/DL — SIGNIFICANT CHANGE UP (ref 0.5–1.3)
D DIMER BLD IA.RAPID-MCNC: 767 NG/ML DDU — HIGH
DIFF PNL FLD: NEGATIVE — SIGNIFICANT CHANGE UP
EGFR: 94 ML/MIN/1.73M2 — SIGNIFICANT CHANGE UP
EGFR: 94 ML/MIN/1.73M2 — SIGNIFICANT CHANGE UP
EOSINOPHIL # BLD AUTO: 0.12 K/UL — SIGNIFICANT CHANGE UP (ref 0–0.5)
EOSINOPHIL NFR BLD AUTO: 2.1 % — SIGNIFICANT CHANGE UP (ref 0–6)
GLUCOSE SERPL-MCNC: 95 MG/DL — SIGNIFICANT CHANGE UP (ref 70–99)
GLUCOSE UR QL: NEGATIVE MG/DL — SIGNIFICANT CHANGE UP
HCT VFR BLD CALC: 45 % — SIGNIFICANT CHANGE UP (ref 39–50)
HGB BLD-MCNC: 14.8 G/DL — SIGNIFICANT CHANGE UP (ref 13–17)
IMM GRANULOCYTES # BLD AUTO: 0.02 K/UL — SIGNIFICANT CHANGE UP (ref 0–0.07)
IMM GRANULOCYTES NFR BLD AUTO: 0.4 % — SIGNIFICANT CHANGE UP (ref 0–0.9)
KETONES UR QL: NEGATIVE MG/DL — SIGNIFICANT CHANGE UP
LACTATE SERPL-SCNC: 1.1 MMOL/L — SIGNIFICANT CHANGE UP (ref 0.7–2)
LACTATE SERPL-SCNC: 3.4 MMOL/L — HIGH (ref 0.7–2)
LEUKOCYTE ESTERASE UR-ACNC: NEGATIVE — SIGNIFICANT CHANGE UP
LYMPHOCYTES # BLD AUTO: 1.52 K/UL — SIGNIFICANT CHANGE UP (ref 1–3.3)
LYMPHOCYTES NFR BLD AUTO: 26.9 % — SIGNIFICANT CHANGE UP (ref 13–44)
MCHC RBC-ENTMCNC: 29.9 PG — SIGNIFICANT CHANGE UP (ref 27–34)
MCHC RBC-ENTMCNC: 32.9 G/DL — SIGNIFICANT CHANGE UP (ref 32–36)
MCV RBC AUTO: 90.9 FL — SIGNIFICANT CHANGE UP (ref 80–100)
MONOCYTES # BLD AUTO: 0.54 K/UL — SIGNIFICANT CHANGE UP (ref 0–0.9)
MONOCYTES NFR BLD AUTO: 9.6 % — SIGNIFICANT CHANGE UP (ref 2–14)
NEUTROPHILS # BLD AUTO: 3.4 K/UL — SIGNIFICANT CHANGE UP (ref 1.8–7.4)
NEUTROPHILS NFR BLD AUTO: 60.1 % — SIGNIFICANT CHANGE UP (ref 43–77)
NITRITE UR-MCNC: NEGATIVE — SIGNIFICANT CHANGE UP
NRBC # BLD AUTO: 0 K/UL — SIGNIFICANT CHANGE UP (ref 0–0)
NRBC # FLD: 0 K/UL — SIGNIFICANT CHANGE UP (ref 0–0)
NRBC BLD AUTO-RTO: 0 /100 WBCS — SIGNIFICANT CHANGE UP (ref 0–0)
NT-PROBNP SERPL-SCNC: 24 PG/ML — SIGNIFICANT CHANGE UP (ref 0–125)
PH UR: 8 — SIGNIFICANT CHANGE UP (ref 5–8)
PLATELET # BLD AUTO: 226 K/UL — SIGNIFICANT CHANGE UP (ref 150–400)
PMV BLD: 9.3 FL — SIGNIFICANT CHANGE UP (ref 7–13)
POTASSIUM SERPL-MCNC: 3.8 MMOL/L — SIGNIFICANT CHANGE UP (ref 3.5–5.3)
POTASSIUM SERPL-SCNC: 3.8 MMOL/L — SIGNIFICANT CHANGE UP (ref 3.5–5.3)
PROT SERPL-MCNC: 6.9 GM/DL — SIGNIFICANT CHANGE UP (ref 6–8.3)
PROT UR-MCNC: NEGATIVE MG/DL — SIGNIFICANT CHANGE UP
RBC # BLD: 4.95 M/UL — SIGNIFICANT CHANGE UP (ref 4.2–5.8)
RBC # FLD: 12.4 % — SIGNIFICANT CHANGE UP (ref 10.3–14.5)
SODIUM SERPL-SCNC: 133 MMOL/L — LOW (ref 135–145)
SP GR SPEC: 1.02 — SIGNIFICANT CHANGE UP (ref 1–1.03)
TROPONIN I, HIGH SENSITIVITY RESULT: 4.24 NG/L — SIGNIFICANT CHANGE UP
TROPONIN I, HIGH SENSITIVITY RESULT: 4.54 NG/L — SIGNIFICANT CHANGE UP
UROBILINOGEN FLD QL: 0.2 MG/DL — SIGNIFICANT CHANGE UP (ref 0.2–1)
WBC # BLD: 5.65 K/UL — SIGNIFICANT CHANGE UP (ref 3.8–10.5)
WBC # FLD AUTO: 5.65 K/UL — SIGNIFICANT CHANGE UP (ref 3.8–10.5)

## 2025-08-26 PROCEDURE — 81003 URINALYSIS AUTO W/O SCOPE: CPT

## 2025-08-26 PROCEDURE — 83880 ASSAY OF NATRIURETIC PEPTIDE: CPT

## 2025-08-26 PROCEDURE — 71275 CT ANGIOGRAPHY CHEST: CPT | Mod: 26

## 2025-08-26 PROCEDURE — 83605 ASSAY OF LACTIC ACID: CPT

## 2025-08-26 PROCEDURE — 85379 FIBRIN DEGRADATION QUANT: CPT

## 2025-08-26 PROCEDURE — 82962 GLUCOSE BLOOD TEST: CPT

## 2025-08-26 PROCEDURE — 93010 ELECTROCARDIOGRAM REPORT: CPT

## 2025-08-26 PROCEDURE — 96374 THER/PROPH/DIAG INJ IV PUSH: CPT | Mod: XU

## 2025-08-26 PROCEDURE — 99285 EMERGENCY DEPT VISIT HI MDM: CPT

## 2025-08-26 PROCEDURE — 74177 CT ABD & PELVIS W/CONTRAST: CPT | Mod: 26

## 2025-08-26 PROCEDURE — 85025 COMPLETE CBC W/AUTO DIFF WBC: CPT

## 2025-08-26 PROCEDURE — 84484 ASSAY OF TROPONIN QUANT: CPT

## 2025-08-26 PROCEDURE — 71045 X-RAY EXAM CHEST 1 VIEW: CPT

## 2025-08-26 PROCEDURE — 99285 EMERGENCY DEPT VISIT HI MDM: CPT | Mod: 25

## 2025-08-26 PROCEDURE — 70450 CT HEAD/BRAIN W/O DYE: CPT

## 2025-08-26 PROCEDURE — 74177 CT ABD & PELVIS W/CONTRAST: CPT

## 2025-08-26 PROCEDURE — 36415 COLL VENOUS BLD VENIPUNCTURE: CPT

## 2025-08-26 PROCEDURE — 93005 ELECTROCARDIOGRAM TRACING: CPT

## 2025-08-26 PROCEDURE — 72125 CT NECK SPINE W/O DYE: CPT | Mod: 26

## 2025-08-26 PROCEDURE — 70450 CT HEAD/BRAIN W/O DYE: CPT | Mod: 26

## 2025-08-26 PROCEDURE — 71275 CT ANGIOGRAPHY CHEST: CPT

## 2025-08-26 PROCEDURE — 80053 COMPREHEN METABOLIC PANEL: CPT

## 2025-08-26 PROCEDURE — 71045 X-RAY EXAM CHEST 1 VIEW: CPT | Mod: 26

## 2025-08-26 PROCEDURE — 72125 CT NECK SPINE W/O DYE: CPT

## 2025-08-26 RX ORDER — ONDANSETRON HCL/PF 4 MG/2 ML
4 VIAL (ML) INJECTION ONCE
Refills: 0 | Status: COMPLETED | OUTPATIENT
Start: 2025-08-26 | End: 2025-08-26

## 2025-08-26 RX ADMIN — Medication 4 MILLIGRAM(S): at 11:24

## 2025-08-26 RX ADMIN — Medication 1000 MILLILITER(S): at 11:24
